# Patient Record
Sex: MALE | Race: WHITE | Employment: OTHER | URBAN - METROPOLITAN AREA
[De-identification: names, ages, dates, MRNs, and addresses within clinical notes are randomized per-mention and may not be internally consistent; named-entity substitution may affect disease eponyms.]

---

## 2017-01-12 ENCOUNTER — HOSPITAL ENCOUNTER (OUTPATIENT)
Dept: GENERAL RADIOLOGY | Age: 67
Discharge: HOME | End: 2017-01-12
Attending: FAMILY MEDICINE
Payer: COMMERCIAL

## 2017-01-12 DIAGNOSIS — M25.471 ANKLE SWELLING, RIGHT: ICD-10-CM
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
## 2017-01-12 PROCEDURE — 73610 X-RAY EXAM OF ANKLE: CPT

## 2017-03-10 ENCOUNTER — HOSPITAL ENCOUNTER (OUTPATIENT)
Dept: CT IMAGING | Age: 67
Discharge: HOME | End: 2017-03-10
Payer: COMMERCIAL

## 2017-03-10 DIAGNOSIS — N20.0 KIDNEY STONE: ICD-10-CM

## 2017-03-10 DIAGNOSIS — R31.9 HEMATURIA: ICD-10-CM

## 2017-03-10 PROCEDURE — 74176 CT ABD & PELVIS W/O CONTRAST: CPT

## 2017-07-13 ENCOUNTER — ANESTHESIA EVENT (OUTPATIENT)
Dept: ENDOSCOPY | Age: 67
End: 2017-07-13
Payer: COMMERCIAL

## 2017-07-13 RX ORDER — SODIUM CHLORIDE, SODIUM LACTATE, POTASSIUM CHLORIDE, CALCIUM CHLORIDE 600; 310; 30; 20 MG/100ML; MG/100ML; MG/100ML; MG/100ML
100 INJECTION, SOLUTION INTRAVENOUS CONTINUOUS
Status: CANCELLED | OUTPATIENT
Start: 2017-07-13

## 2017-07-13 RX ORDER — DIPHENHYDRAMINE HYDROCHLORIDE 50 MG/ML
12.5 INJECTION, SOLUTION INTRAMUSCULAR; INTRAVENOUS
Status: CANCELLED | OUTPATIENT
Start: 2017-07-13

## 2017-07-13 RX ORDER — ALBUTEROL SULFATE 0.83 MG/ML
2.5 SOLUTION RESPIRATORY (INHALATION) AS NEEDED
Status: CANCELLED | OUTPATIENT
Start: 2017-07-13

## 2017-07-13 RX ORDER — OXYCODONE HYDROCHLORIDE 5 MG/1
5 TABLET ORAL
Status: CANCELLED | OUTPATIENT
Start: 2017-07-13

## 2017-07-13 RX ORDER — HYDROMORPHONE HYDROCHLORIDE 2 MG/ML
0.5 INJECTION, SOLUTION INTRAMUSCULAR; INTRAVENOUS; SUBCUTANEOUS
Status: CANCELLED | OUTPATIENT
Start: 2017-07-13

## 2017-07-13 RX ORDER — OXYCODONE HYDROCHLORIDE 5 MG/1
10 TABLET ORAL
Status: CANCELLED | OUTPATIENT
Start: 2017-07-13

## 2017-07-13 RX ORDER — ONDANSETRON 2 MG/ML
4 INJECTION INTRAMUSCULAR; INTRAVENOUS ONCE
Status: CANCELLED | OUTPATIENT
Start: 2017-07-13 | End: 2017-07-13

## 2017-07-13 RX ORDER — NALOXONE HYDROCHLORIDE 0.4 MG/ML
0.1 INJECTION, SOLUTION INTRAMUSCULAR; INTRAVENOUS; SUBCUTANEOUS AS NEEDED
Status: CANCELLED | OUTPATIENT
Start: 2017-07-13

## 2017-07-13 NOTE — H&P
Patient:  Daria Kim  YOB: 1950   Date:                   7/14/17      This 77year old  patient was referred by Jailyn Ribeiro NP. Chief Complaint:  Patient seen at the request of Jailyn Ribeiro NP for evaluation of the following:  diarrhea    History of Present Illness:  1.  diarrhea   Mr. Sharda Laughlin is a 77year old male who presents for diarrhea. His last colonoscopy was done by Dr. Jose Luis Lyons on 12/21/10 which was normal. He states that his atrial fibrillation is currently well controlled on Tikosyn, and he is on Pradaxa. His mother had colon polyps but he denies family history of colon cancer. He reports that he has been experiencing diarrhea since February and also passed two kidney stones, one in February and one in March. He has diarrhea about 3-4 times a week and usually has only one episode daily. He will experience fecal urgency at times as well. He says that his mail order company has changed which caused some of the brands of his medications to change recently.         PAST MEDICAL/SURGICAL HISTORY  (Detailed)    Disease/disorder Onset Date Management Date Comments     Angioplasty 1995      CABG 1999    ADD       Coronary artery disease       Degenerative disc disease  celebrex     Diabetes type 2       Hyperlipidemia       Sleep apnea       Thyroid disease               PROVIDER INFORMATION AND PATIENT ADMISSION:    CURRENT MEDICATIONS  Medication Name Sig Desc   aspirin 81 mg tablet,delayed release take 1 tablet by oral route  every day   L-Lysine 500 mg tablet 1 qod   Tikosyn 500 mcg capsule take 1 capsule by oral route 2 times every day   Lotrel 5 mg-20 mg capsule take 1 capsule by oral route  every day   Lipitor 80 mg tablet take 0.5 tablet by oral route  every day   Feldene 20 mg capsule take 1 capsule by oral route  every day   Pradaxa 150 mg capsule take 1 capsule by oral route 2 times every day   Synthroid 88 mcg tablet take 1 tablet by oral route  every day   Vitamin B-12 1,000 mcg tablet Once daily   Fluticasone 50 mcg/Actuation Nasal Spray, Susp spray 1 spray by Intranasal route  every day in each nostril   Neurontin 300 mg Cap take 1 capsule (300MG)  by ORAL route 3 times every day   Omeprazole 20 mg Cap, Delayed Release take 1 capsule (20MG)  by ORAL route  every day before a meal   Tricor 145 mg Tab take 1 tablet (145MG)  by ORAL route  every day     Reviewed, no changes. Family History  (Detailed)  Relationship Family Member Name  Age at Death Condition Onset Age Cause of Death   Father  Y       Father  Y 72 Coronary artery disease  Y   Mother    Colon polyps  N     SOCIAL HISTORY  (Detailed)  Preferred language is [place]. EDUCATION/EMPLOYMENT/OCCUPATION  Employment History Status Retired Restrictions   [employer]         MARITAL STATUS/FAMILY/SOCIAL SUPPORT  Currently single. SMOKING STATUS  Use Status Type Smoking Status Usage Per Day Years Used Total Pack Years   no/never  Never smoker          REVIEW OF SYSTEMS  System Neg/Pos Details   ENMT Negative Hearing deficit. Eyes Negative Vision changes. Respiratory Negative Dyspnea. Cardio Negative Chest pain and irregular heartbeat/palpitations.  Negative Dysuria and urinary frequency. Endocrine Negative Cold intolerance and heat intolerance. Neuro Negative Confusion/disorientation, dizziness, headache and seizures. Hema/Lymph Negative Easy bleeding. VITAL SIGNS  BP mm/Hg Pulse/min Resp/min Temp F Height (Total in.) Weight (lbs.) Weight (oz.) BMI   162/84 64   69.00 173.00  25.55     PHYSICAL EXAM:  Weight has remained stable. Patient is healthy-appearing in no acute  distress. Further exam deferred at this time. Completed Orders (this encounter)  Order Details   Dietary management education, guidance, and counseling      Assessment/Plan  # Detail Type Description    1. Assessment Body mass index (BMI) 25.0-25.9, adult (C12.02).     Plan Orders Today's instructions / counseling include(s) Dietary management education, guidance, and counseling. 2. Assessment Functional diarrhea (K59.1). 3. Assessment Atrial fibrillation, unspecified type (I48.91). 4. Assessment Type 2 diabetes mellitus without complications (H13.8). 5. Assessment Coronary artery disease involving coronary bypass graft of native heart without angina pectoris (I25.810). 6. Assessment Colon cancer screening (Z12.11). Provider Plan Patient is complex from a  cardiac standpoint. He is on Tikosyn which will need to be continued. We can hold the Pradaxa. When he arrives for the procedure, we will check a QT interval on the rhythm strip. We will be prepared for [place] Point[place] in case an issue arises. We will obtain a stool for c. diff and giardia. We will hold anticoagulation for the procedure and biopsies will be taken. Further recommendations can be made following procedure. I suspect he has a post infectious diarrhea. We may use Enteragam in the future pending results of the procedure and the biopsies. Patient is schedule to see Dr. Toby Palm later this week so we will have a QT interval available, and he will be made aware of our plans. Plan Orders Further diagnostic evaluations ordered today include(s) Colonoscopy to be performed. Counseling / Educational Factors:  Items reviewed / discussed during today's visit: prior testing / procedures were reviewed; testing was discussed in detail; old records were reviewed; prescription medication usage was discussed; advised to follow up if symptoms worsen or do not completely resolve; symptomatic care was discussed; advised to continue current treatment. Patient expressed understanding of instructions. PT SEEN AND EXAMINED AND PLAN DISCUSSED AND IMPLEMENTED.   Dorota Ramirez MD

## 2017-07-14 ENCOUNTER — HOSPITAL ENCOUNTER (OUTPATIENT)
Age: 67
Setting detail: OUTPATIENT SURGERY
Discharge: HOME | End: 2017-07-14
Attending: INTERNAL MEDICINE | Admitting: INTERNAL MEDICINE
Payer: COMMERCIAL

## 2017-07-14 ENCOUNTER — ANESTHESIA (OUTPATIENT)
Dept: ENDOSCOPY | Age: 67
End: 2017-07-14
Payer: COMMERCIAL

## 2017-07-14 VITALS
TEMPERATURE: 98.6 F | HEIGHT: 69 IN | RESPIRATION RATE: 12 BRPM | WEIGHT: 170 LBS | DIASTOLIC BLOOD PRESSURE: 73 MMHG | HEART RATE: 62 BPM | BODY MASS INDEX: 25.18 KG/M2 | SYSTOLIC BLOOD PRESSURE: 152 MMHG | OXYGEN SATURATION: 99 %

## 2017-07-14 LAB
ATRIAL RATE: 300 BPM
CALCULATED R AXIS, ECG10: 50 DEGREES
CALCULATED T AXIS, ECG11: 85 DEGREES
DIAGNOSIS, 93000: NORMAL
GLUCOSE BLD STRIP.AUTO-MCNC: 91 MG/DL (ref 65–100)
MAGNESIUM SERPL-MCNC: 1.6 MG/DL (ref 1.8–2.4)
POTASSIUM SERPL-SCNC: 3.3 MMOL/L (ref 3.5–5.1)
Q-T INTERVAL, ECG07: 436 MS
QRS DURATION, ECG06: 92 MS
QTC CALCULATION (BEZET), ECG08: 467 MS
VENTRICULAR RATE, ECG03: 69 BPM

## 2017-07-14 PROCEDURE — 88305 TISSUE EXAM BY PATHOLOGIST: CPT | Performed by: INTERNAL MEDICINE

## 2017-07-14 PROCEDURE — 82962 GLUCOSE BLOOD TEST: CPT

## 2017-07-14 PROCEDURE — 77030009426 HC FCPS BIOP ENDOSC BSC -B: Performed by: INTERNAL MEDICINE

## 2017-07-14 PROCEDURE — 76040000025: Performed by: INTERNAL MEDICINE

## 2017-07-14 PROCEDURE — 84132 ASSAY OF SERUM POTASSIUM: CPT | Performed by: INTERNAL MEDICINE

## 2017-07-14 PROCEDURE — 74011250637 HC RX REV CODE- 250/637: Performed by: INTERNAL MEDICINE

## 2017-07-14 PROCEDURE — 93005 ELECTROCARDIOGRAM TRACING: CPT | Performed by: INTERNAL MEDICINE

## 2017-07-14 PROCEDURE — 74011000250 HC RX REV CODE- 250

## 2017-07-14 PROCEDURE — 83735 ASSAY OF MAGNESIUM: CPT | Performed by: INTERNAL MEDICINE

## 2017-07-14 PROCEDURE — 74011250636 HC RX REV CODE- 250/636: Performed by: INTERNAL MEDICINE

## 2017-07-14 PROCEDURE — 76060000031 HC ANESTHESIA FIRST 0.5 HR: Performed by: INTERNAL MEDICINE

## 2017-07-14 PROCEDURE — 74011250636 HC RX REV CODE- 250/636

## 2017-07-14 PROCEDURE — 74011250636 HC RX REV CODE- 250/636: Performed by: ANESTHESIOLOGY

## 2017-07-14 PROCEDURE — 36415 COLL VENOUS BLD VENIPUNCTURE: CPT | Performed by: INTERNAL MEDICINE

## 2017-07-14 RX ORDER — FENTANYL CITRATE 50 UG/ML
100 INJECTION, SOLUTION INTRAMUSCULAR; INTRAVENOUS ONCE
Status: DISCONTINUED | OUTPATIENT
Start: 2017-07-14 | End: 2017-07-14 | Stop reason: HOSPADM

## 2017-07-14 RX ORDER — MIDAZOLAM HYDROCHLORIDE 1 MG/ML
2 INJECTION, SOLUTION INTRAMUSCULAR; INTRAVENOUS
Status: DISCONTINUED | OUTPATIENT
Start: 2017-07-14 | End: 2017-07-14 | Stop reason: HOSPADM

## 2017-07-14 RX ORDER — PROPOFOL 10 MG/ML
INJECTION, EMULSION INTRAVENOUS AS NEEDED
Status: DISCONTINUED | OUTPATIENT
Start: 2017-07-14 | End: 2017-07-14 | Stop reason: HOSPADM

## 2017-07-14 RX ORDER — POTASSIUM CHLORIDE 14.9 MG/ML
20 INJECTION INTRAVENOUS
Status: DISCONTINUED | OUTPATIENT
Start: 2017-07-14 | End: 2017-07-14

## 2017-07-14 RX ORDER — MAGNESIUM SULFATE HEPTAHYDRATE 40 MG/ML
2 INJECTION, SOLUTION INTRAVENOUS ONCE
Status: COMPLETED | OUTPATIENT
Start: 2017-07-14 | End: 2017-07-14

## 2017-07-14 RX ORDER — MIDAZOLAM HYDROCHLORIDE 1 MG/ML
2 INJECTION, SOLUTION INTRAMUSCULAR; INTRAVENOUS ONCE
Status: DISCONTINUED | OUTPATIENT
Start: 2017-07-14 | End: 2017-07-14 | Stop reason: HOSPADM

## 2017-07-14 RX ORDER — LIDOCAINE HYDROCHLORIDE 10 MG/ML
0.1 INJECTION INFILTRATION; PERINEURAL AS NEEDED
Status: DISCONTINUED | OUTPATIENT
Start: 2017-07-14 | End: 2017-07-14 | Stop reason: HOSPADM

## 2017-07-14 RX ORDER — LIDOCAINE HYDROCHLORIDE 20 MG/ML
INJECTION, SOLUTION EPIDURAL; INFILTRATION; INTRACAUDAL; PERINEURAL AS NEEDED
Status: DISCONTINUED | OUTPATIENT
Start: 2017-07-14 | End: 2017-07-14 | Stop reason: HOSPADM

## 2017-07-14 RX ORDER — SODIUM CHLORIDE, SODIUM LACTATE, POTASSIUM CHLORIDE, CALCIUM CHLORIDE 600; 310; 30; 20 MG/100ML; MG/100ML; MG/100ML; MG/100ML
100 INJECTION, SOLUTION INTRAVENOUS CONTINUOUS
Status: DISCONTINUED | OUTPATIENT
Start: 2017-07-14 | End: 2017-07-14 | Stop reason: HOSPADM

## 2017-07-14 RX ORDER — PROPOFOL 10 MG/ML
INJECTION, EMULSION INTRAVENOUS
Status: DISCONTINUED | OUTPATIENT
Start: 2017-07-14 | End: 2017-07-14 | Stop reason: HOSPADM

## 2017-07-14 RX ORDER — POTASSIUM CHLORIDE 20 MEQ/1
40 TABLET, EXTENDED RELEASE ORAL
Status: COMPLETED | OUTPATIENT
Start: 2017-07-14 | End: 2017-07-14

## 2017-07-14 RX ADMIN — PROPOFOL 50 MG: 10 INJECTION, EMULSION INTRAVENOUS at 10:47

## 2017-07-14 RX ADMIN — POTASSIUM CHLORIDE 20 MEQ: 14.9 INJECTION, SOLUTION INTRAVENOUS at 10:03

## 2017-07-14 RX ADMIN — LIDOCAINE HYDROCHLORIDE 40 MG: 20 INJECTION, SOLUTION EPIDURAL; INFILTRATION; INTRACAUDAL; PERINEURAL at 10:47

## 2017-07-14 RX ADMIN — PROPOFOL 160 MCG/KG/MIN: 10 INJECTION, EMULSION INTRAVENOUS at 10:47

## 2017-07-14 RX ADMIN — SODIUM CHLORIDE, SODIUM LACTATE, POTASSIUM CHLORIDE, AND CALCIUM CHLORIDE 100 ML/HR: 600; 310; 30; 20 INJECTION, SOLUTION INTRAVENOUS at 09:14

## 2017-07-14 RX ADMIN — MAGNESIUM SULFATE IN WATER 2 G: 40 INJECTION, SOLUTION INTRAVENOUS at 11:17

## 2017-07-14 RX ADMIN — POTASSIUM CHLORIDE 40 MEQ: 20 TABLET, EXTENDED RELEASE ORAL at 11:18

## 2017-07-14 RX ADMIN — PROPOFOL 30 MG: 10 INJECTION, EMULSION INTRAVENOUS at 10:48

## 2017-07-14 NOTE — ROUTINE PROCESS
Discharge instructions given to son in law. IV magnesium given and pt tolerated well. PO Potassium was given and tolerated well. IV discontinued. Pt has passed flatus and tolerating liquids well. Pt ready for discharge.

## 2017-07-14 NOTE — ANESTHESIA POSTPROCEDURE EVALUATION
Post-Anesthesia Evaluation and Assessment    Patient: Jennifer Bernard MRN: [id number]  SSN: [id number]    YOB: 1950  Age: 77 y.o. Sex: male       Cardiovascular Function/Vital Signs  Visit Vitals    /82    Pulse 71    Temp 37 °C (98.6 °F)    Resp 14    Ht 5' 9\" (1.753 m)    Wt 77.1 kg (170 lb)    SpO2 99%    BMI 25.1 kg/m2       Patient is status post total IV anesthesia anesthesia for Procedure(s):  COLONOSCOPY  COLON BIOPSY. Nausea/Vomiting: None    Postoperative hydration reviewed and adequate. Pain:  Pain Scale 1: Numeric (0 - 10) (07/14/17 1156)  Pain Intensity 1: 0 (07/14/17 1156)   Managed    Neurological Status: At baseline    Mental Status and Level of Consciousness: Arousable    Pulmonary Status:   O2 Device: Room air (07/14/17 1156)   Adequate oxygenation and airway patent    Complications related to anesthesia: None    Post-anesthesia assessment completed.  No concerns    Signed By: Brian Garcia MD     July 14, 2017

## 2017-07-14 NOTE — DISCHARGE INSTRUCTIONS
Gastrointestinal Colonoscopy/Flexible Sigmoidoscopy - Lower Exam Discharge Instructions  1. Call Dr. Cheryl Pugh for any problems or questions. 2. Contact the doctors office for follow up appointment as directed  3. Medication may cause drowsiness for several hours, therefore, do not drive or operate machinery for remainder of the day. 4. No alcohol today. 5. Ordinarily, you may resume regular diet and activity after exam unless otherwise specified by your physician. 6. Because of air put into your colon during exam, you may experience some abdominal distension, relieved by the passage of gas, for several hours. 7. Contact your physician if you have any of the following:  a. Excessive amount of bleeding - large amount when having a bowel movement. Occasional specks of blood with bowel movement would not be unusual.  b. Severe abdominal pain  c. Fever or Chills  Any additional instructions:  Check pathology on biopsies. Next colonoscopy in 10 years. Follow up in one month. Instructions given to Eduar Palafox and other family members.   Instructions given by:  Dr. Cheryl Pugh

## 2017-07-14 NOTE — IP AVS SNAPSHOT
[address] [facility] 
[phone] Patient: Lucille Nair MRN: [id number] QFP:1/9/8003 You are allergic to the following Allergen Reactions Other Medication Anaphylaxis Combid Augmentin (Amoxicillin-Pot Clavulanate) Diarrhea Recent Documentation Height Weight BMI Smoking Status 1.753 m 77.1 kg 25.1 kg/m2 Never Smoker Emergency Contacts Name Discharge Info Relation Home Work Mobile Doug Pat  Other Relative [6] [phone] Pati Europe  Daughter [21] [phone] Evelia Adam  Daughter [21] [phone] About your hospitalization You were admitted on:  July 14, 2017 You last received care in the:  [facility] You were discharged on:  July 14, 2017 Unit phone number:  [phone] Why you were hospitalized Your primary diagnosis was:  Not on File Providers Seen During Your Hospitalizations Provider Role Specialty Primary office phone Christia Hodgkins, MD Attending Provider Gastroenterology [phone] Your Primary Care Physician (PCP) Primary Care Physician Office Phone Office [facility]ax [facility] [phone] Follow-up Information None Your Appointments Tuesday August 01, 2017 10:00 AM EDT Return appointment with [address] Reaves ([address] [facility] [address] [place] [address] [place] [id number] [phone] Current Discharge Medication List  
  
ASK your doctor about these medications Dose & Instructions Dispensing Information Comments Morning Noon Evening Bedtime  
 amLODIPine-benazepril 5-20 mg per capsule Commonly known as:  Jackquline Cox Your last dose was: Your next dose is: Take 1 capsule by mouth  daily Quantity:  90 Cap Refills:  1  
     
   
   
   
  
 aspirin delayed-release 81 mg tablet Your last dose was: Your next dose is: Take  by mouth every morning. Refills:  0  
     
   
   
   
  
 atorvastatin 80 mg tablet Commonly known as:  LIPITOR Your last dose was: Your next dose is: Take 1 tablet by mouth  daily Quantity:  90 Tab Refills:  1  
     
   
   
   
  
 cefdinir 300 mg capsule Commonly known as:  OMNICEF Your last dose was: Your next dose is:    
   
   
 Dose:  300 mg Take 1 Cap by mouth two (2) times a day. Quantity:  20 Cap Refills:  0  
     
   
   
   
  
 dabigatran etexilate 150 mg capsule Commonly known as:  PRADAXA Your last dose was: Your next dose is:    
   
   
 Dose:  150 mg Take 1 Cap by mouth every twelve (12) hours. Quantity:  180 Cap Refills:  3  
     
   
   
   
  
 dofetilide 500 mcg capsule Commonly known as:  Shardahlia Armando Your last dose was: Your next dose is: Take 1 capsule by mouth  twice daily Quantity:  180 Cap Refills:  3  
     
   
   
   
  
 fenofibrate 160 mg tablet Commonly known as:  LOFIBRA Your last dose was: Your next dose is:    
   
   
 Dose:  160 mg Take 1 Tab by mouth daily. Quantity:  180 Tab Refills:  1  
     
   
   
   
  
 fluticasone 50 mcg/actuation nasal spray Commonly known as:  Jorge Ortiz Your last dose was: Your next dose is:    
   
   
 Dose:  1 Spray 1 [place] by Both Nostrils route daily. Quantity:  3 Bottle Refills:  3  
     
   
   
   
  
 gabapentin 300 mg capsule Commonly known as:  NEURONTIN Your last dose was: Your next dose is: Take 1 capsule by mouth 3  times a day Quantity:  270 Cap Refills:  1  
     
   
   
   
  
 glucose blood VI test strips strip Commonly known as:  blood glucose test  
   
Your last dose was: Your next dose is: One Touch Verio IQ. Monitor BG once daily. Quantity:  100 Strip Refills:  3  
     
   
   
   
  
 lancets 30 gauge Misc Commonly known as:  Hurtis Catrina LANCETS Your last dose was: Your next dose is:    
   
   
 Test Blood Sugars once daily; 250.00 Quantity:  1 Package Refills:  3  
     
   
   
   
  
 levothyroxine 88 mcg tablet Commonly known as:  SYNTHROID Your last dose was: Your next dose is: Take 1 tablet by mouth  daily before breakfast  
 Quantity:  90 Tab Refills:  1  
     
   
   
   
  
 lysine 500 mg Tab tablet Commonly known as:  L-LYSINE Your last dose was: Your next dose is:    
   
   
 Dose:  250 mg Take 250 mg by mouth every other day. Refills:  0  
     
   
   
   
  
 omeprazole 20 mg capsule Commonly known as:  PRILOSEC Your last dose was: Your next dose is:    
   
   
 Dose:  20 mg Take 1 Cap by mouth daily. Quantity:  90 Cap Refills:  2  
     
   
   
   
  
 piroxicam 20 mg capsule Commonly known as:  Guzman Snide Your last dose was: Your next dose is: Take 1 capsule by mouth  daily Quantity:  90 Cap Refills:  1 VITAMIN B-12 1,000 mcg tablet Generic drug:  cyanocobalamin Your last dose was: Your next dose is:    
   
   
 Dose:  1000 mcg Take 1,000 mcg by mouth every morning. Refills:  0 Discharge Instructions Gastrointestinal Colonoscopy/Flexible Sigmoidoscopy - Lower Exam Discharge Instructions 1. Call Dr. Jesica Jimenez for any problems or questions. 2. Contact the doctors office for follow up appointment as directed 3. Medication may cause drowsiness for several hours, therefore, do not drive or operate machinery for remainder of the day. 4. No alcohol today. 5. Ordinarily, you may resume regular diet and activity after exam unless otherwise specified by your physician. 6. Because of air put into your colon during exam, you may experience some abdominal distension, relieved by the passage of gas, for several hours. 7. Contact your physician if you have any of the following: 
a. Excessive amount of bleeding  large amount when having a bowel movement. Occasional specks of blood with bowel movement would not be unusual. 
b. Severe abdominal pain 
c. Fever or Chills Any additional instructions:  Check pathology on biopsies. Next colonoscopy in 10 years. Follow up in one month. Instructions given to Isabela Bain and other family members. Instructions given by:  Dr. Raf Guillen Discharge Orders None Introducing [address]! Dear Bhakti Figueroa: Thank you for requesting a [employer] account. Our records indicate that you already have an active [employer] account. You can access your account anytime at [email or web address] Did you know that you can access your hospital and ER discharge instructions at any time in [employer]? You can also review all of your test results from your hospital stay or ER visit. Additional Information If you have questions, please visit the Frequently Asked Questions section of the [employer] website at [email or web address] Remember, [employer] is NOT to be used for urgent needs. For medical emergencies, dial 911. Now available from your iPhone and Android! General Information Please provide this summary of care documentation to your next provider. Patient Signature:  ____________________________________________________________ Date:  ____________________________________________________________  
  
Linda Soliz Provider Signature:  ____________________________________________________________ Date:  ____________________________________________________________

## 2017-07-14 NOTE — PROCEDURES
COLONOSCOPY    DATE of PROCEDURE: 7/14/2017    PT NAME: Wu Guallpa     [id number]    MEDICATION:   MAC    INSTRUMENT: DataRankHQ 190 L    SPECIAL PROCEDURE: bx  PREP: good  BLOOD LOSS- 0 or min. SPEC- yes  IMPLANTS- NONE  PROCEDURE: standard colonoscopy to terminal ileum w/ retroflex view of right colon    ASSESSMENT:  1. Normal - random bxs  2. Moderate Internal Hemorrhoids    PLAN:  1. F/U 1 month  2. Check path  3.  Surveillance 10 yrs    Hubert Baker MD

## 2017-07-14 NOTE — ANESTHESIA PREPROCEDURE EVALUATION
Anesthetic History               Review of Systems / Medical History  Patient summary reviewed, nursing notes reviewed and pertinent labs reviewed    Pulmonary        Sleep apnea: CPAP           Neuro/Psych              Cardiovascular    Hypertension: well controlled        Dysrhythmias : atrial fibrillation      Exercise tolerance: >4 METS     GI/Hepatic/Renal     GERD: well controlled           Endo/Other    Diabetes: well controlled, type 2  Hypothyroidism: well controlled       Other Findings              Physical Exam    Airway  Mallampati: III  TM Distance: 4 - 6 cm  Neck ROM: normal range of motion   Mouth opening: Diminished (comment)     Cardiovascular  Regular rate and rhythm,  S1 and S2 normal,  no murmur, click, rub, or gallop             Dental  No notable dental hx       Pulmonary  Breath sounds clear to auscultation               Abdominal         Other Findings            Anesthetic Plan    ASA: 3  Anesthesia type: total IV anesthesia          Induction: Intravenous  Anesthetic plan and risks discussed with: Patient

## 2017-10-02 ENCOUNTER — HOSPITAL ENCOUNTER (OUTPATIENT)
Dept: LAB | Age: 67
Discharge: HOME | End: 2017-10-02
Payer: COMMERCIAL

## 2017-10-02 DIAGNOSIS — E78.00 HYPERCHOLESTEROLEMIA: Chronic | ICD-10-CM

## 2017-10-02 LAB
CHOLEST SERPL-MCNC: 136 MG/DL
HDLC SERPL-MCNC: 55 MG/DL (ref 40–60)
HDLC SERPL: 2.5 {RATIO}
LDLC SERPL CALC-MCNC: 66.6 MG/DL
LIPID PROFILE,FLP: NORMAL
TRIGL SERPL-MCNC: 72 MG/DL (ref 35–150)
VLDLC SERPL CALC-MCNC: 14.4 MG/DL (ref 6–23)

## 2017-10-02 PROCEDURE — 80061 LIPID PANEL: CPT | Performed by: INTERNAL MEDICINE

## 2017-10-02 PROCEDURE — 36415 COLL VENOUS BLD VENIPUNCTURE: CPT | Performed by: INTERNAL MEDICINE

## 2018-01-19 PROBLEM — E11.21 TYPE 2 DIABETES MELLITUS WITH NEPHROPATHY (HCC): Status: ACTIVE | Noted: 2018-01-19

## 2019-06-21 RX ORDER — ATORVASTATIN CALCIUM 80 MG/1
80 TABLET, FILM COATED ORAL DAILY
COMMUNITY
End: 2020-01-20 | Stop reason: SDUPTHER

## 2019-06-21 NOTE — PROGRESS NOTES
Called to pre-assess for atrial fib ablation with Dr Karla Bustos , Scheduled 6/25/19. No answer & message left.

## 2019-06-22 NOTE — PROGRESS NOTES
Patient pre-assessment complete for Atrial fib ablation with Dr Darren Donovan scheduled for 19 at 7:30am, arrival time 6am. Patient verified using . Patient instructed to bring all home medications in labeled bottles on the day of procedure. NPO status reinforced. Patient instructed to HOLD pradaxa, glimperide & lotrel the am of procedure. Instructed they can take all other medications excluding vitamins & supplements. Patient verbalizes understanding of all instructions & denies any questions at this time.

## 2019-06-24 ENCOUNTER — ANESTHESIA EVENT (OUTPATIENT)
Dept: SURGERY | Age: 69
End: 2019-06-24
Payer: MEDICARE

## 2019-06-25 ENCOUNTER — APPOINTMENT (OUTPATIENT)
Dept: CARDIAC CATH/INVASIVE PROCEDURES | Age: 69
End: 2019-06-25
Payer: MEDICARE

## 2019-06-25 ENCOUNTER — HOSPITAL ENCOUNTER (OUTPATIENT)
Age: 69
Setting detail: OBSERVATION
Discharge: HOME | End: 2019-06-26
Attending: INTERNAL MEDICINE | Admitting: INTERNAL MEDICINE
Payer: MEDICARE

## 2019-06-25 ENCOUNTER — ANESTHESIA (OUTPATIENT)
Dept: SURGERY | Age: 69
End: 2019-06-25
Payer: MEDICARE

## 2019-06-25 ENCOUNTER — APPOINTMENT (OUTPATIENT)
Dept: GENERAL RADIOLOGY | Age: 69
End: 2019-06-25
Attending: INTERNAL MEDICINE
Payer: MEDICARE

## 2019-06-25 LAB
ACT BLD: 312 SECS (ref 70–128)
ACT BLD: 312 SECS (ref 70–128)
ACT BLD: 340 SECS (ref 70–128)
ANION GAP SERPL CALC-SCNC: 7 MMOL/L (ref 7–16)
ATRIAL RATE: 220 BPM
ATRIAL RATE: 64 BPM
BUN SERPL-MCNC: 20 MG/DL (ref 8–23)
CALCIUM SERPL-MCNC: 9.2 MG/DL (ref 8.3–10.4)
CALCULATED P AXIS, ECG09: 63 DEGREES
CALCULATED R AXIS, ECG10: 59 DEGREES
CALCULATED R AXIS, ECG10: 94 DEGREES
CALCULATED T AXIS, ECG11: 104 DEGREES
CALCULATED T AXIS, ECG11: 67 DEGREES
CHLORIDE SERPL-SCNC: 111 MMOL/L (ref 98–107)
CO2 SERPL-SCNC: 27 MMOL/L (ref 21–32)
CREAT SERPL-MCNC: 1.39 MG/DL (ref 0.8–1.5)
DIAGNOSIS, 93000: NORMAL
DIAGNOSIS, 93000: NORMAL
ERYTHROCYTE [DISTWIDTH] IN BLOOD BY AUTOMATED COUNT: 13.2 % (ref 11.9–14.6)
GLUCOSE SERPL-MCNC: 99 MG/DL (ref 65–100)
HCT VFR BLD AUTO: 43.4 % (ref 41.1–50.3)
HGB BLD-MCNC: 13.9 G/DL (ref 13.6–17.2)
INR PPP: 1.1
MAGNESIUM SERPL-MCNC: 1.8 MG/DL (ref 1.8–2.4)
MCH RBC QN AUTO: 29.3 PG (ref 26.1–32.9)
MCHC RBC AUTO-ENTMCNC: 32 G/DL (ref 31.4–35)
MCV RBC AUTO: 91.6 FL (ref 79.6–97.8)
NRBC # BLD: 0 K/UL (ref 0–0.2)
P-R INTERVAL, ECG05: 212 MS
PLATELET # BLD AUTO: 241 K/UL (ref 150–450)
PMV BLD AUTO: 10.9 FL (ref 9.4–12.3)
POTASSIUM SERPL-SCNC: 3.8 MMOL/L (ref 3.5–5.1)
PROTHROMBIN TIME: 14.5 SEC (ref 11.7–14.5)
Q-T INTERVAL, ECG07: 452 MS
Q-T INTERVAL, ECG07: 518 MS
QRS DURATION, ECG06: 88 MS
QRS DURATION, ECG06: 96 MS
QTC CALCULATION (BEZET), ECG08: 436 MS
QTC CALCULATION (BEZET), ECG08: 534 MS
RBC # BLD AUTO: 4.74 M/UL (ref 4.23–5.6)
SODIUM SERPL-SCNC: 145 MMOL/L (ref 136–145)
VENTRICULAR RATE, ECG03: 56 BPM
VENTRICULAR RATE, ECG03: 64 BPM
WBC # BLD AUTO: 4.6 K/UL (ref 4.3–11.1)

## 2019-06-25 PROCEDURE — 80048 BASIC METABOLIC PNL TOTAL CA: CPT

## 2019-06-25 PROCEDURE — 77030013292 HC BOWL MX PRSM J&J -A: Performed by: ANESTHESIOLOGY

## 2019-06-25 PROCEDURE — C1766 INTRO/SHEATH,STRBLE,NON-PEEL: HCPCS

## 2019-06-25 PROCEDURE — 93656 COMPRE EP EVAL ABLTJ ATR FIB: CPT

## 2019-06-25 PROCEDURE — 85027 COMPLETE CBC AUTOMATED: CPT

## 2019-06-25 PROCEDURE — C1732 CATH, EP, DIAG/ABL, 3D/VECT: HCPCS

## 2019-06-25 PROCEDURE — 99218 HC RM OBSERVATION: CPT

## 2019-06-25 PROCEDURE — 76210000016 HC OR PH I REC 1 TO 1.5 HR: Performed by: INTERNAL MEDICINE

## 2019-06-25 PROCEDURE — 77030019908 HC STETH ESOPH SIMS -A: Performed by: ANESTHESIOLOGY

## 2019-06-25 PROCEDURE — 93655 ICAR CATH ABLTJ DSCRT ARRHYT: CPT

## 2019-06-25 PROCEDURE — 77030027107 HC PTCH EXT REF CARTO3 J&J -F

## 2019-06-25 PROCEDURE — 77030013794 HC KT TRNSDUC BLD EDWD -B: Performed by: ANESTHESIOLOGY

## 2019-06-25 PROCEDURE — 85610 PROTHROMBIN TIME: CPT

## 2019-06-25 PROCEDURE — 85347 COAGULATION TIME ACTIVATED: CPT

## 2019-06-25 PROCEDURE — 74011000250 HC RX REV CODE- 250

## 2019-06-25 PROCEDURE — 74011250636 HC RX REV CODE- 250/636

## 2019-06-25 PROCEDURE — 77030020506 HC NDL TRNSPTL NRG BAYL -F

## 2019-06-25 PROCEDURE — 93623 PRGRMD STIMJ&PACG IV RX NFS: CPT

## 2019-06-25 PROCEDURE — 77030039425 HC BLD LARYNG TRULITE DISP TELE -A: Performed by: ANESTHESIOLOGY

## 2019-06-25 PROCEDURE — C1733 CATH, EP, OTHR THAN COOL-TIP: HCPCS

## 2019-06-25 PROCEDURE — 71045 X-RAY EXAM CHEST 1 VIEW: CPT

## 2019-06-25 PROCEDURE — 93312 ECHO TRANSESOPHAGEAL: CPT

## 2019-06-25 PROCEDURE — 94760 N-INVAS EAR/PLS OXIMETRY 1: CPT

## 2019-06-25 PROCEDURE — C1894 INTRO/SHEATH, NON-LASER: HCPCS

## 2019-06-25 PROCEDURE — 74011250636 HC RX REV CODE- 250/636: Performed by: INTERNAL MEDICINE

## 2019-06-25 PROCEDURE — C1759 CATH, INTRA ECHOCARDIOGRAPHY: HCPCS

## 2019-06-25 PROCEDURE — 92960 CARDIOVERSION ELECTRIC EXT: CPT

## 2019-06-25 PROCEDURE — 77030037088 HC TUBE ENDOTRACH ORAL NSL COVD-A: Performed by: ANESTHESIOLOGY

## 2019-06-25 PROCEDURE — C1893 INTRO/SHEATH, FIXED,NON-PEEL: HCPCS

## 2019-06-25 PROCEDURE — 93613 INTRACARDIAC EPHYS 3D MAPG: CPT

## 2019-06-25 PROCEDURE — 83735 ASSAY OF MAGNESIUM: CPT

## 2019-06-25 PROCEDURE — 74011250637 HC RX REV CODE- 250/637: Performed by: NURSE PRACTITIONER

## 2019-06-25 PROCEDURE — 93662 INTRACARDIAC ECG (ICE): CPT

## 2019-06-25 PROCEDURE — 77010033678 HC OXYGEN DAILY

## 2019-06-25 PROCEDURE — 93622 COMP EP EVAL L VENTR PAC&REC: CPT

## 2019-06-25 PROCEDURE — 74011250637 HC RX REV CODE- 250/637: Performed by: INTERNAL MEDICINE

## 2019-06-25 PROCEDURE — 93005 ELECTROCARDIOGRAM TRACING: CPT | Performed by: INTERNAL MEDICINE

## 2019-06-25 PROCEDURE — 76060000039 HC ANESTHESIA 4 TO 4.5 HR: Performed by: INTERNAL MEDICINE

## 2019-06-25 PROCEDURE — 77030005401 HC CATH RAD ARRO -A: Performed by: ANESTHESIOLOGY

## 2019-06-25 PROCEDURE — 77030035291 HC TBNG PMP SMARTABLATE J&J -B

## 2019-06-25 PROCEDURE — 74011250636 HC RX REV CODE- 250/636: Performed by: ANESTHESIOLOGY

## 2019-06-25 PROCEDURE — 77030013687 HC GD NDL BARD -B

## 2019-06-25 RX ORDER — HEPARIN SODIUM 1000 [USP'U]/ML
INJECTION, SOLUTION INTRAVENOUS; SUBCUTANEOUS AS NEEDED
Status: DISCONTINUED | OUTPATIENT
Start: 2019-06-25 | End: 2019-06-25 | Stop reason: HOSPADM

## 2019-06-25 RX ORDER — PANTOPRAZOLE SODIUM 40 MG/1
40 TABLET, DELAYED RELEASE ORAL
Status: DISCONTINUED | OUTPATIENT
Start: 2019-06-25 | End: 2019-06-26 | Stop reason: HOSPADM

## 2019-06-25 RX ORDER — NEOSTIGMINE METHYLSULFATE 1 MG/ML
INJECTION INTRAVENOUS AS NEEDED
Status: DISCONTINUED | OUTPATIENT
Start: 2019-06-25 | End: 2019-06-25 | Stop reason: HOSPADM

## 2019-06-25 RX ORDER — SODIUM CHLORIDE 0.9 % (FLUSH) 0.9 %
5-40 SYRINGE (ML) INJECTION AS NEEDED
Status: DISCONTINUED | OUTPATIENT
Start: 2019-06-25 | End: 2019-06-26 | Stop reason: HOSPADM

## 2019-06-25 RX ORDER — MIDAZOLAM HYDROCHLORIDE 1 MG/ML
2 INJECTION, SOLUTION INTRAMUSCULAR; INTRAVENOUS
Status: ACTIVE | OUTPATIENT
Start: 2019-06-25 | End: 2019-06-26

## 2019-06-25 RX ORDER — ATORVASTATIN CALCIUM 80 MG/1
80 TABLET, FILM COATED ORAL DAILY
Status: DISCONTINUED | OUTPATIENT
Start: 2019-06-26 | End: 2019-06-26 | Stop reason: HOSPADM

## 2019-06-25 RX ORDER — SODIUM CHLORIDE, SODIUM LACTATE, POTASSIUM CHLORIDE, CALCIUM CHLORIDE 600; 310; 30; 20 MG/100ML; MG/100ML; MG/100ML; MG/100ML
75 INJECTION, SOLUTION INTRAVENOUS CONTINUOUS
Status: DISPENSED | OUTPATIENT
Start: 2019-06-25 | End: 2019-06-26

## 2019-06-25 RX ORDER — SUCRALFATE 1 G/1
1 TABLET ORAL
Status: DISCONTINUED | OUTPATIENT
Start: 2019-06-25 | End: 2019-06-26 | Stop reason: HOSPADM

## 2019-06-25 RX ORDER — PANTOPRAZOLE SODIUM 40 MG/1
40 TABLET, DELAYED RELEASE ORAL
Qty: 60 TAB | Refills: 0 | Status: SHIPPED | OUTPATIENT
Start: 2019-06-26 | End: 2019-10-14

## 2019-06-25 RX ORDER — SODIUM CHLORIDE, SODIUM LACTATE, POTASSIUM CHLORIDE, CALCIUM CHLORIDE 600; 310; 30; 20 MG/100ML; MG/100ML; MG/100ML; MG/100ML
75 INJECTION, SOLUTION INTRAVENOUS CONTINUOUS
Status: DISCONTINUED | OUTPATIENT
Start: 2019-06-25 | End: 2019-06-25 | Stop reason: SDUPTHER

## 2019-06-25 RX ORDER — AMLODIPINE AND BENAZEPRIL HYDROCHLORIDE 5; 20 MG/1; MG/1
1 CAPSULE ORAL DAILY
Status: DISCONTINUED | OUTPATIENT
Start: 2019-06-26 | End: 2019-06-26 | Stop reason: HOSPADM

## 2019-06-25 RX ORDER — PROTAMINE SULFATE 10 MG/ML
INJECTION, SOLUTION INTRAVENOUS AS NEEDED
Status: DISCONTINUED | OUTPATIENT
Start: 2019-06-25 | End: 2019-06-25 | Stop reason: HOSPADM

## 2019-06-25 RX ORDER — LIDOCAINE HYDROCHLORIDE 10 MG/ML
0.3 INJECTION INFILTRATION; PERINEURAL ONCE
Status: DISPENSED | OUTPATIENT
Start: 2019-06-25 | End: 2019-06-25

## 2019-06-25 RX ORDER — FENTANYL CITRATE 50 UG/ML
INJECTION, SOLUTION INTRAMUSCULAR; INTRAVENOUS AS NEEDED
Status: DISCONTINUED | OUTPATIENT
Start: 2019-06-25 | End: 2019-06-25 | Stop reason: HOSPADM

## 2019-06-25 RX ORDER — LIDOCAINE HYDROCHLORIDE 10 MG/ML
0.1 INJECTION INFILTRATION; PERINEURAL AS NEEDED
Status: DISCONTINUED | OUTPATIENT
Start: 2019-06-25 | End: 2019-06-26 | Stop reason: HOSPADM

## 2019-06-25 RX ORDER — HYDROCODONE BITARTRATE AND ACETAMINOPHEN 5; 325 MG/1; MG/1
1 TABLET ORAL
Status: DISCONTINUED | OUTPATIENT
Start: 2019-06-25 | End: 2019-06-26 | Stop reason: HOSPADM

## 2019-06-25 RX ORDER — PIROXICAM 20 MG/1
20 CAPSULE ORAL DAILY
Status: DISCONTINUED | OUTPATIENT
Start: 2019-06-26 | End: 2019-06-26 | Stop reason: HOSPADM

## 2019-06-25 RX ORDER — ACETAMINOPHEN 325 MG/1
650 TABLET ORAL
Status: DISCONTINUED | OUTPATIENT
Start: 2019-06-25 | End: 2019-06-26 | Stop reason: HOSPADM

## 2019-06-25 RX ORDER — SODIUM CHLORIDE 0.9 % (FLUSH) 0.9 %
5-40 SYRINGE (ML) INJECTION EVERY 8 HOURS
Status: DISCONTINUED | OUTPATIENT
Start: 2019-06-25 | End: 2019-06-26 | Stop reason: HOSPADM

## 2019-06-25 RX ORDER — SODIUM CHLORIDE, SODIUM LACTATE, POTASSIUM CHLORIDE, CALCIUM CHLORIDE 600; 310; 30; 20 MG/100ML; MG/100ML; MG/100ML; MG/100ML
100 INJECTION, SOLUTION INTRAVENOUS CONTINUOUS
Status: DISCONTINUED | OUTPATIENT
Start: 2019-06-25 | End: 2019-06-25 | Stop reason: SDUPTHER

## 2019-06-25 RX ORDER — NALOXONE HYDROCHLORIDE 0.4 MG/ML
0.1 INJECTION, SOLUTION INTRAMUSCULAR; INTRAVENOUS; SUBCUTANEOUS
Status: DISCONTINUED | OUTPATIENT
Start: 2019-06-25 | End: 2019-06-25 | Stop reason: HOSPADM

## 2019-06-25 RX ORDER — OXYCODONE HYDROCHLORIDE 5 MG/1
5 TABLET ORAL
Status: DISCONTINUED | OUTPATIENT
Start: 2019-06-25 | End: 2019-06-25 | Stop reason: SDUPTHER

## 2019-06-25 RX ORDER — ATORVASTATIN CALCIUM 40 MG/1
80 TABLET, FILM COATED ORAL DAILY
Status: DISCONTINUED | OUTPATIENT
Start: 2019-06-26 | End: 2019-06-25

## 2019-06-25 RX ORDER — GLYCOPYRROLATE 0.2 MG/ML
INJECTION INTRAMUSCULAR; INTRAVENOUS AS NEEDED
Status: DISCONTINUED | OUTPATIENT
Start: 2019-06-25 | End: 2019-06-25 | Stop reason: HOSPADM

## 2019-06-25 RX ORDER — OXYCODONE HYDROCHLORIDE 5 MG/1
10 TABLET ORAL
Status: DISCONTINUED | OUTPATIENT
Start: 2019-06-25 | End: 2019-06-25 | Stop reason: SDUPTHER

## 2019-06-25 RX ORDER — FLUMAZENIL 0.1 MG/ML
0.2 INJECTION INTRAVENOUS AS NEEDED
Status: DISCONTINUED | OUTPATIENT
Start: 2019-06-25 | End: 2019-06-25 | Stop reason: HOSPADM

## 2019-06-25 RX ORDER — LIDOCAINE HYDROCHLORIDE 20 MG/ML
INJECTION, SOLUTION EPIDURAL; INFILTRATION; INTRACAUDAL; PERINEURAL AS NEEDED
Status: DISCONTINUED | OUTPATIENT
Start: 2019-06-25 | End: 2019-06-25 | Stop reason: HOSPADM

## 2019-06-25 RX ORDER — FENOFIBRATE 160 MG/1
160 TABLET ORAL DAILY
Status: DISCONTINUED | OUTPATIENT
Start: 2019-06-26 | End: 2019-06-26 | Stop reason: HOSPADM

## 2019-06-25 RX ORDER — DOFETILIDE 0.25 MG/1
250 CAPSULE ORAL EVERY 12 HOURS
Qty: 60 CAP | Refills: 6 | Status: SHIPPED | OUTPATIENT
Start: 2019-06-26 | End: 2019-07-11 | Stop reason: SDUPTHER

## 2019-06-25 RX ORDER — FLUTICASONE PROPIONATE 50 MCG
2 SPRAY, SUSPENSION (ML) NASAL DAILY
Status: DISCONTINUED | OUTPATIENT
Start: 2019-06-26 | End: 2019-06-26 | Stop reason: HOSPADM

## 2019-06-25 RX ORDER — DOFETILIDE 0.25 MG/1
250 CAPSULE ORAL EVERY 12 HOURS
Status: DISCONTINUED | OUTPATIENT
Start: 2019-06-25 | End: 2019-06-26 | Stop reason: HOSPADM

## 2019-06-25 RX ORDER — ROCURONIUM BROMIDE 10 MG/ML
INJECTION, SOLUTION INTRAVENOUS AS NEEDED
Status: DISCONTINUED | OUTPATIENT
Start: 2019-06-25 | End: 2019-06-25 | Stop reason: HOSPADM

## 2019-06-25 RX ORDER — HYDROMORPHONE HYDROCHLORIDE 2 MG/ML
0.5 INJECTION, SOLUTION INTRAMUSCULAR; INTRAVENOUS; SUBCUTANEOUS
Status: DISCONTINUED | OUTPATIENT
Start: 2019-06-25 | End: 2019-06-25 | Stop reason: SDUPTHER

## 2019-06-25 RX ORDER — GABAPENTIN 300 MG/1
300 CAPSULE ORAL
Status: DISCONTINUED | OUTPATIENT
Start: 2019-06-25 | End: 2019-06-26 | Stop reason: HOSPADM

## 2019-06-25 RX ORDER — NALOXONE HYDROCHLORIDE 0.4 MG/ML
0.1 INJECTION, SOLUTION INTRAMUSCULAR; INTRAVENOUS; SUBCUTANEOUS
Status: DISCONTINUED | OUTPATIENT
Start: 2019-06-25 | End: 2019-06-25 | Stop reason: SDUPTHER

## 2019-06-25 RX ORDER — PROPOFOL 10 MG/ML
INJECTION, EMULSION INTRAVENOUS AS NEEDED
Status: DISCONTINUED | OUTPATIENT
Start: 2019-06-25 | End: 2019-06-25 | Stop reason: HOSPADM

## 2019-06-25 RX ORDER — HEPARIN SODIUM 5000 [USP'U]/100ML
INJECTION, SOLUTION INTRAVENOUS
Status: DISCONTINUED | OUTPATIENT
Start: 2019-06-25 | End: 2019-06-25 | Stop reason: HOSPADM

## 2019-06-25 RX ORDER — HEPARIN SODIUM 200 [USP'U]/100ML
500 INJECTION, SOLUTION INTRAVENOUS AS NEEDED
Status: DISCONTINUED | OUTPATIENT
Start: 2019-06-25 | End: 2019-06-25 | Stop reason: HOSPADM

## 2019-06-25 RX ORDER — ASPIRIN 81 MG/1
81 TABLET ORAL DAILY
Status: DISCONTINUED | OUTPATIENT
Start: 2019-06-26 | End: 2019-06-26 | Stop reason: HOSPADM

## 2019-06-25 RX ORDER — FUROSEMIDE 10 MG/ML
INJECTION INTRAMUSCULAR; INTRAVENOUS AS NEEDED
Status: DISCONTINUED | OUTPATIENT
Start: 2019-06-25 | End: 2019-06-25 | Stop reason: HOSPADM

## 2019-06-25 RX ORDER — SUCRALFATE 1 G/1
1 TABLET ORAL
Qty: 120 TAB | Refills: 0 | Status: SHIPPED | OUTPATIENT
Start: 2019-06-25 | End: 2019-10-14

## 2019-06-25 RX ORDER — HYDROMORPHONE HYDROCHLORIDE 2 MG/ML
0.5 INJECTION, SOLUTION INTRAMUSCULAR; INTRAVENOUS; SUBCUTANEOUS
Status: DISCONTINUED | OUTPATIENT
Start: 2019-06-25 | End: 2019-06-25 | Stop reason: HOSPADM

## 2019-06-25 RX ORDER — SODIUM CHLORIDE, SODIUM LACTATE, POTASSIUM CHLORIDE, CALCIUM CHLORIDE 600; 310; 30; 20 MG/100ML; MG/100ML; MG/100ML; MG/100ML
100 INJECTION, SOLUTION INTRAVENOUS CONTINUOUS
Status: DISPENSED | OUTPATIENT
Start: 2019-06-25 | End: 2019-06-26

## 2019-06-25 RX ORDER — LEVOTHYROXINE SODIUM 88 UG/1
88 TABLET ORAL
Status: DISCONTINUED | OUTPATIENT
Start: 2019-06-26 | End: 2019-06-26 | Stop reason: HOSPADM

## 2019-06-25 RX ORDER — ADENOSINE 3 MG/ML
6 INJECTION, SOLUTION INTRAVENOUS
Status: DISCONTINUED | OUTPATIENT
Start: 2019-06-25 | End: 2019-06-25 | Stop reason: HOSPADM

## 2019-06-25 RX ORDER — OXYCODONE HYDROCHLORIDE 5 MG/1
5 TABLET ORAL
Status: DISCONTINUED | OUTPATIENT
Start: 2019-06-25 | End: 2019-06-25 | Stop reason: HOSPADM

## 2019-06-25 RX ORDER — GABAPENTIN 300 MG/1
300 CAPSULE ORAL 3 TIMES DAILY
Status: DISCONTINUED | OUTPATIENT
Start: 2019-06-25 | End: 2019-06-25

## 2019-06-25 RX ORDER — DABIGATRAN ETEXILATE 150 MG/1
150 CAPSULE ORAL EVERY 12 HOURS
Status: DISCONTINUED | OUTPATIENT
Start: 2019-06-25 | End: 2019-06-26 | Stop reason: HOSPADM

## 2019-06-25 RX ORDER — FLUMAZENIL 0.1 MG/ML
0.2 INJECTION INTRAVENOUS AS NEEDED
Status: DISCONTINUED | OUTPATIENT
Start: 2019-06-25 | End: 2019-06-25 | Stop reason: SDUPTHER

## 2019-06-25 RX ORDER — DIPHENHYDRAMINE HYDROCHLORIDE 50 MG/ML
12.5 INJECTION, SOLUTION INTRAMUSCULAR; INTRAVENOUS
Status: DISCONTINUED | OUTPATIENT
Start: 2019-06-25 | End: 2019-06-25 | Stop reason: HOSPADM

## 2019-06-25 RX ORDER — DOFETILIDE 0.25 MG/1
500 CAPSULE ORAL EVERY 12 HOURS
Status: DISCONTINUED | OUTPATIENT
Start: 2019-06-25 | End: 2019-06-25

## 2019-06-25 RX ORDER — OXYCODONE HYDROCHLORIDE 5 MG/1
10 TABLET ORAL
Status: DISCONTINUED | OUTPATIENT
Start: 2019-06-25 | End: 2019-06-25 | Stop reason: HOSPADM

## 2019-06-25 RX ORDER — SODIUM CHLORIDE, SODIUM LACTATE, POTASSIUM CHLORIDE, CALCIUM CHLORIDE 600; 310; 30; 20 MG/100ML; MG/100ML; MG/100ML; MG/100ML
75 INJECTION, SOLUTION INTRAVENOUS CONTINUOUS
Status: DISCONTINUED | OUTPATIENT
Start: 2019-06-25 | End: 2019-06-25 | Stop reason: HOSPADM

## 2019-06-25 RX ORDER — DIPHENHYDRAMINE HYDROCHLORIDE 50 MG/ML
12.5 INJECTION, SOLUTION INTRAMUSCULAR; INTRAVENOUS
Status: DISCONTINUED | OUTPATIENT
Start: 2019-06-25 | End: 2019-06-25 | Stop reason: SDUPTHER

## 2019-06-25 RX ORDER — EPHEDRINE SULFATE 50 MG/ML
INJECTION, SOLUTION INTRAVENOUS AS NEEDED
Status: DISCONTINUED | OUTPATIENT
Start: 2019-06-25 | End: 2019-06-25 | Stop reason: HOSPADM

## 2019-06-25 RX ORDER — DEXAMETHASONE SODIUM PHOSPHATE 4 MG/ML
INJECTION, SOLUTION INTRA-ARTICULAR; INTRALESIONAL; INTRAMUSCULAR; INTRAVENOUS; SOFT TISSUE AS NEEDED
Status: DISCONTINUED | OUTPATIENT
Start: 2019-06-25 | End: 2019-06-25 | Stop reason: HOSPADM

## 2019-06-25 RX ADMIN — DOFETILIDE 250 MCG: 0.25 CAPSULE ORAL at 21:00

## 2019-06-25 RX ADMIN — HEPARIN SODIUM 8700 UNITS: 1000 INJECTION, SOLUTION INTRAVENOUS; SUBCUTANEOUS at 09:17

## 2019-06-25 RX ADMIN — NEOSTIGMINE METHYLSULFATE 4 MG: 1 INJECTION INTRAVENOUS at 11:06

## 2019-06-25 RX ADMIN — Medication 10 ML: at 14:00

## 2019-06-25 RX ADMIN — DABIGATRAN ETEXILATE MESYLATE 150 MG: 150 CAPSULE ORAL at 20:56

## 2019-06-25 RX ADMIN — PROTAMINE SULFATE 10 MG: 10 INJECTION, SOLUTION INTRAVENOUS at 10:59

## 2019-06-25 RX ADMIN — FUROSEMIDE 40 MG: 10 INJECTION INTRAMUSCULAR; INTRAVENOUS at 11:03

## 2019-06-25 RX ADMIN — SUCRALFATE 1 G: 1 TABLET ORAL at 21:30

## 2019-06-25 RX ADMIN — Medication 10 ML: at 22:17

## 2019-06-25 RX ADMIN — HEPARIN SODIUM 1000 UNITS: 200 INJECTION, SOLUTION INTRAVENOUS at 09:43

## 2019-06-25 RX ADMIN — PANTOPRAZOLE SODIUM 40 MG: 40 TABLET, DELAYED RELEASE ORAL at 16:04

## 2019-06-25 RX ADMIN — EPHEDRINE SULFATE 10 MG: 50 INJECTION, SOLUTION INTRAVENOUS at 08:06

## 2019-06-25 RX ADMIN — PROTAMINE SULFATE 10 MG: 10 INJECTION, SOLUTION INTRAVENOUS at 11:00

## 2019-06-25 RX ADMIN — PROTAMINE SULFATE 20 MG: 10 INJECTION, SOLUTION INTRAVENOUS at 11:02

## 2019-06-25 RX ADMIN — LIDOCAINE HYDROCHLORIDE 60 MG: 20 INJECTION, SOLUTION EPIDURAL; INFILTRATION; INTRACAUDAL; PERINEURAL at 07:39

## 2019-06-25 RX ADMIN — ATORVASTATIN CALCIUM 80 MG: 80 TABLET, FILM COATED ORAL at 20:58

## 2019-06-25 RX ADMIN — PROPOFOL 200 MG: 10 INJECTION, EMULSION INTRAVENOUS at 07:39

## 2019-06-25 RX ADMIN — EPHEDRINE SULFATE 5 MG: 50 INJECTION, SOLUTION INTRAVENOUS at 09:59

## 2019-06-25 RX ADMIN — FENTANYL CITRATE 100 MCG: 50 INJECTION, SOLUTION INTRAMUSCULAR; INTRAVENOUS at 07:39

## 2019-06-25 RX ADMIN — HEPARIN SODIUM 1000 UNITS: 200 INJECTION, SOLUTION INTRAVENOUS at 09:42

## 2019-06-25 RX ADMIN — HEPARIN SODIUM 40 UNITS/KG/HR: 5000 INJECTION, SOLUTION INTRAVENOUS at 09:17

## 2019-06-25 RX ADMIN — ADENOSINE 6 MG: 3 INJECTION, SOLUTION INTRAVENOUS at 09:47

## 2019-06-25 RX ADMIN — SODIUM CHLORIDE, SODIUM LACTATE, POTASSIUM CHLORIDE, AND CALCIUM CHLORIDE: 600; 310; 30; 20 INJECTION, SOLUTION INTRAVENOUS at 10:44

## 2019-06-25 RX ADMIN — FENTANYL CITRATE 25 MCG: 50 INJECTION, SOLUTION INTRAMUSCULAR; INTRAVENOUS at 08:29

## 2019-06-25 RX ADMIN — HEPARIN SODIUM 3000 UNITS: 1000 INJECTION, SOLUTION INTRAVENOUS; SUBCUTANEOUS at 08:35

## 2019-06-25 RX ADMIN — HEPARIN SODIUM 1000 UNITS: 200 INJECTION, SOLUTION INTRAVENOUS at 09:46

## 2019-06-25 RX ADMIN — HEPARIN SODIUM 1000 UNITS: 200 INJECTION, SOLUTION INTRAVENOUS at 09:44

## 2019-06-25 RX ADMIN — ROCURONIUM BROMIDE 10 MG: 10 INJECTION, SOLUTION INTRAVENOUS at 08:36

## 2019-06-25 RX ADMIN — HEPARIN SODIUM 1000 UNITS: 200 INJECTION, SOLUTION INTRAVENOUS at 09:45

## 2019-06-25 RX ADMIN — SUCRALFATE 1 G: 1 TABLET ORAL at 16:04

## 2019-06-25 RX ADMIN — ROCURONIUM BROMIDE 10 MG: 10 INJECTION, SOLUTION INTRAVENOUS at 09:33

## 2019-06-25 RX ADMIN — PROTAMINE SULFATE 20 MG: 10 INJECTION, SOLUTION INTRAVENOUS at 11:01

## 2019-06-25 RX ADMIN — SODIUM CHLORIDE, SODIUM LACTATE, POTASSIUM CHLORIDE, AND CALCIUM CHLORIDE: 600; 310; 30; 20 INJECTION, SOLUTION INTRAVENOUS at 07:28

## 2019-06-25 RX ADMIN — HEPARIN SODIUM 1000 UNITS: 200 INJECTION, SOLUTION INTRAVENOUS at 09:40

## 2019-06-25 RX ADMIN — GABAPENTIN 300 MG: 300 CAPSULE ORAL at 22:58

## 2019-06-25 RX ADMIN — GLYCOPYRROLATE 0.6 MG: 0.2 INJECTION INTRAMUSCULAR; INTRAVENOUS at 11:06

## 2019-06-25 RX ADMIN — DEXAMETHASONE SODIUM PHOSPHATE 10 MG: 4 INJECTION, SOLUTION INTRA-ARTICULAR; INTRALESIONAL; INTRAMUSCULAR; INTRAVENOUS; SOFT TISSUE at 10:22

## 2019-06-25 RX ADMIN — ROCURONIUM BROMIDE 50 MG: 10 INJECTION, SOLUTION INTRAVENOUS at 07:39

## 2019-06-25 RX ADMIN — HEPARIN SODIUM 1000 UNITS: 200 INJECTION, SOLUTION INTRAVENOUS at 09:41

## 2019-06-25 NOTE — ANESTHESIA PREPROCEDURE EVALUATION
Relevant Problems   No relevant active problems       Anesthetic History   No history of anesthetic complications            Review of Systems / Medical History  Patient summary reviewed and pertinent labs reviewed    Pulmonary        Sleep apnea: CPAP           Neuro/Psych   Within defined limits           Cardiovascular    Hypertension        Dysrhythmias : atrial fibrillation  CAD, CABG (1999) and hyperlipidemia    Exercise tolerance: >4 METS  Comments: 2016 TTE - EF 55-60%   GI/Hepatic/Renal     GERD: well controlled           Endo/Other    Diabetes: well controlled, type 2  Hypothyroidism: well controlled  Arthritis     Other Findings              Physical Exam    Airway  Mallampati: II  TM Distance: 4 - 6 cm  Neck ROM: normal range of motion   Mouth opening: Normal     Cardiovascular    Rhythm: irregular  Rate: normal         Dental  No notable dental hx       Pulmonary  Breath sounds clear to auscultation               Abdominal         Other Findings            Anesthetic Plan    ASA: 3  Anesthesia type: general            Anesthetic plan and risks discussed with: Patient

## 2019-06-25 NOTE — PROGRESS NOTES
Initial visit to assess pt's spiritual needs. Feeling today? good    Receiving good care?  yes    Needs from Spiritual Care:  prayer    Ministry of presence & prayer to demonstrate caring & concern, convey emotional & spiritual support.     Chaplain Rolf Varela MDiv,ThM,PhD

## 2019-06-25 NOTE — PROGRESS NOTES
Pt admitted as Observation status. Pt instructed on home medication policy; pt voices understanding. Pt provided copies of the following: Admission pamphlet with Observation insert and Medicare FAQ's. Home meds ordered per MD, verified with [facility] and supplied by patient. Home medications include narcotics. Medications verified and labeled per pharmacy and placed in locked box in patient's room. Home narcotics counted and verified with patient, 2 RN's and pharmacy; home narcotics locked up at nursing station with Narcotic Inventory Record completed. The medications received from the patient include Gabapentin.

## 2019-06-25 NOTE — PROCEDURES
Attending: Carrie Armendariz. Jamila Escobar MD    Referring: Son Joshi MD      Pre-Electrophysiology Diagnosis  1. Atrial fibrillation, persistent  2. Atrial flutter        Procedure Performed  1. EPS afib ablation/pulmonary vein isolation  2. Left atrial pacing recording from the coronary sinus. 3. 3-D Electroanatomical mapping  4. Intracardiac echo  5. Ablation of second arrhythmia  6. LV pacing and recording  7. Transesophageal echo  8. Drug infusion  9. Cardioversion      Anesthesia: General     Estimated Blood Loss: Less than 50 cc     Specimens: * No specimens in log *    Antibiotics: None    Fluoroscopy Time: 6.3 TUQPNMD/78 mGy    Complications: None      Procedure in Detail:  The patient was brought to the electrophysiology lab in the fasting state. The patient was intubated by anesthesiology and an esophageal temperature probe inserted and advanced to a location directly posterior to the LA at the level of the pulmonary veins. The esophageal temperature probe was repositioned throughout the case to a location as close the the ablation catheter as possible. If the esophageal temperature increased 0.5 degrees Celsius ablation was stopped and high flush performed until the temperature returned to baseline. A tranesophageal echocardiogram was performed directly prior to the procedure and was negative for a VINCENT thrombus (see full report in chart). A Ref-Star CARTO patch was placed, the patient was then prepped and draped in sterile fashion. Venous access was obtained under ultrasound guidance x4 using modified Seldinger technique, with placement of three 8Fr short sidearm sheaths into the right femoral vein and placement of a 10Fr sheath into the left femoral vein. Two of the 8F sheaths were upsized to an 8.5Fr SL-1 and Agilis sheaths, respectively. The patient presented to the EP lab in atrial fibrillation.  An external 200 J shock was performed with pads across the anterior and posterior chest with an unsuccessful result as the patient remained atrial fibrillation. An intra-cardiac echo probe was prepped, and then inserted into an 10 Fr short sheath and used to localize the fossa ovalis and create LA and pulmonary vein anatomy utilizing [employer] [facility]. A Bios[employer] Ahll Pentaray catheter was then inserted into an 8.5 SL1 Fr sheath and used to create a 3D electroanatomical map of the right atrium, including attempts at His localization and the os of the CS. Then a Smart Touch porous irrigated BW 3.5 mm ablation catheter was used to map out the body of the CS. A decapolar Biosense Hall CS catheter was inserted via an 8Fr sheath and positioned in the mid coronary sinus. Next, a trans-septal needle was inserted into the SL1 and was used to perform a trans-septal puncture with assistance from ICE (intra-cardiac echo) as well as the [address] [place] [address],[address] map and the right atrial impedence map. The SL1 sheath was advanced into the LA. Total weight based heparin bolus was administered just after transeptal access, and systemic blood pressure monitored invasively. The patient was then placed on our heparin weight based nomogram for targeted ACT of 300-350 during the ablation procedure. A second trans-septal access was obtained with the ablation catheter using the \"junior-wire\" technique. The Pentaray catheter was then inserted into the LA via the SL-1 sheath and was used to map pulmonary vein potentials and target ablation. A complete 3D electroanatomic map of the left atrium was performed. PV potentials were seen in all 4 PVs. An 8 Fr, 3.5mm tip saline irrigated bidirectional D/F curve Thermo-cool SmartTouch catheter was used for RF ablation and ablation was performed at 40W unless ablation was in the proximity of the esophagus where ablation was performed at 30-35W. Antral pulmonary vein isolation was then performed for all 4 pulmonary veins using Surpoint technology to add in successful durable lesion formation. Entrance and exit block was demonstrated by left atrial pacing and within the pulmonary veins. Lack of any conducted atrial EGMs into the pulmonary veins was documented. Prior to ablation of the right antral pulmonary veins, the ablation catheter was used to pace at high output to try to localize the right phrenic nerve and map its location prior to ablation. Of note, the patient's arrhythmia terminated during ablation near the roof/superior region of the RUPV. The patient briefly went into a typical atrial flutter after this and remained in NSR for the remainder of the case. Adenosine was injected (12 mg) to demonstrate the lack of early reconnection with the Pentaray in the PVs. There was no early reconnection with adenosine. The ablation catheter was placed in the VINCENT and pacing maneuvers document lack of any signal in LUPV sleeve. The ablation catheter was inserted into the LV, documented LV electrograms and was used to pace the LV for the EP study and for rescue pacing during adenosine infusion. Cavotricuspid Isthmus ablation (right atrial flutter)  Linear ablation across the cavo-tricuspid isthmus was performed starting with 1:2 A:V EGMs along the isthmus at 6pm STEPH. The local electrogram activation sequence, differential pacing maneuvers and electrogram timing was used to demonstrate bidirectional block along the cavotricuspid isthmus. Post-Ablation trans-isthmus time: 192 msec  Local double potentials: 95 msec    Next, baseline recordings and a diagnostic EP study was performed. The coronary sinus multipolar catheter was used to pace the left atrium during the EP study. The LA CS electrograms were documented and interpreted during the procedure. A comprehensive EP study was performed with 1:1 AV decremental pacing, atrial extrastimuli and ventricular pacing to assess retrograde conduction. The patient did not have sustained slow pathway conduction or evidence of an accessory pathway. Ventricular pacing revealed retrograde AV conduction which was concentric and decremental.    At the completion of the ablation and EPS, all catheters were removed, 60-80 mg Protamine was administered and sheaths were pulled after placing a figure of 8 stitch. The patient tolerated the procedure well with no acute complications recognized. The patient left the EP lab in stable condition, in normal sinus rhythm. Just prior to pulling shealths, the ICE catheter was used to obtain ultrasound images and revealed no evidence of pericardial effusion. Baseline Intervals:  AH interval: 162 msec  HV interval: 46 msec  [place] interval: 232 msec  QRS duration: 91 msec  QT interval: 509 msec  RR interval: 1121 msec    EP Study  AV Wenchebach: 500 msec  AV taniya ERP: < or equal to 600/440 msec  VA [place]: 550 msec    Figure 1. 3D electroanatomic maps of the right and left atrium post ablation. Plan of care: The patient will be placed in observation on telemetry, 4 hour flat time, followed by ambulation as tolerated and will continue anticoagulation as prescribed pre-procedure. Impression:   1. Successful pulmonary vein isolation (PVAI) of the 4 pulmonary veins. 2.  Successful ablation of CTI-dependent atrial flutter. 3.  Comprehensive EP study with normal intra-atrial and infrahissian conduction times. Slightly diseased AV taniya timing. Plan:   1. Continue ([address] [place] [address]) indefinitely until EP follow up. 2.  Continue (AAD) for at least 4-6 weeks and stop if no AF. 3.  Family updated with plan. 4.  Routine cardiology follow up with Dr. Fabrice Phillips. 5.  Patient and family updated about small risk of atrioesophageal fistula and need for emergent ED evaluation if any concerning symptoms arise. Beab Joe.  Jose Guadalupe MO, MS  Clinical Cardiac Electrophysiology  6/25/2019  11:54 AM

## 2019-06-25 NOTE — ANESTHESIA POSTPROCEDURE EVALUATION
Procedure(s):  AFIB  ABLATION. general    Anesthesia Post Evaluation      Multimodal analgesia: multimodal analgesia used between 6 hours prior to anesthesia start to PACU discharge  Patient location during evaluation: PACU  Patient participation: complete - patient participated  Level of consciousness: awake  Pain management: adequate  Airway patency: patent  Anesthetic complications: no  Cardiovascular status: acceptable and hemodynamically stable  Respiratory status: acceptable  Hydration status: acceptable  Comments: Acceptable for discharge from PACU. Post anesthesia nausea and vomiting:  none      Vitals Value Taken Time   /73 6/25/2019 11:56 AM   Temp     Pulse 63 6/25/2019 12:04 PM   Resp 9 6/25/2019 12:04 PM   SpO2 100 % 6/25/2019 12:04 PM   Vitals shown include unvalidated device data.

## 2019-06-25 NOTE — PROGRESS NOTES
Bedside and verbal report given to [facility] by Praveen Corbin. Report included the following information SBAR, Kardex, Intake/Output and MAR. Oncoming RN assumed care of the patient. Bilateral groin sites visualized, no apparent bleeding or hematoma.

## 2019-06-25 NOTE — PERIOP NOTES
TRANSFER - OUT REPORT:    Verbal report given to Casey Martinez RN on Linda Wong  being transferred to [facility] for routine post - op       Report consisted of patients Situation, Background, Assessment and   Recommendations(SBAR). Information from the following report(s) Procedure Summary, Intake/Output, MAR, Recent Results and Cardiac Rhythm NSR was reviewed with the receiving nurse. Lines:   Peripheral IV 06/25/19 Right Antecubital (Active)   Site Assessment Clean, dry, & intact 6/25/2019 11:56 AM   Phlebitis Assessment 0 6/25/2019 11:56 AM   Infiltration Assessment 0 6/25/2019 11:56 AM   Dressing Status Clean, dry, & intact 6/25/2019 11:56 AM   Dressing Type Tape;Transparent 6/25/2019 11:56 AM   Hub Color/Line Status Pink;Capped 6/25/2019 11:56 AM       Peripheral IV 06/25/19 Left;Posterior Hand (Active)   Site Assessment Clean, dry, & intact 6/25/2019 11:56 AM   Phlebitis Assessment 0 6/25/2019 11:56 AM   Infiltration Assessment 0 6/25/2019 11:56 AM   Dressing Status Clean, dry, & intact 6/25/2019 11:56 AM   Dressing Type Tape;Transparent 6/25/2019 11:56 AM   Hub Color/Line Status Pink; Infusing 6/25/2019 11:56 AM        Opportunity for questions and clarification was provided. Patient transported with:   Monitor  O2 @ 2 liters  Registered Nurse    VTE prophylaxis orders have been written for Linda Wong. Patient and family given floor number and nurses name. Family updated re: pt status after security code verified.

## 2019-06-25 NOTE — PROGRESS NOTES
Patient received to [address] [place] [address] room # 9  Ambulatory from [facility]. Patient scheduled for Afib ABlation today with Dr Cynthia Dowd. Procedure reviewed & questions answered, voiced good understanding consent obtained & placed on chart. All medications and medical history reviewed. Will prep patient per orders. Patient & family updated on plan of care. The patient has a fraility score of 3-MANAGING WELL, based on patient A&Ox3, [facility]

## 2019-06-25 NOTE — H&P
The patient has been examined and the previous clinic note dated, 2019, has been reviewed and changes have been noted below. 76year old male with persistent AF who presents for AF ablation. He has undergone informed consent and will proceed with planned ablation procedure under GA. Will perform pre-procedural KWAME. Nikko Torrez. Sabine Noel MD, [address]  Clinical Cardiac Electrophysiology  [facility] Cardiology      NAME:  Emil Stratton  : 1950  MRN: [id number]      Referring Cardiologist: Rod Montoya MD     Reason for Consultation: Atrial fibrillation     ASSESSMENT and PLAN:  Diagnoses and all orders for this visit:        2. Persistent atrial fibrillation ([address] [id number] = 5, on Pradaxa, Tikosyn.      3. Essential hypertension     4. Coronary artery disease without angina pectoris, unspecified vessel or lesion type, unspecified whether native or transplanted heart     5. Uncontrolled type 2 diabetes mellitus with hyperglycemia (HCC)     6. S/P CABG/Aortocoronary bypass status     7. Anxiety     8. Pure hypercholesterolemia     69-year-old male with a history of persistent atrial fibrillation which is now drug refractory. He has symptomatic atrial fibrillation and remains in atrial fibrillation since about January of this year. We discussed the possible treatment options for his atrial fibrillation including doing nothing further versus consideration of a catheter ablation. He is interested in the catheter ablation at this point. We fully reviewed the procedure in detail describing the risks and the benefits, and he wishes to pursue this procedure at a time that is convenient in the near future.     -Plan for AF ablation.   -Continue current therapy.  -Routine cardiac care per Dr. Rod Montoya.      Procedure to be performed: AF ablation  Device/ablation Company: [employer]  Procedure Date: TBD  Medications to hold, days to hold [facility], AAD): Tikosyn, Pradaxa, day of procedure.   Anesthesia: GA   If ablation, device company to join (company)?: N  DEGAR required?: Y     AF ABLATION EDUCATION (done today):  I discussed with the patient the pathophysiology of atrial fibrillation, including details about potential triggers from the pulmonary veins (PVs), as well as non-PV sites. We also discussed that in certain patients (especially those with more persistent AF) there is often atrial substrate (i.e. fibrosis, dilatation, etc.) that promotes more sustained AF. We also discussed the therapeutic options for treatment of atrial fibrillation, including:  --Rate control   --Rhythm control with antiarrhythmic drugs (AAD) and supplemental rate control  --Catheter ablation, including pulmonary vein isolation (PVI), with or without additional substrate modification, in attempt to maintain sinus rhythm long-term  --AV taniya ablation with a permanent pacemaker (ablate & pace).     I emphasized to the patient that all of these options require stroke prophylaxis with oral anticoagulation therapy ([facility]) with  Coumadin or novel oral anticoagulant (NOACs), depending on risk factors. I explained that successful catheter ablation with sufficient long-term follow-up documenting a lack of recurrent AF may allow for discontinuation of anticoagulation in the future; however, this has not been proven safe in prospective clinical studies and is still considered experimental.  Data from retrospective trials, however, has suggested that stopping oral anticoagulation after successful ablation does not result in increased in stroke rates and appears to be safe.     The benefits and risks of each of these approaches were outlined in detail.   We discussed the variable success rates of AF ablation, which can range from 50-80+%, depending on multiple factors, including paroxysmal vs. persistent AF, duration of AF, AF burden, left atrial size and remodeling, concomitant valvular or other structural heart disease, non-PV triggers, prior ablation lesion sets, obstructive sleep apnea, and other potential confounding factors. I also explained that often (approximately 30-50% of the time) patients will require multiple procedures to achieve long-term AF suppression. Additionally, we discussed that ablation may decrease AF burden and/or symptoms, but additional therapeutic strategies (i.e. concomitant AAD therapy, rate controlling medications, oral anticoagulants, etc.) may be needed long term for AF management.     The catheter ablation procedure was described to the patient in detail, including the risks of recurrent AF/AT, need for repeat ablation, esophageal perforation/fistula, pulmonary vein stenosis,  bronchial injury/fistula, stroke, cardiac perforation with the need for catheter drainage or surgical repair, vascular damage, DVT/PE, bleeding, thermal skin burns, radiation skin injury, kidney failure, pneumo/hemothorax, need for permanent pacemaker, phrenic or vagus nerve damage resulting in diaphragmatic paralysis or gastroparesis, stiff left atrial syndrome, and even death.       We also discussed in detail today the options for anticoagulation for AF , including the periprocedure period. Published data (the RELY trial, NEJ 2009) suggests that dabigatran is superior to warfarin for stroke prevention in AF, and may also be safer. Also, using dabigatran will allow us to avoid routine lab monitoring as well as many drug-drug and food-drug interactions. Additionally, using dabigatran will allow us to avoid using LMWH periprocedurally, which may reduce bleeding and hematomas. The patient is aware of these risks/benefits and wishes to proceed with using dabigatran periprocedurally.     Patient has been instructed and agrees to call our office with any issues or other concerns related to their cardiac condition(s) and/or complaint(s). Thank you for allowing me to participate in the electrophysiologic care of Mr. Lisa Domingo.  Please contact me if any questions or concerns were to arise.     Aleja Angeles. Jose Guadalupe MO, MS  Clinical Cardiac Electrophysiology  [facility] Cardiology  04/25/19  1:31 PM     ===================================================================  Chief Complant:         Chief Complaint   Patient presents with    Irregular Heart Beat       Consult         Consultation is requested by Felix Rankin for evaluation of Irregular Heart Beat (Consult)        History:  Tod Berumen is a most pleasant 76 y.o. male with a past medical and cardiac history significant for CAD s/p CABG, HTN, and AF presenting for consultation regarding AF. He is a very active 78 yo who cycles avidly and reports he cycles about 100 miles per month. He presented to Dr. Leatha Siddiqui in 2016 and underwent a DCCV by Dr. Sita Muro a month later. He has not followed up with EP since that time. He was initiated on Tikosyn around that time and he had done well until he was found to be in AF in January of this time. He is followed by Dr. Portia Kirby and he has graciously referred him back for further EP mgmt of his persistent AF.      Currently, the patient remains in AF. He does feel more fatigued and has less energy, especially when attempting exercise. He remains on Pradaxa and Tikosyn. The patient otherwise denies chest pain, dyspnea, presyncope, syncope or lateralizing symptoms.     His daughter is expecting a baby girl in August, this will be his first grandchild.  He is a retired .      Cardiac PMH: (Old records have been reviewed and summarized below)  TTE (8/10/16): EF 50-55%, mod to severe LAE  MPI (9/9/16): normal perfusion     EKG:  (EKG has been independently visualized by me with interpretation below): Atrial fibrillation with controlled heart rate.      ECHO: 2016, EF 50-55%, moderate to severe LA, RVSP 30 mmHg      Previous Heart Catheterization: History of CABG \     Stress Test: 9/2016  Left Ventricular Size: normal.  Transient Ischemic Dilatation: not present. Gated cine images could not be performed due to atrial fibrillation. CONCLUSION:   1. Stress EKG: Normal.  2. SPECT Perfusion Imaging: Normal Perfusion. 3. LV Systolic Function is  not calculated due to inability to gate images   due to atrial fibrillation.      DEVICE INTERROGATION: n/a       Past Medical History, Past Surgical History, Family history, Social History, and Medications were all reviewed with the patient today and updated as necessary.             Current Outpatient Medications   Medication Sig Dispense Refill    amLODIPine-benazepril (LOTREL) 5-20 mg per capsule Take 1 capsule by mouth  daily 90 Cap 3    atorvastatin (LIPITOR) 80 mg tablet Take 1 tablet by mouth  daily 90 Tab 3    dabigatran etexilate (PRADAXA) 150 mg capsule Take 1 Cap by mouth every twelve (12) hours. 180 Cap 3    fenofibrate (LOFIBRA) 160 mg tablet Take 1 Tab by mouth daily. 180 Tab 3    dofetilide (TIKOSYN) 500 mcg capsule Take 1 capsule by mouth  twice daily 180 Cap 3    fluticasone propionate (FLONASE) 50 mcg/actuation nasal spray USE 2 SPRAYS IN EACH NOSTRIL EVERY DAY 48 g 1    Blood-Glucose Meter (ACCU-CHEK CANDICE PLUS METER) [facility] [place] blood sugar daily 1 Each 0    glucose blood VI test strips (ACCU-CHEK CANDICE PLUS TEST STRP) strip Check fasting blood sugar daily 90 Strip 3    piroxicam (FELDENE) 20 mg capsule Take 1 Cap by mouth daily. 90 Cap 4    levothyroxine (SYNTHROID) 88 mcg tablet Take 1 Tab by mouth Daily (before breakfast). 90 Tab 1    gabapentin (NEURONTIN) 300 mg capsule Take 1 Cap by mouth three (3) times daily. 270 Cap 1    omeprazole (PRILOSEC) 20 mg capsule Take 1 Cap by mouth daily.  90 Cap 1    lysine (L-LYSINE) 500 mg tab tablet Take 250 mg by mouth every other day.        cyanocobalamin (VITAMIN B-12) 1,000 mcg tablet Take 1,000 mcg by mouth every morning.        lancets (ONE TOUCH DELICA LANCETS) 30 gauge misc Test Blood Sugars once daily; 250.00 1 Package 3    aspirin delayed-release 81 mg tablet Take  by mouth every morning.                Allergies   Allergen Reactions    Other Medication Anaphylaxis       Combid    Augmentin [Amoxicillin-Pot Clavulanate] Diarrhea           Patient Active Problem List     Diagnosis    Type 2 diabetes mellitus with nephropathy (HCC)    Atrial fibrillation (HCC)    Hypersomnia    Persistent atrial fibrillation (HCC)    Lipid - Hyperlipidemia other unsp Dyslipidemia    S/P CABG/Aortocoronary bypass status    Mass of head, face, gum, chin, mouth, or nose       3/10/15 s/p excision right facial mass; Dr Abelina Severin: EPIDERMAL INCLUSION CYST. Electronically signed out on 3/11/2015 10:48 by ROSELYN Alfaro M.D. Microscopic examination reveals skin with dermal cyst lined by well differentiated squamous epithelium and  filled with keratin debris. Deeper cuts have similar findings. Dr. Frida pichardo.       CAD (coronary artery disease)       followed by Dr. Jaime Benoit Hypertension    Chronic pain       DDD       Type II diabetes mellitus adult onset, uncontrolled ([address]    Thyroid disease       Hypothyroid       Hypercholesterolemia    Arrhythmia       Atrial fib.       Calculus of kidney       right renal calculi       MARIO on CPAP        CPAP 10-20 cm       Diverticulitis    History of shingles    Anxiety              Past Medical History:   Diagnosis Date    Anxiety      Arrhythmia       Atrial fib.     CAD (coronary artery disease) 1995     followed by Dr. Tyler Dang Calculus of kidney       right renal calculi    Chronic pain       back     DDD (degenerative disc disease)      Diabetes ([address]       type 2; diet and exercise; a1c 6.8; does not chekc sugars daily    Diverticulitis 2013    Endocrine disease       thyroid    GERD (gastroesophageal reflux disease)      History of shingles 2013    Hypercholesterolemia      Hypertension      Lipid - Hyperlipidemia other unsp Dyslipidemia 7/5/2016    Other ill-defined conditions(799.89)       cholesterol    S/P CABG/Aortocoronary bypass status 7/5/2016    Sleep disorder 2007     apnea-uses CPAP    Thyroid disease       Hypothyroid    Type II diabetes mellitus adult onset, uncontrolled ([facility]              Past Surgical History:   Procedure Laterality Date    CARDIAC SURG PROCEDURE UNLIST   1999     CABG    COLONOSCOPY N/A 7/14/2017     COLONOSCOPY performed by Gustabo Kitchen MD at [place] [address] HX COLONOSCOPY   2010     Dr. Esperanza Dennis (5yrs)    HX COLONOSCOPY   07/14/2017    HX CORONARY ARTERY BYPASS GRAFT        HX HEENT         nasal surgery 2ndary to obstruction    HX HEENT   02/2015     cyst removal    HX OTHER SURGICAL         lasik eyes            Family History   Problem Relation Age of Onset    Heart Disease Mother      Heart Disease Father      Heart Disease Brother        Social History            Tobacco Use    Smoking status: Never Smoker    Smokeless tobacco: Never Used   Substance Use Topics    Alcohol use: Yes       Alcohol/week: 0.6 oz       Types: 1 Cans of beer per week       Comment: maybe 1-2 drinks per month         ROS:  A comprehensive review of systems was performed with the pertinent positives and negatives as noted in the HPI in addition to:     Review of Systems   Constitutional: Positive for malaise/fatigue. HENT: Negative. Eyes: Negative. Respiratory: Negative. Cardiovascular: Negative. Gastrointestinal: Negative. Genitourinary: Negative. Musculoskeletal: Negative. Skin: Negative. Neurological: Negative. Endo/Heme/Allergies: Negative.     Psychiatric/Behavioral: Negative.             PHYSICAL EXAM:     Visit Vitals  /74   Pulse 79   Ht 5' 9\" (1.753 m)   Wt 177 lb (80.3 kg)   BMI 26.14 kg/m²             Wt Readings from Last 3 Encounters:   04/25/19 177 lb (80.3 kg)   04/02/19 175 lb (79.4 kg) 03/28/19 174 lb 9.6 oz (79.2 kg)          BP Readings from Last 3 Encounters:   04/25/19 142/74   04/02/19 122/74   03/28/19 110/50         Gen: Well appearing, well developed, no acute distress  Eyes: Pupils equal, round.  Extraocular movements are intact  ENT: Oropharynx clear, no oral lesions, normal dentition  CV: S1S2, IRRR, no murmurs, rubs or gallops, normal JVD, no carotid bruits, normal distal pulses, no TYSHAWN  Pulm: Clear to auscultation bilaterally, no accessory muscle uses, no wheezes or rales  GI: Soft, NT, ND, +BS  Neuro: Alert and oriented, nonfocal  Psych: Appropriate affect  Skin: Normal color and skin turgor  MSK: Normal muscle bulk and tone     Medical problems and test results were reviewed with the patient today.      No results found for any visits on 04/25/19.           Lab Results   Component Value Date/Time     Potassium 5.4 (H) 01/25/2019 10:17 AM            Lab Results   Component Value Date/Time     Creatinine 1.31 (H) 02/28/2019 10:21 AM            Lab Results   Component Value Date/Time     HGB 14.6 01/25/2019 10:17 AM      No results found for: INR, PTMR, PTP, PT1, PT2

## 2019-06-25 NOTE — PROGRESS NOTES
TRANSFER - IN REPORT:    Verbal report received from Rafael Araujo RN on Marcia Medina being received from PACU for routine post - op      Report consisted of patients Situation, Background, Assessment and Recommendations(SBAR). Information from the following report(s) SBAR, Kardex, Intake/Output and MAR was reviewed with the receiving nurse. Opportunity for questions and clarification was provided. Assessment completed upon patients arrival to unit and care assumed. Telemetry monitor applied, bed low and locked, side rails x2. Pt oriented to room and instructed to call for assistance. Pt educated on bedrest until 1530 and to limit use of BLE. Dual skin assessment with secondary RN reveals sacrum and heels are intact. Bilateral groin sites present with dressings CDI, no apparent bleeding or hematoma noted. Mid sternal healed CABG scar and LLE scar from CABG present.

## 2019-06-26 VITALS
OXYGEN SATURATION: 92 % | RESPIRATION RATE: 18 BRPM | BODY MASS INDEX: 25.86 KG/M2 | TEMPERATURE: 98.6 F | HEART RATE: 73 BPM | DIASTOLIC BLOOD PRESSURE: 78 MMHG | HEIGHT: 69 IN | SYSTOLIC BLOOD PRESSURE: 165 MMHG | WEIGHT: 174.6 LBS

## 2019-06-26 LAB
ATRIAL RATE: 71 BPM
CALCULATED P AXIS, ECG09: 82 DEGREES
CALCULATED R AXIS, ECG10: 75 DEGREES
CALCULATED T AXIS, ECG11: 111 DEGREES
DIAGNOSIS, 93000: NORMAL
P-R INTERVAL, ECG05: 200 MS
Q-T INTERVAL, ECG07: 470 MS
QRS DURATION, ECG06: 94 MS
QTC CALCULATION (BEZET), ECG08: 510 MS
VENTRICULAR RATE, ECG03: 71 BPM

## 2019-06-26 PROCEDURE — 74011250637 HC RX REV CODE- 250/637: Performed by: INTERNAL MEDICINE

## 2019-06-26 PROCEDURE — 99218 HC RM OBSERVATION: CPT

## 2019-06-26 PROCEDURE — 93005 ELECTROCARDIOGRAM TRACING: CPT | Performed by: NURSE PRACTITIONER

## 2019-06-26 PROCEDURE — 74011250637 HC RX REV CODE- 250/637: Performed by: NURSE PRACTITIONER

## 2019-06-26 RX ADMIN — DABIGATRAN ETEXILATE MESYLATE 150 MG: 150 CAPSULE ORAL at 08:11

## 2019-06-26 RX ADMIN — PANTOPRAZOLE SODIUM 40 MG: 40 TABLET, DELAYED RELEASE ORAL at 07:14

## 2019-06-26 RX ADMIN — PIROXICAM 20 MG: 20 CAPSULE ORAL at 08:14

## 2019-06-26 RX ADMIN — Medication 10 ML: at 05:27

## 2019-06-26 RX ADMIN — FENOFIBRATE 160 MG: 160 TABLET ORAL at 08:15

## 2019-06-26 RX ADMIN — SUCRALFATE 1 G: 1 TABLET ORAL at 07:15

## 2019-06-26 RX ADMIN — ATORVASTATIN CALCIUM 80 MG: 80 TABLET, FILM COATED ORAL at 08:14

## 2019-06-26 RX ADMIN — LEVOTHYROXINE SODIUM 88 MCG: 88 TABLET ORAL at 05:41

## 2019-06-26 RX ADMIN — GABAPENTIN 300 MG: 300 CAPSULE ORAL at 07:15

## 2019-06-26 RX ADMIN — AMLODIPINE AND BENAZEPRIL HYDROCHLORIDE 1 CAPSULE: 5; 20 CAPSULE ORAL at 08:13

## 2019-06-26 RX ADMIN — ASPIRIN 81 MG: 81 TABLET, COATED ORAL at 08:11

## 2019-06-26 RX ADMIN — DOFETILIDE 250 MCG: 0.25 CAPSULE ORAL at 08:11

## 2019-06-26 NOTE — DISCHARGE INSTRUCTIONS
DISPOSITION: The patient is being discharged home in stable condition on a low saturated fat, low cholesterol and low salt diet. The patient is instructed to advance activities as tolerated to the limit of fatigue or shortness of breath. The patient is instructed to avoid all heavy lifting, straining, stooping or squatting for 3-5 days. The patient is instructed to watch the cath site for bleeding/oozing; if seen, the patient is instructed to apply firm pressure with a clean cloth and call [facility] at [phone]. The patient is instructed to watch for signs of infection which include: increasing area of redness, fever/hot to touch or purulent drainage at the catheterization site. The patient is instructed not to soak in a bathtub for 7-10 days, but is cleared to shower. Atrial Fibrillation: Care Instructions  Your Care Instructions    Atrial fibrillation is an irregular and often fast heartbeat. Treating this condition is important for several reasons. It can cause blood clots, which can travel from your heart to your brain and cause a stroke. If you have a fast heartbeat, you may feel lightheaded, dizzy, and weak. An irregular heartbeat can also increase your risk for heart failure. Atrial fibrillation is often the result of another heart condition, such as high blood pressure or coronary artery disease. Making changes to improve your heart condition will help you stay healthy and active. Follow-up care is a key part of your treatment and safety. Be sure to make and go to all appointments, and call your doctor if you are having problems. It's also a good idea to know your test results and keep a list of the medicines you take. How can you care for yourself at home? Medicines    · Take your medicines exactly as prescribed. Call your doctor if you think you are having a problem with your medicine.  You will get more details on the specific medicines your doctor prescribes.     · If your doctor has given you a blood thinner to prevent a stroke, be sure you get instructions about how to take your medicine safely. Blood thinners can cause serious bleeding problems.     · Do not take any vitamins, over-the-counter drugs, or herbal products without talking to your doctor first.    Lifestyle changes    · Do not smoke. Smoking can increase your chance of a stroke and heart attack. If you need help quitting, talk to your doctor about stop-smoking programs and medicines. These can increase your chances of quitting for good.     · Eat a heart-healthy diet.     · Stay at a healthy weight. Lose weight if you need to.     · Limit alcohol to 2 drinks a day for men and 1 drink a day for women. Too much alcohol can cause health problems.     · Avoid colds and flu. Get a pneumococcal vaccine shot. If you have had one before, ask your doctor whether you need another dose. Get a flu shot every year. If you must be around people with colds or flu, wash your hands often. Activity    · If your doctor recommends it, get more exercise. Walking is a good choice. Bit by bit, increase the amount you walk every day. Try for at least 30 minutes on most days of the week. You also may want to swim, bike, or do other activities. Your doctor may suggest that you join a cardiac rehabilitation program so that you can have help increasing your physical activity safely.     · Start light exercise if your doctor says it is okay. Even a small amount will help you get stronger, have more energy, and manage stress. Walking is an easy way to get exercise. Start out by walking a little more than you did in the hospital. Gradually increase the amount you walk.     · When you exercise, watch for signs that your heart is working too hard. You are pushing too hard if you cannot talk while you are exercising. If you become short of breath or dizzy or have chest pain, sit down and rest immediately.     · Check your pulse regularly.  Place two fingers on the artery at the palm side of your wrist, in line with your thumb. If your heartbeat seems uneven or fast, talk to your doctor. When should you call for help? Call 911 anytime you think you may need emergency care. For example, call if:    · You have symptoms of a heart attack. These may include:  ? Chest pain or pressure, or a strange feeling in the chest.  ? Sweating. ? Shortness of breath. ? Nausea or vomiting. ? Pain, pressure, or a strange feeling in the back, neck, jaw, or upper belly or in one or both shoulders or arms. ? Lightheadedness or sudden weakness. ? A fast or irregular heartbeat. After you call 911, the  may tell you to chew 1 adult-strength or 2 to 4 low-dose aspirin. Wait for an ambulance. Do not try to drive yourself.     · You have symptoms of a stroke. These may include:  ? Sudden numbness, tingling, weakness, or loss of movement in your face, arm, or leg, especially on only one side of your body. ? Sudden vision changes. ? Sudden trouble speaking. ? Sudden confusion or trouble understanding simple statements. ? Sudden problems with walking or balance. ? A sudden, severe headache that is different from past headaches.     · You passed out (lost consciousness).    Call your doctor now or seek immediate medical care if:    · You have new or increased shortness of breath.     · You feel dizzy or lightheaded, or you feel like you may faint.     · Your heart rate becomes irregular.     · You can feel your heart flutter in your chest or skip heartbeats. Tell your doctor if these symptoms are new or worse.    Watch closely for changes in your health, and be sure to contact your doctor if you have any problems. Where can you learn more? Go to http://lora-celia.info/. Enter U020 in the search box to learn more about \"Atrial Fibrillation: Care Instructions. \"  Current as of: July 22, 2018  Content Version: 11.9  © 3440-3521 [employer].  Care instructions adapted under license by [employer] (which disclaims liability or warranty for this information). If you have questions about a medical condition or this instruction, always ask your healthcare professional. [address] any warranty or liability for your use of this information. Electrophysiology Study and Catheter Ablation: What to Expect at [address] [place] [address] had an electrophysiology study for a problem with your heartbeat. You may also have had a catheter ablation to try to correct the problem. You may have swelling, bruising, or a small lump around the site where the catheters went into your body. These should go away in 3 to 4 weeks. Do not exercise hard or lift anything heavy for a week. You may be able to go back to work and to your normal routine in 1 or 2 days. This care sheet gives you a general idea about how long it will take for you to recover. But each person recovers at a different pace. Follow the steps below to get better as quickly as possible. How can you care for yourself at home? Activity    · For 1 week, do not lift anything that would make you strain. This may include heavy grocery bags and milk containers, a heavy briefcase or backpack, cat litter or dog food bags, a vacuum , or a child.     · For 1 week, do not exercise hard or do any activity that could strain your blood vessels or the site where the catheters went into your body.     · Ask your doctor when it is okay to have sex.     · You may shower 24 to 48 hours after the procedure, if your doctor okays it. Pat the incision dry. Do not take a bath for 1 week, or until your doctor tells you it is okay. Diet   · You can eat your normal diet. If your stomach is upset, try bland, low-fat foods like plain rice, broiled chicken, toast, and yogurt.     · Drink plenty of fluids (unless your doctor tells you not to).    Medicines    · Your doctor will tell you if and when you can restart your medicines. He or she will also give you instructions about taking any new medicines.     · If you take blood thinners, such as warfarin (Coumadin), clopidogrel (Plavix), or aspirin, be sure to talk to your doctor. He or she will tell you if and when to start taking those medicines again. Make sure that you understand exactly what your doctor wants you to do.     · Ask your doctor if you can take acetaminophen (Tylenol) for pain. Do not take aspirin for 3 days, unless your doctor says it is okay.     · Check with your doctor before you take aspirin or anti-inflammatory medicines to reduce pain and swelling. These include ibuprofen (Advil, Motrin) and naproxen (Aleve).   · Make sure you know which heart medicines to continue and which ones to stop. Ask your doctor if you are not sure.   Luigi Cavazos site care    · You can remove your bandages the day after the procedure.     · If you are bleeding, lie down and press on the area for 15 minutes to try to make it stop. If the bleeding does not stop, call your doctor or seek immediate medical care. Follow-up care is a key part of your treatment and safety. Be sure to make and go to all appointments, and call your doctor if you are having problems. It's also a good idea to know your test results and keep a list of the medicines you take. When should you call for help? Call 911 anytime you think you may need emergency care. For example, call if:    · You passed out (lost consciousness).     · You have symptoms of a heart attack. These may include:  ? Chest pain or pressure, or a strange feeling in the chest.  ? Sweating. ? Shortness of breath. ? Nausea or vomiting. ? Pain, pressure, or a strange feeling in the back, neck, jaw, or upper belly, or in one or both shoulders or arms. ? Lightheadedness or sudden weakness. ? A fast or irregular heartbeat. After you call 911, the  may tell you to chew 1 adult-strength or 2 to 4 low-dose aspirin. Wait for an ambulance. Do not try to drive yourself.     · You have symptoms of a stroke. These may include:  ? Sudden numbness, tingling, weakness, or loss of movement in your face, arm, or leg, especially on only one side of your body. ? Sudden vision changes. ? Sudden trouble speaking. ? Sudden confusion or trouble understanding simple statements. ? Sudden problems with walking or balance. ? A sudden, severe headache that is different from past headaches.    Call your doctor now or seek immediate medical care if:    · You are bleeding from the area where the catheter was put in your artery.     · You have a fast-growing, painful lump at the catheter site.     · You have signs of infection, such as:  ? Increased pain, swelling, warmth, or redness. ? Red streaks leading from the catheter site. ? Pus draining from the catheter site. ? A fever.     · Your leg or arm looks blue or feels cold, numb, or tingly.    Watch closely for any changes in your health, and be sure to contact your doctor if you have any problems. Where can you learn more? Go to http://lora-celia.info/. Enter [id number] in the search box to learn more about \"Electrophysiology Study and Catheter Ablation: What to Expect at Home. \"  Current as of: July 22, 2018  Content Version: 11.9  © 6100-7520 [employer]. Care instructions adapted under license by [employer] (which disclaims liability or warranty for this information). If you have questions about a medical condition or this instruction, always ask your healthcare professional. [place] [address] any warranty or liability for your use of this information. Ablation Discharge Instructions    *Check the puncture site frequently for swelling or bleeding. If you see any bleeding, lie down and apply pressure over the area with a clean town or washcloth.  Notify your doctor for any redness, swelling, drainage or oozing from the puncture site. Notify your doctor for any fever or chills. *If the leg with the puncture becomes cold, numb or painful, call [facility] at  [phone]. *Activity should be limited for the next 48 hours. Climb stairs as little as possible and avoid any stooping, bending or strenuous activity for 48 hours. No heavy lifting (anything over 10 pounds) for three days. *Do not drive for 48 hours. *You may resume your usual diet. Drink more fluids than usual.    *Have a responsible person drive you home and stay with you for at least 24 hours after your ablation. *You may remove the bandage from your Right, Left Groin in 24 hours. You may shower in 24 hours. No tub baths, hot tubs or swimming for one week. Do not place any lotions, creams, powders, ointments over the puncture site for one week. You may place a clean band-aid over the puncture site each day for 5 days. Change this daily. DISCHARGE SUMMARY from Nurse    PATIENT INSTRUCTIONS:    After general anesthesia or intravenous sedation, for 24 hours or while taking prescription Narcotics:  · Limit your activities  · Do not drive and operate hazardous machinery  · Do not make important personal or business decisions  · Do  not drink alcoholic beverages  · If you have not urinated within 8 hours after discharge, please contact your surgeon on call. Report the following to your surgeon:  · Excessive pain, swelling, redness or odor of or around the surgical area  · Temperature over 100.5  · Nausea and vomiting lasting longer than 4 hours or if unable to take medications  · Any signs of decreased circulation or nerve impairment to extremity: change in color, persistent  numbness, tingling, coldness or increase pain  · Any questions    What to do at Home:    *  Please give a list of your current medications to your Primary Care Provider.     *  Please update this list whenever your medications are discontinued, doses are      changed, or new medications (including over-the-counter products) are added. *  Please carry medication information at all times in case of emergency situations. These are general instructions for a healthy lifestyle:    No smoking/ No tobacco products/ Avoid exposure to second hand smoke  Surgeon General's Warning:  Quitting smoking now greatly reduces serious risk to your health. Obesity, smoking, and sedentary lifestyle greatly increases your risk for illness    A healthy diet, regular physical exercise & weight monitoring are important for maintaining a healthy lifestyle    You may be retaining fluid if you have a history of heart failure or if you experience any of the following symptoms:  Weight gain of 3 pounds or more overnight or 5 pounds in a week, increased swelling in our hands or feet or shortness of breath while lying flat in bed. Please call your doctor as soon as you notice any of these symptoms; do not wait until your next office visit. The discharge information has been reviewed with the patient. The patient verbalized understanding. Discharge medications reviewed with the patient and appropriate educational materials and side effects teaching were provided. ___________________________________________________________________________________________________________________________________    Pantoprazole (By mouth)   Pantoprazole (pan-TOE-pra-zole)  Treats gastroesophageal reflux disease (GERD), a damaged esophagus, and high levels of stomach acid. This medicine is a proton pump inhibitor (PPI). Brand Name(s): Protonix   There may be other brand names for this medicine. When This Medicine Should Not Be Used: This medicine is not right for everyone. Do not use it if you had an allergic reaction to pantoprazole or similar medicines.   How to Use This Medicine:   Packet, Tablet, Delayed Release Tablet, Long Acting Tablet  · Your doctor will tell you how much medicine to use. Do not use more than directed. Take the medicine at least 30 minutes before a meal.  · Delayed-release tablet: Swallow the tablet whole. Do not crush, break, or chew it. · Delayed-release packet:   ¨ To prepare with applesauce:   § Mix the packet contents with 1 teaspoon of applesauce. Do not mix with water, or other liquids or food. Do not divide the packet contents to make smaller doses. § Swallow the mixture within 10 minutes after you mix it. Do not chew or crush the granules. § Sip some water after you take the mixture to make sure you swallow all of the medicine. ¨ To prepare with apple juice:   § Mix the packet contents with 1 teaspoon of apple juice in a small cup. Do not divide the packet contents to make smaller doses. § Stir for 5 seconds and drink the mixture immediately. Do not chew or crush the granules. § To make sure you get all of the medicine, add more apple juice to the cup. Drink it immediately. ¨ To prepare for a feeding tube:   § Pour the packet contents in a 2-ounce (60 milliliter [mL]) catheter-tip syringe. § Add 10 mL of apple juice to the syringe. Add the mixture to the tube. Gently tap or shake the barrel of the syringe to help empty it. § Add 10 mL of apple juice to the syringe and put it in the tube. Do this at least 2 times. There should be no granules left in the syringe. · This medicine should come with a Medication Guide. Ask your pharmacist for a copy if you do not have one. · Missed dose: Take a dose as soon as you remember. If it is almost time for your next dose, wait until then and take a regular dose. Do not take extra medicine to make up for a missed dose. · Store the medicine in a closed container at room temperature, away from heat, moisture, and direct light. Drugs and Foods to Avoid:   Ask your doctor or pharmacist before using any other medicine, including over-the-counter medicines, vitamins, and herbal products.   · Some foods and medicines can affect how pantoprazole works. Tell your doctor if you are using any of the following:   ¨ Ampicillin, atazanavir, digoxin, erlotinib, ketoconazole, methotrexate, mycophenolate mofetil, nelfinavir  ¨ Blood thinner (including warfarin)  ¨ Diuretic (water pill)  ¨ Iron supplements  Warnings While Using This Medicine:   · Tell your doctor if you are pregnant or breastfeeding, or if you have liver disease, lupus, or osteoporosis. · This medicine may cause the following problems:  ¨ Kidney problems  ¨ Low vitamin B12 or magnesium levels  ¨ Increased risk of broken bones in the hip, wrist, or spine  · This medicine can cause diarrhea. Call your doctor if the diarrhea becomes severe, does not stop, or is bloody. Do not take any medicine to stop diarrhea until you have talked to your doctor. Diarrhea can occur 2 months or more after you stop taking this medicine. · Tell any doctor or dentist who treats you that you are using this medicine. This medicine may affect certain medical test results. · Your doctor will check your progress and the effects of this medicine at regular visits. Keep all appointments. · Keep all medicine out of the reach of children. Never share your medicine with anyone.   Possible Side Effects While Using This Medicine:   Call your doctor right away if you notice any of these side effects:  · Allergic reaction: Itching or hives, swelling in your face or hands, swelling or tingling in your mouth or throat, chest tightness, trouble breathing  · Blistering, peeling, red skin rash  · Fever, joint pain, swelling in your body, unusual weight gain, change in how much or how often you urinate  · Joint pain, rash on your cheeks or arms that gets worse in the sun  · Seizures, dizziness, uneven heartbeat, muscle cramps or twitching  · Severe diarrhea, stomach cramps, fever  · Stomach pain, nausea, vomiting, weight loss  If you notice other side effects that you think are caused by this medicine, tell your doctor. Call your doctor for medical advice about side effects. You may report side effects to FDA at 3-523-XWC-9488  © 2017 [facility] Information is for End User's use only and may not be sold, redistributed or otherwise used for commercial purposes. The above information is an  only. It is not intended as medical advice for individual conditions or treatments. Talk to your doctor, nurse or pharmacist before following any medical regimen to see if it is safe and effective for you. Sucralfate (By mouth)   Sucralfate ([id number]-MVUL-[id number])  Treats ulcers. Brand Name(s): Carafate   There may be other brand names for this medicine. When This Medicine Should Not Be Used: This medicine is not right for everyone. Do not use it if you had an allergic reaction to sucralfate. How to Use This Medicine:   Liquid, Tablet  · Your doctor will tell you how much medicine to use. Do not use more than directed. · It is best to take this medicine on an empty stomach. · Do not stop taking the medicine unless your doctor tells you to, even if you feel better. · Tablet: Swallow with a glass of water. Take it 1 hour before meals and at bedtime. · Oral liquid: Measure the oral liquid medicine with a marked measuring spoon, oral syringe, or medicine cup. Shake it well before each use. · Missed dose: Take a dose as soon as you remember. If it is almost time for your next dose, wait until then and take a regular dose. Do not take extra medicine to make up for a missed dose. · Store the medicine in a closed container at room temperature, away from heat, moisture, and direct light. Do not freeze the oral liquid. Drugs and Foods to Avoid:   Ask your doctor or pharmacist before using any other medicine, including over-the-counter medicines, vitamins, and herbal products. · Some medicines can affect how sucralfate works.  Tell your doctor if you are using any of the following:  ¨ Cimetidine, digoxin, levothyroxine, phenytoin, quinidine, ranitidine, theophylline  ¨ Medicine to treat infection (including fluoroquinolones, ketoconazole, tetracycline)  · Take antacids more than one-half hour before or after taking sucralfate oral liquid. · Take cimetidine, ciprofloxacin, digoxin, norfloxacin, ofloxacin, or ranitidine 2 hours before you take sucralfate oral liquid. Warnings While Using This Medicine:   · Tell your doctor if you are pregnant or breastfeeding, or if you have kidney disease or diabetes. · This medicine may cause the following problems:  ¨ Blood clot in the lungs or brain, which could be life-threatening  ¨ High blood sugar  · Your doctor will check your progress and the effects of this medicine at regular visits. Keep all appointments. · Keep all medicine out of the reach of children. Never share your medicine with anyone. Possible Side Effects While Using This Medicine:   Call your doctor right away if you notice any of these side effects:  · Allergic reaction: Itching or hives, swelling in your face or hands, swelling or tingling in your mouth or throat, chest tightness, trouble breathing  · Chest pain, trouble breathing, coughing up blood  · Increased hunger or thirst, change in how much or how often you urinate, unusual weight loss  · Numbness or weakness in your arm or leg, or on one side of your body  · Pain in your lower leg (calf)  · Sudden or severe headache, problems with vision, speech, or walking  If you notice these less serious side effects, talk with your doctor:   · Constipation  · Dizziness  · Dry mouth  · Mild stomach cramps, diarrhea  · Stomach upset, passing gas  If you notice other side effects that you think are caused by this medicine, tell your doctor. Call your doctor for medical advice about side effects.  You may report side effects to FDA at 3-512-FDA-1087  © 2017 [facility] Information is for End User's use only and may not be sold, redistributed or otherwise used for commercial purposes. The above information is an  only. It is not intended as medical advice for individual conditions or treatments. Talk to your doctor, nurse or pharmacist before following any medical regimen to see if it is safe and effective for you.

## 2019-06-26 NOTE — DISCHARGE SUMMARY
[facility] Cardiology Discharge Summary     Patient ID:  Rocio Milligan  [id number]  47 y.o.  1950    Admit date: 6/25/2019    Discharge date:  6/26/2019    Admitting Physician: Danny Pearl MD     Discharge Physician: Piedad Pavon NP/     Admission Diagnoses: Paroxysmal atrial fibrillation ([facility] [address] [I48.0]  Atrial fibrillation [facility]) [I48.91]    Discharge Diagnoses:    Diagnosis    Type 2 diabetes mellitus with nephropathy ([facility] [address]    Atrial fibrillation ([address]    Persistent atrial fibrillation ([facility] [address]    Lipid - Hyperlipidemia other unsp Dyslipidemia    S/P CABG/Aortocoronary bypass status    CAD (coronary artery disease)    Hypertension       Cardiology Procedures this admission:  AFIB ablation  Consults: None    Hospital Course: Patient was seen at the office of [facility] by Dr. Kassie Nicholas for management of AFIB and was subsequently scheduled for an AM Admission ablation at [facility] on 6/25/19. Patient tolerated the procedure well and was taken to telemetry for recovery. The morning of discharge, patient was up feeling well without any complaints of chest pain or shortness of breath. Patient's bilateral groin cath sites were clean, dry and intact without hematoma or bruit. Patient's labs were WNL. Patient was seen and examined by Dr. Kassie Nicholas and determined stable and ready for discharge. Patient was instructed on the importance of medication compliance including taking carafate, PPI and Pradaxa everyday without missing a dose. QTc on EKG post-procedure was noted to be long and home Tikosyn dose was dropped to 250mcg Q12. The patient will follow up with [facility] -- Dr. Kassie Nicholas in 2-4 weeks. Repeat ECG this am does note continued improvement of QTc--Pt seen by Dr. Kassie Nicholas and is acceptable for discharge.      DISPOSITION: The patient is being discharged home in stable condition on a low saturated fat, low cholesterol and low salt diet. The patient is instructed to advance activities as tolerated to the limit of fatigue or shortness of breath. The patient is instructed to avoid all heavy lifting, straining, stooping or squatting for 3-5 days. The patient is instructed to watch the cath site for bleeding/oozing; if seen, the patient is instructed to apply firm pressure with a clean cloth and call [facility] at [phone]. The patient is instructed to watch for signs of infection which include: increasing area of redness, fever/hot to touch or purulent drainage at the catheterization site. The patient is instructed not to soak in a bathtub for 7-10 days, but is cleared to shower. Discharge Exam:Patient has been seen by Dr. Kassie Nicholas: see his progress note for exam details.      Visit Vitals  /69   Pulse 75   Temp 97.1 °F (36.2 °C)   Resp 17   Ht 5' 9\" (1.753 m)   Wt 77.6 kg (171 lb)   SpO2 97%   BMI 25.25 kg/m²         Recent Results (from the past 24 hour(s))   CBC W/O DIFF    Collection Time: 06/25/19  6:37 AM   Result Value Ref Range    WBC 4.6 4.3 - 11.1 K/uL    RBC 4.74 4.23 - 5.6 M/uL    HGB 13.9 13.6 - 17.2 g/dL    HCT 43.4 41.1 - 50.3 %    MCV 91.6 79.6 - 97.8 FL    MCH 29.3 26.1 - 32.9 PG    MCHC 32.0 31.4 - 35.0 g/dL    RDW 13.2 11.9 - 14.6 %    PLATELET 359 702 - 962 K/uL    MPV 10.9 9.4 - 12.3 FL    ABSOLUTE NRBC 0.00 0.0 - 0.2 K/uL   MAGNESIUM    Collection Time: 06/25/19  6:37 AM   Result Value Ref Range    Magnesium 1.8 1.8 - 2.4 mg/dL   METABOLIC PANEL, BASIC    Collection Time: 06/25/19  6:37 AM   Result Value Ref Range    Sodium 145 136 - 145 mmol/L    Potassium 3.8 3.5 - 5.1 mmol/L    Chloride 111 (H) 98 - 107 mmol/L    CO2 27 21 - 32 mmol/L    Anion gap 7 7 - 16 mmol/L    Glucose 99 65 - 100 mg/dL    BUN 20 8 - 23 MG/DL    Creatinine 1.39 0.8 - 1.5 MG/DL    GFR est AA >60 >60 ml/min/1.73m2    GFR est non-AA 54 (L) >60 ml/min/1.73m2    Calcium 9.2 8.3 - 10.4 MG/DL   PROTHROMBIN TIME + INR    Collection Time: 06/25/19  6:37 AM   Result Value Ref Range    Prothrombin time 14.5 11.7 - 14.5 sec    INR 1.1     EKG, 12 LEAD, INITIAL    Collection Time: 06/25/19  6:57 AM   Result Value Ref Range    Ventricular Rate 56 BPM    Atrial Rate 220 BPM    QRS Duration 88 ms    Q-T Interval 452 ms    QTC Calculation (Bezet) 436 ms    Calculated R Axis 94 degrees    Calculated T Axis 67 degrees    Diagnosis       Atrial fibrillation with slow ventricular response  Rightward axis  Nonspecific ST and T wave abnormality  Abnormal ECG  When compared with ECG of 14-JUL-2017 08:53,  Borderline criteria for Anterior infarct are no longer Present  Confirmed by AYE MO (), Ruben Lovett ([id number]) on 6/25/2019 7:32:54 AM     POC ACTIVATED CLOTTING TIME    Collection Time: 06/25/19  9:32 AM   Result Value Ref Range    Activated Clotting Time (POC) 340 (H) 70 - 128 SECS   POC ACTIVATED CLOTTING TIME    Collection Time: 06/25/19 10:08 AM   Result Value Ref Range    Activated Clotting Time (POC) 312 (H) 70 - 128 SECS   POC ACTIVATED CLOTTING TIME    Collection Time: 06/25/19 10:46 AM   Result Value Ref Range    Activated Clotting Time (POC) 312 (H) 70 - 128 SECS   EKG, 12 LEAD, INITIAL    Collection Time: 06/25/19 11:55 AM   Result Value Ref Range    Ventricular Rate 64 BPM    Atrial Rate 64 BPM    P-R Interval 212 ms    QRS Duration 96 ms    Q-T Interval 518 ms    QTC Calculation (Bezet) 534 ms    Calculated P Axis 63 degrees    Calculated R Axis 59 degrees    Calculated T Axis 104 degrees    Diagnosis       Sinus rhythm with 1st degree A-V block with Premature atrial complexes  Incomplete right bundle branch block  Possible Anterior infarct , age undetermined  Prolonged QT  Abnormal ECG  When compared with ECG of 25-JUN-2019 06:57,  Sinus rhythm has replaced Atrial fibrillation  QT has lengthened  Confirmed by ST VINNY BAUMAN MD (), JOLYNN PATEL ([id number]) on 6/25/2019 12:18:33 PM           Patient Instructions:   Current Discharge Medication List      START taking these medications    Details   pantoprazole (PROTONIX) 40 mg tablet Take 1 Tab by mouth Before breakfast and dinner. Qty: 60 Tab, Refills: 0      sucralfate (CARAFATE) 1 gram tablet Take 1 Tab by mouth Before breakfast, lunch, dinner and at bedtime. Qty: 120 Tab, Refills: 0         CONTINUE these medications which have CHANGED    Details   dofetilide (TIKOSYN) 250 mcg capsule Take 1 Cap by mouth every twelve (12) hours every twelve (12) hours. Qty: 60 Cap, Refills: 6         CONTINUE these medications which have NOT CHANGED    Details   atorvastatin (LIPITOR) 80 mg tablet Take 80 mg by mouth daily. glimepiride (AMARYL) 2 mg tablet Take 1 Tab by mouth every morning. Qty: 90 Tab, Refills: 1      amLODIPine-benazepril (LOTREL) 5-20 mg per capsule Take 1 capsule by mouth  daily  Qty: 90 Cap, Refills: 3      dabigatran etexilate (PRADAXA) 150 mg capsule Take 1 Cap by mouth every twelve (12) hours. Qty: 180 Cap, Refills: 3      fenofibrate (LOFIBRA) 160 mg tablet Take 1 Tab by mouth daily. Qty: 180 Tab, Refills: 3      fluticasone propionate (FLONASE) 50 mcg/actuation nasal spray USE 2 SPRAYS IN EACH NOSTRIL EVERY DAY  Qty: 48 g, Refills: 1      piroxicam (FELDENE) 20 mg capsule Take 1 Cap by mouth daily. Qty: 90 Cap, Refills: 4      levothyroxine (SYNTHROID) 88 mcg tablet Take 1 Tab by mouth Daily (before breakfast). Qty: 90 Tab, Refills: 1      gabapentin (NEURONTIN) 300 mg capsule Take 1 Cap by mouth three (3) times daily. Qty: 270 Cap, Refills: 1      omeprazole (PRILOSEC) 20 mg capsule Take 1 Cap by mouth daily. Qty: 90 Cap, Refills: 1      lysine (L-LYSINE) 500 mg tab tablet Take 250 mg by mouth every other day. cyanocobalamin (VITAMIN B-12) 1,000 mcg tablet Take 1,000 mcg by mouth every morning. aspirin delayed-release 81 mg tablet Take  by mouth every morning.                Rupinder Felix NP

## 2019-06-26 NOTE — PROGRESS NOTES
Bedside and Verbal shift change report given to self (oncoming nurse) by Jose Ramon Mishra (offgoing nurse). Report included the following information SBAR, Kardex, Intake/Output and MAR. Bilateral groin sites visualized, no apparent bleeding or hematoma.

## 2019-06-26 NOTE — PROGRESS NOTES
Bilateral groin suture (s) were removed by  Nurse with difficulty. Wound edges were not well approximated. There was not evidence of infection. Patient did tolerate well.

## 2019-06-26 NOTE — PROGRESS NOTES
Discharge instructions were reviewed with patient. An opportunity was given for questions. All medications were reviewed, and information was given on the new medications - tikosyn, protonix, carafate. Patient verbalized understanding, and has no questions at this time. IV and telemetry monitor removed by primary RN.

## 2019-06-26 NOTE — PROGRESS NOTES
Care Management Interventions  PCP Verified by CM: Yes  Last Visit to PCP: 01/25/19  Mode of Transport at Discharge: Other (see comment)(Almita Pat Daughter [phone] )  Transition of Care Consult (CM Consult): Discharge Planning  Discharge Durable Medical Equipment: No  Physical Therapy Consult: No  Occupational Therapy Consult: No  Speech Therapy Consult: No  Current Support Network: Own Home, Lives Alone  Confirm Follow Up Transport: Self  Plan discussed with Pt/Family/Caregiver: Yes  Freedom of Choice Offered: Yes  Discharge Location  Discharge Placement: Home      Pt admitted to 3rd floor [facility] for Afib. CM met with pt to discuss CM needs & DCP. Pt is A&Ox4. Pt is indep at home with all ADLS. Pt lives alone. Pt has no DME needs. Pt has no difficulty with obtaining medications or transport. CM spoke with [facility] on [address], confirmed Roxanne Means is available. DCP home. No further needs noted. CM to continue to monitor.

## 2019-07-02 ENCOUNTER — HOSPITAL ENCOUNTER (OUTPATIENT)
Dept: ULTRASOUND IMAGING | Age: 69
Discharge: HOME | End: 2019-07-02
Attending: INTERNAL MEDICINE
Payer: MEDICARE

## 2019-07-02 DIAGNOSIS — I72.9 PSEUDOANEURYSM (HCC): ICD-10-CM

## 2019-07-02 PROCEDURE — 93922 UPR/L XTREMITY ART 2 LEVELS: CPT

## 2019-10-14 PROBLEM — Z86.79 S/P ABLATION OF ATRIAL FIBRILLATION: Status: ACTIVE | Noted: 2019-10-14

## 2019-10-14 PROBLEM — S54.12XA NEUROPRAXIA OF LEFT MEDIAN NERVE: Status: ACTIVE | Noted: 2019-10-14

## 2019-10-14 PROBLEM — Z98.890 S/P ABLATION OF ATRIAL FIBRILLATION: Status: ACTIVE | Noted: 2019-10-14

## 2021-01-19 ENCOUNTER — HOSPITAL ENCOUNTER (OUTPATIENT)
Dept: CT IMAGING | Age: 71
Discharge: HOME | End: 2021-01-19
Attending: FAMILY MEDICINE

## 2021-01-19 DIAGNOSIS — R10.9 FLANK PAIN: ICD-10-CM

## 2021-01-19 DIAGNOSIS — R10.31 CONTINUOUS RLQ ABDOMINAL PAIN: ICD-10-CM

## 2021-01-19 RX ORDER — SODIUM CHLORIDE 0.9 % (FLUSH) 0.9 %
10 SYRINGE (ML) INJECTION
Status: COMPLETED | OUTPATIENT
Start: 2021-01-19 | End: 2021-01-19

## 2021-01-19 RX ADMIN — Medication 10 ML: at 11:27

## 2021-05-24 PROBLEM — E11.65 UNCONTROLLED TYPE 2 DIABETES MELLITUS WITH HYPERGLYCEMIA (HCC): Status: ACTIVE | Noted: 2021-05-24

## 2021-05-24 PROBLEM — R22.1 NECK MASS: Status: ACTIVE | Noted: 2021-05-24

## 2021-07-01 PROBLEM — R13.19 OTHER DYSPHAGIA: Status: ACTIVE | Noted: 2021-07-01

## 2021-07-05 ENCOUNTER — ANESTHESIA EVENT (OUTPATIENT)
Dept: SURGERY | Age: 71
End: 2021-07-05
Payer: MEDICARE

## 2021-07-06 ENCOUNTER — HOSPITAL ENCOUNTER (OUTPATIENT)
Age: 71
Setting detail: OUTPATIENT SURGERY
Discharge: HOME | End: 2021-07-06
Attending: SURGERY | Admitting: SURGERY
Payer: MEDICARE

## 2021-07-06 ENCOUNTER — ANESTHESIA (OUTPATIENT)
Dept: SURGERY | Age: 71
End: 2021-07-06
Payer: MEDICARE

## 2021-07-06 VITALS
SYSTOLIC BLOOD PRESSURE: 157 MMHG | HEART RATE: 79 BPM | HEIGHT: 69 IN | TEMPERATURE: 98.7 F | OXYGEN SATURATION: 98 % | RESPIRATION RATE: 24 BRPM | DIASTOLIC BLOOD PRESSURE: 78 MMHG | BODY MASS INDEX: 27.61 KG/M2 | WEIGHT: 186.4 LBS

## 2021-07-06 DIAGNOSIS — R22.1 NECK MASS: Primary | ICD-10-CM

## 2021-07-06 LAB
ANION GAP SERPL CALC-SCNC: 6 MMOL/L (ref 7–16)
ATRIAL RATE: 227 BPM
BUN SERPL-MCNC: 24 MG/DL (ref 8–23)
CALCIUM SERPL-MCNC: 8.2 MG/DL (ref 8.3–10.4)
CALCULATED R AXIS, ECG10: 21 DEGREES
CALCULATED T AXIS, ECG11: 69 DEGREES
CHLORIDE SERPL-SCNC: 114 MMOL/L (ref 98–107)
CO2 SERPL-SCNC: 25 MMOL/L (ref 21–32)
CREAT SERPL-MCNC: 1.34 MG/DL (ref 0.8–1.5)
DIAGNOSIS, 93000: NORMAL
GLUCOSE BLD STRIP.AUTO-MCNC: 114 MG/DL (ref 65–100)
GLUCOSE SERPL-MCNC: 118 MG/DL (ref 65–100)
MAGNESIUM SERPL-MCNC: 1.9 MG/DL (ref 1.8–2.4)
POTASSIUM SERPL-SCNC: 4 MMOL/L (ref 3.5–5.1)
Q-T INTERVAL, ECG07: 438 MS
QRS DURATION, ECG06: 88 MS
QTC CALCULATION (BEZET), ECG08: 459 MS
SERVICE CMNT-IMP: ABNORMAL
SODIUM SERPL-SCNC: 145 MMOL/L (ref 138–145)
VENTRICULAR RATE, ECG03: 66 BPM

## 2021-07-06 PROCEDURE — 83735 ASSAY OF MAGNESIUM: CPT

## 2021-07-06 PROCEDURE — 2709999900 HC NON-CHARGEABLE SUPPLY: Performed by: SURGERY

## 2021-07-06 PROCEDURE — 74011250636 HC RX REV CODE- 250/636: Performed by: ANESTHESIOLOGY

## 2021-07-06 PROCEDURE — 82962 GLUCOSE BLOOD TEST: CPT

## 2021-07-06 PROCEDURE — 77030033269 HC SLV COMPR SCD KNE2 CARD -B: Performed by: SURGERY

## 2021-07-06 PROCEDURE — 77030002916 HC SUT ETHLN J&J -A: Performed by: SURGERY

## 2021-07-06 PROCEDURE — 77030019908 HC STETH ESOPH SIMS -A: Performed by: ANESTHESIOLOGY

## 2021-07-06 PROCEDURE — 77030008462 HC STPLR SKN PROX J&J -A: Performed by: SURGERY

## 2021-07-06 PROCEDURE — 77030040922 HC BLNKT HYPOTHRM STRY -A: Performed by: ANESTHESIOLOGY

## 2021-07-06 PROCEDURE — 77030040361 HC SLV COMPR DVT MDII -B: Performed by: SURGERY

## 2021-07-06 PROCEDURE — 74011250637 HC RX REV CODE- 250/637: Performed by: ANESTHESIOLOGY

## 2021-07-06 PROCEDURE — 76210000006 HC OR PH I REC 0.5 TO 1 HR: Performed by: SURGERY

## 2021-07-06 PROCEDURE — 76060000032 HC ANESTHESIA 0.5 TO 1 HR: Performed by: SURGERY

## 2021-07-06 PROCEDURE — 74011250636 HC RX REV CODE- 250/636: Performed by: SURGERY

## 2021-07-06 PROCEDURE — 76010000160 HC OR TIME 0.5 TO 1 HR INTENSV-TIER 1: Performed by: SURGERY

## 2021-07-06 PROCEDURE — 77030010509 HC AIRWY LMA MSK TELE -A: Performed by: ANESTHESIOLOGY

## 2021-07-06 PROCEDURE — 80048 BASIC METABOLIC PNL TOTAL CA: CPT

## 2021-07-06 PROCEDURE — 76210000020 HC REC RM PH II FIRST 0.5 HR: Performed by: SURGERY

## 2021-07-06 PROCEDURE — 21555 EXC NECK LES SC < 3 CM: CPT | Performed by: SURGERY

## 2021-07-06 PROCEDURE — 93005 ELECTROCARDIOGRAM TRACING: CPT

## 2021-07-06 PROCEDURE — 88305 TISSUE EXAM BY PATHOLOGIST: CPT

## 2021-07-06 PROCEDURE — 74011000250 HC RX REV CODE- 250: Performed by: SURGERY

## 2021-07-06 PROCEDURE — 74011000250 HC RX REV CODE- 250: Performed by: ANESTHESIOLOGY

## 2021-07-06 RX ORDER — ONDANSETRON 2 MG/ML
4 INJECTION INTRAMUSCULAR; INTRAVENOUS
Status: DISCONTINUED | OUTPATIENT
Start: 2021-07-06 | End: 2021-07-06 | Stop reason: HOSPADM

## 2021-07-06 RX ORDER — LIDOCAINE HYDROCHLORIDE 10 MG/ML
0.1 INJECTION INFILTRATION; PERINEURAL AS NEEDED
Status: DISCONTINUED | OUTPATIENT
Start: 2021-07-06 | End: 2021-07-06 | Stop reason: HOSPADM

## 2021-07-06 RX ORDER — FENTANYL CITRATE 50 UG/ML
INJECTION, SOLUTION INTRAMUSCULAR; INTRAVENOUS AS NEEDED
Status: DISCONTINUED | OUTPATIENT
Start: 2021-07-06 | End: 2021-07-06 | Stop reason: HOSPADM

## 2021-07-06 RX ORDER — HYDROCODONE BITARTRATE AND ACETAMINOPHEN 5; 325 MG/1; MG/1
1-2 TABLET ORAL
Qty: 28 TABLET | Refills: 0 | Status: SHIPPED | OUTPATIENT
Start: 2021-07-06 | End: 2021-07-13

## 2021-07-06 RX ORDER — BUPIVACAINE HYDROCHLORIDE 2.5 MG/ML
INJECTION, SOLUTION EPIDURAL; INFILTRATION; INTRACAUDAL AS NEEDED
Status: DISCONTINUED | OUTPATIENT
Start: 2021-07-06 | End: 2021-07-06 | Stop reason: HOSPADM

## 2021-07-06 RX ORDER — ALBUTEROL SULFATE 0.83 MG/ML
2.5 SOLUTION RESPIRATORY (INHALATION) AS NEEDED
Status: DISCONTINUED | OUTPATIENT
Start: 2021-07-06 | End: 2021-07-06 | Stop reason: HOSPADM

## 2021-07-06 RX ORDER — MIDAZOLAM HYDROCHLORIDE 1 MG/ML
2 INJECTION, SOLUTION INTRAMUSCULAR; INTRAVENOUS
Status: DISCONTINUED | OUTPATIENT
Start: 2021-07-06 | End: 2021-07-06 | Stop reason: HOSPADM

## 2021-07-06 RX ORDER — OXYCODONE HYDROCHLORIDE 5 MG/1
5 TABLET ORAL
Status: DISCONTINUED | OUTPATIENT
Start: 2021-07-06 | End: 2021-07-06 | Stop reason: HOSPADM

## 2021-07-06 RX ORDER — LIDOCAINE HYDROCHLORIDE 20 MG/ML
INJECTION, SOLUTION EPIDURAL; INFILTRATION; INTRACAUDAL; PERINEURAL AS NEEDED
Status: DISCONTINUED | OUTPATIENT
Start: 2021-07-06 | End: 2021-07-06 | Stop reason: HOSPADM

## 2021-07-06 RX ORDER — CEFAZOLIN SODIUM/WATER 2 G/20 ML
2 SYRINGE (ML) INTRAVENOUS ONCE
Status: COMPLETED | OUTPATIENT
Start: 2021-07-06 | End: 2021-07-06

## 2021-07-06 RX ORDER — SODIUM CHLORIDE, SODIUM LACTATE, POTASSIUM CHLORIDE, CALCIUM CHLORIDE 600; 310; 30; 20 MG/100ML; MG/100ML; MG/100ML; MG/100ML
1000 INJECTION, SOLUTION INTRAVENOUS CONTINUOUS
Status: DISCONTINUED | OUTPATIENT
Start: 2021-07-06 | End: 2021-07-06 | Stop reason: HOSPADM

## 2021-07-06 RX ORDER — ACETAMINOPHEN 500 MG
1000 TABLET ORAL ONCE
Status: COMPLETED | OUTPATIENT
Start: 2021-07-06 | End: 2021-07-06

## 2021-07-06 RX ORDER — SODIUM CHLORIDE 0.9 % (FLUSH) 0.9 %
5-40 SYRINGE (ML) INJECTION EVERY 8 HOURS
Status: DISCONTINUED | OUTPATIENT
Start: 2021-07-06 | End: 2021-07-06 | Stop reason: HOSPADM

## 2021-07-06 RX ORDER — PROPOFOL 10 MG/ML
INJECTION, EMULSION INTRAVENOUS AS NEEDED
Status: DISCONTINUED | OUTPATIENT
Start: 2021-07-06 | End: 2021-07-06 | Stop reason: HOSPADM

## 2021-07-06 RX ORDER — EPHEDRINE SULFATE/0.9% NACL/PF 50 MG/5 ML
SYRINGE (ML) INTRAVENOUS AS NEEDED
Status: DISCONTINUED | OUTPATIENT
Start: 2021-07-06 | End: 2021-07-06 | Stop reason: HOSPADM

## 2021-07-06 RX ORDER — SODIUM CHLORIDE 0.9 % (FLUSH) 0.9 %
5-40 SYRINGE (ML) INJECTION AS NEEDED
Status: DISCONTINUED | OUTPATIENT
Start: 2021-07-06 | End: 2021-07-06 | Stop reason: HOSPADM

## 2021-07-06 RX ORDER — HYDROMORPHONE HYDROCHLORIDE 2 MG/ML
0.5 INJECTION, SOLUTION INTRAMUSCULAR; INTRAVENOUS; SUBCUTANEOUS
Status: DISCONTINUED | OUTPATIENT
Start: 2021-07-06 | End: 2021-07-06 | Stop reason: HOSPADM

## 2021-07-06 RX ADMIN — ACETAMINOPHEN 1000 MG: 500 TABLET ORAL at 09:08

## 2021-07-06 RX ADMIN — PHENYLEPHRINE HYDROCHLORIDE 180 MCG: 10 INJECTION INTRAVENOUS at 11:10

## 2021-07-06 RX ADMIN — FENTANYL CITRATE 50 MCG: 50 INJECTION INTRAMUSCULAR; INTRAVENOUS at 11:25

## 2021-07-06 RX ADMIN — SODIUM CHLORIDE, SODIUM LACTATE, POTASSIUM CHLORIDE, AND CALCIUM CHLORIDE 1000 ML: 600; 310; 30; 20 INJECTION, SOLUTION INTRAVENOUS at 09:15

## 2021-07-06 RX ADMIN — CEFAZOLIN 2 G: 1 INJECTION, POWDER, FOR SOLUTION INTRAVENOUS at 11:23

## 2021-07-06 RX ADMIN — Medication 10 MG: at 11:30

## 2021-07-06 RX ADMIN — PROPOFOL 180 MG: 10 INJECTION, EMULSION INTRAVENOUS at 11:10

## 2021-07-06 RX ADMIN — LIDOCAINE HYDROCHLORIDE 100 MG: 20 INJECTION, SOLUTION EPIDURAL; INFILTRATION; INTRACAUDAL; PERINEURAL at 11:10

## 2021-07-06 NOTE — OP NOTES
[facility]  OPERATIVE REPORT    Name:  Winnie Cheung  MR#:  [id number]  :  1950  ACCOUNT #:  [de-identified]  DATE OF SERVICE:  2021    PREOPERATIVE DIAGNOSIS:  Right posterior neck soft tissue mass. POSTOPERATIVE DIAGNOSIS:  Right posterior neck soft tissue mass. PROCEDURE PERFORMED:  Excision of right posterior neck subcutaneous 2-cm soft tissue mass (CPT 86277). SURGEON:  Sunny Hernandez MD    ASSISTANT:  None    ANESTHESIA:  General.    COMPLICATIONS:  None. SPECIMENS REMOVED:  Sent to Pathology for review. IMPLANTS:  None. ESTIMATED BLOOD LOSS:  Less than 10 mL. PROCEDURE:  The patient was taken to the operating room and placed in the supine position. After adequate anesthesia was given, he was repositioned in the decubitus position on a bean bag with his right side up. His right posterior neck was prepped and draped in a sterile fashion with multiple layers of Betadine scrub and Betadine solution. Time-out protocol was followed. He has given prophylactic antibiotics. An oblique incision was made over a soft tissue mass in his right posterior neck. We dissected into the subcutaneous tissues and excised the 2-cm soft tissue mass clinically consistent with lipoma. It was completely removed. It was sent to Pathology for review. Irrigation was used. Hemostasis was confirmed. There was no evidence of gross or palpable mass remaining following the excision. The fibers of the trapezius were exposed during the dissection and could clearly be seen at the base of the wound. We could palpate no residual masses deep to the trapezius. Irrigation was used. Hemostasis was confirmed. There was no evidence of gross or palpable disease remaining following the excision. Interrupted deep dermal 3-0 Vicryl sutures were placed. The skin was closed with clips. A sterile dressing was placed. The patient was taken to Recovery in good condition.   He tolerated the procedure well. There were no immediate complications.       Opal Sheikh MD [id number]  D:  07/06/2021 12:32  T:  07/06/2021 14:37  JOB #:  [id number]

## 2021-07-06 NOTE — H&P
H&P/Consult Note/Progress Note/Office Note:   Kishor Tracey  MRN: [id number]  DSK:7[birth year]  Age:70 y.o.    HPI: Kishor Tracey is a 79 y.o. male who is here for right posterior neck mass excision on 7/6/21. He was originally referred by Dr Annette [place] for evaluation of a right posterior neck mass since approx Jan, 2021  No recent infections. No h/o malignancy  Non-smoker    Nothing in particular made the mass better or worse. A trial of doxycycline did not help  No h/o ear infections or ear symptoms  He was bothered by the mass and felstthat it kepts him awake at night. He is s/p excision of a benign epidermal inclusion cyst from the right jaw in 2015 which has not recurred (see path below). No N/V, F/C, wt loss, rashes, or itching. No history of lymphoma. He mentione some incidental difficulty with dysphagia recently    He is diabetic with Hgb A1c 8.0 on 5/5/21        3/10/15 s/p excision of a large >2cm sebaceous cyst right jaw; Dr Stoddard Pulling: EPIDERMAL INCLUSION CYST. Electronically signed out on 3/11/2015 10:48 by ROSELYN Garcia M.D. Microscopic examination reveals skin with dermal cyst lined by well differentiated squamous epithelium and  filled with keratin debris. Deeper cuts have similar findings. Dr. Christina Caraballo concurs          Past Medical History:   Diagnosis Date    Anxiety     Arrhythmia     Atrial fib.     CAD (coronary artery disease) 1995    followed by Dr. Kamar Gaston Calculus of kidney     right renal calculi    Chronic pain     back     DDD (degenerative disc disease)     Diabetes ([address]     type 2; glimeperide as needed per patient ; a1c = 8.0 as of 5/5/2021; check sugars daily avg is 80 per patient; hypoglycemic at 61     Diverticulitis 2013    Endocrine disease     thyroid    GERD (gastroesophageal reflux disease)     controlled with medication     History of EKG 02/15/2021    NSR with incomp RBBB    History of shingles 2013    Hx of echocardiogram 07/01/2019    LVEF >55%    Hypercholesteremia     medication     Hypertension     controlled with medication  per patient     Lipid - Hyperlipidemia other unsp Dyslipidemia 7/5/2016    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 Karen Thomas (5yrs)    HX COLONOSCOPY  07/14/2017    HX CORONARY ARTERY BYPASS GRAFT      HX HEART CATHETERIZATION  06/25/2019    HX HEENT      nasal surgery 2ndary to obstruction    HX HEENT  02/2015    cyst removal    HX OTHER SURGICAL      lasik eyes    [place] CARDIAC SURG PROCEDURE UNLIST  1999    CABG    [place] CARDIAC SURG PROCEDURE UNLIST  6/225/19    ablation     No current facility-administered medications for this encounter. Current Outpatient Medications   Medication Sig    tamsulosin (FLOMAX) 0.4 mg capsule Take 0.4 mg by mouth every evening.  doxycycline (ADOXA) 100 mg tablet Take 1 Tablet by mouth every twelve (12) hours.  fluticasone propionate (FLONASE) 50 mcg/actuation nasal spray 2 Sprays by Both Nostrils route daily.  gabapentin (NEURONTIN) 300 mg capsule Take 1 Cap by mouth three (3) times daily.  glimepiride (AMARYL) 1 mg tablet Take 1 Tab by mouth Daily (before breakfast). (Patient taking differently: Take 1 mg by mouth as needed. Patient takes 'as needed' so his blood sugar does not drop too low)    levothyroxine (SYNTHROID) 88 mcg tablet TAKE 1 TABLET BY MOUTH EVERY DAY BEFORE BREAKFAST    omeprazole (PRILOSEC) 20 mg capsule Take 1 Cap by mouth daily.     piroxicam (FELDENE) 20 mg capsule Take 1 Cap by mouth daily.  fenofibrate (LOFIBRA) 160 mg tablet Take 1 Tab by mouth daily.  dofetilide (TIKOSYN) 250 mcg capsule Take 1 Cap by mouth every twelve (12) hours every twelve (12) hours.  dabigatran etexilate (Pradaxa) 150 mg capsule Take 1 Cap by mouth every twelve (12) hours.  atorvastatin (Lipitor) 80 mg tablet Take 1 Tab by mouth daily. (Patient taking differently: Take 80 mg by mouth every evening.)    amLODIPine-benazepril (LOTREL) 5-20 mg per capsule Take 1 capsule by mouth  daily    glucose blood VI test strips (OneTouch Verio test strips) strip UAD to test BG daily as directed.  lysine (L-LYSINE) 500 mg tab tablet Take 250 mg by mouth every other day.  cyanocobalamin (VITAMIN B-12) 1,000 mcg tablet Take 1,000 mcg by mouth every morning.  aspirin delayed-release 81 mg tablet Take  by mouth every morning. Other medication and Augmentin [amoxicillin-pot clavulanate]  Social History     Socioeconomic History    Marital status:      Spouse name: Not on file    Number of children: Not on file    Years of education: Not on file    Highest education level: Not on file   Tobacco Use    Smoking status: Never Smoker    Smokeless tobacco: Never Used   Vaping Use    Vaping Use: Never used   Substance and Sexual Activity    Alcohol use: Yes     Alcohol/week: 1.0 standard drinks     Types: 1 Cans of beer per week     Comment: maybe 1-2 drinks per month    Drug use: No     Social Determinants of Health     Financial Resource Strain:     Difficulty of Paying Living Expenses:    Food Insecurity:     Worried About Running Out of Food in the Last Year:     [address] in the Last Year:    Transportation Needs:     Lack of Transportation (Medical):      Lack of Transportation (Non-Medical):    Physical Activity:     Days of Exercise per Week:     Minutes of Exercise per Session:    Stress:     Feeling of Stress :    Social Connections:     Frequency of Communication with Friends and Family:     Frequency of Social Gatherings with Friends and Family:     Attends Buddhist Services:     Active Member of Clubs or Organizations:     Attends Club or Organization Meetings:     Marital Status:      Social History     Tobacco Use   Smoking Status Never Smoker   Smokeless Tobacco Never Used     Family History   Problem Relation Age of Onset    Heart Disease Mother     Heart Disease Father     Heart Disease Brother      ROS: The patient has no difficulty with chest pain or shortness of breath. No fever or chills. Comprehensive review of systems was otherwise unremarkable except as noted above. Physical Exam:   There were no vitals taken for this visit. There were no vitals filed for this visit. [unfilled]  [unfilled]    Constitutional: Alert, oriented, cooperative patient in no acute distress; appears stated age    Eyes:Sclera are clear. EOMs intact  ENMT: no external lesions gross hearing normal; no obvious neck masses, no ear or lip lesions, nares normal    There is a soft tissue mass involving the right posterior mid neck just lateral to the border of the trapezius. It is isolated. It is fairly deep. It is approximately 1.3 cm --->now approx 2cm in size      CV: RRR. Normal perfusion  Resp: No JVD. Breathing is  non-labored; no audible wheezing. GI: soft and non-distended     Musculoskeletal: unremarkable with normal function. No embolic signs or cyanosis. Neuro:  Oriented; moves all 4; no focal deficits  Psychiatric: normal affect and mood, no memory impairment    Recent vitals (if inpt):  @IPVITALS(24:)@    Amount and/or Complexity of Data Reviewed and Analyzed:  I reviewed and analyzed all of the unique labs and radiologic studies that are shown below as well as any that are in the HPI, and any that are in the expanded problem list below  *Each unique test, order, or document contributes to the combination of 2 or combination of 3 in Category 1 below.   For this visit I also reviewed old records and prior notes. No results for input(s): WBC, HGB, PLT, NA, K, CL, CO2, BUN, CREA, GLU, PTP, INR, APTT, TBIL, TBILI, CBIL, ALT, AP, AML, AML, LPSE, LCAD, NH4, TROPT, TROIQ, PCO2, PO2, HCO3, HGBEXT, PLTEXT, INREXT, HGBEXT, PLTEXT, INREXT in the last 72 hours. No lab exists for component: SGOT, GPT,  PH  Review of most recent CBC  Lab Results   Component Value Date/Time    WBC 3.5 04/23/2021 07:50 AM    HGB 13.2 04/23/2021 07:50 AM    HCT 40.1 04/23/2021 07:50 AM    PLATELET 668 41/98/2499 07:50 AM    MCV 90 04/23/2021 07:50 AM       Review of most recent BMP  Lab Results   Component Value Date/Time    Sodium 144 04/23/2021 07:50 AM    Potassium 4.2 04/23/2021 07:50 AM    Chloride 106 04/23/2021 07:50 AM    CO2 24 04/23/2021 07:50 AM    Anion gap 7 06/25/2019 06:37 AM    Glucose 116 (H) 04/23/2021 07:50 AM    BUN 19 05/05/2021 08:36 AM    Creatinine 1.47 (H) 05/05/2021 08:36 AM    BUN/Creatinine ratio 13 05/05/2021 08:36 AM    GFR est AA 55 (L) 05/05/2021 08:36 AM    GFR est non-AA 48 (L) 05/05/2021 08:36 AM    Calcium 9.3 04/23/2021 07:50 AM       Review of most recent LFTs (and lipase if done)  Lab Results   Component Value Date/Time    ALT (SGPT) 17 04/23/2021 07:50 AM    AST (SGOT) 22 04/23/2021 07:50 AM    Alk.  phosphatase 87 04/23/2021 07:50 AM    Bilirubin, direct 0.13 11/11/2019 09:44 AM    Bilirubin, total 0.4 04/23/2021 07:50 AM     No results found for: LPSE    Lab Results   Component Value Date/Time    INR 1.1 06/25/2019 06:37 AM    Bilirubin, direct 0.13 11/11/2019 09:44 AM       Review of most recent HgbA1c  Lab Results   Component Value Date/Time    Hemoglobin A1c 8.0 (H) 05/05/2021 08:36 AM    Hemoglobin A1c (POC) 6.6 08/10/2020 03:00 PM       Nutritional assessment screen for wound healing issues:  Lab Results   Component Value Date/Time    Protein, total 6.6 04/23/2021 07:50 AM    Albumin 4.4 04/23/2021 07:50 AM       @lastcovr@  XR Results (most recent):  Results from [place] encounter on 06/25/19    XR CHEST PORT    Narrative  CHEST X-RAY, one view. HISTORY:  Status post atrial fibrillation ablation. TECHNIQUE:  AP supine portable view. COMPARISON: None. FINDINGS:   Lungs are clear. Heart and pulmonary vasculature is unremarkable. No  pneumothorax. Surgical changes along the left heart border. Impression  IMPRESSION:  Negative for acute change. CT Results (most recent):  Results from Hospital Encounter encounter on 01/19/21    CT ABD PELV W CONT    Narrative  EXAMINATION: CT  ABDOMEN / PELVIS   1/19/2021 11:28 AM    ACCESSION NUMBER:  [id number]    INDICATION:  RLQ pain    COMPARISON: CT of the abdomen and pelvis, 3/10/2017. CT of the abdomen and  pelvis, 4/25/2013    TECHNIQUE: Contiguous axial computed tomographic images were obtained from the  domes of the diaphragm to the symphysis pubis after the intravenous  administration of 100 mL of Isovue 370. Coronal reconstructions were also  performed. Radiation dose reduction techniques were used for this study:  Our CT scanners  use one or all of the following: Automated exposure control, adjustment of the  mA and/or kVp according to patient's size, iterative reconstruction. FINDINGS:    LOWER THORAX: The visualized lung bases are clear. The visualized portions of  the heart are unremarkable. LIVER: Normal    BILIARY: Gallbladder is unremarkable. No intrahepatic or extrahepatic biliary  ductal dilation. SPLEEN: No splenomegaly or concerning lesion. PANCREAS: No pancreatic duct dilation or mass. ADRENALS: No adrenal nodules or hypertrophy. URINARY: Right renal cyst measuring 2.6 cm. A few subcentimeter hypodensities  are present on the left kidney, too small to definitively characterize but  likely representing simple cysts. No renal or ureteral calculi. No  hydronephrosis. There is mild circumferential wall thickening of the urinary  bladder.     BOWEL: The stomach, small bowel, and large bowel are normal in caliber. No  abnormal wall thickening. No pneumoperitoneum. The appendix is normal.    VESSELS: Abdominal aorta and iliac arterial system are nonaneurysmal with  moderate atherosclerotic calcifications. NODES: Redemonstration of lobulated hypodense fluid density cluster of masses in  the right lower quadrant mesentery associated with a few punctate  calcifications, the largest of which measures 4.5 x 2.0 x 1.2 cm and remains  unchanged from 2017, mildly decreased in size from 2013. FLUID: No free intraperitoneal fluid. REPRODUCTIVE: There is hypertrophy of the central gland of the prostate with  indentation of the bladder base. BONES/SOFT TISSUES: No acute or aggressive osseous abnormality. Regional soft  tissues are unremarkable. Impression  1. Mild circumferential thickening of the urinary bladder is nonspecific. Differential considerations include underdistention, acute cystitis, and chronic  bladder outlet obstruction given prostatomegaly. 2. A cluster of low-density nodules in the right lower quadrant mesentery are  unchanged from 2017 but decreased in size from 2013, consistent with a benign  process. US Results (most recent):  Results from [place] encounter on 09    US RT [facility]    ULTRASOUND    NAME: Sada Ybarra  PT : 1950               SEX: M         MR#: [id number]  LOCATION/NS: ER-                 AGE: 63Y      ACCT: [de-identified]  ORDERING: Tala Pedersen                  PT TYPE: E  RADIOLOGIST: Kaleb Cano ([id number])  Final Report      ICD Codes / Adm. Diagnosis:                  /  Examination:  US VENOUS DUPLEX RIGHT LE  - [id number] - May  5 2009  3:24PM          Reason:   Unilateral leg swelling. REPORT:  Sonographic evaluation of the right lower extremity venous vascular  was performed.     The right common femoral vein, profunda, greater saphenous vein, superficial  femoral vein, popliteal vein, posterior tibial vein and peroneal vein  demonstrate no evidence of deep venous thrombosis. The veins demonstrate  normal compressibility and augmentation. The right iliac vein is within  normal limits. The left common femoral vein demonstrates no evidence of deep venous  thrombosis. IMPRESSION:    NO EVIDENCE OF DEEP VENOUS THROMBOSIS WITHIN THE RIGHT LOWER  EXTREMITY VENOUS VASCULATURE. WITHIN THE RIGHT GROIN ARE LYMPH NODES, THE  LARGEST OF WHICH MEASURES 2.2 CM. CLINICAL CORRELATION ADVISED. Interpreting/Reading Doctor: Mirta Cage ([id number])  Transcribed: [address] Dario[address]jali Levi on 05/05/2009  Approved: Mirta Cage ([id number])  05/13/2009        Distribution:  Attending Doctor: Beatris Holley        Admission date (for inpatients): (Not on file)   * No surgery found *  Procedure(s):  MASS EXCISION RIGHT POSTERIOR NECK/ RIGHT SIDE UP DECUBITUS BEAN BAG        ASSESSMENT/PLAN:  Problem List  Date Reviewed: 7/1/2021        Codes Class Noted    Other dysphagia ICD-10-CM: R13.19  ICD-9-CM: 787.29  7/1/2021        Neck mass ICD-10-CM: R22.1  ICD-9-CM: 784.2  5/24/2021        Neuropraxia of left median nerve ICD-10-CM: S54. 12XA  ICD-9-CM: 955.1  10/14/2019        S/P ablation of atrial fibrillation ICD-10-CM: Z98.890, Z86.79  ICD-9-CM: V45.89  10/14/2019        Type 2 diabetes mellitus with nephropathy ([facility] ICD-10-CM: E11.21  ICD-9-CM: 250.40, 583.81  1/19/2018        Atrial fibrillation ([facility] ICD-10-CM: I48.91  ICD-9-CM: 427.31  11/3/2016        Hypersomnia ICD-10-CM: G47.10  ICD-9-CM: 780.54  8/16/2016        Persistent atrial fibrillation (HCC) ICD-10-CM: I48.19  ICD-9-CM: 427.31  7/15/2016        Lipid - Hyperlipidemia other unsp Dyslipidemia ICD-10-CM: E78.5  ICD-9-CM: 272.4  7/5/2016        S/P CABG/Aortocoronary bypass status ICD-10-CM: Z95.1  ICD-9-CM: V45.81  7/5/2016        Mass of soft tissue of face ICD-10-CM: M79.89  ICD-9-CM: 729.99  3/10/2015    Overview Signed 3/13/2015  1:52 PM by Alex Bishop MD     3/10/15 s/p excision right facial mass; Dr David Mayorga: EPIDERMAL INCLUSION CYST. Electronically signed out on 3/11/2015 10:48 by ROSELYN Guerra M.D. Microscopic examination reveals skin with dermal cyst lined by well differentiated squamous epithelium and  filled with keratin debris. Deeper cuts have similar findings. Dr. Zeny King concurs. CAD (coronary artery disease) (Chronic) ICD-10-CM: I25.10  ICD-9-CM: 414.00  Unknown    Overview Signed 10/22/2014  8:05 AM by Annabelle Gupta RN     followed by Dr. Lissy Garcia             Hypertension (Chronic) ICD-10-CM: I10  ICD-9-CM: 401.9  Unknown        Chronic pain (Chronic) ICD-10-CM: J60.49  ICD-9-CM: 338.29  Unknown    Overview Signed 10/22/2014  8:05 AM by Annabelle Gupta RN     DDD             Thyroid disease (Chronic) ICD-10-CM: E07.9  ICD-9-CM: 246. 9  Unknown    Overview Signed 10/22/2014  8:07 AM by Annabelle Gupta RN     Hypothyroid             Hypercholesterolemia (Chronic) ICD-10-CM: E78.00  ICD-9-CM: 272.0  Unknown        Arrhythmia (Chronic) ICD-10-CM: I49.9  ICD-9-CM: 427.9  Unknown    Overview Signed 10/22/2014  8:08 AM by Annabelle Gupta RN     Atrial fib. Calculus of kidney ICD-10-CM: N20.0  ICD-9-CM: 592.0  Unknown    Overview Signed 10/22/2014  8:08 AM by Annabelle Gupta RN     right renal calculi             MARIO on CPAP ICD-10-CM: G47.33, Z99.89  ICD-9-CM: 327.23, V46.8  Unknown    Overview Addendum 1/19/2017  4:05 PM by Guilherme Mitchell MD      CPAP 10-20 cm             Diverticulitis ICD-10-CM: U15.43  ICD-9-CM: 562.11  Unknown        History of shingles ICD-10-CM: Z86.19  ICD-9-CM: V12.09  Unknown        Anxiety ICD-10-CM: F41.9  ICD-9-CM: 300.00  Unknown            Active Problems:    * No active hospital problems.  *         Number and Complexity of Problems addressed and   Risks of complications and/or morbidity of management            Isolated right posterior neck soft tissue mass suspicious for isolated node    He is here for right posterior neck mass excision on 7/6/21. We will send this to pathology for review. No recent known infections  No history of malignancy. Prior to resection we prescribed doxycycline 100mg q12 hours x10 days which did not help. Informed consent was obtained for right posterior neck mass excision. Procedural risks discussed including bleeding, infection, risk of anesthesia, etc.          Recurrent mass right face/jaw   This was a benign sebaceous cyst and has not recurred since its excision in 2015. DM2  Hgb A1c 8.0 on 5/5/2021  Encourage strict adherence to diabetic medical regimen, diabetic diet, and close follow-up with primary care physician to lower risk of infection and help with wound healing      Dysphagia  I encouraged him to follow-up with his gastroenterologist at [address] [place] [address]. He had a prior colonoscopy with them in the past                I have personally performed a face-to-face diagnostic evaluation and management  service on this patient. I have independently seen the patient. I have independently obtained the above history from the patient/family. I have independently examined the patient with above findings. I have independently reviewed data/labs for this patient and developed the above plan of care (MDM). Signed: Dali Barrera MD, FACS

## 2021-07-06 NOTE — ANESTHESIA POSTPROCEDURE EVALUATION
Procedure(s):  MASS EXCISION RIGHT POSTERIOR NECK/ RIGHT SIDE UP DECUBITUS BEAN BAG. general    Anesthesia Post Evaluation      Multimodal analgesia: multimodal analgesia used between 6 hours prior to anesthesia start to PACU discharge  Patient location during evaluation: bedside  Patient participation: complete - patient participated  Level of consciousness: awake and responsive to light touch  Pain management: adequate  Airway patency: patent  Anesthetic complications: no  Cardiovascular status: acceptable, hemodynamically stable, blood pressure returned to baseline and stable  Respiratory status: acceptable, unassisted, spontaneous ventilation and nonlabored ventilation  Hydration status: acceptable        INITIAL Post-op Vital signs:   Vitals Value Taken Time   /78 07/06/21 1247   Temp 36.5 °C (97.7 °F) 07/06/21 1203   Pulse 59 07/06/21 1248   Resp 12 07/06/21 1248   SpO2 98 % 07/06/21 1248   Vitals shown include unvalidated device data.

## 2021-07-06 NOTE — ANESTHESIA PREPROCEDURE EVALUATION
Relevant Problems   No relevant active problems       Anesthetic History   No history of anesthetic complications            Review of Systems / Medical History  Patient summary reviewed and pertinent labs reviewed    Pulmonary        Sleep apnea: CPAP           Neuro/Psych   Within defined limits           Cardiovascular    Hypertension        Dysrhythmias : atrial fibrillation  CAD, CABG (1999) and hyperlipidemia    Exercise tolerance: >4 METS  Comments: 2016 TTE - EF 55-60%   GI/Hepatic/Renal     GERD: well controlled           Endo/Other    Diabetes: well controlled, type 2  Hypothyroidism: well controlled  Arthritis     Other Findings              Physical Exam    Airway  Mallampati: II  TM Distance: 4 - 6 cm  Neck ROM: normal range of motion   Mouth opening: Normal     Cardiovascular    Rhythm: irregular  Rate: normal      Pertinent negatives: No peripheral edema   Dental  No notable dental hx       Pulmonary  Breath sounds clear to auscultation               Abdominal         Other Findings            Anesthetic Plan    ASA: 3  Anesthesia type: general            Anesthetic plan and risks discussed with: Patient      LMA

## 2021-07-15 PROBLEM — Z98.890 S/P EXCISION OF LIPOMA: Status: ACTIVE | Noted: 2021-07-15

## 2021-07-15 PROBLEM — Z86.018 S/P EXCISION OF LIPOMA: Status: ACTIVE | Noted: 2021-07-15

## 2021-08-30 ENCOUNTER — HOSPITAL ENCOUNTER (OUTPATIENT)
Dept: LAB | Age: 71
Discharge: HOME | End: 2021-08-30
Payer: MEDICARE

## 2021-08-30 DIAGNOSIS — I48.19 PERSISTENT ATRIAL FIBRILLATION (HCC): ICD-10-CM

## 2021-08-30 DIAGNOSIS — I25.119 ATHEROSCLEROSIS OF NATIVE CORONARY ARTERY OF NATIVE HEART WITH ANGINA PECTORIS (HCC): ICD-10-CM

## 2021-08-30 DIAGNOSIS — I25.10 CORONARY ARTERY DISEASE WITHOUT ANGINA PECTORIS, UNSPECIFIED VESSEL OR LESION TYPE, UNSPECIFIED WHETHER NATIVE OR TRANSPLANTED HEART: Chronic | ICD-10-CM

## 2021-08-30 DIAGNOSIS — E11.21 TYPE 2 DIABETES MELLITUS WITH NEPHROPATHY (HCC): ICD-10-CM

## 2021-08-30 DIAGNOSIS — G47.33 OSA ON CPAP: ICD-10-CM

## 2021-08-30 DIAGNOSIS — Z99.89 OSA ON CPAP: ICD-10-CM

## 2021-08-30 DIAGNOSIS — I10 ESSENTIAL HYPERTENSION: Chronic | ICD-10-CM

## 2021-08-30 DIAGNOSIS — I20.9 ANGINA PECTORIS, UNSPECIFIED (HCC): ICD-10-CM

## 2021-08-30 PROBLEM — I20.0 UNSTABLE ANGINA PECTORIS (HCC): Status: ACTIVE | Noted: 2021-08-30

## 2021-08-30 PROBLEM — R07.9 CHEST PAIN: Status: ACTIVE | Noted: 2021-08-30

## 2021-08-30 LAB
ANION GAP SERPL CALC-SCNC: 4 MMOL/L (ref 7–16)
BASOPHILS # BLD: 0.1 K/UL (ref 0–0.2)
BASOPHILS NFR BLD: 2 % (ref 0–2)
BUN SERPL-MCNC: 25 MG/DL (ref 8–23)
CALCIUM SERPL-MCNC: 9.1 MG/DL (ref 8.3–10.4)
CHLORIDE SERPL-SCNC: 112 MMOL/L (ref 98–107)
CO2 SERPL-SCNC: 27 MMOL/L (ref 21–32)
CREAT SERPL-MCNC: 1.79 MG/DL (ref 0.8–1.5)
DIFFERENTIAL METHOD BLD: ABNORMAL
EOSINOPHIL # BLD: 0.1 K/UL (ref 0–0.8)
EOSINOPHIL NFR BLD: 5 % (ref 0.5–7.8)
ERYTHROCYTE [DISTWIDTH] IN BLOOD BY AUTOMATED COUNT: 14.9 % (ref 11.9–14.6)
GLUCOSE SERPL-MCNC: 148 MG/DL (ref 65–100)
HCT VFR BLD AUTO: 39.8 % (ref 41.1–50.3)
HGB BLD-MCNC: 12.2 G/DL (ref 13.6–17.2)
IMM GRANULOCYTES # BLD AUTO: 0 K/UL (ref 0–0.5)
IMM GRANULOCYTES NFR BLD AUTO: 0 % (ref 0–5)
INR PPP: 1.5
LYMPHOCYTES # BLD: 0.4 K/UL (ref 0.5–4.6)
LYMPHOCYTES NFR BLD: 14 % (ref 13–44)
MCH RBC QN AUTO: 28.3 PG (ref 26.1–32.9)
MCHC RBC AUTO-ENTMCNC: 30.7 G/DL (ref 31.4–35)
MCV RBC AUTO: 92.3 FL (ref 79.6–97.8)
MONOCYTES # BLD: 0.4 K/UL (ref 0.1–1.3)
MONOCYTES NFR BLD: 13 % (ref 4–12)
NEUTS SEG # BLD: 1.9 K/UL (ref 1.7–8.2)
NEUTS SEG NFR BLD: 67 % (ref 43–78)
NRBC # BLD: 0 K/UL (ref 0–0.2)
PLATELET # BLD AUTO: 184 K/UL (ref 150–450)
PMV BLD AUTO: 13 FL (ref 9.4–12.3)
POTASSIUM SERPL-SCNC: 4.8 MMOL/L (ref 3.5–5.1)
PROTHROMBIN TIME: 18 SEC (ref 12.6–14.5)
RBC # BLD AUTO: 4.31 M/UL (ref 4.23–5.6)
SODIUM SERPL-SCNC: 143 MMOL/L (ref 138–145)
WBC # BLD AUTO: 2.9 K/UL (ref 4.3–11.1)

## 2021-08-30 PROCEDURE — 85610 PROTHROMBIN TIME: CPT

## 2021-08-30 PROCEDURE — 80048 BASIC METABOLIC PNL TOTAL CA: CPT

## 2021-08-30 PROCEDURE — 36415 COLL VENOUS BLD VENIPUNCTURE: CPT

## 2021-08-30 PROCEDURE — 85025 COMPLETE CBC W/AUTO DIFF WBC: CPT

## 2021-09-09 ENCOUNTER — HOSPITAL ENCOUNTER (OUTPATIENT)
Age: 71
Setting detail: OUTPATIENT SURGERY
Discharge: HOME | End: 2021-09-09
Attending: INTERNAL MEDICINE | Admitting: INTERNAL MEDICINE
Payer: MEDICARE

## 2021-09-09 VITALS
DIASTOLIC BLOOD PRESSURE: 70 MMHG | SYSTOLIC BLOOD PRESSURE: 137 MMHG | HEART RATE: 57 BPM | WEIGHT: 183 LBS | RESPIRATION RATE: 16 BRPM | OXYGEN SATURATION: 96 % | BODY MASS INDEX: 27.11 KG/M2 | HEIGHT: 69 IN

## 2021-09-09 DIAGNOSIS — R07.2 PRECORDIAL PAIN: ICD-10-CM

## 2021-09-09 DIAGNOSIS — R07.9 CHEST PAIN: ICD-10-CM

## 2021-09-09 LAB
ANION GAP SERPL CALC-SCNC: 7 MMOL/L (ref 7–16)
ATRIAL RATE: 70 BPM
BUN SERPL-MCNC: 30 MG/DL (ref 8–23)
CALCIUM SERPL-MCNC: 8.9 MG/DL (ref 8.3–10.4)
CALCULATED P AXIS, ECG09: -27 DEGREES
CALCULATED R AXIS, ECG10: 40 DEGREES
CALCULATED T AXIS, ECG11: 74 DEGREES
CHLORIDE SERPL-SCNC: 113 MMOL/L (ref 98–107)
CO2 SERPL-SCNC: 25 MMOL/L (ref 21–32)
CREAT SERPL-MCNC: 1.62 MG/DL (ref 0.8–1.5)
DIAGNOSIS, 93000: NORMAL
GLUCOSE BLD STRIP.AUTO-MCNC: 79 MG/DL (ref 65–100)
GLUCOSE SERPL-MCNC: 87 MG/DL (ref 65–100)
INR PPP: 1.2
MAGNESIUM SERPL-MCNC: 2.1 MG/DL (ref 1.8–2.4)
P-R INTERVAL, ECG05: 176 MS
POTASSIUM SERPL-SCNC: 4 MMOL/L (ref 3.5–5.1)
PROTHROMBIN TIME: 15 SEC (ref 12.6–14.5)
Q-T INTERVAL, ECG07: 424 MS
QRS DURATION, ECG06: 92 MS
QTC CALCULATION (BEZET), ECG08: 457 MS
SERVICE CMNT-IMP: NORMAL
SODIUM SERPL-SCNC: 145 MMOL/L (ref 138–145)
VENTRICULAR RATE, ECG03: 70 BPM

## 2021-09-09 PROCEDURE — C1894 INTRO/SHEATH, NON-LASER: HCPCS | Performed by: INTERNAL MEDICINE

## 2021-09-09 PROCEDURE — 74011250636 HC RX REV CODE- 250/636: Performed by: INTERNAL MEDICINE

## 2021-09-09 PROCEDURE — 74011250637 HC RX REV CODE- 250/637: Performed by: INTERNAL MEDICINE

## 2021-09-09 PROCEDURE — 85610 PROTHROMBIN TIME: CPT

## 2021-09-09 PROCEDURE — 74011000636 HC RX REV CODE- 636: Performed by: INTERNAL MEDICINE

## 2021-09-09 PROCEDURE — 99153 MOD SED SAME PHYS/QHP EA: CPT | Performed by: INTERNAL MEDICINE

## 2021-09-09 PROCEDURE — 80048 BASIC METABOLIC PNL TOTAL CA: CPT

## 2021-09-09 PROCEDURE — 77030004558 HC CATH ANGI DX SUPR TORQ CARD -A: Performed by: INTERNAL MEDICINE

## 2021-09-09 PROCEDURE — 93005 ELECTROCARDIOGRAM TRACING: CPT | Performed by: INTERNAL MEDICINE

## 2021-09-09 PROCEDURE — 93458 L HRT ARTERY/VENTRICLE ANGIO: CPT | Performed by: INTERNAL MEDICINE

## 2021-09-09 PROCEDURE — 99152 MOD SED SAME PHYS/QHP 5/>YRS: CPT | Performed by: INTERNAL MEDICINE

## 2021-09-09 PROCEDURE — 93459 L HRT ART/GRFT ANGIO: CPT | Performed by: INTERNAL MEDICINE

## 2021-09-09 PROCEDURE — 74011000250 HC RX REV CODE- 250: Performed by: INTERNAL MEDICINE

## 2021-09-09 PROCEDURE — 82962 GLUCOSE BLOOD TEST: CPT

## 2021-09-09 PROCEDURE — 83735 ASSAY OF MAGNESIUM: CPT

## 2021-09-09 RX ORDER — NALOXONE HYDROCHLORIDE 0.4 MG/ML
0.4 INJECTION, SOLUTION INTRAMUSCULAR; INTRAVENOUS; SUBCUTANEOUS AS NEEDED
Status: DISCONTINUED | OUTPATIENT
Start: 2021-09-09 | End: 2021-09-09 | Stop reason: HOSPADM

## 2021-09-09 RX ORDER — SODIUM CHLORIDE 9 MG/ML
1 INJECTION, SOLUTION INTRAVENOUS AS NEEDED
Status: DISCONTINUED | OUTPATIENT
Start: 2021-09-09 | End: 2021-09-09 | Stop reason: HOSPADM

## 2021-09-09 RX ORDER — GUAIFENESIN 100 MG/5ML
324 LIQUID (ML) ORAL ONCE
Status: COMPLETED | OUTPATIENT
Start: 2021-09-09 | End: 2021-09-09

## 2021-09-09 RX ORDER — MIDAZOLAM HYDROCHLORIDE 1 MG/ML
INJECTION, SOLUTION INTRAMUSCULAR; INTRAVENOUS AS NEEDED
Status: DISCONTINUED | OUTPATIENT
Start: 2021-09-09 | End: 2021-09-09 | Stop reason: HOSPADM

## 2021-09-09 RX ORDER — LIDOCAINE HYDROCHLORIDE 10 MG/ML
INJECTION INFILTRATION; PERINEURAL AS NEEDED
Status: DISCONTINUED | OUTPATIENT
Start: 2021-09-09 | End: 2021-09-09 | Stop reason: HOSPADM

## 2021-09-09 RX ORDER — ACETAMINOPHEN 325 MG/1
650 TABLET ORAL
Status: DISCONTINUED | OUTPATIENT
Start: 2021-09-09 | End: 2021-09-09 | Stop reason: HOSPADM

## 2021-09-09 RX ORDER — SODIUM CHLORIDE 0.9 % (FLUSH) 0.9 %
5-40 SYRINGE (ML) INJECTION EVERY 8 HOURS
Status: DISCONTINUED | OUTPATIENT
Start: 2021-09-09 | End: 2021-09-09 | Stop reason: HOSPADM

## 2021-09-09 RX ORDER — SODIUM CHLORIDE 9 MG/ML
75 INJECTION, SOLUTION INTRAVENOUS CONTINUOUS
Status: DISCONTINUED | OUTPATIENT
Start: 2021-09-09 | End: 2021-09-09

## 2021-09-09 RX ORDER — SODIUM CHLORIDE 0.9 % (FLUSH) 0.9 %
5-40 SYRINGE (ML) INJECTION AS NEEDED
Status: DISCONTINUED | OUTPATIENT
Start: 2021-09-09 | End: 2021-09-09 | Stop reason: HOSPADM

## 2021-09-09 RX ORDER — ONDANSETRON 2 MG/ML
4 INJECTION INTRAMUSCULAR; INTRAVENOUS
Status: DISCONTINUED | OUTPATIENT
Start: 2021-09-09 | End: 2021-09-09 | Stop reason: HOSPADM

## 2021-09-09 RX ORDER — HEPARIN SODIUM 200 [USP'U]/100ML
INJECTION, SOLUTION INTRAVENOUS
Status: COMPLETED | OUTPATIENT
Start: 2021-09-09 | End: 2021-09-09

## 2021-09-09 RX ORDER — FENTANYL CITRATE 50 UG/ML
INJECTION, SOLUTION INTRAMUSCULAR; INTRAVENOUS AS NEEDED
Status: DISCONTINUED | OUTPATIENT
Start: 2021-09-09 | End: 2021-09-09 | Stop reason: HOSPADM

## 2021-09-09 RX ORDER — SODIUM CHLORIDE 9 MG/ML
75 INJECTION, SOLUTION INTRAVENOUS CONTINUOUS
Status: DISCONTINUED | OUTPATIENT
Start: 2021-09-09 | End: 2021-09-09 | Stop reason: HOSPADM

## 2021-09-09 RX ORDER — SODIUM CHLORIDE 9 MG/ML
3 INJECTION, SOLUTION INTRAVENOUS ONCE
Status: COMPLETED | OUTPATIENT
Start: 2021-09-09 | End: 2021-09-09

## 2021-09-09 RX ORDER — HYDROCODONE BITARTRATE AND ACETAMINOPHEN 5; 325 MG/1; MG/1
1 TABLET ORAL
Status: DISCONTINUED | OUTPATIENT
Start: 2021-09-09 | End: 2021-09-09 | Stop reason: HOSPADM

## 2021-09-09 RX ORDER — MORPHINE SULFATE 2 MG/ML
1 INJECTION, SOLUTION INTRAMUSCULAR; INTRAVENOUS
Status: DISCONTINUED | OUTPATIENT
Start: 2021-09-09 | End: 2021-09-09 | Stop reason: HOSPADM

## 2021-09-09 RX ADMIN — ASPIRIN 81 MG 324 MG: 81 TABLET ORAL at 06:00

## 2021-09-09 RX ADMIN — SODIUM CHLORIDE 3 ML/KG/HR: 900 INJECTION, SOLUTION INTRAVENOUS at 07:50

## 2021-09-09 RX ADMIN — SODIUM CHLORIDE 1 ML/KG/HR: 900 INJECTION, SOLUTION INTRAVENOUS at 08:06

## 2021-09-09 NOTE — ROUTINE PROCESS
Patient received to [address] [place] [address] room # 15  Ambulatory from [facility]. Patient scheduled for C today with Dr Hannah Jane. Procedure reviewed & questions answered, voiced good understanding consent obtained & placed on chart. All medications and medical history reviewed. Will prep patient per orders. Patient & family updated on plan of care.       The patient has a fraility score of 3-MANAGING WELL, based on independent of adl's        Patient took Aspirin 324 mg today at 0600 prior to arrival.

## 2021-09-09 NOTE — PROGRESS NOTES
TRANSFER - IN REPORT:    Verbal report received from [address] [place] [address] at 8403 on [facility] being received from [facility] for routine post - op      Report consisted of patients Situation, Background, Assessment and Recommendations(SBAR). Information from the following report(s) Procedure Summary and MAR was reviewed with the receiving nurse. Opportunity for questions and clarification was provided. Assessment completed upon patients arrival to unit and care assumed. 6FR arterial sheath removed from right groin using sterile technique. Manual pressure held over site for 20 minutes. Hemostasis achieved. Groin site without bleeding and without hematoma. Sterile gauze placed over site and secured with tegaderm. 5lb sandbag placed over site. Patient instructed to keep leg straight and still and head on pillow.  Patient verbalizes understanding

## 2021-09-09 NOTE — DISCHARGE INSTRUCTIONS
HEART CATHETERIZATION/ANGIOGRAPHY DISCHARGE INSTRUCTIONS    1. Check puncture site frequently for swelling or bleeding. If there is any bleeding, apply pressure over the area with a clean towel or washcloth and call 911. Notify your doctor for any redness, swelling, drainage, or oozing from the puncture site. Notify your doctor for any fever or chills. 2. If the extremity becomes cold, numb, or painful call Dr. Jett Quiros at [phone].  3. Activity should be limited for the next 48 hours. No heavy lifting, pushing, pulling  or strenuous activity for 48 hours. No heavy lifting (anything over 10 pounds) for 3 days. 4. You may resume your usual diet. Drink more fluids than usual.  5. Have a responsible person drive you home and stay with you for at least 24 hours after your heart catheterization/angiography. 6. You may remove bandage from your Right and Groin in 24 hours. You may shower in 24 hours. No tub baths, hot tubs, or swimming for 1 week. Do not place any lotions, creams, powders, or ointments over puncture site for 1 week. You may place a clean band-aid over the puncture site each day for 5 days. Change daily. Sedation for a Medical Procedure: Care Instructions     You were given a sedative medication during your visit. While many of the effects will have worn   off before you leave; you may continue to feel some effects for several hours. Common side effects from sedation include:  · Feeling sleepy. (Your doctors and nurses will make sure you are not too sleepy to go home.)  · Nausea and vomiting. This usually does not last long. · Feeling tired. How can you care for yourself at home? Activity    · Don't do anything for 24 hours that requires attention to detail. It takes time for the medicine effects to completely wear off. · Do not make important legal decisions for 24 hours. · Do not sign any legal documents for 24 hours.      · Do not drink alcohol today     · For your safety, you should not drive or operate heavy machinery for the remainder of the day     · Rest when you feel tired. Getting enough sleep will help you recover. Diet    · You can eat your normal diet, unless your doctor gives you other instructions. If your stomach is upset, try clear liquids and bland, low-fat foods like plain toast or rice. · Drink plenty of fluids (unless your doctor tells you not to). · Don't drink alcohol for 24 hours. Medicines    · Be safe with medicines. Read and follow all instructions on the label. · If the doctor gave you a prescription medicine for pain, take it as prescribed. · If you are not taking a prescription pain medicine, ask your doctor if you can take an over-the-counter medicine. · If you think your pain medicine is making you sick to your stomach:  · Take your medicine after meals (unless your doctor has told you not to). · Ask your doctor for a different pain medicine. I have read the above instructions and have had the opportunity to ask questions.       Patient: ________________________   Date: _____________    Witness: _______________________   Date: _____________

## 2021-09-09 NOTE — Clinical Note
TRANSFER - IN REPORT:     Verbal report received from: CPRU RN. Report consisted of patient's Situation, Background, Assessment and   Recommendations(SBAR). Opportunity for questions and clarification was provided. Assessment completed upon patient's arrival to unit and care assumed.

## 2021-09-09 NOTE — Clinical Note
Contrast Dose Calculator:   Patient's age: 70.   Patient's sex: Male. Patient weight (kg) = 83. Creatinine level (mg/dL) = 1.62. Creatinine clearance (mL/min): 49.1. Contrast concentration (mg/mL) = 370. MACD = 207. 71 mL. Max Contrast dose per Creatinine Cl calculator = 110.47 mL.

## 2021-09-09 NOTE — ROUTINE PROCESS
TRANSFER - OUT REPORT:    LHC/grafts  Dr. Jaki Manzano  RFA access    Versed 3mg, fentanyl 75mcg  Diagnostic cath, 3 of 4 grafts patent, medical management  6FR sheath RFA to be pulled in cpru    Verbal report given to Evens BOURGEOIS(name) on Nilda Quinones  being transferred to cpru(unit) for routine progression of care       Report consisted of patients Situation, Background, Assessment and   Recommendations(SBAR). Information from the following report(s) Procedure Summary was reviewed with the receiving nurse. Lines:   Peripheral IV 09/09/21 Right Antecubital (Active)       Peripheral IV 09/09/21 Left;Posterior Hand (Active)        Opportunity for questions and clarification was provided.       Patient transported with:   Registered Nurse

## 2021-09-09 NOTE — PROGRESS NOTES
Spiritual Care visit. Initial Visit, Pre Surgery Consult. Visit and prayer before patient goes to surgery.     Visit by Ashleigh Hendricks M.Ed., Th.B. ,Staff

## 2021-09-09 NOTE — Clinical Note
TRANSFER - OUT REPORT:     Verbal report given to: CPRU RN. Report consisted of patient's Situation, Background, Assessment and   Recommendations(SBAR). Opportunity for questions and clarification was provided. Patient transported with a Cardiac Cath Tech / Patient Care Tech. Patient transported to: [address] [place] [address].

## 2022-03-18 PROBLEM — E11.21 TYPE 2 DIABETES MELLITUS WITH NEPHROPATHY (HCC): Status: ACTIVE | Noted: 2018-01-19

## 2022-03-19 PROBLEM — R07.9 CHEST PAIN: Status: ACTIVE | Noted: 2021-08-30

## 2022-03-19 PROBLEM — Z98.890 S/P ABLATION OF ATRIAL FIBRILLATION: Status: ACTIVE | Noted: 2019-10-14

## 2022-03-19 PROBLEM — R22.1 NECK MASS: Status: ACTIVE | Noted: 2021-05-24

## 2022-03-19 PROBLEM — R13.19 OTHER DYSPHAGIA: Status: ACTIVE | Noted: 2021-07-01

## 2022-03-19 PROBLEM — Z86.79 S/P ABLATION OF ATRIAL FIBRILLATION: Status: ACTIVE | Noted: 2019-10-14

## 2022-03-19 PROBLEM — S54.12XA NEUROPRAXIA OF LEFT MEDIAN NERVE: Status: ACTIVE | Noted: 2019-10-14

## 2022-03-19 PROBLEM — I20.0 UNSTABLE ANGINA PECTORIS (HCC): Status: ACTIVE | Noted: 2021-08-30

## 2022-03-19 PROBLEM — Z86.018 S/P EXCISION OF LIPOMA: Status: ACTIVE | Noted: 2021-07-15

## 2022-03-19 PROBLEM — Z98.890 S/P EXCISION OF LIPOMA: Status: ACTIVE | Noted: 2021-07-15

## 2022-05-23 ENCOUNTER — TELEPHONE (OUTPATIENT)
Dept: FAMILY MEDICINE CLINIC | Facility: CLINIC | Age: 72
End: 2022-05-23

## 2022-05-23 DIAGNOSIS — S54.12XD NEURAPRAXIA OF LEFT MEDIAN NERVE, SUBSEQUENT ENCOUNTER: Primary | ICD-10-CM

## 2022-05-23 RX ORDER — GABAPENTIN 300 MG/1
CAPSULE ORAL
Qty: 90 CAPSULE | Refills: 5 | Status: SHIPPED | OUTPATIENT
Start: 2022-05-23 | End: 2022-08-10 | Stop reason: SDUPTHER

## 2022-05-23 NOTE — TELEPHONE ENCOUNTER
Patient called for a refill of Gabapentin 300 sent  To [facility] on [address]     He stated that he is completely out.

## 2022-06-06 ENCOUNTER — PATIENT MESSAGE (OUTPATIENT)
Dept: CARDIOLOGY CLINIC | Age: 72
End: 2022-06-06

## 2022-06-06 RX ORDER — NITROGLYCERIN 0.4 MG/1
0.4 TABLET SUBLINGUAL EVERY 5 MIN PRN
Qty: 25 TABLET | Refills: 5 | Status: SHIPPED | OUTPATIENT
Start: 2022-06-06

## 2022-06-06 NOTE — TELEPHONE ENCOUNTER
From: Nash Severs  To: Dr. Gina Schofield  Sent: 6/6/2022 1:51 PM EDT  Subject: I lost all feeling in my left hand    Sunday evening, I lost all feeling in my left hand, the numbing traveled all most to my shoulder. I took a nitroglycerin tablet and about 15 min I regained use of my hand. Please refill my: nitroGLYCERIN 0.4 MG SL tablet. at [address] near [facility].    Thanks, Nory Garcia

## 2022-07-14 RX ORDER — LEVOTHYROXINE SODIUM 88 UG/1
TABLET ORAL
Qty: 90 TABLET | OUTPATIENT
Start: 2022-07-14

## 2022-08-10 ENCOUNTER — OFFICE VISIT (OUTPATIENT)
Dept: FAMILY MEDICINE CLINIC | Facility: CLINIC | Age: 72
End: 2022-08-10
Payer: MEDICARE

## 2022-08-10 VITALS
WEIGHT: 181 LBS | HEART RATE: 52 BPM | BODY MASS INDEX: 26.73 KG/M2 | SYSTOLIC BLOOD PRESSURE: 118 MMHG | DIASTOLIC BLOOD PRESSURE: 62 MMHG | OXYGEN SATURATION: 100 %

## 2022-08-10 DIAGNOSIS — I25.119 CORONARY ARTERY DISEASE WITH ANGINA PECTORIS, UNSPECIFIED VESSEL OR LESION TYPE, UNSPECIFIED WHETHER NATIVE OR TRANSPLANTED HEART (HCC): ICD-10-CM

## 2022-08-10 DIAGNOSIS — M79.671 RIGHT FOOT PAIN: ICD-10-CM

## 2022-08-10 DIAGNOSIS — S54.12XD NEURAPRAXIA OF LEFT MEDIAN NERVE, SUBSEQUENT ENCOUNTER: ICD-10-CM

## 2022-08-10 DIAGNOSIS — E11.21 TYPE 2 DIABETES MELLITUS WITH NEPHROPATHY (HCC): Primary | ICD-10-CM

## 2022-08-10 DIAGNOSIS — M25.532 LEFT WRIST PAIN: ICD-10-CM

## 2022-08-10 DIAGNOSIS — E07.9 THYROID DISEASE: ICD-10-CM

## 2022-08-10 DIAGNOSIS — J32.0 LEFT MAXILLARY SINUSITIS: ICD-10-CM

## 2022-08-10 PROCEDURE — 3017F COLORECTAL CA SCREEN DOC REV: CPT | Performed by: FAMILY MEDICINE

## 2022-08-10 PROCEDURE — G8428 CUR MEDS NOT DOCUMENT: HCPCS | Performed by: FAMILY MEDICINE

## 2022-08-10 PROCEDURE — 1036F TOBACCO NON-USER: CPT | Performed by: FAMILY MEDICINE

## 2022-08-10 PROCEDURE — 1123F ACP DISCUSS/DSCN MKR DOCD: CPT | Performed by: FAMILY MEDICINE

## 2022-08-10 PROCEDURE — 3051F HG A1C>EQUAL 7.0%<8.0%: CPT | Performed by: FAMILY MEDICINE

## 2022-08-10 PROCEDURE — G8417 CALC BMI ABV UP PARAM F/U: HCPCS | Performed by: FAMILY MEDICINE

## 2022-08-10 PROCEDURE — 2022F DILAT RTA XM EVC RTNOPTHY: CPT | Performed by: FAMILY MEDICINE

## 2022-08-10 PROCEDURE — 99214 OFFICE O/P EST MOD 30 MIN: CPT | Performed by: FAMILY MEDICINE

## 2022-08-10 RX ORDER — GLIMEPIRIDE 1 MG/1
1 TABLET ORAL
Qty: 30 TABLET | Refills: 5 | Status: SHIPPED | OUTPATIENT
Start: 2022-08-10

## 2022-08-10 RX ORDER — LEVOTHYROXINE SODIUM 88 UG/1
88 TABLET ORAL DAILY
Qty: 30 TABLET | Refills: 5 | Status: SHIPPED | OUTPATIENT
Start: 2022-08-10

## 2022-08-10 RX ORDER — GABAPENTIN 300 MG/1
CAPSULE ORAL
Qty: 90 CAPSULE | Refills: 5 | Status: SHIPPED | OUTPATIENT
Start: 2022-08-10 | End: 2023-08-10

## 2022-08-10 RX ORDER — FLUTICASONE PROPIONATE 50 MCG
2 SPRAY, SUSPENSION (ML) NASAL DAILY
Qty: 16 G | Refills: 5 | Status: SHIPPED | OUTPATIENT
Start: 2022-08-10 | End: 2022-08-26 | Stop reason: CLARIF

## 2022-08-10 RX ORDER — OMEPRAZOLE 20 MG/1
20 CAPSULE, DELAYED RELEASE ORAL DAILY
Qty: 30 CAPSULE | Refills: 5 | Status: SHIPPED | OUTPATIENT
Start: 2022-08-10

## 2022-08-11 LAB
ALBUMIN SERPL-MCNC: 4 G/DL (ref 3.2–4.6)
ALBUMIN/GLOB SERPL: 1.5 {RATIO} (ref 1.2–3.5)
ALP SERPL-CCNC: 91 U/L (ref 50–136)
ALT SERPL-CCNC: 32 U/L (ref 12–65)
ANION GAP SERPL CALC-SCNC: 3 MMOL/L (ref 7–16)
AST SERPL-CCNC: 27 U/L (ref 15–37)
BILIRUB SERPL-MCNC: 0.6 MG/DL (ref 0.2–1.1)
BUN SERPL-MCNC: 26 MG/DL (ref 8–23)
CALCIUM SERPL-MCNC: 9.5 MG/DL (ref 8.3–10.4)
CHLORIDE SERPL-SCNC: 113 MMOL/L (ref 98–107)
CO2 SERPL-SCNC: 27 MMOL/L (ref 21–32)
CREAT SERPL-MCNC: 1.6 MG/DL (ref 0.8–1.5)
EST. AVERAGE GLUCOSE BLD GHB EST-MCNC: 163 MG/DL
GLOBULIN SER CALC-MCNC: 2.6 G/DL (ref 2.3–3.5)
GLUCOSE SERPL-MCNC: 129 MG/DL (ref 65–100)
HBA1C MFR BLD: 7.3 % (ref 4.8–5.6)
POTASSIUM SERPL-SCNC: 4.7 MMOL/L (ref 3.5–5.1)
PROT SERPL-MCNC: 6.6 G/DL (ref 6.3–8.2)
SODIUM SERPL-SCNC: 143 MMOL/L (ref 136–145)
TSH, 3RD GENERATION: 0.4 UIU/ML (ref 0.36–3.74)

## 2022-08-12 ENCOUNTER — OFFICE VISIT (OUTPATIENT)
Dept: ORTHOPEDIC SURGERY | Age: 72
End: 2022-08-12
Payer: MEDICARE

## 2022-08-12 DIAGNOSIS — M79.672 FOOT PAIN, LEFT: Primary | ICD-10-CM

## 2022-08-12 PROCEDURE — G8417 CALC BMI ABV UP PARAM F/U: HCPCS | Performed by: ORTHOPAEDIC SURGERY

## 2022-08-12 PROCEDURE — 99204 OFFICE O/P NEW MOD 45 MIN: CPT | Performed by: ORTHOPAEDIC SURGERY

## 2022-08-12 PROCEDURE — G8428 CUR MEDS NOT DOCUMENT: HCPCS | Performed by: ORTHOPAEDIC SURGERY

## 2022-08-12 PROCEDURE — 4004F PT TOBACCO SCREEN RCVD TLK: CPT | Performed by: ORTHOPAEDIC SURGERY

## 2022-08-12 PROCEDURE — 3017F COLORECTAL CA SCREEN DOC REV: CPT | Performed by: ORTHOPAEDIC SURGERY

## 2022-08-12 PROCEDURE — L4396 STATIC OR DYNAMI AFO PRE CST: HCPCS | Performed by: ORTHOPAEDIC SURGERY

## 2022-08-12 PROCEDURE — 1123F ACP DISCUSS/DSCN MKR DOCD: CPT | Performed by: ORTHOPAEDIC SURGERY

## 2022-08-12 RX ORDER — METHYLPREDNISOLONE 4 MG/1
TABLET ORAL
Qty: 1 KIT | Refills: 0 | Status: SHIPPED | OUTPATIENT
Start: 2022-08-12 | End: 2022-08-18

## 2022-08-12 NOTE — PROGRESS NOTES
Name: Sujit Caldwell  YOB: 1950  Gender: male  MRN: [id number]    Summary: Left insertion achilles tendonitis with retrocalcaneal bursitis. Medrol dose pack night splint and physical therapy started. At next visit discussed retrocalcaneal bursa injection, return to activities, versus potential secondary Achilles reconstruction           CC: left Heel pain     HPI: Sujit Caldwell is a 67 y.o. male who presents with increasing posterior ankle pain located at the Achilles tendon region. They do not remember any acute event that started. It has been going on for several months and has gotten worse. The pain as a burning tearing pain, tender to palpation and pressure with shoe wear, and it limits daily activities. I also described some swelling in this region also. He has been treated from a podiatrist with multiple treatments to include home stretching, boot, inserts, heel lifts, oral nonsteroidal medications. His pain began approximately January 2022. It is getting worse and he can no longer cycle he wants to do. History was obtained by patient    ROS/Meds/PSH/PMH/FH/SH: I personally reviewed the patients standard intake form. Below are the pertinents    Tobacco:  reports that he has never smoked. He has never used smokeless tobacco.  Diabetes: None  Other: none    Physical Examination:  left Lower Extremity: FROM actively of toes, foot, ankle, knee and hip. No instability of foot or ankle with drawer and stress. 5/5 strength to TA/EHL/GSC/Peroneals/PTib. No skin lesions, Non TTP throughout. 2+ dp pulse w/ cap refill of 5 toes <2s. Dorsiflexion of the ankle produces pain at the Achilles insertion. Hind foot alignment neutral. There is some edema, mild erythema, and TTP at the Achilles at the same point of tenderness. + silverskoid. Achilles has no defects but it is painful.        Imaging:   I independently interpreted XR taken today and   3 views (AP/Lat/Oblique) of the on left ankle for achilles pain/ankle pain   Findings: no signs of acute fractures or dislocations. No signs of neoplastic process. There is some age related degenerative changes noted in the ankle but these are minimal.  The achilles tendon at the insertion shows some calcifications and there is an enlarged calcaneal process indicative of a Tommie deformity. Impression:  Insertional calcific achilles tendonitis. Ana Wong MD       And apparently interpreted MRI obtained by another doctor at an outside facility. MRI of the left ankle without contrast on March 24, 2022 does reveal significant retrocalcaneal bursitis with a large Achilles enthesophyte and calcifications noted at the insertion of the Achilles tendon. There is degenerative changes consistent with interstitial tearing of the Achilles tendon near its insertion. No Tommie's deformity is noted. Assessment:   insertion Achilles Tendonitis    Plan:     4 This is a chronic illness/condition with exacerbation and progression  Treatment at this time: Physical Therapy and Bracing: Placed in: night splint  Studies ordered: NO XR needed @ Next Visit  Prescription drug management for Medrol Dosepak was also performed  Weight-bearing status: WBAT        Return to work/work restrictions: none  Medrol Dose pack (6 day oral taper): I discussed medrol being a steroid and how it can elevate blood sugars. I recommended to monitor sugars closely and to beware of symptoms such as lethargy, excessive thirst, polyuria, and GI distress. We also discussed the dose pack taper format and how long term steroid use can alter adrenal function. I also discussed steroids effect on depression and mood. How in some people it can exacerbate underlying depression while in others create a more manic response.      Oksana Fontaine MD    no surgery today - proceed with non op

## 2022-08-12 NOTE — LETTER
DME Patient Authorization Form    Name: Michael Figueroa  : 1950  MRN: [id number]   Age: 67 y.o. Gender: male  Delivery Address: [facility]     Diagnosis:     ICD-10-CM    1. Foot pain, left  M79.672 XR FOOT LEFT (MIN 3 VIEWS)     Night Splint Dorsal ()     [facility], [facility]           Requested DME:  Night Splint Dorsal -  ($197.00) X 1 - left        Clinical Notes:     **Indicates non-covered items by insurance. Payment expected on date of service. Electronically signed by  Provider: _______________________________ Date: 2022                             [facility]/[address] Tax ID # [id number]        Durable Medical Equipment and/or Orthotics Patient Consent     I understand that my physician has prescribed this medical supply as part of my treatment plan as a matter of Medical Necessity.  I understand that I have a choice in where I receive my prescribed orthopedic supplies and/or services.  I authorize [facility] to furnish this service/product and to provide my insurance carrier with any information requested in order to process for payment.  I instruct my insurance carrier to pay [facility] directly for these services/products.  I understand that my insurance carrier may deny payment for this supply because it is a non-covered item, deemed not medically necessary or considered experimental.   I understand that any cost not covered by my insurance carrier will be solely my financial responsibility.  I have received the Supplier Standards and have reviewed them.  I have received the prescribed item and have been fully instructed on the proper use of the above services/products.    ______ (Patient Initials) I understand that all DME items are non-returnable after being dispensed.  Items still in sealed packaging may be returned up to 14 days after purchasing. [address] will replace items that are defective.    ______ (Patient Initials) I understand that [facility] will not file a claim with my insurance carrier for this service/product and I am waiving my right to file a claim on my own for this service/product with my insurance company as this item is NON-COVERED (Denoted by the **) by my Insurance company/policy. ______ (Patient Initials) I understand that I am responsible to bring my equipment to the hospital for any surgery. ______________________________________________  ________________________  Patient / Yovani Carter            Thank you for considering [address] Your physician has prescribed specific medical equipment or devices for your home use. The following describes your rights and responsibilities as our customer. Right to Choose Providers: You have a choice regarding which company supplies your home medical equipment and devices, and to consult your physician in this decision. You may choose a medical supply store, a home medical equipment provider, or a specialist such as [facility]. [facility] will coordinate with your physician to provide the medical equipment or devices prescribed for your home use. Right to Service:  You have the right to considerate, respectful and nondiscriminatory care. You have the right to receive accurate and easily understood information about your health care. If you speak a foreign language, or don't understand the discussions, assistance will be provided to allow you to make informed health care decisions. You have the right to know your treatment options and to participate in decisions about your care, including the right to accept or refuse treatment.   You have the right to expect a reasonable response to your requests for treatment or service. You have the right to talk in confidence with health care providers and to have your health care information protected. You have the right to receive an explanation of your bill. You have the right to complain about the service or product you receive. Patient Responsibilities:  Please provide complete and accurate information about your health insurance benefits and make arrangements for the timely payment of your bill. [facility] will, if possible, assume responsibility for billing your insurance (Medicare, Medicaid and commercial) for the prescribed equipment or devices. If your policy does not cover the prescribed product, or only covers a portion of the bill, you are responsible for any remaining balance. Return and Exchange Policy:  [facility] will honor published  Warranties for products. [facility] will accept returns or exchanges within 14 days from the date of receipt, providin) the product must be in new condition; 2) receipt as required; and 3) used disposable and hygiene products may only be returned due to a defective product. Note: Refunds will be issued in a timely manner, please allow 4-6 weeks for processing. Complaint Procedures and DME Consumer Protection Resources:  [facility] values you as a customer, and is committed to resolving patient concerns. This commitment includes understanding and documenting your concerns, conducting a review of internal procedures, and providing you with an explanation and resolution to your concerns. Should you have any questions about our services or billing process, please contact our office at (practice phone number). If we are unable to resolve the concern, you have the right to direct comments to the office of Consumer Protection, in the [address] or the [facility] office, without fear of repercussion.     [address] [facility]    A supplier must be in compliance with all applicable Federal and State licensure and regulatory requirements. A supplier must provide complete and accurate information on the DMEPOS supplier application. Any changes to this information must be reported to the [facility] within 30 days. An authorized individual (one whose signature is binding) must sign the application for billing privileges. A supplier must fill orders from its own inventory, or must contract with other companies for the purchase of items necessary to fill the order. A supplier may not contract with any entity that is currently excluded from the Medicare program, any [facility] program, or from any other Federal procurement or Nonprocurement programs. A supplier must advise beneficiaries that they may rent or purchase inexpensive or routinely purchased durable medical equipment, and of the purchase option for capped rental equipment. A supplier must notify beneficiaries of warranty coverage and honor all warranties under applicable State Law, and repair or replace free of charge Medicare covered items that are under warranty. A supplier must maintain a physical facility on an appropriate site. A supplier must permit CMS, or its agents to conduct on-site inspections to ascertain the supplier's compliance with these standards. The supplier location must be accessible to beneficiaries during reasonable business hours, and must maintain a visible sign and posted hours of operation. A supplier must maintain a primary business telephone listed under the name of the business in a [employer] or a toll free number available through directory assistance. The exclusive use of a beeper, answering machine or cell phone is prohibited. A supplier must have comprehensive liability insurance in the amount of at least $300,000 that covers both the supplier's place of business and all customers and employees of the supplier.   If the supplier manufactures its own items, this insurance must also cover product liability and completed operations. A supplier must agree not to initiate telephone contact with beneficiaries, with a few exceptions allowed. This standard prohibits suppliers from calling beneficiaries in order to solicit new business. A supplier is responsible for delivery and must instruct beneficiaries on use of Medicare covered items, and maintain proof of delivery. A supplier must answer questions, and respond to complaints of the beneficiaries, and maintain documentation of such contacts. A supplier must maintain and replace at no charge or repair directly, or through a service contract with another company, Medicare covered items it has rented to beneficiaries. A supplier must accept returns of substandard (less than full quality for the particular item) or unsuitable items (inappropriate for the beneficiary at the time it was fitted and rented or sold) from beneficiaries. A supplier must disclose these supplier standards to each beneficiary to whom it supplies a Medicare-covered item. A supplier must disclose to the government any person having ownership, financial, or control interest in the supplier. A supplier must not convey or reassign a supplier number; i.e., the supplier may not sell or allow another entity to use its Medicare billing number. A supplier must have a complaint resolution protocol established to address beneficiary complaints that relate to these standards. A record of these complaints must be maintained at the physical facility. Complaint records must include: the name, address, telephone number and health insurance claim number of the beneficiary, a summary of the complaint, and any action taken to resolve it. A supplier must agree to furnish CMS any information required by the Medicare statute and implementing regulations.   A supplier of DMEPOS and other items and services must be accredited by a CMS-approved accreditation organization in order to receive and retain a supplier billing number. The accreditation must indicate the specific products and services, for which the supplier is accredited in order for the supplier to receive payment for those specific products and services. A DMEPOS supplier must notify their accreditation organization when a new DMEPOS location is opened. The accreditation organization may accredit the new supplier location for three months after it is operational without requiring a new site visit. All DMEPOS supplier locations, whether owned or subcontracted, must meet the [employer] and be separately accredited in order to bill Medicare. An accredited supplier may be denied enrollment or their enrollment may be revoked, if CMS determines that they are not in compliance with the DMEPOS quality standards. A DMEPOS supplier must disclose upon enrollment all products and services, including the addition of new product lines for which they are seeking accreditation. If a new product line is added after enrollment, the DMEPOS supplier will be responsible for notifying the accrediting body of the new product so that the DMEPOS supplier can be re-surveyed and accredited for these new products. Must meet the surety bond requirements specified in 42 C. F.R. 424.57(c). Implementation date- May 4, 2009. A supplier must obtain oxygen from a state-licensed oxygen supplier. A supplier must maintain ordering and referring documentation consistent with provisions found in 42 C. F.R. 424.516(f). DMEPOS suppliers are prohibited from sharing a practice location with certain other Medicare providers and suppliers. DMEPOS suppliers must remain open to the public for a minimum of 30 hours per week with certain exceptions.

## 2022-08-12 NOTE — PROGRESS NOTES
Patient was prescribed a Night splint for the patient's left foot. The patient wears a size 10.5 shoe and I fitted the patient with a M size night splint. Additionally, I instructed the patient on proper use and care of device as well as ensuring patient could safely get device on and off. I explained to the patient that wearing the splint, the foot is held in a certain position, called dorsiflexion. This means that the fascia is stretched, not allowing it to contract and become tighter overnight. The great thing about a night splint is that the remedy is quite gentle and the fascia will be returned to its proper length over a period of time. Once stretched out, the plantar fascia will become less tense and therefore will cause less pain. Patient read and signed documenting they understand and agree to [facility]'s current DME return policy.

## 2022-08-15 ENCOUNTER — HOSPITAL ENCOUNTER (OUTPATIENT)
Dept: CT IMAGING | Age: 72
Discharge: HOME | End: 2022-08-18
Payer: MEDICARE

## 2022-08-15 DIAGNOSIS — J32.0 LEFT MAXILLARY SINUSITIS: ICD-10-CM

## 2022-08-15 DIAGNOSIS — J34.9 SINUS DISORDER: Primary | ICD-10-CM

## 2022-08-15 PROCEDURE — 70486 CT MAXILLOFACIAL W/O DYE: CPT

## 2022-08-15 NOTE — RESULT ENCOUNTER NOTE
Notify that we will refer to ENT because there is complete blockage of the right max sinus but I think he said the left was the one that was bothering him .

## 2022-08-22 ENCOUNTER — OFFICE VISIT (OUTPATIENT)
Dept: ORTHOPEDIC SURGERY | Age: 72
End: 2022-08-22
Payer: MEDICARE

## 2022-08-22 VITALS — HEIGHT: 69 IN | BODY MASS INDEX: 25.62 KG/M2 | WEIGHT: 173 LBS

## 2022-08-22 DIAGNOSIS — G56.02 LEFT CARPAL TUNNEL SYNDROME: Primary | ICD-10-CM

## 2022-08-22 NOTE — PROGRESS NOTES
Patient was fitted and instructed on an  Wrist Splint for patients left wrist. Patient is aware the hand slides in the brace with the thumb placed threw the thumb hole. I demonstrated the correct way to tighten the brace straps to allow for a comfortable fit. Patient understood the correct way to wear the brace. Patient read and signed documenting they understand and agree to [facility]'s current DME return policy.

## 2022-08-22 NOTE — LETTER
DME Patient Authorization Form    Name: Hollis Gottlieb  : 1950  MRN: [id number]   Age: 67 y.o. Gender: male  Delivery Address: [facility]     Diagnosis:     ICD-10-CM    1. Left carpal tunnel syndrome  G56.02 XR WRIST LEFT (MIN 3 VIEWS)     Wrist Lacer ()           Requested DME:  Wrist Lacer- ($65.00) X 1 - left        Clinical Notes:     **Indicates non-covered items by insurance. Payment expected on date of service. Electronically signed by  Provider: ___Ivonne Julian MD_______________________ Date: 2022                             [facility] Tax ID # [id number]        Durable Medical Equipment and/or Orthotics Patient Consent     I understand that my physician has prescribed this medical supply as part of my treatment plan as a matter of Medical Necessity.  I understand that I have a choice in where I receive my prescribed orthopedic supplies and/or services.  I authorize [facility] to furnish this service/product and to provide my insurance carrier with any information requested in order to process for payment.  I instruct my insurance carrier to pay [facility] directly for these services/products.  I understand that my insurance carrier may deny payment for this supply because it is a non-covered item, deemed not medically necessary or considered experimental.   I understand that any cost not covered by my insurance carrier will be solely my financial responsibility.  I have received the Supplier Standards and have reviewed them.  I have received the prescribed item and have been fully instructed on the proper use of the above services/products.    ______ (Patient Initials) I understand that all DME items are non-returnable after being dispensed. Items still in sealed packaging may be returned up to 14 days after purchasing.  [facility] [address] will replace items that are defective.    ______ (Patient Initials) I understand that [facility] will not file a claim with my insurance carrier for this service/product and I am waiving my right to file a claim on my own for this service/product with my insurance company as this item is NON-COVERED (Denoted by the **) by my Insurance company/policy. ______ (Patient Initials) I understand that I am responsible to bring my equipment to the hospital for any surgery. ______________________________________________  ________________________  Patient / Artist Ahr            Thank you for considering [address] Your physician has prescribed specific medical equipment or devices for your home use. The following describes your rights and responsibilities as our customer. Right to Choose Providers: You have a choice regarding which company supplies your home medical equipment and devices, and to consult your physician in this decision. You may choose a medical supply store, a home medical equipment provider, or a specialist such as [facility]. [facility] will coordinate with your physician to provide the medical equipment or devices prescribed for your home use. Right to Service:  You have the right to considerate, respectful and nondiscriminatory care. You have the right to receive accurate and easily understood information about your health care. If you speak a foreign language, or don't understand the discussions, assistance will be provided to allow you to make informed health care decisions. You have the right to know your treatment options and to participate in decisions about your care, including the right to accept or refuse treatment. You have the right to expect a reasonable response to your requests for treatment or service.   You have the right to talk in confidence with health care providers and to have your health care information protected. You have the right to receive an explanation of your bill. You have the right to complain about the service or product you receive. Patient Responsibilities:  Please provide complete and accurate information about your health insurance benefits and make arrangements for the timely payment of your bill. [facility] will, if possible, assume responsibility for billing your insurance (Medicare, Medicaid and commercial) for the prescribed equipment or devices. If your policy does not cover the prescribed product, or only covers a portion of the bill, you are responsible for any remaining balance. Return and Exchange Policy:  [facility] will honor published  Warranties for products. [facility] will accept returns or exchanges within 14 days from the date of receipt, providin) the product must be in new condition; 2) receipt as required; and 3) used disposable and hygiene products may only be returned due to a defective product. Note: Refunds will be issued in a timely manner, please allow 4-6 weeks for processing. Complaint Procedures and DME Consumer Protection Resources:  [facility] values you as a customer, and is committed to resolving patient concerns. This commitment includes understanding and documenting your concerns, conducting a review of internal procedures, and providing you with an explanation and resolution to your concerns. Should you have any questions about our services or billing process, please contact our office at (practice phone number). If we are unable to resolve the concern, you have the right to direct comments to the office of Consumer Protection, in the [address] or the [place] office, without fear of repercussion.     DMEPOS SUPPLIER STANDARDS    A supplier must be in compliance with all applicable Federal and [employer] and regulatory requirements. A supplier must provide complete and accurate information on the DMEPOS supplier application. Any changes to this information must be reported to the [facility] within 30 days. An authorized individual (one whose signature is binding) must sign the application for billing privileges. A supplier must fill orders from its own inventory, or must contract with other companies for the purchase of items necessary to fill the order. A supplier may not contract with any entity that is currently excluded from the Medicare program, any [facility] program, or from any other Federal procurement or Nonprocurement programs. A supplier must advise beneficiaries that they may rent or purchase inexpensive or routinely purchased durable medical equipment, and of the purchase option for capped rental equipment. A supplier must notify beneficiaries of warranty coverage and honor all warranties under applicable State Law, and repair or replace free of charge Medicare covered items that are under warranty. A supplier must maintain a physical facility on an appropriate site. A supplier must permit CMS, or its agents to conduct on-site inspections to ascertain the supplier's compliance with these standards. The supplier location must be accessible to beneficiaries during reasonable business hours, and must maintain a visible sign and posted hours of operation. A supplier must maintain a primary business telephone listed under the name of the business in a [employer] or a toll free number available through directory assistance. The exclusive use of a beeper, answering machine or cell phone is prohibited. A supplier must have comprehensive liability insurance in the amount of at least $300,000 that covers both the supplier's place of business and all customers and employees of the supplier.   If the supplier manufactures its own items, this insurance must also cover product liability and completed operations. A supplier must agree not to initiate telephone contact with beneficiaries, with a few exceptions allowed. This standard prohibits suppliers from calling beneficiaries in order to solicit new business. A supplier is responsible for delivery and must instruct beneficiaries on use of Medicare covered items, and maintain proof of delivery. A supplier must answer questions, and respond to complaints of the beneficiaries, and maintain documentation of such contacts. A supplier must maintain and replace at no charge or repair directly, or through a service contract with another company, Medicare covered items it has rented to beneficiaries. A supplier must accept returns of substandard (less than full quality for the particular item) or unsuitable items (inappropriate for the beneficiary at the time it was fitted and rented or sold) from beneficiaries. A supplier must disclose these supplier standards to each beneficiary to whom it supplies a Medicare-covered item. A supplier must disclose to the government any person having ownership, financial, or control interest in the supplier. A supplier must not convey or reassign a supplier number; i.e., the supplier may not sell or allow another entity to use its Medicare billing number. A supplier must have a complaint resolution protocol established to address beneficiary complaints that relate to these standards. A record of these complaints must be maintained at the physical facility. Complaint records must include: the name, address, telephone number and health insurance claim number of the beneficiary, a summary of the complaint, and any action taken to resolve it. A supplier must agree to furnish CMS any information required by the Medicare statute and implementing regulations.   A supplier of DMEPOS and other items and services must be accredited by a CMS-approved accreditation organization in order to receive and retain a supplier billing number. The accreditation must indicate the specific products and services, for which the supplier is accredited in order for the supplier to receive payment for those specific products and services. A DMEPOS supplier must notify their accreditation organization when a new DMEPOS location is opened. The accreditation organization may accredit the new supplier location for three months after it is operational without requiring a new site visit. All DMEPOS supplier locations, whether owned or subcontracted, must meet the [employer] and be separately accredited in order to bill Medicare. An accredited supplier may be denied enrollment or their enrollment may be revoked, if CMS determines that they are not in compliance with the DMEPOS quality standards. A DMEPOS supplier must disclose upon enrollment all products and services, including the addition of new product lines for which they are seeking accreditation. If a new product line is added after enrollment, the DMEPOS supplier will be responsible for notifying the accrediting body of the new product so that the DMEPOS supplier can be re-surveyed and accredited for these new products. Must meet the surety bond requirements specified in 42 C. F.R. 424.57(c). Implementation date- May 4, 2009. A supplier must obtain oxygen from a state-licensed oxygen supplier. A supplier must maintain ordering and referring documentation consistent with provisions found in 42 C. F.R. 424.516(f). DMEPOS suppliers are prohibited from sharing a practice location with certain other Medicare providers and suppliers. DMEPOS suppliers must remain open to the public for a minimum of 30 hours per week with certain exceptions.

## 2022-08-22 NOTE — PROGRESS NOTES
Orthopaedic Hand Surgery Note    Name: Kalpesh Ordonez  YOB: 1950  Gender: male  MRN: [id number]    CC: New patient referred for hand numbness      HPI: Patient is a 67 y.o. male with a chief complaint of left hand numbness and tingling all fingers, and a sensation that his hand becomes \"locked\". He has difficulty describing the locking, and is not sure if this is numbness, or inability to move the hand. This occurs when he is working with the hand and bends the wrist backwards. He has mild pain in the wrist, which he describes as a squeezing feeling. The symptoms have been going on for at least 3 years, and started after a cardiac procedure. . The patient does not complain of night wakening and increased symptoms with driving. Evaluation to date has included nerve conduction study 3 years ago. ROS/Meds/PSH/PMH/FH/SH: I personally reviewed the patients standard intake form. Pertinents are discussed In the HPI    Physical Examination:    Musculoskeletal:     Examination of the left upper extremity demonstrates Normal sensation to light touch in the median distribution, normal sensation in ulnar and radial distribution, Positive carpal tunnel compression testing and Phalen testing, cap refill < 5 seconds in all fingers. Inspection reveals no thenar atrophy. Positive Tinel and elbow flexion compression test of the cubital tunnel, negative Tinel over Guyon's canal. Sensation to light touch in the ulnar 2 digits is normal with no intrinsic atrophy/weakness. No tenderness to palpation or masses noted in the forearm. Imaging / Electrodiagnostic Tests: Independently reviewed and interpreted patient's nerve conduction study from 2019. He has findings consistent with mild left carpal and moderate cubital tunnel syndrome. Left median sensory latency is mildly prolonged at 3.7 ms.   Left median motor conduction has mildly diminished conduction velocity at 48 m/s, with normal latency and amplitude. There is a conduction velocity drop of the left ulnar motor conduction from 54 to 42 m/s. Needle EMG demonstrates reduced recruitment at the first dorsal interosseous and abductor digit he minimi. Assessment:     ICD-10-CM    1. Left carpal tunnel syndrome  G56.02 XR WRIST LEFT (MIN 3 VIEWS)     Wrist Lacer ()     Ambulatory referral to Neurology     Wrist Adeel  ()          Plan:  We discussed the diagnosis and different treatment options. We discussed observation, EMG/NCV, night splinting, cortisone injections and surgical decompression and the risks and benefits of all were clearly outlined. After discussing in detail the patient elects to proceed with use of a  wrist brace at night, and will refer him for another nerve conduction study. Once this is complete he will return and we will discuss the results and further treatment options. Patient voiced accordance and understanding of the information provided and the formulated plan. All questions were answered to the patient's satisfaction during the encounter.         Anne Overton MD  Orthopaedic Surgery  08/22/22  4:37 PM

## 2022-08-24 NOTE — PROGRESS NOTES
[address] [facility] [address]. [address] [facility], [address] [facility]  [phone]    Patient Name:  Lori Valles  YOB: 1950      Office Visit 8/26/2022    CHIEF COMPLAINT:    Chief Complaint   Patient presents with    Sleep Apnea         HISTORY OF PRESENT ILLNESS:      The patient presents today in follow-up of obstructive sleep apnea. He has a history of severe obstructive sleep apnea with an RDI of 69.8 and desaturations as low as 75% on a PSG in 2007. He is currently being treated with APAP at 10-20 cmH2O with an EPR of 3. His download today indicates 100% compliance over the past 90 days. Average usage is running about 7.5 hours per night and his AHI is excellent at 1.2. The patient does report fairly good sleep quality. His [place] score today is mildly elevated at 11/24 consistent with mild hypersomnia. The etiology for his hypersomnia is a bit unclear. He did have a recent normal TSH suggesting that his thyroid dysfunction is not contributing to his sleep apnea or hypersomnia. The patient does have underlying coronary artery disease and atrial fibrillation. He is on Tikosyn which may be associated with some malaise. The atrial fibrillation may also contribute to fatigue. At his last visit with Cole Lema, it was recommended melatonin to help improve sleep earlier in the night. The patient does report that he is only able to tolerate a Mirage Activa LT nasal mask. It has become increasingly difficult for him to get this mask. We will switch his DME company since he has had issues with his current company.        Sleep Medicine 8/26/2022   Sitting and reading 0   Watching TV 3   Sitting, inactive in a public place (e.g. a theatre or a meeting) 2   As a passenger in a car for an hour without a break 3   Lying down to rest in the afternoon when circumstances permit 3   Sitting and talking to someone 0   Sitting quietly after a lunch without alcohol 0   In a car, while stopped for a few minutes in traffic 0   Total score 11     Component Ref Range & Units 8/10/22 1424    TSH, 3RD GENERATION 0.358 - 3.740 uIU/mL 0.398    Resulting Agency  [facility]       Past Medical History:   Diagnosis Date    Anxiety     Arrhythmia     Atrial fib.     CAD (coronary artery disease) 1995    followed by Dr. Prasad Baez    Calculus of kidney     right renal calculi    Chronic pain     back     DDD (degenerative disc disease)     Diabetes ([address]     type 2; glimeperide as needed per patient ; a1c = 8.0 as of 5/5/2021; check sugars daily avg is 80 per patient; hypoglycemic at 60     Diverticulitis 2013    Endocrine disease     thyroid    GERD (gastroesophageal reflux disease)     controlled with medication     History of EKG 02/15/2021    NSR with incomp RBBB    History of shingles 2013    HLD (hyperlipidemia) 7/5/2016    Hx of echocardiogram 07/01/2019    LVEF >55%    Hypercholesteremia     medication     Hypertension     controlled with medication  per patient     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     anxiety - controlled     S/P CABG (coronary artery bypass graft) 07/05/2016    4 vessel bypass    Sleep apnea     cpap    Sleep disorder 2007    apnea-uses CPAP    Thyroid disease     Hypothyroid         Patient Active Problem List   Diagnosis    Persistent atrial fibrillation ([address]    Type 2 diabetes mellitus with nephropathy ([address]    Anxiety    Arrhythmia    Diverticulitis    Mass of soft tissue of face    History of shingles    Other dysphagia    Chest pain    Atrial fibrillation (HCC)    Chronic pain    BULMARO on CPAP    Hypersomnia    Unstable angina pectoris (HCC)    Neck mass    S/P excision of lipoma    Neuropraxia of left median nerve    S/P ablation of atrial fibrillation    Hypercholesterolemia    S/P CABG (coronary artery bypass graft)    Calculus of kidney    CAD (coronary artery disease)    Hypertension    HLD (hyperlipidemia)    Thyroid disease          Past Surgical History:   Procedure Laterality Date    ANGIOPLASTY  1995    CARDIAC CATHETERIZATION  06/25/2019    COLONOSCOPY N/A 7/14/2017    COLONOSCOPY performed by Yamilet Stone MD at [place] [address]    Dr. Jaime Erickson (5yrs)    COLONOSCOPY  07/14/2017    CORONARY ARTERY BYPASS GRAFT      HEENT      nasal surgery 2ndary to obstruction    HEENT  02/2015    cyst removal    OTHER SURGICAL HISTORY      lasik eyes    [place] CARDI[place] SUR[place] PROCEDURE UNLIST  6/225/19    ablation    [place] CARDI[place] SUR[facility] PROCEDURE UNLIST  1999    CABG               Social History     Socioeconomic History    Marital status:      Spouse name: Not on file    Number of children: Not on file    Years of education: Not on file    Highest education level: Not on file   Occupational History    Not on file   Tobacco Use    Smoking status: Never    Smokeless tobacco: Never   Vaping Use    Vaping Use: Never used   Substance and Sexual Activity    Alcohol use: Not Currently    Drug use: No    Sexual activity: Not on file   Other Topics Concern    Not on file   Social History Narrative    Not on file     Social Determinants of Health     Financial Resource Strain: Not on file   Food Insecurity: Not on file   Transportation Needs: Not on file   Physical Activity: Not on file   Stress: Not on file   Social Connections: Not on file   Intimate Partner Violence: Not on file   Housing Stability: Not on file         Family History   Problem Relation Age of Onset    Heart Disease Mother     Heart Disease Father     Heart Disease Brother          Allergies   Allergen Reactions    Amoxicillin-Pot Clavulanate Diarrhea         Current Outpatient Medications   Medication Sig    glimepiride (AMARYL) 1 MG tablet Take 1 tablet by mouth every morning (before breakfast) TAKE 1 TABLET EVERY DAY BEFORE BREAKFAST    levothyroxine (SYNTHROID) 88 MCG tablet Take 1 tablet by mouth in the morning. TAKE 1 TABLET BY MOUTH EVERY DAY BEFORE BREAKFAST. omeprazole (PRILOSEC) 20 MG delayed release capsule Take 1 capsule by mouth in the morning. TAKE 1 CAPSULE EVERY DAY. gabapentin (NEURONTIN) 300 MG capsule Take 300 mg by mouth 3 times daily. nitroGLYCERIN (NITROSTAT) 0.4 MG SL tablet Place 1 tablet under the tongue every 5 minutes as needed for Chest pain Up to 3 tabs in 24 hrs. amLODIPine-benazepril (LOTREL) 5-20 MG per capsule TAKE 1 CAPSULE EVERY DAY    aspirin 81 MG EC tablet Take by mouth    atorvastatin (LIPITOR) 80 MG tablet TAKE 1 TABLET EVERY DAY    cyanocobalamin 1000 MCG tablet Take 1,000 mcg by mouth    dabigatran (PRADAXA) 150 MG capsule TAKE 1 CAPSULE EVERY 12 HOURS    dofetilide (TIKOSYN) 250 MCG capsule Take 250 mcg by mouth every 12 hours    fenofibrate (TRIGLIDE) 160 MG tablet TAKE 1 TABLET EVERY DAY    tamsulosin (FLOMAX) 0.4 MG capsule Take 0.4 mg by mouth every evening     No current facility-administered medications for this visit. REVIEW OF SYSTEMS:   CONSTITUTIONAL:   There is no history of fever, chills, night sweats, weight loss, weight gain; he has had some persistent fatigue, and lethargy/hypersomnolence. CARDIAC:   No chest pain, pressure, discomfort, palpitations, orthopnea, murmurs, or edema. GI:   No dysphagia, heartburn reflux, nausea/vomiting, diarrhea, abdominal pain, or bleeding. NEURO:   There is no history of AMS, persistent headache, decreased level of consciousness, seizures, or motor or sensory deficits. PHYSICAL EXAM:    Vitals:    08/26/22 1055   BP: 104/60   Pulse: 82   Resp: 14   Temp: 96.8 °F (36 °C)   SpO2: 97%        GENERAL APPEARANCE:   The patient is normal weight and in no respiratory distress. HEENT:   PERRL. Conjunctivae unremarkable. Nasal mucosa is without epistaxis, exudate, or polyps. Gums and dentition are unremarkable. There is mild to moderate oropharyngeal narrowing. NECK/LYMPHATIC:   Symmetrical with no elevation of jugular venous pulsation. Trachea midline.  No thyroid enlargement. No cervical adenopathy. LUNGS:   Normal respiratory effort with symmetrical lung expansion. Breath sounds are clear bilaterally. Reinaldo Chime HEART:   There is an irregularly irregular rate and rhythm. No murmur, rub, or gallop. There is no edema in the lower extremities. ABDOMEN:   Soft and non-tender. Bowel sounds are normal.     NEURO:   The patient is alert and oriented to person, place, and time. Memory appears intact and mood is normal.  No gross sensorimotor deficits are present. ASSESSMENT:  (Medical Decision Making)        ICD-10-CM    1. BULMARO on CPAP  G47.33 The patient is doing quite well on his current APAP therapy. No adjustment is needed in the pressure settings. I will renew his supplies and set him up with [facility].    Z99.89       2. Hypersomnia  G47.10 This is very mild with an [place] score of 11/24. I suspect this is multifactorial from his other medical issues aside from the sleep apnea. 3. Persistent atrial fibrillation (HCC)  I48.19 The patient's ventricular response appears to be well controlled. PLAN:    Continue APAP as currently prescribed. New supplies are ordered today and he will be set up with [facility].    Continue current medications as prescribed. Follow-up will be in 6 months.       Orders Placed This Encounter   Procedures    DME - DURABLE MEDICAL EQUIPMENT     [facility]  Phone: [address] [address]  Dept: [phone]      Patient Name: Kimberley Prasad  : 1950  Gender: male  Address: [address] [place] [address]   Patient phone: [phone] (home)       Primary Insurance: Payor: Pratik Arzate / Plan: MEDICARE PART A AND B / Product Type: *No Product type* /   Subscriber ID: [id number] - (Medicare)      AMB Supply Order  Order Details     DME Location: [facility]   Order Date: 2022   The primary encounter diagnosis was BULMARO on CPAP. Diagnoses of Hypersomnia and Persistent atrial fibrillation (HCC) were also pertinent to this visit. (  X   )Supplies Needed        Machine   (     ) CPAP Unit  (     ) Auto CPAP Unit  (     ) BiLevel Unit  (     ) Auto BiLevel Unit  (     ) ASV        (     ) Bilevel ST      Length of need: 12 months    Pressure: 10-20 cmH20  EPR: 3    Starting Ramp Pressure:  10 cm H20  Ramp Time: min N/A      Patient had a diagnostic Apnea Hypopnea Index (AHI) of : 69.8  *SUPPLIES* Replace all as needed, or per coverage guidelines     Masks Type:  (    ) -Full Face Mask (1 per 3 mon)  (    ) -Full Mask (1 per month) Interface/Cushion      ( x ) -Nasal Mask (1 per 3 mon) <<< Mirage Activa LT >>>   ( x  ) - Nasal Mask (1 per month) Interface/Cushion  (  x   ) -Pillow (2 per mon)  (     ) -Ezdwmyfmw (1 per 6 mon)            Other Supplies:    (   X  )-Txkpvude (1 per 6 mon)  (   X  )-Wjglvl Tubing (1 per 3 mon)  (   X  )- Disposable Filter (2 per mon)  (   x  )-Aodkhn Humidifier (1 per year)     (  x   )-Ymhkucylx (sometimes used with Full Face Mask) (1 per 6 mos)  (    )-Tubing-without heat (1 per 3 mos)  (     )-Non-Disposable Filter (1 per 6 mos)  (  x   )-Water Chamber (1 per 6 mos)  (     )-Humidifier non-heated (1 per 5 yrs)      Signed Date: 8/26/2022  Electronically Signed By: Shukri Merino MD  Electronically Dated:  8/26/2022          Shukri Merino MD  Electronically signed    Over 50% of today's office visit was spent in face to face time reviewing test results, prognosis, importance of compliance, education about disease process, benefits of medications, instructions for management of acute flare-ups, and follow up plans. Total face to face time spent with the patient and charting was 18 minutes. Dictated using voice recognition software.   Proof read but unrecognized errors may exist.

## 2022-08-26 ENCOUNTER — OFFICE VISIT (OUTPATIENT)
Dept: SLEEP MEDICINE | Age: 72
End: 2022-08-26
Payer: MEDICARE

## 2022-08-26 VITALS
DIASTOLIC BLOOD PRESSURE: 60 MMHG | BODY MASS INDEX: 25.77 KG/M2 | SYSTOLIC BLOOD PRESSURE: 104 MMHG | OXYGEN SATURATION: 97 % | WEIGHT: 174 LBS | HEART RATE: 82 BPM | TEMPERATURE: 96.8 F | RESPIRATION RATE: 14 BRPM | HEIGHT: 69 IN

## 2022-08-26 DIAGNOSIS — G47.33 OSA ON CPAP: Primary | ICD-10-CM

## 2022-08-26 DIAGNOSIS — G47.10 HYPERSOMNIA: ICD-10-CM

## 2022-08-26 DIAGNOSIS — I48.19 PERSISTENT ATRIAL FIBRILLATION (HCC): ICD-10-CM

## 2022-08-26 DIAGNOSIS — Z99.89 OSA ON CPAP: Primary | ICD-10-CM

## 2022-08-26 PROCEDURE — 99214 OFFICE O/P EST MOD 30 MIN: CPT | Performed by: INTERNAL MEDICINE

## 2022-08-26 PROCEDURE — G8417 CALC BMI ABV UP PARAM F/U: HCPCS | Performed by: INTERNAL MEDICINE

## 2022-08-26 PROCEDURE — 1123F ACP DISCUSS/DSCN MKR DOCD: CPT | Performed by: INTERNAL MEDICINE

## 2022-08-26 PROCEDURE — 1036F TOBACCO NON-USER: CPT | Performed by: INTERNAL MEDICINE

## 2022-08-26 PROCEDURE — 3017F COLORECTAL CA SCREEN DOC REV: CPT | Performed by: INTERNAL MEDICINE

## 2022-08-26 PROCEDURE — G8427 DOCREV CUR MEDS BY ELIG CLIN: HCPCS | Performed by: INTERNAL MEDICINE

## 2022-08-26 ASSESSMENT — SLEEP AND FATIGUE QUESTIONNAIRES
HOW LIKELY ARE YOU TO NOD OFF OR FALL ASLEEP WHILE LYING DOWN TO REST IN THE AFTERNOON WHEN CIRCUMSTANCES PERMIT: 3
HOW LIKELY ARE YOU TO NOD OFF OR FALL ASLEEP WHEN YOU ARE A PASSENGER IN A CAR FOR AN HOUR WITHOUT A BREAK: 3
ESS TOTAL SCORE: 11
HOW LIKELY ARE YOU TO NOD OFF OR FALL ASLEEP WHILE SITTING INACTIVE IN A PUBLIC PLACE: 2
HOW LIKELY ARE YOU TO NOD OFF OR FALL ASLEEP WHILE WATCHING TV: 3
HOW LIKELY ARE YOU TO NOD OFF OR FALL ASLEEP WHILE SITTING AND READING: 0
HOW LIKELY ARE YOU TO NOD OFF OR FALL ASLEEP WHILE SITTING AND TALKING TO SOMEONE: 0
HOW LIKELY ARE YOU TO NOD OFF OR FALL ASLEEP WHILE SITTING QUIETLY AFTER LUNCH WITHOUT ALCOHOL: 0
HOW LIKELY ARE YOU TO NOD OFF OR FALL ASLEEP IN A CAR, WHILE STOPPED FOR A FEW MINUTES IN TRAFFIC: 0

## 2022-08-27 ASSESSMENT — ENCOUNTER SYMPTOMS
RESPIRATORY NEGATIVE: 1
GASTROINTESTINAL NEGATIVE: 1
CHEST TIGHTNESS: 0
SHORTNESS OF BREATH: 0
EYES NEGATIVE: 1

## 2022-08-27 NOTE — PROGRESS NOTES
HISTORY OF PRESENT ILLNESS  Mal Sinclair is a 67 y.o. y.o. male    Diabetes  He presents for his follow-up diabetic visit. He has type 1 diabetes mellitus. His disease course has been improving. Pertinent negatives for diabetes include no chest pain. Coronary Artery Disease  Presents for follow-up visit. Symptoms include palpitations. Pertinent negatives include no chest pain, chest pressure, chest tightness or shortness of breath. The symptoms have been stable. Compliance with diet is good. Compliance with exercise is good. Compliance with medications is good. Foot Pain   The pain is present in the left foot. This is a recurrent problem. The problem has been unchanged. Other  This is a recurrent problem. The problem has been unchanged. Pertinent negatives include no chest pain. Palpitations   This is a recurrent (atrial fib) problem. The problem has been unchanged. Pertinent negatives include no chest fullness, chest pain or shortness of breath. Shoulder Pain   This is a recurrent problem. The problem has been unchanged. The pain is at a severity of 4/10. The pain is moderate. Sinusitis  This is a recurrent problem. The problem is unchanged. The pain is mild. Pertinent negatives include no shortness of breath. Allergies   Allergen Reactions    Amoxicillin-Pot Clavulanate Diarrhea        Current Outpatient Medications   Medication Sig    glimepiride (AMARYL) 1 MG tablet Take 1 tablet by mouth every morning (before breakfast) TAKE 1 TABLET EVERY DAY BEFORE BREAKFAST    levothyroxine (SYNTHROID) 88 MCG tablet Take 1 tablet by mouth in the morning. TAKE 1 TABLET BY MOUTH EVERY DAY BEFORE BREAKFAST.    omeprazole (PRILOSEC) 20 MG delayed release capsule Take 1 capsule by mouth in the morning. TAKE 1 CAPSULE EVERY DAY. gabapentin (NEURONTIN) 300 MG capsule Take 300 mg by mouth 3 times daily.     nitroGLYCERIN (NITROSTAT) 0.4 MG SL tablet Place 1 tablet under the tongue every 5 minutes as needed for Chest pain Up to 3 tabs in 24 hrs. amLODIPine-benazepril (LOTREL) 5-20 MG per capsule TAKE 1 CAPSULE EVERY DAY    aspirin 81 MG EC tablet Take by mouth    atorvastatin (LIPITOR) 80 MG tablet TAKE 1 TABLET EVERY DAY    cyanocobalamin 1000 MCG tablet Take 1,000 mcg by mouth    dabigatran (PRADAXA) 150 MG capsule TAKE 1 CAPSULE EVERY 12 HOURS    dofetilide (TIKOSYN) 250 MCG capsule Take 250 mcg by mouth every 12 hours    fenofibrate (TRIGLIDE) 160 MG tablet TAKE 1 TABLET EVERY DAY    tamsulosin (FLOMAX) 0.4 MG capsule Take 0.4 mg by mouth every evening     No current facility-administered medications for this visit. Past Medical History:   Diagnosis Date    Anxiety     Arrhythmia     Atrial fib.     CAD (coronary artery disease) 1995    followed by Dr. Brent Callahan    Calculus of kidney     right renal calculi    Chronic pain     back     DDD (degenerative disc disease)     Diabetes ([facility]     type 2; glimeperide as needed per patient ; a1c = 8.0 as of 5/5/2021; check sugars daily avg is 80 per patient; hypoglycemic at 60     Diverticulitis 2013    Endocrine disease     thyroid    GERD (gastroesophageal reflux disease)     controlled with medication     History of EKG 02/15/2021    NSR with incomp RBBB    History of shingles 2013    HLD (hyperlipidemia) 7/5/2016    Hx of echocardiogram 07/01/2019    LVEF >55%    Hypercholesteremia     medication     Hypertension     controlled with medication  per patient     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     anxiety - controlled     S/P CABG (coronary artery bypass graft) 07/05/2016    4 vessel bypass    Sleep apnea     cpap    Sleep disorder 2007    apnea-uses CPAP    Thyroid disease     Hypothyroid        Past Surgical History:   Procedure Laterality Date    [address]  06/25/2019    COLONOSCOPY N/A 7/14/2017    COLONOSCOPY performed by Dea Ordonez MD at [facility]  2010     [place] (5yrs)    COLONOSCOPY  07/14/2017    CORONARY ARTERY BYPASS GRAFT      HEENT      nasal surgery 2ndary to obstruction    HEENT  02/2015    cyst removal    OTHER SURGICAL HISTORY      lasik eyes    [place] CARDIAC SUR[place] PROCEDURE UNLIST  6/225/19    ablation    [place] CARDIAC SUR[place] PROCEDURE UNLIST  1999    CABG        Social History     Socioeconomic History    Marital status:      Spouse name: Not on file    Number of children: Not on file    Years of education: Not on file    Highest education level: Not on file   Occupational History    Not on file   Tobacco Use    Smoking status: Never    Smokeless tobacco: Never   Vaping Use    Vaping Use: Never used   Substance and Sexual Activity    Alcohol use: Not Currently    Drug use: No    Sexual activity: Not on file   Other Topics Concern    Not on file   Social History Narrative    Not on file     Social Determinants of Health     Financial Resource Strain: Not on file   Food Insecurity: Not on file   Transportation Needs: Not on file   Physical Activity: Not on file   Stress: Not on file   Social Connections: Not on file   Intimate Partner Violence: Not on file   Housing Stability: Not on file        Review of Systems   Constitutional: Negative. HENT: Negative. Eyes: Negative. Respiratory: Negative. Negative for chest tightness and shortness of breath. Cardiovascular:  Positive for palpitations. Negative for chest pain. Gastrointestinal: Negative. Endocrine: Negative. Genitourinary: Negative. Skin: Negative. Neurological: Negative. Psychiatric/Behavioral: Negative. /62   Pulse 52   Wt 181 lb (82.1 kg)   SpO2 100%   BMI 26.73 kg/m²      Physical Exam  Constitutional:       General: He is not in acute distress. Appearance: Normal appearance. HENT:      Head: Normocephalic. Nose: Nose normal.   Eyes:      Conjunctiva/sclera: Conjunctivae normal.   Cardiovascular:      Rate and Rhythm: Normal rate. and schedule for future lab studies reviewed with patient  reviewed diet, exercise and weight control     Return in about 3 months (around 11/10/2022).      Martin Painter MD

## 2022-08-31 ENCOUNTER — HOSPITAL ENCOUNTER (OUTPATIENT)
Dept: PHYSICAL THERAPY | Age: 72
Setting detail: RECURRING SERIES
Discharge: HOME | End: 2022-09-03
Payer: MEDICARE

## 2022-08-31 PROCEDURE — 97161 PT EVAL LOW COMPLEX 20 MIN: CPT

## 2022-08-31 PROCEDURE — 97110 THERAPEUTIC EXERCISES: CPT

## 2022-08-31 ASSESSMENT — PAIN SCALES - GENERAL: PAINLEVEL_OUTOF10: 5

## 2022-08-31 NOTE — PLAN OF CARE
Shabnam Lines  : 1950  Primary: Medicare Part A And B  Secondary: [facility]  [address] [address]  Phone: [phone]  Fax: [phone] Plan Frequency: 4-7M/HL  Plan of Care/Certification Expiration Date: 22    PT Visit Info:    No data recorded    Visit Count:  1    OUTPATIENT PHYSICAL THERAPY:OP NOTE TYPE: Initial Assessment 2022               Episode  Appt Desk         Treatment Diagnosis:  pain in left foot (X61.362) ACHILLES TENDONITIS M76.62  Medical/Referring Diagnosis:  Pain in left foot [M79.672]  Referring Physician:  Jorge Luis Maldonado MD MD Orders:  PT Eval and Treat   Return MD Appt:    Date of Onset:  No data recorded   Allergies: Other and Amoxicillin-pot clavulanate  Restrictions/Precautions:    No data recordedNo data recorded   Medications Last Reviewed:  2022     SUBJECTIVE   History of Injury/Illness (Reason for Referral):  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 She saw a podiatrist who ordered an MRI and gave him a boot for 8 wks. He felt some better but the pain was still there and gradually worsened. The MRI revealed partial achilles tear. Dr. Kj Rodriguez recommended a night splint which has helped. Pain starts after about 1/2 mile. Patient Stated Goal(s):   \"To return to full function include cycling without pain\"  Initial:      /10 Post Session:      /10  Past Medical History/Comorbidities:   Mr. Fer Barker  has a past medical history of Anxiety, Arrhythmia, CAD (coronary artery disease), Calculus of kidney, Chronic pain, DDD (degenerative disc disease), Diabetes ([facility] [address] Diverticulitis, Endocrine disease, GERD (gastroesophageal reflux disease), History of EKG, History of shingles, HLD (hyperlipidemia), Hx of echocardiogram, Hypercholesteremia, Hypertension, Neuropraxia of left median nerve, Psychiatric disorder, S/P CABG (coronary artery bypass graft), Sleep apnea, Sleep disorder, and Thyroid disease. Mr. Nav Viera  has a past surgical history that includes Colonoscopy (N/A, 7/14/2017); heent; pr cardiac surg procedure unlist (6/225/19); Cardiac catheterization (06/25/2019); pr cardiac surg procedure unlist (1999); other surgical history; Colonoscopy (2010); Colonoscopy (07/14/2017); heent (02/2015); Coronary artery bypass graft; and angioplasty (1995). Quadruple bypass 22 yrs ago, afibb   Social History/Living Environment:   Lives With: Spouse  Type of Home: House   Prior Level of Function/Work/Activity:   Prior level of function: Independent  Occupation: [employer] recorded   Learning:   No data recorded   Fall Risk Scale: Total Score: 0  Porter Fall Risk: Low (0-24)         OBJECTIVE   Observation/Orthostatic Postural Assessment:    Relaxed standing posture:  [place] deformity noted on L side, relatively neutral rearfoot    Palpation/tone/tissue texture:    ASIS: symmetrical   PSIS: symmetrical   Leg Length: supine:symmetrical         Long Sitting:      Soft tissue:  Gastroc/soleus/posterior tibialis: mild tightness  Achilles: very tender near insertion on L side  Planter fascia:  n/a      Foot Exam Date:  8/31/2022       Left Right   Talocrural Joint: Mild tightness Mild tightness   Rear foot: (neutral to relaxed: 4-6                      deg-normal) Mild tightness Mild tightness   Mid-foot (navicular drop, <7-normal) wnl wnl   Forefoot: (position, mobility) Varus,mobile Varus,mobile   First ray position/hallux limitus test: - -   Windlass test: - -   Foot intrinsic strength:  - -       ROM:      Ankle ROM Date:  9/7/2022       Right Left   DF 5 5   PF wnl wnl   Inversion wnl wnl   Eversion wnl wnl           Strength: Foot and ankle Strength Date:  9/7/2022       Right Left   DF 5 5   PF 4 3   Inversion 5 5   Eversion 5 5                Special Tests:    Ankle stability:  Anterior Drawer:stable  Talar tilt: stable  Kleiger:stable    Neurological Screen:   Myotomes: intact in bilateral LE's     Dermatomes: intact in bilateral LE's         Reflexes: n/a         Neural Tension Tests: n/a  Functional Mobility:    Double leg squat: able to do a functional squat  Gait Pattern: ambulates with decreased stance time  Balance:  Single leg   L: 3 sec, moderate sway  R:3 sec  ASSESSMENT   Initial Assessment:  Patient presents to physical therapy with symptoms of posterior calcaneal pain consistent with insertional tendinopthy. Skilled physical therapy is needed to progress his plan of care in order to return him to full function with minimal symptoms. Problem List: (Impacting functional limitations): Body Structures, Functions, Activity Limitations Requiring Skilled Therapeutic Intervention: Decreased functional mobility ; Decreased ROM; Decreased strength; Increased pain   Therapy Prognosis:   Therapy Prognosis: Good   Assessment Complexity:   Low Complexity  PLAN   Effective Dates: 8/31/2022 TO Plan of Care/Certification Expiration Date: 12/06/22   Frequency/Duration: Plan Frequency: 1-2x/wk   Interventions Planned (Treatment may consist of any combination of the following):    Current Treatment Recommendations: Strengthening; ROM;  Integrated dry needling; Manual Therapy - Joint Manipulation; Manual Therapy - Soft Tissue Mobilization   Goals: (Goals have been discussed and agreed upon with patient.)  Short Term Goals: (2-4 wks)  Patient will report 25-50% overall improvement  Patient will be compliant with HEP and plan of care  Patient will be able to do 10 single leg heel raises without increased pain  Patient will improve on the FAAM questionnaire by 4-5 points    Long Term Goals: (6-8 wks)  Patient will report % overall improvement in order to demonstrate improved function with less pain  Patient will report feeling comfortable progressing their plan of care from this point forward  Patient will be able to ride his bike for 30-60 min with minimal symptoms (0-2/10)  Patient will improve on the FAAM questionnaire by 8-10 points in order to demonstrate improved function with less pain              Outcome Measure: Tool Used: FOOT AND ANKLE ABILITY MEASURE  Score:  Initial: 52 Most Recent: X (Date: -- )   Interpretation of Score: For the \"Activities of Daily Living\", there are 21 questions each scored on a 5 point scale with 0 representing \"Unable to do\" and 4 representing \"No difficulty\". The lower the score, the greater the functional disability. 84/84 represents no disability. Minimal detectable change is 5.7 points. With the addition of the 8 questions in the \"Sports Subscale,\" there are 29 questions, each scored on a 5 point scale with 0 representing \"Unable to do\" and 4 representing \"No difficulty\". The lower the score, the greater the functional disability. 116/116 represents no disability. Minimal detectable change is 12.3 points. Medical Necessity:   > Skilled intervention continues to be required due to ongoing symptoms. Reason For Services/Other Comments:  > Patient continues to require skilled intervention due to ongoing symptoms. Total Duration:  Time In: 1520  Time Out: 5698    Regarding Lion Merchant's therapy, I certify that the treatment plan above will be carried out by a therapist or under their direction.   Thank you for this referral,  Michelle Galicia, PT     Referring Physician Signature: Jordan Christie MD _______________________________ Date _____________        Post Session Pain  Charge Capture  PT Visit Info MD Maggy Boothe

## 2022-08-31 NOTE — PROGRESS NOTES
Mohit Lockhart  : 1950  Primary: Medicare Part A And B  Secondary: [facility]  [address] [facility] [address]  Phone: [phone]  Fax: [phone] Plan Frequency: 5-9B/GD  Plan of Care/Certification Expiration Date: 22    PT Visit Info:  No data recorded   Visit Count:  1   OUTPATIENT PHYSICAL THERAPY:OP NOTE TYPE: Treatment Note 2022       Episode  }Appt Desk             Treatment Diagnosis:  pain in left foot (M79.672) ACHILLES TENDONITIS M76.62  Medical/Referring Diagnosis:  Pain in left foot [M79.672]  Referring Physician:  Yazmin Martínez MD MD Orders:  PT Eval and Treat   Date of Onset:  No data recorded   Allergies: Other and Amoxicillin-pot clavulanate  Restrictions/Precautions:  No data recordedNo data recorded   Interventions Planned (Treatment may consist of any combination of the following):    Current Treatment Recommendations: Strengthening; ROM; Integrated dry needling; Manual Therapy - Joint Manipulation; Manual Therapy - Soft Tissue Mobilization   Subjective Comments:   Reports moderate pain in his left posterior heel. Initial:}    5/10Post Session:       5/10  Medications Last Reviewed:  2022  Updated Objective Findings:  See evaluation note from today  Treatment   THERAPEUTIC ACTIVITY: ( 10 minutes): Therapeutic activities per grid below to improve mobility and strength. Date:  22 Date:     Activity/Exercise Parameters Parameters   Education Discussed his plan of care and HEP in detail    Taping Taped foot to limit closed chain DF, used kinesio tape    DF stretch Knee straight, using a strap    Achilles strengthening  Isometric, 5 sec, 10x                  Treatment/Session Summary:    Treatment Assessment:   Patient tolerated the session well. Reviewed home program and plan of care.   Communication/Consultation:  None today  Equipment provided today:  None  Recommendations/Intent for next treatment session: Next visit will focus on progressing his plan of care.     Total Treatment Billable Duration:  15 minutes, evaluation 30 min  Time In: 1520  Time Out: [address], PT       Charge Capture  }Post Session Pain  PT Visit [address] Portal  MD Guidelines  Scanned Media  Benefits  MyChart    Future Appointments   Date Time Provider Savanna Kanchan   9/7/2022 10:45 AM Tad Domingo, PT [facility]   9/14/2022  1:45 PM Tad Domingo, PT [facility]   9/26/2022  2:15 PM Bri Saxena MD BSNI [facility]   9/27/2022 10:40 AM Ralph Kerr MD POAI [facility]   10/3/2022 12:45 PM Glenys Cartwright MD THA [facility]   10/17/2022 10:00 AM DO LEIGHA Steve [facility]   11/9/2022 10:30 AM Zakia Jean MD HTF [facility]   11/21/2022  2:45 PM Zaina Whitehead MD UCDG [facility]   2/23/2023 11:20 AM Keyla Ellis APRN - CNP PSCD [facility]   4/28/2023  1:45 PM Zakia Jean MD HTF [facility]   5/3/2023  9:30 AM PGU GYPSY BLOOD DRAWS PGUMAU [facility]   5/9/2023 11:00 AM Fish Porter MD PGUMAU [facility]

## 2022-09-06 ENCOUNTER — PATIENT MESSAGE (OUTPATIENT)
Dept: CARDIOLOGY CLINIC | Age: 72
End: 2022-09-06

## 2022-09-06 ENCOUNTER — OFFICE VISIT (OUTPATIENT)
Dept: ENT CLINIC | Age: 72
End: 2022-09-06
Payer: MEDICARE

## 2022-09-06 VITALS
BODY MASS INDEX: 25.62 KG/M2 | HEIGHT: 69 IN | WEIGHT: 173 LBS | SYSTOLIC BLOOD PRESSURE: 110 MMHG | DIASTOLIC BLOOD PRESSURE: 66 MMHG

## 2022-09-06 DIAGNOSIS — J34.2 DEVIATED NASAL SEPTUM: ICD-10-CM

## 2022-09-06 DIAGNOSIS — J34.3 NASAL TURBINATE HYPERTROPHY: ICD-10-CM

## 2022-09-06 DIAGNOSIS — J31.0 CHRONIC RHINOSINUSITIS: Primary | ICD-10-CM

## 2022-09-06 DIAGNOSIS — J32.9 CHRONIC RHINOSINUSITIS: Primary | ICD-10-CM

## 2022-09-06 PROCEDURE — 1036F TOBACCO NON-USER: CPT | Performed by: STUDENT IN AN ORGANIZED HEALTH CARE EDUCATION/TRAINING PROGRAM

## 2022-09-06 PROCEDURE — G8427 DOCREV CUR MEDS BY ELIG CLIN: HCPCS | Performed by: STUDENT IN AN ORGANIZED HEALTH CARE EDUCATION/TRAINING PROGRAM

## 2022-09-06 PROCEDURE — G8417 CALC BMI ABV UP PARAM F/U: HCPCS | Performed by: STUDENT IN AN ORGANIZED HEALTH CARE EDUCATION/TRAINING PROGRAM

## 2022-09-06 PROCEDURE — 99204 OFFICE O/P NEW MOD 45 MIN: CPT | Performed by: STUDENT IN AN ORGANIZED HEALTH CARE EDUCATION/TRAINING PROGRAM

## 2022-09-06 PROCEDURE — 31231 NASAL ENDOSCOPY DX: CPT | Performed by: STUDENT IN AN ORGANIZED HEALTH CARE EDUCATION/TRAINING PROGRAM

## 2022-09-06 PROCEDURE — 1123F ACP DISCUSS/DSCN MKR DOCD: CPT | Performed by: STUDENT IN AN ORGANIZED HEALTH CARE EDUCATION/TRAINING PROGRAM

## 2022-09-06 PROCEDURE — 3017F COLORECTAL CA SCREEN DOC REV: CPT | Performed by: STUDENT IN AN ORGANIZED HEALTH CARE EDUCATION/TRAINING PROGRAM

## 2022-09-06 RX ORDER — AMOXICILLIN AND CLAVULANATE POTASSIUM 875; 125 MG/1; MG/1
1 TABLET, FILM COATED ORAL 2 TIMES DAILY
Qty: 20 TABLET | Refills: 0 | Status: SHIPPED | OUTPATIENT
Start: 2022-09-06 | End: 2022-09-15 | Stop reason: SDUPTHER

## 2022-09-06 ASSESSMENT — ENCOUNTER SYMPTOMS
ABDOMINAL PAIN: 0
EYE DISCHARGE: 0
COUGH: 0

## 2022-09-06 NOTE — PROGRESS NOTES
fibrillation    Hypercholesterolemia    S/P CABG (coronary artery bypass graft)    Calculus of kidney    CAD (coronary artery disease)    Hypertension    HLD (hyperlipidemia)    Thyroid disease       Current Outpatient Medications   Medication Sig    amoxicillin-clavulanate (AUGMENTIN) 875-125 MG per tablet Take 1 tablet by mouth 2 times daily for 10 days    glimepiride (AMARYL) 1 MG tablet Take 1 tablet by mouth every morning (before breakfast) TAKE 1 TABLET EVERY DAY BEFORE BREAKFAST    levothyroxine (SYNTHROID) 88 MCG tablet Take 1 tablet by mouth in the morning. TAKE 1 TABLET BY MOUTH EVERY DAY BEFORE BREAKFAST.    omeprazole (PRILOSEC) 20 MG delayed release capsule Take 1 capsule by mouth in the morning. TAKE 1 CAPSULE EVERY DAY. gabapentin (NEURONTIN) 300 MG capsule Take 300 mg by mouth 3 times daily. nitroGLYCERIN (NITROSTAT) 0.4 MG SL tablet Place 1 tablet under the tongue every 5 minutes as needed for Chest pain Up to 3 tabs in 24 hrs. amLODIPine-benazepril (LOTREL) 5-20 MG per capsule TAKE 1 CAPSULE EVERY DAY    aspirin 81 MG EC tablet Take by mouth    atorvastatin (LIPITOR) 80 MG tablet TAKE 1 TABLET EVERY DAY    cyanocobalamin 1000 MCG tablet Take 1,000 mcg by mouth    dabigatran (PRADAXA) 150 MG capsule TAKE 1 CAPSULE EVERY 12 HOURS    dofetilide (TIKOSYN) 250 MCG capsule Take 250 mcg by mouth every 12 hours    fenofibrate (TRIGLIDE) 160 MG tablet TAKE 1 TABLET EVERY DAY    tamsulosin (FLOMAX) 0.4 MG capsule Take 0.4 mg by mouth every evening     No current facility-administered medications for this visit. Past Medical History:   Diagnosis Date    Anxiety     Arrhythmia     Atrial fib.     CAD (coronary artery disease) 1995    followed by Dr. José Miguel Vernon    Calculus of kidney     right renal calculi    Chronic pain     back     DDD (degenerative disc disease)     Diabetes ([facility] [address]     type 2; glimeperide as needed per patient ; a1c = 8.0 as of 5/5/2021; check sugars daily avg is 80 per patient; hypoglycemic at 60     Diverticulitis 2013    Endocrine disease     thyroid    GERD (gastroesophageal reflux disease)     controlled with medication     History of EKG 02/15/2021    NSR with incomp RBBB    History of shingles 2013    HLD (hyperlipidemia) 7/5/2016    Hx of echocardiogram 07/01/2019    LVEF >55%    Hypercholesteremia     medication     Hypertension     controlled with medication  per patient     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     anxiety - controlled     S/P CABG (coronary artery bypass graft) 07/05/2016    4 vessel bypass    Sleep apnea     cpap    Sleep disorder 2007    apnea-uses CPAP    Thyroid disease     Hypothyroid       Past Surgical History:   Procedure Laterality Date    [address]  06/25/2019    COLONOSCOPY N/A 7/14/2017    COLONOSCOPY performed by Terrie Abbott MD at [facility] ENDOSCOPY    COLONOSCOPY  2010    Dr. Zahira Tabor (5yrs)    COLONOSCOPY  07/14/2017    CORONARY ARTERY BYPASS GRAFT      HEENT      nasal surgery 2ndary to obstruction    HEENT  02/2015    cyst removal    OTHER SURGICAL HISTORY      lasik eyes    [place] CARDIAC SURG PROCEDURE UNLIST  6/225/19    ablation    [place] CARDIAC SURG PROCEDURE UNLIST  1999    CABG       Social History     Tobacco Use    Smoking status: Never    Smokeless tobacco: Never   Substance Use Topics    Alcohol use: Not Currently       Family History   Problem Relation Age of Onset    Heart Disease Mother     Heart Disease Father     Heart Disease Brother         ROS:    Review of Systems   Constitutional:  Negative for fever. HENT:  Positive for congestion and postnasal drip. Negative for ear discharge and ear pain. Eyes:  Negative for discharge. Respiratory:  Negative for cough. Gastrointestinal:  Negative for abdominal pain. Endocrine: Negative for cold intolerance. Genitourinary:  Negative for difficulty urinating.    Musculoskeletal:  Negative for arthralgias and neck pain.   Skin:  Negative for rash. Allergic/Immunologic: Negative for environmental allergies. Neurological:  Negative for dizziness. Hematological:  Negative for adenopathy. Psychiatric/Behavioral:  Negative for agitation. PHYSICAL EXAM:    /66   Ht 5' 9\" (1.753 m)   Wt 173 lb (78.5 kg)   BMI 25.55 kg/m²     Physical Exam  Vitals and nursing note reviewed. Constitutional:       Appearance: Normal appearance. HENT:      Head: Normocephalic and atraumatic. Right Ear: Tympanic membrane, ear canal and external ear normal. There is no impacted cerumen. Left Ear: Tympanic membrane, ear canal and external ear normal. There is no impacted cerumen. Nose: Septal deviation present. No congestion or rhinorrhea. Right Turbinates: Enlarged. Left Turbinates: Enlarged. Comments: DNS to the right posteriorly with narrowing of the right middle meatus     Mouth/Throat:      Mouth: Mucous membranes are moist.      Pharynx: Oropharynx is clear. No oropharyngeal exudate or posterior oropharyngeal erythema. Eyes:      General: No scleral icterus. Pulmonary:      Effort: Pulmonary effort is normal.   Musculoskeletal:      Cervical back: Normal range of motion and neck supple. No rigidity. Lymphadenopathy:      Cervical: No cervical adenopathy. Skin:     General: Skin is warm and dry. Neurological:      Mental Status: He is alert and oriented to person, place, and time. Psychiatric:         Mood and Affect: Mood normal.         Behavior: Behavior normal.      CT Result (most recent):  CT SINUS WO CONTRAST 08/15/2022    Narrative  CT OF THE SINUSES WITHOUT CONTRAST. CLINICAL INDICATION: Maxillary sinusitis    PROCEDURE: Serial thin section axial images obtained through the sinuses without  the administration of intravenous contrast.  Radiation dose reduction techniques  were used for this study.  Our CT scanners use one or all of the following:  Automated exposure control, adjusted of the mA and/or kV according to patient  size, iterative reconstruction    COMPARISON: No prior similar study available for direct comparison. FINDINGS: There is complete opacification of the right maxillary sinus with  hyperostosis of the sinus walls and linear hyperdensity within the sinus  consolidation. The left maxillary sinus is clear. The sphenoid sinus chambers  and ethmoid air cells are clear. The frontal sinuses are clear. The retroantral  fat along the posterior wall of the right maxillary sinus is clean. The  pterygoid palatine fossa is clean. There is no bone destruction present or  intraorbital extension of the infection two suspect aggressive sinusitis. The  middle ear cavities and mastoid air cells are clear. Coronal reformatted images show complete opacification of the right ostiomeatal  unit with minimal extension into the right nasal cavity. The left ostiomeatal  unit is patent. Impression  Chronic right maxillary sinusitis without aggressive features. PROCEDURE: Nasal endoscopy  INDICATIONS: Chronic sinusitis  DESCRIPTION: After verbal consent was obtained and a timeout was performed, the flexible fiberoptic nasal endoscope was advanced into both nares. The septum was deviated to the right w/ no perforations. There were no masses seen along the nasal floor or within the nasopharynx. On the R side, the mucosa was healthy and the turbinates were intact. The right-sided middle meatus had presence of mucopurulence and mild edema/erythema. No obvious polyps or lesions extruding from the maxillary antrum. And the sphenoethmoid recess was was clear. On the L side, the mucosa was healthy and the turbinates were intact. The middle meatus was clear w/ no edema, polyps or mucopurulence and the sphenoethmoid recess was was clear. The scope was then removed and the patient tolerated the procedure well w/ no complications. ASSESSMENT and PLAN        ICD-10-CM    1. Chronic rhinosinusitis  J31.0 NASAL ENDOSCOPY,DX    J32.9       2. Deviated nasal septum  J34.2       3. Nasal turbinate hypertrophy  J34.3           Patient with chronic nasal congestion/obstruction and significant postnasal drip from the sinuses for the last couple years with recent CT imaging of the sinuses showing right-sided maxillary sinus opacification with indications of chronic sinusitis. This is in line with today's nasal endoscopy showing mucopurulence and edematous inflammatory changes to the right middle meatus. There is been no recent optimize medical therapy for which I recommended 3 weeks Augmentin twice daily, Flonase daily, nasal saline irrigations daily. No systemic steroids at this time secondary to diabetes mellitus type 2. Follow-up in 3 to 4 weeks for reevaluation and repeat nasal endoscopy and if symptoms and/or examination findings still present we may consider surgical interventions as indicated.     Tano Carter DO  9/6/2022

## 2022-09-07 ENCOUNTER — HOSPITAL ENCOUNTER (OUTPATIENT)
Dept: PHYSICAL THERAPY | Age: 72
Setting detail: RECURRING SERIES
Discharge: HOME | End: 2022-09-10
Payer: MEDICARE

## 2022-09-07 PROCEDURE — 97140 MANUAL THERAPY 1/> REGIONS: CPT

## 2022-09-07 PROCEDURE — 97110 THERAPEUTIC EXERCISES: CPT

## 2022-09-07 NOTE — PROGRESS NOTES
Karina Sykes  : 1950  Primary: Medicare Part A And B  Secondary: [facility]  [address] [facility] [address]  Phone: [phone]  Fax: [phone] Plan Frequency: 7-0B/WO  Plan of Care/Certification Expiration Date: 22      PT Visit Info:  No data recorded   Visit Count:  2   OUTPATIENT PHYSICAL THERAPY:OP NOTE TYPE: Treatment Note 2022       Episode  }Appt Desk             Treatment Diagnosis:  pain in left foot (M79.672) ACHILLES TENDONITIS M76.62  Medical/Referring Diagnosis:  Pain in left foot [M79.672]  Referring Physician:  Franck Hearn MD MD Orders:  PT Eval and Treat   Date of Onset:  No data recorded   Allergies: Other and Amoxicillin-pot clavulanate  Restrictions/Precautions:  No data recordedNo data recorded   Interventions Planned (Treatment may consist of any combination of the following):    Current Treatment Recommendations: Strengthening; ROM; Integrated dry needling; Manual Therapy - Joint Manipulation; Manual Therapy - Soft Tissue Mobilization     Subjective Comments:   Reports moderate pain in his left posterior heel. Initial:}     /10Post Session:        /10  Medications Last Reviewed:  2022  Updated Objective Findings:  See evaluation note from today  Treatment   THERAPEUTIC ACTIVITY: ( 15 minutes): Therapeutic activities per grid below to improve mobility and strength.      Date:  22 Date:  22   Activity/Exercise Parameters Parameters   Education Discussed his plan of care and HEP in detail Discussed HEP   Taping Taped foot to limit closed chain DF, used kinesio tape Taped foot to limit closed chain DF, used kinesio tape   DF stretch Knee straight, using a strap HEP   Achilles strengthening  Isometric, 5 sec, 10x Thick green band, partial motion, knee straight, 30x, 2 sets          Manual Therapy (25 min): done to improve soft tissue and joint mobility in order to improve ROM, muscle tone, and decrease pain  Achilles distraction, done w/ gentle DF    Dry Needling (5 min), done to desensitize the tissue  To distal achilles    Treatment/Session Summary:    Treatment Assessment:   Patient tolerated the session well. He had less sharp pain w/ palpation at the base of the achilles. Reviewed his home program.  Communication/Consultation:  None today  Equipment provided today:  None  Recommendations/Intent for next treatment session: Next visit will focus on progressing his plan of care.     Total Treatment Billable Duration:  manual 25 min, TE-15 min  Time In: 1055  Time Out: Ajit Hancock PT       Charge Capture  }Post Session Pain  PT Visit Info  [facility] Portal  MD Guidelines  Scanned Media  Benefits  MyChart    Future Appointments   Date Time Provider Savanna Hemphill   9/14/2022  1:45 PM Skyler Domingo, PT [facility]   9/26/2022  2:15 PM Olamide Polanco MD BSNI [facility]   9/27/2022 10:40 AM Goldie Gonzáles MD POAI [facility]   10/3/2022 12:45 PM Jenaro Prabhakar MD THA [facility]   10/17/2022 10:00 AM Brady Anderson DO ENTG [facility]   11/9/2022 10:30 AM Sherry Coughlin MD HTF [facility]   11/21/2022  2:45 PM Natasha Gill MD UCDG [facility]   2/23/2023 11:20 AM Yamilka Bunn, APRN - CNP PSCD [facility]   4/28/2023  1:45 PM Sherry Coughlin MD HTF [facility]   5/3/2023  9:30 AM ISAEL BETANCUR BLOOD DRAWS PGUMAU [facility]   5/9/2023 11:00 AM French Wong MD PGUMAU [facility]

## 2022-09-08 RX ORDER — BENAZEPRIL HYDROCHLORIDE 20 MG/1
20 TABLET ORAL DAILY
Qty: 30 TABLET | Refills: 0 | Status: SHIPPED | OUTPATIENT
Start: 2022-09-08 | End: 2022-09-12 | Stop reason: SDUPTHER

## 2022-09-08 NOTE — TELEPHONE ENCOUNTER
Spoke to pt he verbalized understanding and that he received Dr Roseann Villarreal message. He requested a 30 day supply be sent to the local pharmacy and a 90 day supply be sent to his mail order.      Requested Prescriptions     Pending Prescriptions Disp Refills    benazepril (LOTENSIN) 20 MG tablet 30 tablet 0     Sig: Take 1 tablet by mouth daily

## 2022-09-12 RX ORDER — BENAZEPRIL HYDROCHLORIDE 20 MG/1
20 TABLET ORAL DAILY
Qty: 90 TABLET | Refills: 3 | Status: SHIPPED | OUTPATIENT
Start: 2022-09-12

## 2022-09-13 RX ORDER — AMOXICILLIN AND CLAVULANATE POTASSIUM 875; 125 MG/1; MG/1
1 TABLET, FILM COATED ORAL 2 TIMES DAILY
Qty: 11 TABLET | Refills: 0 | Status: SHIPPED | OUTPATIENT
Start: 2022-09-13 | End: 2022-09-15

## 2022-09-13 NOTE — TELEPHONE ENCOUNTER
Patient sent a [employer] message about not getting the full three weeks of Augmentin. I spoke with Prudence Benites due to Authur Aase being out of office and Dr. Nathalie Aponte agreed to send the rest of the medication in to the pharmacy.

## 2022-09-14 ENCOUNTER — HOSPITAL ENCOUNTER (OUTPATIENT)
Dept: PHYSICAL THERAPY | Age: 72
Setting detail: RECURRING SERIES
Discharge: HOME | End: 2022-09-17
Payer: MEDICARE

## 2022-09-14 PROCEDURE — 97140 MANUAL THERAPY 1/> REGIONS: CPT

## 2022-09-14 PROCEDURE — 97110 THERAPEUTIC EXERCISES: CPT

## 2022-09-14 ASSESSMENT — PAIN SCALES - GENERAL: PAINLEVEL_OUTOF10: 1

## 2022-09-14 NOTE — PROGRESS NOTES
bike   Upright, 10 min (after session)   DF stretch Knee straight, using a strap HEP In supine and manually done   Achilles strengthening  Isometric, 5 sec, 10x Thick green band, partial motion, knee straight, 30x, 2 sets Seated, partial range-lift R LE up and lower with left  Double leg heel raise, towel under heel, 10x, 3 sets           Manual Therapy (15 min): done to improve soft tissue and joint mobility in order to improve ROM, muscle tone, and decrease pain  Achilles distraction, done w/ gentle DF  Soleus soft tissue mobilization, done w/ passive DF    Dry Needling (- min), done to desensitize the tissue (NOT DONE TODAY)  To distal achilles    Treatment/Session Summary:    Treatment Assessment:  Patient tolerated the session well. Discussed starting weight bearing strengtheing and using tape to avoid compression force. Educated to start riding his bike up to about 2 miles as long as pain free. Communication/Consultation:  None today  Equipment provided today:  None  Recommendations/Intent for next treatment session: Next visit will focus on progressing his plan of care.     Total Treatment Billable Duration:  manual 15 min, TE-25 min  Time In: 1345  Time Out: 1435    Adenike Borrego, PT       Charge Capture  }Post Session Pain  PT Visit Info  [employer] Portal  MD Guidelines  Scanned Media  Benefits  MyChart    Future Appointments   Date Time Provider [place]   9/26/2022  2:15 PM Kacey Smith MD BSNI [facility] AMB   9/27/2022 10:40 AM Sharry Sicard, MD POAI [facility] AMB   10/3/2022 12:45 PM Sarah Mohamud MD THA [facility] AMB   10/17/2022 10:00 AM DO LEIGHA Brooks [facility] AM[facility]   11/9/2022 10:30 AM MD SALAS Dasilva [facility] AM[facility]   11/21/2022  2:45 PM Benigno Navarro MD UCDG [facility] AMB   2/23/2023 11:20 AM DIANNE Lopez - CNP PSCD [facility] AMB   4/28/2023  1:45 PM Debo Schuler MD ROXI [facility] AMB   5/3/2023  9:30 AM PGU GYPSY BLOOD DRAWS PGUMAU [facility] AMB   5/9/2023 11:00 AM MD Sue Lombardo [facility] AMB

## 2022-09-15 DIAGNOSIS — J34.3 NASAL TURBINATE HYPERTROPHY: ICD-10-CM

## 2022-09-15 DIAGNOSIS — J32.9 CHRONIC RHINOSINUSITIS: Primary | ICD-10-CM

## 2022-09-15 DIAGNOSIS — J31.0 CHRONIC RHINOSINUSITIS: Primary | ICD-10-CM

## 2022-09-15 RX ORDER — AMOXICILLIN AND CLAVULANATE POTASSIUM 875; 125 MG/1; MG/1
1 TABLET, FILM COATED ORAL 2 TIMES DAILY
Qty: 22 TABLET | Refills: 0 | Status: SHIPPED | OUTPATIENT
Start: 2022-09-15 | End: 2022-09-26

## 2022-09-20 ENCOUNTER — HOSPITAL ENCOUNTER (OUTPATIENT)
Dept: PHYSICAL THERAPY | Age: 72
Setting detail: RECURRING SERIES
Discharge: HOME | End: 2022-09-23
Payer: MEDICARE

## 2022-09-20 PROCEDURE — 97140 MANUAL THERAPY 1/> REGIONS: CPT

## 2022-09-20 PROCEDURE — 97110 THERAPEUTIC EXERCISES: CPT

## 2022-09-20 NOTE — PROGRESS NOTES
Quentin Mclean  : 1950  Primary: Medicare Part A And B  Secondary: [facility]  [address] [facility] [address] [facility] [address]  Phone: [phone]  Fax: [phone] Plan Frequency: 9-8D/TU  Plan of Care/Certification Expiration Date: 22      PT Visit Info:  No data recorded   Visit Count:  4   OUTPATIENT PHYSICAL THERAPY:OP NOTE TYPE: Treatment Note 2022       Episode  }Appt Desk             Treatment Diagnosis:  pain in left foot (M79.672) ACHILLES TENDONITIS M76.62  Medical/Referring Diagnosis:  Pain in left foot [M79.672]  Referring Physician:  Angel Gamboa MD MD Orders:  PT Eval and Treat   Date of Onset:  No data recorded   Allergies: Other and Amoxicillin-pot clavulanate  Restrictions/Precautions:  No data recordedNo data recorded   Interventions Planned (Treatment may consist of any combination of the following):    Current Treatment Recommendations: Strengthening; ROM; Integrated dry needling; Manual Therapy - Joint Manipulation; Manual Therapy - Soft Tissue Mobilization     Subjective Comments:  Reports that he is doing pretty good. states he rode several times and started to feel some pain after the 2nd or 3rd ride. Reports moderate pain in his left posterior heel. Initial:}    1/10Post Session:       1/10  Medications Last Reviewed:  2022  Updated Objective Findings:   less tenderness noted in achilles insertion today  Treatment   THERAPEUTIC ACTIVITY: ( 25 minutes): Therapeutic activities per grid below to improve mobility and strength.      Date:  22 Date  22 Date  22   Activity/Exercise Parameters     Education Discussed HEP Discussed HEP Discussed HEP   Taping Taped foot to limit closed chain DF, used kinesio tape Taped foot to limit closed chain DF, used kinesio tape -   bike  Upright, 10 min (after session) -   DF stretch HEP In supine and manually done In supine   Hamstring stretch   In supine, with strap, 60 sec   Achilles strengthening  Thick green band, partial motion, knee straight, 30x, 2 sets Seated, partial range-lift R LE up and lower with left  Double leg heel raise, towel under heel, 10x, 3 sets Up on 2, weight shift L and lower, 10x, 3 sets   Prone hip exten   10x, 2 sets, pillow under belly     Manual Therapy (15 min): done to improve soft tissue and joint mobility in order to improve ROM, muscle tone, and decrease pain  Achilles distraction, done w/ gentle DF  Soleus soft tissue mobilization, done w/ passive DF    Dry Needling (- min), done to desensitize the tissue (NOT DONE TODAY)  To distal achilles    Treatment/Session Summary:    Treatment Assessment:  Patient tolerated the session well. Reviewed his home program. educated to ride his bike with an open heel shoe to limit friction/compression on the achilles tendon. Continues to progress well. Communication/Consultation:  None today  Equipment provided today:  None  Recommendations/Intent for next treatment session: Next visit will focus on progressing his plan of care.     Total Treatment Billable Duration:  manual 15 min, TE-25 min  Time In: 6765  Time Out: 1044 Tirso Becerril, PT       Charge Capture  }Post Session Pain  PT Visit Info  [employer] Portal  MD Guidelines  Scanned Media  Benefits  MyChart    Future Appointments   Date Time Provider [place]   9/26/2022  2:15 PM Prasad Cotton MD BSNI [facility] AMB   9/27/2022 10:40 AM Amanda Sherman MD AI GV AMB   9/28/2022  3:15 PM Thom Sabillon, PT SFOORPT SFO   10/3/2022 12:45 PM Ernie Warren MD THA [facility] AMB   10/17/2022 10:00 AM Kelle Ferrer DO ENTG [facility] AMB   11/9/2022 10:30 AM Kirsten Hernandez MD [facility] AMB   11/21/2022  2:45 PM Ford Danielson MD UCDG [facility] AMB   2/23/2023 11:20 AM DIANNE Forman - CNP PSCD [facility] AMB   4/28/2023  1:45 PM Kirsten Hernandez MD [facility] AMB   5/3/2023  9:30 AM PGU GYPSY BLOOD DRAWS PGUMAU [facility] AMB   5/9/2023 11:00 AM Valeri Curiel MD PADDY COPEL AMB

## 2022-09-21 ASSESSMENT — PAIN SCALES - GENERAL: PAINLEVEL_OUTOF10: 1

## 2022-09-26 ENCOUNTER — SCHEDULED TELEPHONE ENCOUNTER (OUTPATIENT)
Dept: NEUROLOGY | Age: 72
End: 2022-09-26
Payer: MEDICARE

## 2022-09-26 DIAGNOSIS — G25.0 ESSENTIAL TREMOR: Primary | ICD-10-CM

## 2022-09-26 PROCEDURE — 99443 PR PHYS/QHP TELEPHONE EVALUATION 21-30 MIN: CPT | Performed by: PSYCHIATRY & NEUROLOGY

## 2022-09-26 NOTE — PROGRESS NOTES
[facility] Neurology Telephone Encounter  [address], [address] [facility], [address] [place] [address]  Phone: [phone] Fax [phone]  Provider: Taylor Chino MD    Patient: Quentin Mclean  Date: 9/28/2022    Telephone Encounter     Quentin Mclean is a 67 y.o. male who was evaluated by telephone. Patient was offered an encounter using audio-video technology either via [employer] or 1-800-DOCTORS. me but declined. Consent:  He and/or his healthcare decision maker is aware that this patient-initiated Telehealth encounter is a billable service, with coverage as determined by his insurance carrier. He is aware that he may receive a bill and has provided verbal consent to proceed: YES    I was at home while conducting this encounter. Patient Location: Patient Home    Subjective     Chief Complaint   Patient presents with    Follow-up    Tremors       Quentin Mclean is a 67 y.o. male who presents for follow up of essential tremor. He is unaccompanied for today's visit. Patient presents for follow-up and continued management of a tremor of the hands which clinically has been most consistent with benign essential tremor. Tremor has been more noticeable in the nondominant left hand. It does not appear to be causing any significant day-to-day limitations. He continues to take gabapentin for neuropathic pain but has deferred any other medication trials. There is no family history of tremor. His past medical history includes coronary artery disease s/p CABG and he is on anticoagulation for atrial fibrillation. He has type 2 diabetes reportedly complicated by neuropathy. He denies any history of spine surgery. No history of stroke or TIA. Current medications include:  Gabapentin 300 mg 3 times a day for neuropathy pain    Previous medication trials include: N/A      Patient presents today for follow-up. Overall things continue to be very stable.   Tremor is manageable and he remains uninterested in any medications. Past Medical History:     Past medical history, surgical history, social history, family history, medications, and allergies were reviewed and updated as appropriate. PAST MEDICAL HISTORY:  Past Medical History:   Diagnosis Date    Anxiety     Arrhythmia     Atrial fib.     CAD (coronary artery disease) 1995    followed by Dr. Rene Ponce    Calculus of kidney     right renal calculi    Chronic pain     back     DDD (degenerative disc disease)     Diabetes ([address]     type 2; glimeperide as needed per patient ; a1c = 8.0 as of 5/5/2021; check sugars daily avg is 80 per patient; hypoglycemic at 60     Diverticulitis 2013    Endocrine disease     thyroid    GERD (gastroesophageal reflux disease)     controlled with medication     History of EKG 02/15/2021    NSR with incomp RBBB    History of shingles 2013    HLD (hyperlipidemia) 7/5/2016    Hx of echocardiogram 07/01/2019    LVEF >55%    Hypercholesteremia     medication     Hypertension     controlled with medication  per patient     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     anxiety - controlled     S/P CABG (coronary artery bypass graft) 07/05/2016    4 vessel bypass    Sleep apnea     cpap    Sleep disorder 2007    apnea-uses CPAP    Thyroid disease     Hypothyroid     PAST SURGICAL HISTORY:   Past Surgical History:   Procedure Laterality Date    [address]  06/25/2019    COLONOSCOPY N/A 7/14/2017    COLONOSCOPY performed by Tosin Fatima MD at [facility] ENDOSCOPY    COLONOSCOPY  2010    Dr. Polo Hidalgo (5yrs)    COLONOSCOPY  07/14/2017    CORONARY ARTERY BYPASS GRAFT      HEENT      nasal surgery 2ndary to obstruction    HEENT  02/2015    cyst removal    OTHER SURGICAL HISTORY      lasik eyes    [place] CARDIAC SURG PROCEDURE UNLIST  6/225/19    ablation    [place] CARDIAC SURG PROCEDURE UNLIST  1999    CABG     FAMILY HISTORY:  Family History   Problem Relation Age of Onset    Heart Disease Mother     Heart Disease Father     Heart Disease Brother      SOCIAL HISTORY:  Social History     Socioeconomic History    Marital status:    Tobacco Use    Smoking status: Never    Smokeless tobacco: Never   Vaping Use    Vaping Use: Never used   Substance and Sexual Activity    Alcohol use: Not Currently    Drug use: No       Medications/Allergies:     MEDICATIONS:   Outpatient Encounter Medications as of 2022   Medication Sig Dispense Refill    [] amoxicillin-clavulanate (AUGMENTIN) 875-125 MG per tablet Take 1 tablet by mouth 2 times daily for 11 days 22 tablet 0    benazepril (LOTENSIN) 20 MG tablet Take 1 tablet by mouth daily 90 tablet 3    glimepiride (AMARYL) 1 MG tablet Take 1 tablet by mouth every morning (before breakfast) TAKE 1 TABLET EVERY DAY BEFORE BREAKFAST 30 tablet 5    levothyroxine (SYNTHROID) 88 MCG tablet Take 1 tablet by mouth in the morning. TAKE 1 TABLET BY MOUTH EVERY DAY BEFORE BREAKFAST. 30 tablet 5    omeprazole (PRILOSEC) 20 MG delayed release capsule Take 1 capsule by mouth in the morning. TAKE 1 CAPSULE EVERY DAY. 30 capsule 5    gabapentin (NEURONTIN) 300 MG capsule Take 300 mg by mouth 3 times daily. 90 capsule 5    nitroGLYCERIN (NITROSTAT) 0.4 MG SL tablet Place 1 tablet under the tongue every 5 minutes as needed for Chest pain Up to 3 tabs in 24 hrs.  25 tablet 5    amLODIPine-benazepril (LOTREL) 5-20 MG per capsule TAKE 1 CAPSULE EVERY DAY      aspirin 81 MG EC tablet Take by mouth      atorvastatin (LIPITOR) 80 MG tablet TAKE 1 TABLET EVERY DAY      cyanocobalamin 1000 MCG tablet Take 1,000 mcg by mouth      dabigatran (PRADAXA) 150 MG capsule TAKE 1 CAPSULE EVERY 12 HOURS      dofetilide (TIKOSYN) 250 MCG capsule Take 250 mcg by mouth every 12 hours      fenofibrate (TRIGLIDE) 160 MG tablet TAKE 1 TABLET EVERY DAY      tamsulosin (FLOMAX) 0.4 MG capsule Take 0.4 mg by mouth every evening       No facility-administered encounter medications on file as of 9/26/2022. ALLERGIES:  Allergies   Allergen Reactions    Other Anaphylaxis     Combid - no longer sold in [place] per patient     Amoxicillin-Pot Clavulanate Diarrhea       Lab/Imaging Review:   I REVIEWED PERTINENT LABS, IMAGES, AND REPORTS WITH THE PATIENT PERSONALLY, DIRECTLY AND FULLY. THE MOST PERTINENT FINDINGS ARE NOTED BELOW:    N/A    Assessment and Plan:     Libby Soria is a 67 y.o. male who presents with the following issues:     Renato Mar was seen today for follow-up and tremors. Diagnoses and all orders for this visit:    Essential tremor    Patient presents for follow-up and continued management of a mild postural and action tremor of the hands which is most consistent with essential tremor, more noticeable on the nondominant left hand. There has been no concern for parkinsonism. We again discussed options for symptomatic management but again he has declined at this time on account that his current symptoms are not causing any significant day-to-day functional limitations. In the future trials of propranolol could be considered though caution should be given given his known history of coronary artery disease. Primidone would be another option. Advised him to contact us should symptoms worsen. He will follow-up with us as needed. I have personally interviewed Mr. Felipe De León and I have personally reviewed all relevant records including labs and imaging as noted above. I have written all aspects of this note. More than 50% of this time was used for counseling regarding my diagnosis, prognosis, and plans for management. Total visit time: 25 minutes.       Signature: Tnoey Nichole MD      Date:  9/28/2022    [facility] Neurology   [address] , [address]  Ph: [phone]  Fax: [phone]

## 2022-09-27 ENCOUNTER — OFFICE VISIT (OUTPATIENT)
Dept: ORTHOPEDIC SURGERY | Age: 72
End: 2022-09-27
Payer: MEDICARE

## 2022-09-27 DIAGNOSIS — N18.31 STAGE 3A CHRONIC KIDNEY DISEASE (HCC): ICD-10-CM

## 2022-09-27 DIAGNOSIS — M79.672 FOOT PAIN, LEFT: Primary | ICD-10-CM

## 2022-09-27 PROBLEM — N18.30 CHRONIC RENAL DISEASE, STAGE III (HCC): Status: ACTIVE | Noted: 2022-09-27

## 2022-09-27 PROCEDURE — 1036F TOBACCO NON-USER: CPT | Performed by: ORTHOPAEDIC SURGERY

## 2022-09-27 PROCEDURE — G8428 CUR MEDS NOT DOCUMENT: HCPCS | Performed by: ORTHOPAEDIC SURGERY

## 2022-09-27 PROCEDURE — 3017F COLORECTAL CA SCREEN DOC REV: CPT | Performed by: ORTHOPAEDIC SURGERY

## 2022-09-27 PROCEDURE — G8417 CALC BMI ABV UP PARAM F/U: HCPCS | Performed by: ORTHOPAEDIC SURGERY

## 2022-09-27 PROCEDURE — 1123F ACP DISCUSS/DSCN MKR DOCD: CPT | Performed by: ORTHOPAEDIC SURGERY

## 2022-09-27 PROCEDURE — 99213 OFFICE O/P EST LOW 20 MIN: CPT | Performed by: ORTHOPAEDIC SURGERY

## 2022-09-27 NOTE — PROGRESS NOTES
Name: Mal Sinclair  YOB: 1950  Gender: male  MRN: [id number]    Summary: Left insertion achilles tendonitis with retrocalcaneal bursitis. Medrol dose pack night splint and physical therapy -helped a lot. Follow-up as needed. Neck step would be immobilization and recurrent physical therapy  . CC: left Heel pain     HPI: Mal Sinclair is a 67 y.o. male who presents with increasing posterior ankle pain located at the Achilles tendon region. They do not remember any acute event that started. It has been going on for several months and has gotten worse. The pain as a burning tearing pain, tender to palpation and pressure with shoe wear, and it limits daily activities. I also described some swelling in this region also. He has been treated from a podiatrist with multiple treatments to include home stretching, boot, inserts, heel lifts, oral nonsteroidal medications. His pain began approximately January 2022. It is getting worse and he can no longer cycle he wants to do.    9/27/2022: This is my second visit with the patient. He has been in physical therapy. He is doing very well in physical therapy. He has gotten back to cycling and is very pleased. His pain is a 2 out of 10 of them the day. He takes over-the-counter nonsteroidals for his pain. He is very pleased. He continues to use a night splint. ROS/Meds/PSH/PMH/FH/SH: I personally reviewed the patients standard intake form. Below are the pertinents    Tobacco:  reports that he has never smoked. He has never used smokeless tobacco.  Diabetes: None  Other: none    Physical Examination:  left Lower Extremity: FROM actively of toes, foot, ankle, knee and hip. No instability of foot or ankle with drawer and stress. 5/5 strength to TA/EHL/GSC/Peroneals/PTib. No skin lesions, Non TTP throughout. 2+ dp pulse w/ cap refill of 5 toes <2s. Dorsiflexion of the ankle produces pain at the Achilles insertion.   Hind foot alignment neutral.  No longer tender over the Achilles. No sensitivity in the retrocalcaneal bursa. No pain with dorsiflexion. Neutral hindfoot alignment. No cavovarus nor planovalgus foot deformity  Talar tilt exam : normal  Anterior drawer exam w/ ankle plantarflexed at 20 deg: normal    Neuro:  normal SILT to s/s/sp/dp/t. Reflexes normal: 1+ patella reflex bilaterally, 1+ achilles reflex bilaterally, negative babinski bilaterally. no signs of hyper reflexia or absent reflex    Vascular: radial=4/4, femoral=4/4, popliteal=4/4, dorsalis pedis=4/4,         Imaging:   Review of prior x-rays  3 views (AP/Lat/Oblique) of the on left ankle for achilles pain/ankle pain   Findings: no signs of acute fractures or dislocations. No signs of neoplastic process. There is some age related degenerative changes noted in the ankle but these are minimal.  The achilles tendon at the insertion shows some calcifications and there is an enlarged calcaneal process indicative of a Tommie deformity. Impression:  Insertional calcific achilles tendonitis. Ute Greenfield MD       And apparently interpreted MRI obtained by another doctor at an outside facility. MRI of the left ankle without contrast on March 24, 2022 does reveal significant retrocalcaneal bursitis with a large Achilles enthesophyte and calcifications noted at the insertion of the Achilles tendon. There is degenerative changes consistent with interstitial tearing of the Achilles tendon near its insertion. No Tommei's deformity is noted. Assessment:   insertion Achilles Tendonitis    Plan:     4 This is a chronic illness/condition with exacerbation and progression  Treatment at this time: OTC NSAIDS and and completing physical therapy.   Studies ordered: NO XR needed @ Next Visit  Prescription drug management for Medrol Dosepak was also performed  Weight-bearing status: WBAT        Return to work/work restrictions: none  OTC NSAIDs - We discussed using OTC NSAID's or tylenol for inflammation and pain. I discussed reading the bottle and following the instructions. I also briefly discussed how NSAIDs can cause GI irritation and Kidney damage. I also discussed Tylenols effect on the Liver.      Nicole Aquino MD    no surgery today - proceed with non op

## 2022-09-28 ENCOUNTER — APPOINTMENT (OUTPATIENT)
Dept: PHYSICAL THERAPY | Age: 72
End: 2022-09-28
Payer: MEDICARE

## 2022-10-03 ENCOUNTER — OFFICE VISIT (OUTPATIENT)
Dept: PHYSICAL MEDICINE AND REHAB | Age: 72
End: 2022-10-03
Payer: MEDICARE

## 2022-10-03 VITALS
HEIGHT: 69 IN | WEIGHT: 173 LBS | SYSTOLIC BLOOD PRESSURE: 126 MMHG | DIASTOLIC BLOOD PRESSURE: 74 MMHG | HEART RATE: 95 BPM | BODY MASS INDEX: 25.62 KG/M2

## 2022-10-03 DIAGNOSIS — M54.12 CERVICAL RADICULOPATHY: Primary | ICD-10-CM

## 2022-10-03 PROCEDURE — 95909 NRV CNDJ TST 5-6 STUDIES: CPT | Performed by: PHYSICAL MEDICINE & REHABILITATION

## 2022-10-03 PROCEDURE — 95886 MUSC TEST DONE W/N TEST COMP: CPT | Performed by: PHYSICAL MEDICINE & REHABILITATION

## 2022-10-05 ENCOUNTER — HOSPITAL ENCOUNTER (OUTPATIENT)
Dept: PHYSICAL THERAPY | Age: 72
Setting detail: RECURRING SERIES
Discharge: HOME | End: 2022-10-08
Payer: MEDICARE

## 2022-10-05 PROCEDURE — 97110 THERAPEUTIC EXERCISES: CPT

## 2022-10-05 PROCEDURE — 97140 MANUAL THERAPY 1/> REGIONS: CPT

## 2022-10-05 NOTE — PLAN OF CARE
Gigi Nab  : 1950  Primary: Medicare Part A And B  Secondary: [facility]  [address] [address]  Phone: [phone]  Fax: [phone] Plan Frequency: 2-5J/ZV  Plan of Care/Certification Expiration Date: 22      PT Visit Info:    No data recorded    Visit Count:  5    OUTPATIENT PHYSICAL THERAPY:OP NOTE TYPE: Discharge Summary 10/5/2022               Episode  Appt Desk         Treatment Diagnosis:  pain in left foot (C28.396) ACHILLES TENDONITIS M76.62  Medical/Referring Diagnosis:  Pain in left foot [M79.672]  Referring Physician:  Kashif Raya MD MD Orders:  PT Eval and Treat   Return MD Appt:    Date of Onset:  No data recorded   Allergies: Other and Amoxicillin-pot clavulanate  Restrictions/Precautions:    No data recordedNo data recorded   Medications Last Reviewed:  10/5/2022     SUBJECTIVE   Reports feeling better. States he has been able to ride his bike now without pain.             OBJECTIVE   Observation/Orthostatic Postural Assessment:    Relaxed standing posture:  Haglunds deformity noted on L side, relatively neutral rearfoot    Palpation/tone/tissue texture:    ASIS: symmetrical   PSIS: symmetrical   Leg Length: supine:symmetrical         Long Sitting:      Soft tissue:  Gastroc/soleus/posterior tibialis: mild tightness  Achilles: very tender near insertion on L side (much improved but still has slightly tender)  Planter fascia:  n/a      Foot Exam Date:  10/5/2022       Left Right   Talocrural Joint: Mild tightness Mild tightness   Rear foot: (neutral to relaxed: 4-6                      deg-normal) Mild tightness Mild tightness   Mid-foot (navicular drop, <7-normal) wnl wnl   Forefoot: (position, mobility) Varus,mobile Varus,mobile   First ray position/hallux limitus test: - -   Windlass test: - -   Foot intrinsic strength:  - -       ROM:      Ankle ROM Date:  10/5/2022       Right Left   DF 5 5   PF wnl wnl   Inversion wnl wnl   Eversion wnl wnl           Strength: Foot and ankle Strength Date:  10/5/2022       Right Left   DF 5 5   PF 4 4   Inversion 5 5   Eversion 5 5                Special Tests: Ankle stability:  Anterior Drawer:stable  Talar tilt: stable  Kleiger:stable    Neurological Screen:   Myotomes: intact in bilateral LE's     Dermatomes: intact in bilateral LE's         Reflexes: n/a         Neural Tension Tests: n/a  Functional Mobility:    Double leg squat: able to do a functional squat  Gait Pattern: ambulates with decreased stance time  Balance:  Single leg   L: 3 sec, moderate sway  R:3 sec  ASSESSMENT   Assessment:  Mr. Manjit Charles has come for a total of 5 visits since starting physical therapy on 8-31-22. During that time he has progressed well and is now being able to ride his bike without increased pain which was his main goal. At this time he feels comfortable progressing his plan of care and since he met his main goal, we discussed discharging his plan of care until further notice. Goals: (Goals have been discussed and agreed upon with patient.)  Short Term Goals: (2-4 wks)  Patient will report 25-50% overall improvement (MET)  Patient will be compliant with HEP and plan of care (MET)  Patient will be able to do 10 single leg heel raises without increased pain (MET)  Patient will improve on the FAAM questionnaire by 4-5 points (MET)    Long Term Goals: (6-8 wks)  Patient will report % overall improvement in order to demonstrate improved function with less pain (MET)  Patient will report feeling comfortable progressing their plan of care from this point forward (MET)  Patient will be able to ride his bike for 30-60 min with minimal symptoms (0-2/10) (MET)  Patient will improve on the FAAM questionnaire by 8-10 points in order to demonstrate improved function with less pain (MET)              Outcome Measure:    Tool Used: FOOT AND ANKLE ABILITY MEASURE  Score:  Initial: 52 Most Recent: 61 (Date: 10/5/2022)   Interpretation of Score: For the \"Activities of Daily Living\", there are 21 questions each scored on a 5 point scale with 0 representing \"Unable to do\" and 4 representing \"No difficulty\". The lower the score, the greater the functional disability. 84/84 represents no disability. Minimal detectable change is 5.7 points. With the addition of the 8 questions in the \"Sports Subscale,\" there are 29 questions, each scored on a 5 point scale with 0 representing \"Unable to do\" and 4 representing \"No difficulty\". The lower the score, the greater the functional disability. 116/116 represents no disability. Minimal detectable change is 12.3 points. Total Duration:  Time In: 1345  Time Out: 1435    Regarding Lashaun Merchant's therapy, I certify that the treatment plan above will be carried out by a therapist or under their direction. Thank you for this referral,  Randy Bryant PT     Referring Physician Signature: Rachelle Taylor MD No Signature is Required for this note.         Post Session Pain  Charge Capture  PT Visit Info MD Guidelines  Shyann

## 2022-10-05 NOTE — PROGRESS NOTES
Abilio Fall  : 1950  Primary: Medicare Part A And B  Secondary: [facility]  [address] [facility] [address]  Phone: [phone]  Fax: [phone] Plan Frequency: 8-1W/DM  Plan of Care/Certification Expiration Date: 22      PT Visit Info:  No data recorded   Visit Count:  5   OUTPATIENT PHYSICAL THERAPY:OP NOTE TYPE: Treatment Note 10/5/2022       Episode  }Appt Desk             Treatment Diagnosis:  pain in left foot (M79.672) ACHILLES TENDONITIS M76.62  Medical/Referring Diagnosis:  Pain in left foot [M79.672]  Referring Physician:  Thalia Lopez MD MD Orders:  PT Eval and Treat   Date of Onset:  No data recorded   Allergies: Other and Amoxicillin-pot clavulanate  Restrictions/Precautions:  No data recordedNo data recorded   Interventions Planned (Treatment may consist of any combination of the following):    Current Treatment Recommendations: Strengthening; ROM; Integrated dry needling; Manual Therapy - Joint Manipulation; Manual Therapy - Soft Tissue Mobilization     Subjective Comments:  Reports that he is feeling better in his achilles. States he has been riding his bike without issues. Reports moderate pain in his left posterior heel. Initial:}    0/10Post Session:       0/10  Medications Last Reviewed:  10/5/2022  Updated Objective Findings:   less tenderness noted in achilles insertion today  Treatment   THERAPEUTIC ACTIVITY: ( 25 minutes): Therapeutic activities per grid below to improve mobility and strength.      Date  22 Date  22 Date  10-5-22   Activity/Exercise      Education Discussed HEP Discussed HEP Discussed HEP including progressions   Taping Taped foot to limit closed chain DF, used kinesio tape -    bike Upright, 10 min (after session) - Upright, 5 min   DF stretch In supine and manually done In supine In supine, 30 sec   Hamstring stretch  In supine, with strap, 60 sec 60 sec   Achilles strengthening Seated, partial range-lift R LE up and lower with left  Double leg heel raise, towel under heel, 10x, 3 sets Up on 2, weight shift L and lower, 10x, 3 sets SL, 10x, 3 sets   Prone hip exten  10x, 2 sets, pillow under belly      Manual Therapy (15 min): done to improve soft tissue and joint mobility in order to improve ROM, muscle tone, and decrease pain  Achilles distraction, done w/ gentle DF  Soleus soft tissue mobilization, done w/ passive DF    Dry Needling (- min), done to desensitize the tissue (NOT DONE TODAY)  To distal achilles    Treatment/Session Summary:    Treatment Assessment:  Maira Kang has done very well with physical therapy. He is able to ride now without pain in his achilles. At this point he feels comfortable progressing his plan of care and will be discharged until further notice.       Total Treatment Billable Duration:  manual 15 min, TE-25 min, re-eval 5 min  Time In: 1345  Time Out: [address] [facility],  [address], [place]       Charge Capture  }Post Session Pain  PT Visit Info  MedBridge Portal  MD Guidelines  Scanned Media  Benefits  MyChart    Future Appointments   Date Time Provider [facility]   10/17/2022 10:00 AM DO NIRU HongG [facility] AMB   10/18/2022  1:50 PM Braulio Rodgers MD PO [facility] AMB   11/9/2022 10:30 AM Triston Cervantes MD [facility] AMB   11/21/2022  2:45 PM Angelica Solomon MD [facility] AMB   2/23/2023 11:20 AM DIANNE Fair - CNP PS[facility] AMB   4/28/2023  1:45 PM Triston Cervantes MD F [facility] AMB   5/3/2023  9:30 AM PGU GYPSY BLOOD DRAWS [facility] AMB   5/9/2023 11:00 AM Mina Mejia MD [facility] AM[facility]

## 2022-10-12 ASSESSMENT — PAIN SCALES - GENERAL: PAINLEVEL_OUTOF10: 0

## 2022-10-17 ENCOUNTER — PREP FOR PROCEDURE (OUTPATIENT)
Dept: ENT CLINIC | Age: 72
End: 2022-10-17

## 2022-10-17 ENCOUNTER — OFFICE VISIT (OUTPATIENT)
Dept: ENT CLINIC | Age: 72
End: 2022-10-17
Payer: MEDICARE

## 2022-10-17 VITALS — BODY MASS INDEX: 26.22 KG/M2 | WEIGHT: 177 LBS | RESPIRATION RATE: 18 BRPM | HEIGHT: 69 IN

## 2022-10-17 DIAGNOSIS — J32.9 CHRONIC RHINOSINUSITIS: Primary | ICD-10-CM

## 2022-10-17 DIAGNOSIS — J34.2 DEVIATED NASAL SEPTUM: ICD-10-CM

## 2022-10-17 DIAGNOSIS — J31.0 CHRONIC RHINOSINUSITIS: Primary | ICD-10-CM

## 2022-10-17 DIAGNOSIS — J34.3 NASAL TURBINATE HYPERTROPHY: ICD-10-CM

## 2022-10-17 PROBLEM — J34.89 NASAL OBSTRUCTION: Status: ACTIVE | Noted: 2022-10-17

## 2022-10-17 PROCEDURE — 99214 OFFICE O/P EST MOD 30 MIN: CPT | Performed by: STUDENT IN AN ORGANIZED HEALTH CARE EDUCATION/TRAINING PROGRAM

## 2022-10-17 PROCEDURE — 31231 NASAL ENDOSCOPY DX: CPT | Performed by: STUDENT IN AN ORGANIZED HEALTH CARE EDUCATION/TRAINING PROGRAM

## 2022-10-17 PROCEDURE — G8484 FLU IMMUNIZE NO ADMIN: HCPCS | Performed by: STUDENT IN AN ORGANIZED HEALTH CARE EDUCATION/TRAINING PROGRAM

## 2022-10-17 PROCEDURE — 3017F COLORECTAL CA SCREEN DOC REV: CPT | Performed by: STUDENT IN AN ORGANIZED HEALTH CARE EDUCATION/TRAINING PROGRAM

## 2022-10-17 PROCEDURE — 1036F TOBACCO NON-USER: CPT | Performed by: STUDENT IN AN ORGANIZED HEALTH CARE EDUCATION/TRAINING PROGRAM

## 2022-10-17 PROCEDURE — G8417 CALC BMI ABV UP PARAM F/U: HCPCS | Performed by: STUDENT IN AN ORGANIZED HEALTH CARE EDUCATION/TRAINING PROGRAM

## 2022-10-17 PROCEDURE — 1123F ACP DISCUSS/DSCN MKR DOCD: CPT | Performed by: STUDENT IN AN ORGANIZED HEALTH CARE EDUCATION/TRAINING PROGRAM

## 2022-10-17 PROCEDURE — G8427 DOCREV CUR MEDS BY ELIG CLIN: HCPCS | Performed by: STUDENT IN AN ORGANIZED HEALTH CARE EDUCATION/TRAINING PROGRAM

## 2022-10-17 ASSESSMENT — ENCOUNTER SYMPTOMS
COUGH: 0
EYE DISCHARGE: 0
SORE THROAT: 1
ABDOMINAL PAIN: 0

## 2022-10-17 NOTE — PROGRESS NOTES
HPI:  Bebe Aguayo is a 67 y.o. male seen Established   Chief Complaint   Patient presents with    Follow-up     Patient presents today for 4 week recheck . Patient states that he has not noticed much difference or improvement . He has some throat discomfort . Patient states that he has some PND due to nasal sprays. 77-year-old male presents for follow-up evaluation now approximately 3 weeks status post last evaluation with the finding of chronic right-sided rhinosinusitis. He has just recently finished 3 total weeks of Augmentin therapy for which he was also using Flonase and saline irrigations. He has times where he cannot get the saline irrigations to even flow from the right side of his nose. Overall there has been limited if any benefit to his symptoms of sinonasal pain and pressure congestion and drainage. As review he does have diabetes mellitus and we have been withholding oral systemic steroids. He had a CT scan done 2 months prior to our original evaluation which showed complete opacification of the right maxillary sinus with internal calcifications and some mucus thickening of the mucosal linings of the right ethmoid sinuses. He has struggled with the symptoms for the last few years. Past Medical History, Past Surgical History, Family history, Social History, and Medications were all reviewed with the patient today and updated as necessary.      Allergies   Allergen Reactions    Other Anaphylaxis     Combid - no longer sold in [place] per patient     Amoxicillin-Pot Clavulanate Diarrhea       Patient Active Problem List   Diagnosis    Persistent atrial fibrillation (HCC)    Type 2 diabetes mellitus with nephropathy (HCC)    Anxiety    Arrhythmia    Diverticulitis    Mass of soft tissue of face    History of shingles    Other dysphagia    Chest pain    Atrial fibrillation (HCC)    Chronic pain    BULMARO on CPAP    Hypersomnia    Unstable angina pectoris (HCC)    Neck mass    S/P excision of lipoma    Neuropraxia of left median nerve    S/P ablation of atrial fibrillation    Hypercholesterolemia    S/P CABG (coronary artery bypass graft)    Calculus of kidney    CAD (coronary artery disease)    Hypertension    HLD (hyperlipidemia)    Thyroid disease    Chronic renal disease, stage III (HCC) [064297]    Chronic rhinitis    Nasal obstruction    Deviated nasal septum    Hypertrophy of both inferior nasal turbinates       Current Outpatient Medications   Medication Sig    benazepril (LOTENSIN) 20 MG tablet Take 1 tablet by mouth daily    glimepiride (AMARYL) 1 MG tablet Take 1 tablet by mouth every morning (before breakfast) TAKE 1 TABLET EVERY DAY BEFORE BREAKFAST    levothyroxine (SYNTHROID) 88 MCG tablet Take 1 tablet by mouth in the morning. TAKE 1 TABLET BY MOUTH EVERY DAY BEFORE BREAKFAST.    omeprazole (PRILOSEC) 20 MG delayed release capsule Take 1 capsule by mouth in the morning. TAKE 1 CAPSULE EVERY DAY. gabapentin (NEURONTIN) 300 MG capsule Take 300 mg by mouth 3 times daily. nitroGLYCERIN (NITROSTAT) 0.4 MG SL tablet Place 1 tablet under the tongue every 5 minutes as needed for Chest pain Up to 3 tabs in 24 hrs. aspirin 81 MG EC tablet Take by mouth    atorvastatin (LIPITOR) 80 MG tablet TAKE 1 TABLET EVERY DAY    cyanocobalamin 1000 MCG tablet Take 1,000 mcg by mouth    dabigatran (PRADAXA) 150 MG capsule TAKE 1 CAPSULE EVERY 12 HOURS    dofetilide (TIKOSYN) 250 MCG capsule Take 250 mcg by mouth every 12 hours    fenofibrate (TRIGLIDE) 160 MG tablet TAKE 1 TABLET EVERY DAY    tamsulosin (FLOMAX) 0.4 MG capsule Take 0.4 mg by mouth every evening     No current facility-administered medications for this visit. Past Medical History:   Diagnosis Date    Anxiety     Arrhythmia     Atrial fib.     CAD (coronary artery disease) 1995    followed by Dr. Kody Leon    Calculus of kidney     right renal calculi    Chronic pain     back     DDD (degenerative disc disease)     Diabetes ([facility] [address] type 2; glimeperide as needed per patient ; a1c = 8.0 as of 5/5/2021; check sugars daily avg is 80 per patient; hypoglycemic at 60     Diverticulitis 2013    Endocrine disease     thyroid    GERD (gastroesophageal reflux disease)     controlled with medication     History of EKG 02/15/2021    NSR with incomp RBBB    History of shingles 2013    HLD (hyperlipidemia) 7/5/2016    Hx of echocardiogram 07/01/2019    LVEF >55%    Hypercholesteremia     medication     Hypertension     controlled with medication  per patient     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     anxiety - controlled     S/P CABG (coronary artery bypass graft) 07/05/2016    4 vessel bypass    Sleep apnea     cpap    Sleep disorder 2007    apnea-uses CPAP    Thyroid disease     Hypothyroid       Past Surgical History:   Procedure Laterality Date    [address]  06/25/2019    COLONOSCOPY N/A 7/14/2017    COLONOSCOPY performed by Espinoza Luther MD at [facility] ENDOSCOPY    COLONOSCOPY  2010    Dr. Jayme Ramirez (5yrs)    COLONOSCOPY  07/14/2017    CORONARY ARTERY BYPASS GRAFT      HEENT      nasal surgery 2ndary to obstruction    HEENT  02/2015    cyst removal    OTHER SURGICAL HISTORY      lasik eyes    [place] CARDIAC SURG PROCEDURE UNLIST  6/225/19    ablation    [place] CARDIAC SURG PROCEDURE UNLIST  1999    CABG       Social History     Tobacco Use    Smoking status: Never    Smokeless tobacco: Never   Substance Use Topics    Alcohol use: Not Currently       Family History   Problem Relation Age of Onset    Heart Disease Mother     Heart Disease Father     Heart Disease Brother         ROS:    Review of Systems   Constitutional:  Negative for fever. HENT:  Positive for postnasal drip and sore throat. Negative for ear discharge and ear pain. Eyes:  Negative for discharge. Respiratory:  Negative for cough. Cardiovascular:  Negative for chest pain.    Gastrointestinal:  Negative for abdominal pain.   Endocrine: Negative for cold intolerance. Genitourinary:  Negative for difficulty urinating. Musculoskeletal:  Negative for neck pain. Skin:  Negative for rash. Allergic/Immunologic: Negative for environmental allergies. Neurological:  Negative for dizziness. Hematological:  Negative for adenopathy. Psychiatric/Behavioral:  Negative for agitation. PHYSICAL EXAM:    Resp 18   Ht 5' 9\" (1.753 m)   Wt 177 lb (80.3 kg)   BMI 26.14 kg/m²     Physical Exam       PROCEDURE: Nasal endoscopy  INDICATIONS: Chronic rhinosinusitis  DESCRIPTION: After verbal consent was obtained and a timeout was performed, the 0 degree rigid nasal endoscope was advanced into both nares. The septum was deviated to the right posteriorly with some narrowing of the right middle meatus w/ no perforations. There were no masses seen along the nasal floor or within the nasopharynx. Right nasal endoscopy showed narrowing of the right middle meatus secondary to DNS and significant mucopurulence extruding from the right infundibulum of the maxillary sinus. And the sphenoethmoid recess was was clear. On the L side, the mucosa was healthy and the turbinates were intact. The middle meatus was clear w/ no edema, polyps or mucopurulence and the sphenoethmoid recess was was clear. The scope was then removed and the patient tolerated the procedure well w/ no complications. ASSESSMENT and PLAN        ICD-10-CM    1. Chronic rhinosinusitis  J31.0 CT MAXILLOFACIAL WO CONTRAST    J32.9 NASAL ENDOSCOPY,DX      2. Deviated nasal septum  J34.2       3. Nasal turbinate hypertrophy  J34.3           Patient with little or no benefit with 3 weeks of oral Augmentin therapy and Flonase/saline daily. Repeat nasal endoscopy today shows continuation of mucopurulence from the right middle meatus and a right-sided DNS.   We once again reviewed over his recent CT scan from 2 months ago which does show complete right-sided opacification and internal calcifications which could also indicate chronic fungal sinusitis. Today's nasal endoscopy also showed continuation of right middle meatus mucopurulence. As he has failed optimal maximal medical therapy we discussed treatment options to include recommending endoscopic sinus surgery at this time. He would also require septoplasty for access and to relieve narrowing of the right middle meatus. I recommend proceeding w/ FESS with right sided maxillary antrostomies, total ethmoidectomies. For preparation of image guided navigation endoscopic sinus surgery I have recommended a repeat CT of the paranasal sinuses with the image navigation protocol. I discussed all the risks of surgery including bleeding, infection, continued sinonasal symptoms, CSF leak, damage to orbit w/ vision changes, and need for further procedures and they would like to proceed. I discussed all the risks of septoplasty and/or SMR of turbinates surgery including bleeding, septal hematoma, septal perforation, continued nasal obstruction, change in sense of smell, CSF leak, and numbness/tingling of upper teeth/lips, and they would like to proceed. In the interim time we will have him initiate twice daily medicated sinonasal rinses with Levaquin and budesonide to be used for the next couple weeks and is well for the first 2 weeks after surgery. Still withholding oral systemic steroids secondary to diabetes mellitus.       Graciela Cavazos,   10/17/2022

## 2022-10-18 ENCOUNTER — OFFICE VISIT (OUTPATIENT)
Dept: ORTHOPEDIC SURGERY | Age: 72
End: 2022-10-18
Payer: MEDICARE

## 2022-10-18 VITALS — WEIGHT: 173 LBS | BODY MASS INDEX: 25.62 KG/M2 | HEIGHT: 69 IN

## 2022-10-18 DIAGNOSIS — G56.02 LEFT CARPAL TUNNEL SYNDROME: Primary | ICD-10-CM

## 2022-10-18 DIAGNOSIS — M54.12 CERVICAL RADICULOPATHY: ICD-10-CM

## 2022-10-18 PROCEDURE — 99214 OFFICE O/P EST MOD 30 MIN: CPT | Performed by: ORTHOPAEDIC SURGERY

## 2022-10-18 PROCEDURE — 3017F COLORECTAL CA SCREEN DOC REV: CPT | Performed by: ORTHOPAEDIC SURGERY

## 2022-10-18 PROCEDURE — G8484 FLU IMMUNIZE NO ADMIN: HCPCS | Performed by: ORTHOPAEDIC SURGERY

## 2022-10-18 PROCEDURE — G8417 CALC BMI ABV UP PARAM F/U: HCPCS | Performed by: ORTHOPAEDIC SURGERY

## 2022-10-18 PROCEDURE — 1123F ACP DISCUSS/DSCN MKR DOCD: CPT | Performed by: ORTHOPAEDIC SURGERY

## 2022-10-18 PROCEDURE — 1036F TOBACCO NON-USER: CPT | Performed by: ORTHOPAEDIC SURGERY

## 2022-10-18 PROCEDURE — G8428 CUR MEDS NOT DOCUMENT: HCPCS | Performed by: ORTHOPAEDIC SURGERY

## 2022-10-18 PROCEDURE — L3908 WHO COCK-UP NONMOLDE PRE OTS: HCPCS | Performed by: ORTHOPAEDIC SURGERY

## 2022-10-18 NOTE — PROGRESS NOTES
Patient was fitted and instructed on an  Wrist Splint for patients left wrist. Patient is aware the hand slides in the brace with the thumb placed threw the thumb hole. I demonstrated the correct way to tighten the brace straps to allow for a comfortable fit. Patient understood the correct way to wear the brace. Patient read and signed documenting they understand and agree to [facility]'s current DME return policy.

## 2022-10-18 NOTE — LETTER
DME Patient Authorization Form    Name: Shayla Diana  : 1950  MRN: [id number]   Age: 67 y.o. Gender: male  Delivery Address: [address] [place] [address] Orthopaedics     Diagnosis:     ICD-10-CM    1. Left carpal tunnel syndrome  G56.02 [place] WHO COCK-UP NONMOLDE PRE OTS           Requested DME:  Wrist Lacer- ($65.00) X 1 - left        Clinical Notes:     **Indicates non-covered items by insurance. Payment expected on date of service. Electronically signed by  Provider: Jaycob East MD__Date: 10/18/2022                            [facility]/[address] Tax ID # [id number]        Durable Medical Equipment and/or Orthotics Patient Consent     I understand that my physician has prescribed this medical supply as part of my treatment plan as a matter of Medical Necessity.  I understand that I have a choice in where I receive my prescribed orthopedic supplies and/or services.  I authorize [facility] to furnish this service/product and to provide my insurance carrier with any information requested in order to process for payment.  I instruct my insurance carrier to pay [facility] directly for these services/products.  I understand that my insurance carrier may deny payment for this supply because it is a non-covered item, deemed not medically necessary or considered experimental.   I understand that any cost not covered by my insurance carrier will be solely my financial responsibility.  I have received the Supplier Standards and have reviewed them.  I have received the prescribed item and have been fully instructed on the proper use of the above services/products.    ______ (Patient Initials) I understand that all DME items are non-returnable after being dispensed. Items still in sealed packaging may be returned up to 14 days after purchasing.  [address] will replace items that are defective.    ______ (Patient Initials) I understand that [facility] will not file a claim with my insurance carrier for this service/product and I am waiving my right to file a claim on my own for this service/product with my insurance company as this item is NON-COVERED (Denoted by the **) by my Insurance company/policy. ______ (Patient Initials) I understand that I am responsible to bring my equipment to the hospital for any surgery. ______________________________________________  ________________________  Patient / Kaycee [place]            Thank you for considering [address] Your physician has prescribed specific medical equipment or devices for your home use. The following describes your rights and responsibilities as our customer. Right to Choose Providers: You have a choice regarding which company supplies your home medical equipment and devices, and to consult your physician in this decision. You may choose a medical supply store, a home medical equipment provider, or a specialist such as [facility]. [facility] will coordinate with your physician to provide the medical equipment or devices prescribed for your home use. Right to Service:  You have the right to considerate, respectful and nondiscriminatory care. You have the right to receive accurate and easily understood information about your health care. If you speak a foreign language, or don't understand the discussions, assistance will be provided to allow you to make informed health care decisions. You have the right to know your treatment options and to participate in decisions about your care, including the right to accept or refuse treatment. You have the right to expect a reasonable response to your requests for treatment or service.   You have the right to talk in confidence with health care providers and to have your health care information protected. You have the right to receive an explanation of your bill. You have the right to complain about the service or product you receive. Patient Responsibilities:  Please provide complete and accurate information about your health insurance benefits and make arrangements for the timely payment of your bill. [facility] will, if possible, assume responsibility for billing your insurance (Medicare, Medicaid and commercial) for the prescribed equipment or devices. If your policy does not cover the prescribed product, or only covers a portion of the bill, you are responsible for any remaining balance. Return and Exchange Policy:  [facility] will honor published  Warranties for products. [facility] will accept returns or exchanges within 14 days from the date of receipt, providin) the product must be in new condition; 2) receipt as required; and 3) used disposable and hygiene products may only be returned due to a defective product. Note: Refunds will be issued in a timely manner, please allow 4-6 weeks for processing. Complaint Procedures and DME Consumer Protection Resources:  [facility] values you as a customer, and is committed to resolving patient concerns. This commitment includes understanding and documenting your concerns, conducting a review of internal procedures, and providing you with an explanation and resolution to your concerns. Should you have any questions about our services or billing process, please contact our office at (practice phone number). If we are unable to resolve the concern, you have the right to direct comments to the office of Consumer Protection, in the [address] or the [facility] office, without fear of repercussion. DMEPOS SUPPLIER STANDARDS    A supplier must be in compliance with all applicable Federal and [employer] and regulatory requirements.   A supplier must provide complete and accurate information on the DMEPOS supplier application. Any changes to this information must be reported to the [facility] within 30 days. An authorized individual (one whose signature is binding) must sign the application for billing privileges. A supplier must fill orders from its own inventory, or must contract with other companies for the purchase of items necessary to fill the order. A supplier may not contract with any entity that is currently excluded from the Medicare program, any [facility] program, or from any other Federal procurement or Nonprocurement programs. A supplier must advise beneficiaries that they may rent or purchase inexpensive or routinely purchased durable medical equipment, and of the purchase option for capped rental equipment. A supplier must notify beneficiaries of warranty coverage and honor all warranties under applicable State Law, and repair or replace free of charge Medicare covered items that are under warranty. A supplier must maintain a physical facility on an appropriate site. A supplier must permit CMS, or its agents to conduct on-site inspections to ascertain the supplier's compliance with these standards. The supplier location must be accessible to beneficiaries during reasonable business hours, and must maintain a visible sign and posted hours of operation. A supplier must maintain a primary business telephone listed under the name of the business in a [employer] or a toll free number available through directory assistance. The exclusive use of a beeper, answering machine or cell phone is prohibited. A supplier must have comprehensive liability insurance in the amount of at least $300,000 that covers both the supplier's place of business and all customers and employees of the supplier. If the supplier manufactures its own items, this insurance must also cover product liability and completed operations.   A supplier must agree not to initiate telephone contact with beneficiaries, with a few exceptions allowed. This standard prohibits suppliers from calling beneficiaries in order to solicit new business. A supplier is responsible for delivery and must instruct beneficiaries on use of Medicare covered items, and maintain proof of delivery. A supplier must answer questions, and respond to complaints of the beneficiaries, and maintain documentation of such contacts. A supplier must maintain and replace at no charge or repair directly, or through a service contract with another company, Medicare covered items it has rented to beneficiaries. A supplier must accept returns of substandard (less than full quality for the particular item) or unsuitable items (inappropriate for the beneficiary at the time it was fitted and rented or sold) from beneficiaries. A supplier must disclose these supplier standards to each beneficiary to whom it supplies a Medicare-covered item. A supplier must disclose to the government any person having ownership, financial, or control interest in the supplier. A supplier must not convey or reassign a supplier number; i.e., the supplier may not sell or allow another entity to use its Medicare billing number. A supplier must have a complaint resolution protocol established to address beneficiary complaints that relate to these standards. A record of these complaints must be maintained at the physical facility. Complaint records must include: the name, address, telephone number and health insurance claim number of the beneficiary, a summary of the complaint, and any action taken to resolve it. A supplier must agree to furnish CMS any information required by the Medicare statute and implementing regulations. A supplier of DMEPOS and other items and services must be accredited by a CMS-approved accreditation organization in order to receive and retain a supplier billing number.  The accreditation must indicate the specific products and services, for which the supplier is accredited in order for the supplier to receive payment for those specific products and services. A DMEPOS supplier must notify their accreditation organization when a new DMEPOS location is opened. The accreditation organization may accredit the new supplier location for three months after it is operational without requiring a new site visit. All DMEPOS supplier locations, whether owned or subcontracted, must meet the [employer] and be separately accredited in order to bill Medicare. An accredited supplier may be denied enrollment or their enrollment may be revoked, if CMS determines that they are not in compliance with the DMEPOS quality standards. A DMEPOS supplier must disclose upon enrollment all products and services, including the addition of new product lines for which they are seeking accreditation. If a new product line is added after enrollment, the DMEPOS supplier will be responsible for notifying the accrediting body of the new product so that the DMEPOS supplier can be re-surveyed and accredited for these new products. Must meet the surety bond requirements specified in 42 C. F.R. 424.57(c). Implementation date- May 4, 2009. A supplier must obtain oxygen from a state-licensed oxygen supplier. A supplier must maintain ordering and referring documentation consistent with provisions found in 42 C. F.R. 424.516(f). DMEPOS suppliers are prohibited from sharing a practice location with certain other Medicare providers and suppliers. DMEPOS suppliers must remain open to the public for a minimum of 30 hours per week with certain exceptions.

## 2022-10-18 NOTE — PROGRESS NOTES
Orthopaedic Hand Surgery Note    Name: Shayla Diana  YOB: 1950  Gender: male  MRN: [id number]    CC: Follow up of hand numbness    HPI: Patient is a 67 y.o. male who is here regarding follow up for  hand numbness and tingling. He states that the brace is very helpful. He is here to review his nerve conduction study. ROS/Meds/PSH/PMH/FH/SH: I personally reviewed the patients standard intake form. Pertinents are discussed in the HPI    Physical Examination:  Musculoskeletal:       Examination of the left upper extremity demonstrates Normal sensation to light touch in the median distribution, normal sensation in ulnar and radial distribution, equivocal carpal tunnel compression testing and Phalen testing, cap refill < 5 seconds in all fingers. Inspection reveals no thenar atrophy. Negative Tinel and elbow flexion compression test of the cubital tunnel, negative Tinel over Guyon's canal. Sensation to light touch in the ulnar 2 digits is normal with no intrinsic atrophy/weakness. No tenderness to palpation or masses noted in the forearm. Imaging / Electrodiagnostic Tests:     I independently reviewed and interpreted the patient's nerve conduction study. There is no evidence of carpal or cubital tunnel syndrome on the left, left median ulnar and radial sensory conductions are normal, and left median and ulnar motor conductions are normal.  Needle EMG demonstrates increased insertional activity at the left biceps, triceps, FDS, FCU suggestive of C6-7 radiculopathy. Cervical Spine XR: AP, Lateral views     Clinical Indication  1. Left carpal tunnel syndrome    2. Cervical radiculopathy           Report: AP, lateral x-ray of the cervical spine demonstrates multilevel degenerative changes with disc space narrowing and osteophyte formation particularly at C5-6 and C6-7    Impression: as above     Jaycob East MD       Assessment:     ICD-10-CM    1.  Left carpal tunnel syndrome  G56.02 [place] WHO COCK-UP NONMOLDE PRE OTS     [place] WHO COCK-UP NONMOLDE PRE OTS      2. Cervical radiculopathy  M54.12 XR CERVICAL SPINE (2-3 VIEWS)     MRI CERVICAL SPINE WO CONTRAST          Plan:  We discussed the diagnosis and different treatment options. We discussed the results of his nerve conduction study, which are more consistent with cervical radiculopathy rather than carpal or cubital tunnel syndrome. After discussing in detail the patient elects to proceed with obtaining an MRI of the cervical spine, and follow up with one of our spine specialists. He can continue the use of his wrist brace at night. I will see him back as needed . Patient voiced accordance and understanding of the information provided and the formulated plan. All questions were answered to the patient's satisfaction during the encounter.       Laura Rai MD  Orthopaedic Surgery  10/18/22  4:16 PM

## 2022-10-19 ENCOUNTER — HOSPITAL ENCOUNTER (OUTPATIENT)
Dept: CT IMAGING | Age: 72
Discharge: HOME | End: 2022-10-22
Payer: MEDICARE

## 2022-10-19 ENCOUNTER — CLINICAL DOCUMENTATION (OUTPATIENT)
Dept: ORTHOPEDIC SURGERY | Age: 72
End: 2022-10-19

## 2022-10-19 DIAGNOSIS — J31.0 CHRONIC RHINOSINUSITIS: ICD-10-CM

## 2022-10-19 DIAGNOSIS — J32.9 CHRONIC RHINOSINUSITIS: ICD-10-CM

## 2022-10-19 PROCEDURE — 70486 CT MAXILLOFACIAL W/O DYE: CPT

## 2022-10-31 DIAGNOSIS — E11.65 TYPE 2 DIABETES MELLITUS WITH HYPERGLYCEMIA (HCC): ICD-10-CM

## 2022-10-31 RX ORDER — BLOOD SUGAR DIAGNOSTIC
STRIP MISCELLANEOUS
Qty: 100 STRIP | Refills: 4 | Status: SHIPPED | OUTPATIENT
Start: 2022-10-31

## 2022-11-02 DIAGNOSIS — M54.12 CERVICAL RADICULOPATHY: ICD-10-CM

## 2022-11-04 ENCOUNTER — PREP FOR PROCEDURE (OUTPATIENT)
Dept: ENT CLINIC | Age: 72
End: 2022-11-04

## 2022-11-04 DIAGNOSIS — J34.2 DEVIATED NASAL SEPTUM: ICD-10-CM

## 2022-11-04 DIAGNOSIS — J31.0 CHRONIC RHINOSINUSITIS: Primary | ICD-10-CM

## 2022-11-04 DIAGNOSIS — J32.9 CHRONIC RHINOSINUSITIS: Primary | ICD-10-CM

## 2022-11-04 DIAGNOSIS — J34.3 NASAL TURBINATE HYPERTROPHY: ICD-10-CM

## 2022-11-04 NOTE — PERIOP NOTE
Patient verified name and . Order for consent IS NOT found in EHR; patient verifies procedure. Type 1b surgery, phone assessment complete. Orders not received. Labs per surgeon: none  Labs per anesthesia protocol: none    Patient is taking Tikosyn-will need EKG, Mag, and BMP DOS. Patient answered medical/surgical history questions at their best of ability. All prior to admission medications documented in [facility]. Patient instructed to take the following medications the day of surgery according to anesthesia guidelines with a small sip of water: Levothyroxine, Omeprazole   On the day before surgery please take Acetaminophen 1000mg in the morning and then again before bed. You may substitute for Tylenol 650 mg. Hold all vitamins 7 days prior to surgery and NSAIDS 5 days prior to surgery. Prescription meds to hold: Follow Dr. Oral Martinez instructions for holding Pradaxa and Aspirin. Patient instructed on the following:    > Arrive at [facility], time of arrival to be called the day before by 1700  > NPO after midnight, unless otherwise indicated, including gum, mints, and ice chips  > Responsible adult must drive patient to the hospital, stay during surgery, and patient will need supervision 24 hours after anesthesia  > Use antibacterial soap in shower the night before surgery and on the morning of surgery  > All piercings must be removed prior to arrival.    > Leave all valuables (money and jewelry) at home but bring insurance card and ID on DOS.   > You may be required to pay a deductible or co-pay on the day of your procedure. You can pre-pay by calling [phone] if your surgery is at the [facility] or [phone] if your surgery is at the [facility]. > Do not wear make-up, nail polish, lotions, cologne, perfumes, powders, or oil on skin. Artificial nails are not permitted.

## 2022-11-07 ENCOUNTER — TELEPHONE (OUTPATIENT)
Dept: CARDIOLOGY CLINIC | Age: 72
End: 2022-11-07

## 2022-11-07 NOTE — TELEPHONE ENCOUNTER
Patient having CT Image Quided Functional Endoscopic Sinus Surgery , Septoplasty, Turbinate Resection with Dr. Derrek Washington under [facility] for 2 days.  Fax: [phone]

## 2022-11-08 NOTE — TELEPHONE ENCOUNTER
The patient a low CV risk for a moderate risk surgery. Hold the blood thinner for 2-3 days and restart when able.      PERRY

## 2022-11-09 ENCOUNTER — TELEPHONE (OUTPATIENT)
Dept: ENT CLINIC | Age: 72
End: 2022-11-09

## 2022-11-10 DIAGNOSIS — G89.18 POST-OP PAIN: Primary | ICD-10-CM

## 2022-11-10 RX ORDER — ONDANSETRON 8 MG/1
8 TABLET, ORALLY DISINTEGRATING ORAL EVERY 8 HOURS PRN
Qty: 15 TABLET | Refills: 0 | Status: SHIPPED | OUTPATIENT
Start: 2022-11-10 | End: 2022-11-15

## 2022-11-10 RX ORDER — PREDNISOLONE SODIUM PHOSPHATE 15 MG/5ML
15 SOLUTION ORAL DAILY
Qty: 25 ML | Refills: 0 | Status: SHIPPED | OUTPATIENT
Start: 2022-11-10 | End: 2022-11-15

## 2022-11-10 RX ORDER — SULFAMETHOXAZOLE AND TRIMETHOPRIM 400; 80 MG/1; MG/1
1 TABLET ORAL DAILY
Qty: 10 TABLET | Refills: 0 | Status: SHIPPED | OUTPATIENT
Start: 2022-11-10 | End: 2022-11-20

## 2022-11-10 RX ORDER — CLINDAMYCIN HYDROCHLORIDE 300 MG/1
300 CAPSULE ORAL 3 TIMES DAILY
Qty: 21 CAPSULE | Refills: 0 | Status: CANCELLED | OUTPATIENT
Start: 2022-11-10 | End: 2022-11-17

## 2022-11-28 ENCOUNTER — OFFICE VISIT (OUTPATIENT)
Dept: ORTHOPEDIC SURGERY | Age: 72
End: 2022-11-28
Payer: MEDICARE

## 2022-11-28 VITALS — WEIGHT: 171 LBS | BODY MASS INDEX: 24.48 KG/M2 | HEIGHT: 70 IN

## 2022-11-28 DIAGNOSIS — M54.12 CERVICAL RADICULOPATHY: Primary | ICD-10-CM

## 2022-11-28 DIAGNOSIS — M79.2 NEUROPATHIC PAIN: ICD-10-CM

## 2022-11-28 PROCEDURE — 1036F TOBACCO NON-USER: CPT | Performed by: ORTHOPAEDIC SURGERY

## 2022-11-28 PROCEDURE — G8427 DOCREV CUR MEDS BY ELIG CLIN: HCPCS | Performed by: ORTHOPAEDIC SURGERY

## 2022-11-28 PROCEDURE — G8484 FLU IMMUNIZE NO ADMIN: HCPCS | Performed by: ORTHOPAEDIC SURGERY

## 2022-11-28 PROCEDURE — 99214 OFFICE O/P EST MOD 30 MIN: CPT | Performed by: ORTHOPAEDIC SURGERY

## 2022-11-28 PROCEDURE — G8420 CALC BMI NORM PARAMETERS: HCPCS | Performed by: ORTHOPAEDIC SURGERY

## 2022-11-28 PROCEDURE — 1123F ACP DISCUSS/DSCN MKR DOCD: CPT | Performed by: ORTHOPAEDIC SURGERY

## 2022-11-28 PROCEDURE — 3017F COLORECTAL CA SCREEN DOC REV: CPT | Performed by: ORTHOPAEDIC SURGERY

## 2022-11-28 NOTE — PROGRESS NOTES
1000 MCG tablet Take 1,000 mcg by mouth    dabigatran (PRADAXA) 150 MG capsule TAKE 1 CAPSULE EVERY 12 HOURS    dofetilide (TIKOSYN) 250 MCG capsule Take 250 mcg by mouth every 12 hours    fenofibrate (TRIGLIDE) 160 MG tablet TAKE 1 TABLET EVERY DAY    tamsulosin (FLOMAX) 0.4 MG capsule Take 0.4 mg by mouth every evening     No current facility-administered medications for this visit. Allergies: Allergies   Allergen Reactions    Other Anaphylaxis     Combid - no longer sold in [place] per patient     Amoxicillin-Pot Clavulanate Diarrhea     No longer allergic        Physical Exam:     This is a well developed well nourished adult male in no acute distress. Oriented to person, place and time. Mood and affect are appropriate. He is noted to have a subtle head and vocal tremor. He also has a more prominent tremor in the left upper extremity. Respirations are unlabored and there is no evidence of cyanosis. Examination of the cervical spine reveals no evidence of sagittal or coronal plane deformity. Neck ROM is somewhat limited in all planes due to exacerbation of pain. Spurling's sign is negative for reproduction of radicular symptoms. Patient ambulates with a normal tandem gait. Sensory testing reveals intact sensation to light touch and in the distribution of the C5-T1 dermatomes bilaterally with the exception of paresthesias in the radial forearm and around the wrist circumferentially and in the left axilla. Jestine Courts Eaton's is negative    Ankle jerk is  negative      Tinel's and Demetrius testing over the cubital and carpal tunnels do not reproduce the symptoms. Shoulder examination is suggestive of adhesive capsulitis or shoulder arthritis. The patient does have difficulty with coordinated or rapid alternating hand movements.     Strength testing in the upper extremity reveals the following based on the 5 point grading scale:       Delt(C5) Bicep(C6) WE(C6) Tricep (C7) WF(C7) (C8) Int (T1)   Right 5 5 5 5 5 5 5   Left 5 5 5 4 4 3 3     Radiographic Studies:    MRI Cervical spine, report and images reviewed and interpreted and reveals multilevel disc degeneration and spondylotic changes with no area of significant central stenosis. He does have some foraminal stenosis on the left side including at C5-C6 and to a much lesser degree at C6-C7    Course and size of the vertebral arteries: Normal    Diagnosis:      ICD-10-CM    1. Cervical radiculopathy  M54.12       2. Neuropathic pain  M79.2           Assessment/Plan: This patient's symptoms are atypical for single or double root cervical radiculopathy and are more likely of an extraspinal neuropathic origin. Again he has weakness in hand intrinsics, wrist flexors, , and triceps along with the tremor in the left upper extremity which could include components of C6 or C7 radiculopathy but several components would not be explained by those levels of involvement. .  In this scenario, I would not recommend surgical intervention. Rather, I would recommend pain management evaluation or perhaps neurology. The patient is going to discuss over with Dr. Ronda Ross at his next PCP visit and decide what path to pursue.            Electronically Signed By Rebecca Deras MD     2:34 PM

## 2022-12-02 NOTE — TELEPHONE ENCOUNTER
Requested Prescriptions     Pending Prescriptions Disp Refills    dabigatran (PRADAXA) 150 MG capsule [Pharmacy Med Name: DABIGATRAN ETEXILATE 150 MG Capsule] 180 capsule 3     Sig: TAKE 1 CAPSULE EVERY 12 HOURS    dofetilide (TIKOSYN) 250 MCG capsule [Pharmacy Med Name: DOFETILIDE 250 MCG Capsule] 180 capsule 3     Sig: TAKE 1 CAPSULE EVERY 12 HOURS

## 2022-12-06 RX ORDER — DOFETILIDE 0.25 MG/1
CAPSULE ORAL
Qty: 180 CAPSULE | Refills: 3 | Status: SHIPPED | OUTPATIENT
Start: 2022-12-06

## 2022-12-06 RX ORDER — DABIGATRAN ETEXILATE 150 MG/1
CAPSULE ORAL
Qty: 180 CAPSULE | Refills: 3 | Status: SHIPPED | OUTPATIENT
Start: 2022-12-06

## 2022-12-16 ENCOUNTER — PREP FOR PROCEDURE (OUTPATIENT)
Dept: ENT CLINIC | Age: 72
End: 2022-12-16

## 2022-12-19 DIAGNOSIS — G89.18 POST-OP PAIN: Primary | ICD-10-CM

## 2022-12-20 RX ORDER — CLINDAMYCIN HYDROCHLORIDE 300 MG/1
300 CAPSULE ORAL 3 TIMES DAILY
Qty: 21 CAPSULE | Refills: 0 | Status: SHIPPED | OUTPATIENT
Start: 2022-12-20 | End: 2022-12-27

## 2022-12-20 RX ORDER — ONDANSETRON 4 MG/1
4 TABLET, FILM COATED ORAL 3 TIMES DAILY PRN
Qty: 15 TABLET | Refills: 0 | Status: SHIPPED | OUTPATIENT
Start: 2022-12-20 | End: 2022-12-25

## 2022-12-20 RX ORDER — HYDROCODONE BITARTRATE AND ACETAMINOPHEN 5; 325 MG/1; MG/1
1 TABLET ORAL EVERY 4 HOURS PRN
Qty: 30 TABLET | Refills: 0 | Status: SHIPPED | OUTPATIENT
Start: 2022-12-20 | End: 2022-12-25

## 2022-12-27 ENCOUNTER — OFFICE VISIT (OUTPATIENT)
Dept: CARDIOLOGY CLINIC | Age: 72
End: 2022-12-27
Payer: MEDICARE

## 2022-12-27 VITALS
HEART RATE: 75 BPM | HEIGHT: 70 IN | DIASTOLIC BLOOD PRESSURE: 108 MMHG | SYSTOLIC BLOOD PRESSURE: 158 MMHG | BODY MASS INDEX: 24.62 KG/M2 | WEIGHT: 172 LBS

## 2022-12-27 DIAGNOSIS — I48.19 PERSISTENT ATRIAL FIBRILLATION (HCC): Primary | ICD-10-CM

## 2022-12-27 PROCEDURE — 1036F TOBACCO NON-USER: CPT | Performed by: INTERNAL MEDICINE

## 2022-12-27 PROCEDURE — 93000 ELECTROCARDIOGRAM COMPLETE: CPT | Performed by: INTERNAL MEDICINE

## 2022-12-27 PROCEDURE — G8420 CALC BMI NORM PARAMETERS: HCPCS | Performed by: INTERNAL MEDICINE

## 2022-12-27 PROCEDURE — G8484 FLU IMMUNIZE NO ADMIN: HCPCS | Performed by: INTERNAL MEDICINE

## 2022-12-27 PROCEDURE — G8427 DOCREV CUR MEDS BY ELIG CLIN: HCPCS | Performed by: INTERNAL MEDICINE

## 2022-12-27 PROCEDURE — 3078F DIAST BP <80 MM HG: CPT | Performed by: INTERNAL MEDICINE

## 2022-12-27 PROCEDURE — 3074F SYST BP LT 130 MM HG: CPT | Performed by: INTERNAL MEDICINE

## 2022-12-27 PROCEDURE — 99214 OFFICE O/P EST MOD 30 MIN: CPT | Performed by: INTERNAL MEDICINE

## 2022-12-27 PROCEDURE — 3017F COLORECTAL CA SCREEN DOC REV: CPT | Performed by: INTERNAL MEDICINE

## 2022-12-27 PROCEDURE — 1123F ACP DISCUSS/DSCN MKR DOCD: CPT | Performed by: INTERNAL MEDICINE

## 2022-12-27 RX ORDER — BENAZEPRIL HYDROCHLORIDE 20 MG/1
20 TABLET ORAL DAILY
Qty: 90 TABLET | Refills: 3 | Status: SHIPPED | OUTPATIENT
Start: 2022-12-27

## 2022-12-27 RX ORDER — DOFETILIDE 0.25 MG/1
CAPSULE ORAL
Qty: 180 CAPSULE | Refills: 3 | Status: SHIPPED | OUTPATIENT
Start: 2022-12-27

## 2022-12-27 RX ORDER — DABIGATRAN ETEXILATE 150 MG/1
CAPSULE ORAL
Qty: 180 CAPSULE | Refills: 3 | Status: SHIPPED | OUTPATIENT
Start: 2022-12-27

## 2022-12-27 RX ORDER — ATORVASTATIN CALCIUM 80 MG/1
80 TABLET, FILM COATED ORAL DAILY
Qty: 90 TABLET | Refills: 3 | Status: SHIPPED | OUTPATIENT
Start: 2022-12-27

## 2022-12-27 ASSESSMENT — ENCOUNTER SYMPTOMS
ALLERGIC/IMMUNOLOGIC NEGATIVE: 1
RESPIRATORY NEGATIVE: 1
GASTROINTESTINAL NEGATIVE: 1
EYES NEGATIVE: 1

## 2022-12-27 NOTE — PROGRESS NOTES
[facility] CARDIOLOGY  [address] [place] [address], [address] [employer] [facility], [address] [place] [address]  PHONE: [phone]        22      NAME:  Rafael Shea  : 1950  MRN: [id number]     Referring Cardiologist: Barry Meyer MD     Reason for Consultation: Atrial fibrillation     ASSESSMENT and PLAN:  Diagnoses and all orders for this visit:      1. Persistent atrial fibrillation ([facility] [id number] = 5, on Pradaxa, Tikosyn. S/p AF/AFL ablation 2019.       2. S/P CABG/Aortocoronary bypass status, performed in . 3. Essential hypertension      4. Coronary artery disease without angina pectoris, unspecified vessel or lesion type, unspecified whether native or transplanted heart      5. Uncontrolled type 2 diabetes mellitus with hyperglycemia ([facility]      6. Anxiety      7. Pure hypercholesterolemia      8. Neuropraxia of median nerve. 9. COVID infection 2022     66-year-old male with a history of persistent atrial fibrillation which is now drug refractory. He is status post atrial fibrillation ablation and maintaining rhythm. He remains on Tikosyn and Pradaxa. -AF - Continue Tikosyn and Pradaxa indefinitely. Now back in AF. Consider cardioversion in  if remains OOR.   -Maxillary sinus issues - planning for sinus surgery.    -Achilles tendon injury - continue conservative mgmt. -EP Follow up in 6 months or PRN. Patient has been instructed and agrees to call our office with any issues or other concerns related to their cardiac condition(s) and/or complaint(s). No follow-up provider specified. Thank you for allowing me to participate in the electrophysiologic care of Mr. Rafael Shea. Please contact me if any questions or concerns were to arise. Jose Delacruz MD, MS  Clinical Cardiac Electrophysiology  [facility] Cardiology  22  1:14 PM    ===================================================================  Chief Complant:    Chief Complaint   Patient presents with Irregular Heart Beat    Follow-up      Consultation is requested by No ref. provider found for evaluation of Irregular Heart Beat and Follow-up    History:  Claire Ramirez is a most pleasant 67 y.o. male with a past medical and cardiac history significant for CAD s/p CABG, HTN, and AF presenting for consultation regarding AF. He is a very  active 78 yo who cycles avidly and reports he cycles about 100 miles per month. He presented to Dr. Shandra Perera in 2016 and underwent a DCCV by Dr. Celeste Garza a month later. He has not followed up with EP since that time. He was initiated on Tikosyn around  that time and he had done well until he was found to be in AF in January of this time. He is followed by Dr. Alison Pedersen and he has graciously referred him back for further EP mgmt of his persistent AF. He comes in for follow up. He had an AF ablation in June 2019 c/b left arm neuropraxia from issues with his A line. He did suffer an Achilles tendon injury/partial tear which was diagnosed late in 2021. He does have a recent dx of COVID. He needs sinus surgery (right sided). And he's now back in AF today. The patient otherwise denies dyspnea, presyncope, syncope or lateralizing symptoms. His daughter had a baby in August 2019, this will be his first grandchild. He is a retired . Cardiac PMH: (Old records have been reviewed and summarized below)   TTE (8/10/16): EF 50-55%, mod to severe LAE   MPI (9/9/16): normal perfusion      EKG:  (EKG has been independently visualized by me with interpretation below): NSR, PACs, normal axis,  no ischemia. QTc 454 msec. ECHO: 2016, EF 50-55%, moderate to severe LA, RVSP 30 mmHg       Previous Heart Catheterization: History of CABG       Stress Test: 9/2016   Left Ventricular Size: normal.  Transient Ischemic Dilatation: not present. Gated cine images could not be performed due to atrial fibrillation. CONCLUSION:   1.  Stress EKG: Normal.  2. SPECT Perfusion Imaging: Normal Perfusion. 3. LV Systolic Function is  not calculated due to inability to gate images   due to atrial fibrillation. Past Medical History, Past Surgical History, Family history, Social History, and Medications were all reviewed with the patient today and updated as necessary. Current Outpatient Medications   Medication Sig Dispense Refill    dabigatran (PRADAXA) 150 MG capsule TAKE 1 CAPSULE EVERY 12 HOURS 180 capsule 3    benazepril (LOTENSIN) 20 MG tablet Take 1 tablet by mouth daily 90 tablet 3    atorvastatin (LIPITOR) 80 MG tablet Take 1 tablet by mouth daily TAKE 1 TABLET EVERY DAY 90 tablet 3    dofetilide (TIKOSYN) 250 MCG capsule TAKE 1 CAPSULE EVERY 12 HOURS 180 capsule 3    NONFORMULARY Take by mouth daily Levofloxacin 50 mg/Budesonide 0.75mg      ONETOUCH VERIO strip CHECK BLOOD SUGAR EVERY DAY E11.65 100 strip 4    glimepiride (AMARYL) 1 MG tablet Take 1 tablet by mouth every morning (before breakfast) TAKE 1 TABLET EVERY DAY BEFORE BREAKFAST (Patient taking differently: Take 0.5 mg by mouth every morning (before breakfast) TAKE 1 TABLET EVERY DAY BEFORE BREAKFAST) 30 tablet 5    levothyroxine (SYNTHROID) 88 MCG tablet Take 1 tablet by mouth in the morning. TAKE 1 TABLET BY MOUTH EVERY DAY BEFORE BREAKFAST. 30 tablet 5    omeprazole (PRILOSEC) 20 MG delayed release capsule Take 1 capsule by mouth in the morning. TAKE 1 CAPSULE EVERY DAY. 30 capsule 5    gabapentin (NEURONTIN) 300 MG capsule Take 300 mg by mouth 3 times daily. 90 capsule 5    nitroGLYCERIN (NITROSTAT) 0.4 MG SL tablet Place 1 tablet under the tongue every 5 minutes as needed for Chest pain Up to 3 tabs in 24 hrs.  25 tablet 5    aspirin 81 MG EC tablet Take by mouth      cyanocobalamin 1000 MCG tablet Take 1,000 mcg by mouth      fenofibrate (TRIGLIDE) 160 MG tablet TAKE 1 TABLET EVERY DAY      tamsulosin (FLOMAX) 0.4 MG capsule Take 0.4 mg by mouth every evening      clindamycin (CLEOCIN) 300 MG capsule Take 1 capsule by mouth 3 times daily for 7 days (Patient not taking: Reported on 12/27/2022) 21 capsule 0     No current facility-administered medications for this visit. Allergies   Allergen Reactions    Other Anaphylaxis     Combid - no longer sold in [place] per patient        Past Medical History:   Diagnosis Date    Anxiety     Arrhythmia     Atrial fib.     CAD (coronary artery disease) 1995    followed by Dr. Elvi Quintero    Calculus of kidney     right renal calculi    Chronic pain     back     DDD (degenerative disc disease)     Diabetes ([address]     type 2; glimeperide as needed per patient ; a1c = 8.0 as of 5/5/2021; check sugars daily avg is 80 per patient; hypoglycemic at 60     Diverticulitis 2013    Endocrine disease     thyroid    GERD (gastroesophageal reflux disease)     controlled with medication     History of EKG 02/15/2021    NSR with incomp RBBB    History of shingles 2013    HLD (hyperlipidemia) 7/5/2016    Hx of echocardiogram 07/01/2019    LVEF >55%    Hypercholesteremia     medication     Hypertension     controlled with medication  per patient     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     anxiety - controlled     S/P CABG (coronary artery bypass graft) 07/05/2016    4 vessel bypass    Sleep apnea     cpap    Sleep disorder 2007    apnea-uses CPAP    Thyroid disease     Hypothyroid     Past Surgical History:   Procedure Laterality Date    [address]  06/25/2019    COLONOSCOPY N/A 7/14/2017    COLONOSCOPY performed by Luli Fontana MD at [facility] ENDOSCOPY    COLONOSCOPY  2010    Dr. Damaris Esparza (5yrs)    COLONOSCOPY  07/14/2017    CORONARY ARTERY BYPASS GRAFT      quadruple bypass    HEENT      nasal surgery 2ndary to obstruction    HEENT  02/2015    cyst removal    OTHER SURGICAL HISTORY      lasik eyes    DE CARDIAC SURG PROCEDURE UNLIST  6/225/19    ablation    DE CARDIAC SURG PROCEDURE UNLIST  1999    CABG     Family History   Problem Relation Age of Onset    Heart Disease Mother     Heart Disease Father     Heart Disease Brother      Social History     Tobacco Use    Smoking status: Never    Smokeless tobacco: Never   Substance Use Topics    Alcohol use: Not Currently     ROS:  A comprehensive review of systems was performed with the pertinent positives and negatives as noted in the HPI in addition to:  Review of Systems   Constitutional: Negative. HENT: Negative. Eyes: Negative. Respiratory: Negative. Cardiovascular: Negative. Gastrointestinal: Negative. Endocrine: Negative. Genitourinary: Negative. Musculoskeletal: Negative. Skin: Negative. Allergic/Immunologic: Negative. Neurological: Negative. Hematological: Negative. Psychiatric/Behavioral: Negative. All other systems reviewed and are negative. PHYSICAL EXAM:   BP (!) 158/108 Comment: right arm  Pulse 75   Ht 5' 10\" (1.778 m)   Wt 172 lb (78 kg)   BMI 24.68 kg/m²      Wt Readings from Last 3 Encounters:   12/27/22 172 lb (78 kg)   11/28/22 171 lb (77.6 kg)   10/18/22 173 lb (78.5 kg)     BP Readings from Last 3 Encounters:   12/27/22 (!) 158/108   10/03/22 126/74   09/06/22 110/66     Gen: Well appearing, well developed, no acute distress  Eyes: Pupils equal, round. Extraocular movements are intact  ENT: Oropharynx clear, no oral lesions, normal dentition  CV: S1S2, regular rate and rhythm, no murmurs, rubs or gallops, normal JVD, no carotid bruits, normal distal pulses, no RADHA  Pulm: Clear to auscultation bilaterally, no accessory muscle uses, no wheezes or rales  GI: Soft, NT, ND, +BS  Neuro: Alert and oriented, nonfocal  Psych: Appropriate affect  Skin: Normal color and skin turgor  MSK: Normal muscle bulk and tone    Medical problems and test results were reviewed with the patient today. No results found for any visits on 12/27/22.

## 2022-12-29 ENCOUNTER — ANESTHESIA EVENT (OUTPATIENT)
Dept: SURGERY | Age: 72
End: 2022-12-29
Payer: MEDICARE

## 2022-12-29 NOTE — PERIOP NOTE
Pt was scheduled for surgery Nov 11, 2022 but was cancelled due to Covid. Talked with pt and states no medical changes except Covid since prior assessment  11/04/2022. Pt is followed by Dr. Kaila Funez at [facility] Card[facility] with hx of persistent atrial fibrillation. Latest cardiac office note 12/27/2022, latest EKG 12/27/2022, latest Cardiac cath 09/09/2021, latest ECHO ([facility]) with LVEF >55% 06/25/2019 and all found in Chart Review. Pt is currently taking Tikosyn and was instructed to bring Tikosyn in original bottle, verbalized understanding. Instructed to take Omeprazole and gabapentin but pt states takes them at 11am every day and states will take when he gets home from surgery. EKG, BMP and Mag ordered for DOS. Instructed on NPO after midnight and to come in entrance A . Verbalized understanding.

## 2022-12-30 ENCOUNTER — ANESTHESIA (OUTPATIENT)
Dept: SURGERY | Age: 72
End: 2022-12-30
Payer: MEDICARE

## 2022-12-30 ENCOUNTER — HOSPITAL ENCOUNTER (OUTPATIENT)
Age: 72
Setting detail: OUTPATIENT SURGERY
Discharge: HOME | End: 2022-12-30
Attending: STUDENT IN AN ORGANIZED HEALTH CARE EDUCATION/TRAINING PROGRAM | Admitting: STUDENT IN AN ORGANIZED HEALTH CARE EDUCATION/TRAINING PROGRAM
Payer: MEDICARE

## 2022-12-30 VITALS
HEIGHT: 70 IN | OXYGEN SATURATION: 97 % | HEART RATE: 66 BPM | TEMPERATURE: 97.2 F | BODY MASS INDEX: 24.9 KG/M2 | WEIGHT: 173.9 LBS | RESPIRATION RATE: 18 BRPM | DIASTOLIC BLOOD PRESSURE: 70 MMHG | SYSTOLIC BLOOD PRESSURE: 142 MMHG

## 2022-12-30 DIAGNOSIS — J34.2 DEVIATED NASAL SEPTUM: ICD-10-CM

## 2022-12-30 DIAGNOSIS — J32.9 CHRONIC RHINOSINUSITIS: ICD-10-CM

## 2022-12-30 DIAGNOSIS — J34.3 HYPERTROPHY OF BOTH INFERIOR NASAL TURBINATES: ICD-10-CM

## 2022-12-30 DIAGNOSIS — J31.0 CHRONIC RHINOSINUSITIS: ICD-10-CM

## 2022-12-30 DIAGNOSIS — J31.0 CHRONIC RHINITIS: ICD-10-CM

## 2022-12-30 DIAGNOSIS — J34.3 NASAL TURBINATE HYPERTROPHY: ICD-10-CM

## 2022-12-30 DIAGNOSIS — J34.89 NASAL OBSTRUCTION: ICD-10-CM

## 2022-12-30 LAB
ANION GAP SERPL CALC-SCNC: 5 MMOL/L (ref 2–11)
BUN SERPL-MCNC: 23 MG/DL (ref 8–23)
CALCIUM SERPL-MCNC: 9.5 MG/DL (ref 8.3–10.4)
CHLORIDE SERPL-SCNC: 108 MMOL/L (ref 101–110)
CO2 SERPL-SCNC: 28 MMOL/L (ref 21–32)
CREAT SERPL-MCNC: 1.43 MG/DL (ref 0.8–1.5)
EKG ATRIAL RATE: 65 BPM
EKG DIAGNOSIS: NORMAL
EKG P AXIS: 77 DEGREES
EKG P-R INTERVAL: 182 MS
EKG Q-T INTERVAL: 444 MS
EKG QRS DURATION: 82 MS
EKG QTC CALCULATION (BAZETT): 461 MS
EKG R AXIS: 264 DEGREES
EKG T AXIS: -67 DEGREES
EKG VENTRICULAR RATE: 65 BPM
GLUCOSE BLD STRIP.AUTO-MCNC: 126 MG/DL (ref 65–100)
GLUCOSE SERPL-MCNC: 145 MG/DL (ref 65–100)
MAGNESIUM SERPL-MCNC: 2 MG/DL (ref 1.8–2.4)
POTASSIUM SERPL-SCNC: 4.5 MMOL/L (ref 3.5–5.1)
SERVICE CMNT-IMP: ABNORMAL
SODIUM SERPL-SCNC: 141 MMOL/L (ref 133–143)

## 2022-12-30 PROCEDURE — 6370000000 HC RX 637 (ALT 250 FOR IP): Performed by: STUDENT IN AN ORGANIZED HEALTH CARE EDUCATION/TRAINING PROGRAM

## 2022-12-30 PROCEDURE — 6360000002 HC RX W HCPCS: Performed by: REGISTERED NURSE

## 2022-12-30 PROCEDURE — 87205 SMEAR GRAM STAIN: CPT

## 2022-12-30 PROCEDURE — 3700000000 HC ANESTHESIA ATTENDED CARE: Performed by: STUDENT IN AN ORGANIZED HEALTH CARE EDUCATION/TRAINING PROGRAM

## 2022-12-30 PROCEDURE — 87186 SC STD MICRODIL/AGAR DIL: CPT

## 2022-12-30 PROCEDURE — 87206 SMEAR FLUORESCENT/ACID STAI: CPT

## 2022-12-30 PROCEDURE — 7100000010 HC PHASE II RECOVERY - FIRST 15 MIN: Performed by: STUDENT IN AN ORGANIZED HEALTH CARE EDUCATION/TRAINING PROGRAM

## 2022-12-30 PROCEDURE — 7100000000 HC PACU RECOVERY - FIRST 15 MIN: Performed by: STUDENT IN AN ORGANIZED HEALTH CARE EDUCATION/TRAINING PROGRAM

## 2022-12-30 PROCEDURE — 3600000004 HC SURGERY LEVEL 4 BASE: Performed by: STUDENT IN AN ORGANIZED HEALTH CARE EDUCATION/TRAINING PROGRAM

## 2022-12-30 PROCEDURE — 82962 GLUCOSE BLOOD TEST: CPT

## 2022-12-30 PROCEDURE — 87077 CULTURE AEROBIC IDENTIFY: CPT

## 2022-12-30 PROCEDURE — 2709999900 HC NON-CHARGEABLE SUPPLY: Performed by: STUDENT IN AN ORGANIZED HEALTH CARE EDUCATION/TRAINING PROGRAM

## 2022-12-30 PROCEDURE — 2580000003 HC RX 258: Performed by: REGISTERED NURSE

## 2022-12-30 PROCEDURE — 87075 CULTR BACTERIA EXCEPT BLOOD: CPT

## 2022-12-30 PROCEDURE — 2500000003 HC RX 250 WO HCPCS: Performed by: REGISTERED NURSE

## 2022-12-30 PROCEDURE — 3700000001 HC ADD 15 MINUTES (ANESTHESIA): Performed by: STUDENT IN AN ORGANIZED HEALTH CARE EDUCATION/TRAINING PROGRAM

## 2022-12-30 PROCEDURE — 80048 BASIC METABOLIC PNL TOTAL CA: CPT

## 2022-12-30 PROCEDURE — 2500000003 HC RX 250 WO HCPCS: Performed by: STUDENT IN AN ORGANIZED HEALTH CARE EDUCATION/TRAINING PROGRAM

## 2022-12-30 PROCEDURE — 2580000003 HC RX 258: Performed by: ANESTHESIOLOGY

## 2022-12-30 PROCEDURE — 6360000002 HC RX W HCPCS: Performed by: ANESTHESIOLOGY

## 2022-12-30 PROCEDURE — 7100000001 HC PACU RECOVERY - ADDTL 15 MIN: Performed by: STUDENT IN AN ORGANIZED HEALTH CARE EDUCATION/TRAINING PROGRAM

## 2022-12-30 PROCEDURE — 6370000000 HC RX 637 (ALT 250 FOR IP): Performed by: ANESTHESIOLOGY

## 2022-12-30 PROCEDURE — 83735 ASSAY OF MAGNESIUM: CPT

## 2022-12-30 PROCEDURE — 87102 FUNGUS ISOLATION CULTURE: CPT

## 2022-12-30 PROCEDURE — C2625 STENT, NON-COR, TEM W/DEL SY: HCPCS | Performed by: STUDENT IN AN ORGANIZED HEALTH CARE EDUCATION/TRAINING PROGRAM

## 2022-12-30 PROCEDURE — 2720000010 HC SURG SUPPLY STERILE: Performed by: STUDENT IN AN ORGANIZED HEALTH CARE EDUCATION/TRAINING PROGRAM

## 2022-12-30 PROCEDURE — 3600000014 HC SURGERY LEVEL 4 ADDTL 15MIN: Performed by: STUDENT IN AN ORGANIZED HEALTH CARE EDUCATION/TRAINING PROGRAM

## 2022-12-30 DEVICE — PROPEL CONTOUR SINUS IMPLANT
Type: IMPLANTABLE DEVICE | Site: NOSE | Status: FUNCTIONAL
Brand: PROPEL CONTOUR

## 2022-12-30 DEVICE — PROPEL SINUS IMPLANT
Type: IMPLANTABLE DEVICE | Site: NOSE | Status: FUNCTIONAL
Brand: PROPEL

## 2022-12-30 RX ORDER — ONDANSETRON 2 MG/ML
4 INJECTION INTRAMUSCULAR; INTRAVENOUS
Status: DISCONTINUED | OUTPATIENT
Start: 2022-12-30 | End: 2022-12-30 | Stop reason: HOSPADM

## 2022-12-30 RX ORDER — GINSENG 100 MG
CAPSULE ORAL PRN
Status: DISCONTINUED | OUTPATIENT
Start: 2022-12-30 | End: 2022-12-30 | Stop reason: HOSPADM

## 2022-12-30 RX ORDER — MIDAZOLAM HYDROCHLORIDE 2 MG/2ML
2 INJECTION, SOLUTION INTRAMUSCULAR; INTRAVENOUS
Status: COMPLETED | OUTPATIENT
Start: 2022-12-30 | End: 2022-12-30

## 2022-12-30 RX ORDER — SODIUM CHLORIDE 0.9 % (FLUSH) 0.9 %
5-40 SYRINGE (ML) INJECTION EVERY 12 HOURS SCHEDULED
Status: DISCONTINUED | OUTPATIENT
Start: 2022-12-30 | End: 2022-12-30 | Stop reason: HOSPADM

## 2022-12-30 RX ORDER — LIDOCAINE HYDROCHLORIDE AND EPINEPHRINE 10; 10 MG/ML; UG/ML
INJECTION, SOLUTION INFILTRATION; PERINEURAL PRN
Status: DISCONTINUED | OUTPATIENT
Start: 2022-12-30 | End: 2022-12-30 | Stop reason: HOSPADM

## 2022-12-30 RX ORDER — LIDOCAINE HYDROCHLORIDE 10 MG/ML
1 INJECTION, SOLUTION INFILTRATION; PERINEURAL
Status: DISCONTINUED | OUTPATIENT
Start: 2022-12-30 | End: 2022-12-30 | Stop reason: HOSPADM

## 2022-12-30 RX ORDER — PROCHLORPERAZINE EDISYLATE 5 MG/ML
5 INJECTION INTRAMUSCULAR; INTRAVENOUS
Status: DISCONTINUED | OUTPATIENT
Start: 2022-12-30 | End: 2022-12-30 | Stop reason: HOSPADM

## 2022-12-30 RX ORDER — ONDANSETRON 2 MG/ML
INJECTION INTRAMUSCULAR; INTRAVENOUS PRN
Status: DISCONTINUED | OUTPATIENT
Start: 2022-12-30 | End: 2022-12-30 | Stop reason: SDUPTHER

## 2022-12-30 RX ORDER — ACETAMINOPHEN 500 MG
1000 TABLET ORAL ONCE
Status: COMPLETED | OUTPATIENT
Start: 2022-12-30 | End: 2022-12-30

## 2022-12-30 RX ORDER — HYDROMORPHONE HYDROCHLORIDE 1 MG/ML
0.5 INJECTION, SOLUTION INTRAMUSCULAR; INTRAVENOUS; SUBCUTANEOUS EVERY 5 MIN PRN
Status: DISCONTINUED | OUTPATIENT
Start: 2022-12-30 | End: 2022-12-30 | Stop reason: HOSPADM

## 2022-12-30 RX ORDER — SODIUM CHLORIDE 9 MG/ML
INJECTION, SOLUTION INTRAVENOUS PRN
Status: DISCONTINUED | OUTPATIENT
Start: 2022-12-30 | End: 2022-12-30 | Stop reason: HOSPADM

## 2022-12-30 RX ORDER — LIDOCAINE HYDROCHLORIDE 20 MG/ML
INJECTION, SOLUTION EPIDURAL; INFILTRATION; INTRACAUDAL; PERINEURAL PRN
Status: DISCONTINUED | OUTPATIENT
Start: 2022-12-30 | End: 2022-12-30 | Stop reason: SDUPTHER

## 2022-12-30 RX ORDER — FENTANYL CITRATE 50 UG/ML
INJECTION, SOLUTION INTRAMUSCULAR; INTRAVENOUS PRN
Status: DISCONTINUED | OUTPATIENT
Start: 2022-12-30 | End: 2022-12-30 | Stop reason: SDUPTHER

## 2022-12-30 RX ORDER — GLYCOPYRROLATE 0.2 MG/ML
INJECTION INTRAMUSCULAR; INTRAVENOUS PRN
Status: DISCONTINUED | OUTPATIENT
Start: 2022-12-30 | End: 2022-12-30 | Stop reason: SDUPTHER

## 2022-12-30 RX ORDER — EPHEDRINE SULFATE/0.9% NACL/PF 50 MG/5 ML
SYRINGE (ML) INTRAVENOUS PRN
Status: DISCONTINUED | OUTPATIENT
Start: 2022-12-30 | End: 2022-12-30 | Stop reason: SDUPTHER

## 2022-12-30 RX ORDER — OXYMETAZOLINE HYDROCHLORIDE 0.05 G/100ML
SPRAY NASAL PRN
Status: DISCONTINUED | OUTPATIENT
Start: 2022-12-30 | End: 2022-12-30 | Stop reason: HOSPADM

## 2022-12-30 RX ORDER — DEXAMETHASONE SODIUM PHOSPHATE 10 MG/ML
INJECTION INTRAMUSCULAR; INTRAVENOUS PRN
Status: DISCONTINUED | OUTPATIENT
Start: 2022-12-30 | End: 2022-12-30 | Stop reason: SDUPTHER

## 2022-12-30 RX ORDER — OXYCODONE HYDROCHLORIDE 5 MG/1
5 TABLET ORAL PRN
Status: COMPLETED | OUTPATIENT
Start: 2022-12-30 | End: 2022-12-30

## 2022-12-30 RX ORDER — PROPOFOL 10 MG/ML
INJECTION, EMULSION INTRAVENOUS PRN
Status: DISCONTINUED | OUTPATIENT
Start: 2022-12-30 | End: 2022-12-30 | Stop reason: SDUPTHER

## 2022-12-30 RX ORDER — NEOSTIGMINE METHYLSULFATE 1 MG/ML
INJECTION, SOLUTION INTRAVENOUS PRN
Status: DISCONTINUED | OUTPATIENT
Start: 2022-12-30 | End: 2022-12-30 | Stop reason: SDUPTHER

## 2022-12-30 RX ORDER — ROCURONIUM BROMIDE 10 MG/ML
INJECTION, SOLUTION INTRAVENOUS PRN
Status: DISCONTINUED | OUTPATIENT
Start: 2022-12-30 | End: 2022-12-30 | Stop reason: SDUPTHER

## 2022-12-30 RX ORDER — SODIUM CHLORIDE 0.9 % (FLUSH) 0.9 %
5-40 SYRINGE (ML) INJECTION PRN
Status: DISCONTINUED | OUTPATIENT
Start: 2022-12-30 | End: 2022-12-30 | Stop reason: HOSPADM

## 2022-12-30 RX ORDER — OXYCODONE HYDROCHLORIDE 5 MG/1
10 TABLET ORAL PRN
Status: COMPLETED | OUTPATIENT
Start: 2022-12-30 | End: 2022-12-30

## 2022-12-30 RX ORDER — HYDROMORPHONE HYDROCHLORIDE 1 MG/ML
0.25 INJECTION, SOLUTION INTRAMUSCULAR; INTRAVENOUS; SUBCUTANEOUS EVERY 5 MIN PRN
Status: DISCONTINUED | OUTPATIENT
Start: 2022-12-30 | End: 2022-12-30 | Stop reason: HOSPADM

## 2022-12-30 RX ORDER — SODIUM CHLORIDE, SODIUM LACTATE, POTASSIUM CHLORIDE, CALCIUM CHLORIDE 600; 310; 30; 20 MG/100ML; MG/100ML; MG/100ML; MG/100ML
INJECTION, SOLUTION INTRAVENOUS CONTINUOUS
Status: DISCONTINUED | OUTPATIENT
Start: 2022-12-30 | End: 2022-12-30 | Stop reason: HOSPADM

## 2022-12-30 RX ADMIN — SODIUM CHLORIDE, SODIUM LACTATE, POTASSIUM CHLORIDE, AND CALCIUM CHLORIDE: 600; 310; 30; 20 INJECTION, SOLUTION INTRAVENOUS at 06:13

## 2022-12-30 RX ADMIN — SODIUM CHLORIDE, SODIUM LACTATE, POTASSIUM CHLORIDE, AND CALCIUM CHLORIDE: 600; 310; 30; 20 INJECTION, SOLUTION INTRAVENOUS at 08:25

## 2022-12-30 RX ADMIN — PHENYLEPHRINE HYDROCHLORIDE 100 MCG: 0.1 INJECTION, SOLUTION INTRAVENOUS at 08:16

## 2022-12-30 RX ADMIN — LIDOCAINE HYDROCHLORIDE 60 MG: 20 INJECTION, SOLUTION EPIDURAL; INFILTRATION; INTRACAUDAL; PERINEURAL at 07:42

## 2022-12-30 RX ADMIN — PHENYLEPHRINE HYDROCHLORIDE 30 MCG/MIN: 10 INJECTION INTRAVENOUS at 08:24

## 2022-12-30 RX ADMIN — GLYCOPYRROLATE 0.2 MG: 0.2 INJECTION, SOLUTION INTRAMUSCULAR; INTRAVENOUS at 09:23

## 2022-12-30 RX ADMIN — ROCURONIUM BROMIDE 50 MG: 50 INJECTION, SOLUTION INTRAVENOUS at 07:42

## 2022-12-30 RX ADMIN — PHENYLEPHRINE HYDROCHLORIDE 50 MCG: 0.1 INJECTION, SOLUTION INTRAVENOUS at 08:07

## 2022-12-30 RX ADMIN — Medication 10 MG: at 08:21

## 2022-12-30 RX ADMIN — ACETAMINOPHEN 1000 MG: 500 TABLET, FILM COATED ORAL at 06:13

## 2022-12-30 RX ADMIN — Medication 2 MG: at 09:23

## 2022-12-30 RX ADMIN — Medication 3 MG: at 09:13

## 2022-12-30 RX ADMIN — PHENYLEPHRINE HYDROCHLORIDE 100 MCG: 0.1 INJECTION, SOLUTION INTRAVENOUS at 08:10

## 2022-12-30 RX ADMIN — PHENYLEPHRINE HYDROCHLORIDE 100 MCG: 0.1 INJECTION, SOLUTION INTRAVENOUS at 08:21

## 2022-12-30 RX ADMIN — OXYCODONE 5 MG: 5 TABLET ORAL at 10:01

## 2022-12-30 RX ADMIN — MIDAZOLAM HYDROCHLORIDE 2 MG: 1 INJECTION, SOLUTION INTRAMUSCULAR; INTRAVENOUS at 07:38

## 2022-12-30 RX ADMIN — FENTANYL CITRATE 100 MCG: 50 INJECTION, SOLUTION INTRAMUSCULAR; INTRAVENOUS at 07:42

## 2022-12-30 RX ADMIN — PROPOFOL 160 MG: 10 INJECTION, EMULSION INTRAVENOUS at 07:42

## 2022-12-30 RX ADMIN — GLYCOPYRROLATE 0.4 MG: 0.2 INJECTION, SOLUTION INTRAMUSCULAR; INTRAVENOUS at 09:13

## 2022-12-30 RX ADMIN — DEXAMETHASONE SODIUM PHOSPHATE 10 MG: 10 INJECTION INTRAMUSCULAR; INTRAVENOUS at 07:55

## 2022-12-30 RX ADMIN — ONDANSETRON 4 MG: 2 INJECTION INTRAMUSCULAR; INTRAVENOUS at 07:55

## 2022-12-30 ASSESSMENT — ENCOUNTER SYMPTOMS
EYE DISCHARGE: 0
SORE THROAT: 1
COUGH: 0
ABDOMINAL PAIN: 0

## 2022-12-30 ASSESSMENT — PAIN SCALES - GENERAL
PAINLEVEL_OUTOF10: 4
PAINLEVEL_OUTOF10: 4

## 2022-12-30 ASSESSMENT — PAIN DESCRIPTION - LOCATION: LOCATION: NOSE

## 2022-12-30 ASSESSMENT — PAIN - FUNCTIONAL ASSESSMENT: PAIN_FUNCTIONAL_ASSESSMENT: 0-10

## 2022-12-30 ASSESSMENT — PAIN DESCRIPTION - DESCRIPTORS: DESCRIPTORS: DISCOMFORT

## 2022-12-30 NOTE — DISCHARGE INSTRUCTIONS
Nasal/Sinus Surgery      -If needed after surgery, sleep and rest with your head elevated to decrease swelling and pressure.    -The first day or two after surgery, you may have some mild bloody drainage. You can wear a gauze pad under your nose to catch these drippings. This can be changed as needed. CALL THE OFFICE IF YOU HAVE TO CHANGE THE GAUZE MORE THAN 3 TIMES IN 30 MINUTES. -Numbness to the front teeth, roof of mouth is normal after nasal surgery, and will resolve after about 8 weeks. -VERY IMPORTANT. Nasal irrigation with a normal saline rinse after surgery is important after surgery. This is begun the day of surgery. You cannot do this too much. At a minimum of 5 time a day. -You may have splints in your nose; these need to be kept clean. Irrigating the nose helps keep them clean and open.    -Nasal Packing is generally not used. -You may irrigate blood clots from your nose for up to 2 weeks after surgery. IRRIGATION IS VERY IMPORTANT. You may use additionally use Afrin 2-3 times daily for the first 3 days to decrease congestion and bleeding.      -You will be on an oral antibiotic and oral steroid for one week after surgery.     -Use the nebulizer with the antibiotic and steroid capsule(s) starting one week after surgery.    -Do not blow your nose until you've had your post-operative visit and gotten clearance from your doctor.    -If you need to sneeze, sneeze with your mouth open.    -No Aspirin or Ibuprofen for one week after surgery, unless approved by your doctor.    -Nasal congestion after nasal surgery is normal and will usually resolve once your splints are removed. Keep in mind, this is not immediate as you will still be swollen and healing. MEDICATION INTERACTION:    During your procedure you potentially received a medication or medications which may reduce the effectiveness of oral contraceptives.  Please consider other forms of contraception for 1 month following your procedure if you are currently using oral contraceptives as your primary form of birth control. In addition to this, we recommend continuing your oral contraceptive as prescribed, unless otherwise instructed by your physician, during this time. After general anesthesia or intravenous sedation, for 24 hours or while taking prescription Narcotics:  Limit your activities  A responsible adult needs to be with you for the next 24 hours  Do not drive and operate hazardous machinery  Do not make important personal or business decisions  Do not drink alcoholic beverages  If you have not urinated within 8 hours after discharge, and you are experiencing discomfort from urinary retention, please go to the nearest ED. If you have sleep apnea and have a CPAP machine, please use it for all naps and sleeping. Please use caution when taking narcotics and any of your home medications that may cause drowsiness. *  Please give a list of your current medications to your Primary Care Provider. *  Please update this list whenever your medications are discontinued, doses are      changed, or new medications (including over-the-counter products) are added. *  Please carry medication information at all times in case of emergency situations. These are general instructions for a healthy lifestyle:  No smoking/ No tobacco products/ Avoid exposure to second hand smoke  Surgeon General's Warning:  Quitting smoking now greatly reduces serious risk to your health. Obesity, smoking, and sedentary lifestyle greatly increases your risk for illness  A healthy diet, regular physical exercise & weight monitoring are important for maintaining a healthy lifestyle    You may be retaining fluid if you have a history of heart failure or if you experience any of the following symptoms:  Weight gain of 3 pounds or more overnight or 5 pounds in a week, increased swelling in our hands or feet or shortness of breath while lying flat in bed.   Please call your doctor as soon as you notice any of these symptoms; do not wait until your next office visit.

## 2022-12-30 NOTE — ANESTHESIA POSTPROCEDURE EVALUATION
Department of Anesthesiology  Postprocedure Note    Patient: Gwendolyn Martínez  MRN: [id number]  YOB: 1950  Date of evaluation: 12/30/2022      Procedure Summary     Date: 12/30/22 [facility] / [facility] / [facility]    Anesthesia Start: 1830 Anesthesia Stop: 0940    Procedures:       SINUS ENDOSCOPY FUNCTIONAL SURGERY IMAGE GUIDED with Right-Sided Maxillary Antrostomies with Tissue Removal, Right-Sided  Total Ethmoidectomy, Propel Stent (Right: Nose)      SEPTOPLASTY GRAFT (Nose)      INFERIOR TURBINATE REDUCTION (Bilateral: Nose) Diagnosis:       Chronic rhinitis      Nasal obstruction      Deviated nasal septum      Hypertrophy, nasal, turbinate      (Chronic rhinitis [J31.0])      (Nasal obstruction [J34.89])      (Deviated nasal septum [J34.2])      (Hypertrophy of both inferior nasal turbinates [J34.3])    Surgeons: Mikal Mejia DO Responsible Provider: Jayson [facility] MD    Anesthesia Type: general ASA Status: 3          Anesthesia Type: No value filed.     Chester Phase I: Chester Score: 8    Chester Phase II: Chester Score: 10      Anesthesia Post Evaluation    Patient location during evaluation: PACU  Patient participation: complete - patient participated  Level of consciousness: awake and alert  Airway patency: patent  Nausea & Vomiting: no nausea  Cardiovascular status: hemodynamically stable  Respiratory status: acceptable  Hydration status: euvolemic

## 2022-12-30 NOTE — OP NOTE
[place]  OPERATIVE REPORT    Name:  Colette Tamayo  MR#:  [id number]  :  1950  ACCOUNT #:  [de-identified]  DATE OF SERVICE:  2022    PREOPERATIVE DIAGNOSES:  Chronic rhinosinusitis, deviated nasal septum, turbinate hypertrophy. POSTOPERATIVE DIAGNOSES:  Chronic rhinosinusitis, deviated nasal septum, turbinate hypertrophy. PROCEDURES PERFORMED:  Septoplasty, bilateral submucous resection of inferior turbinates, endoscopic sinus surgery with image navigation, right-sided maxillary antrostomy with tissue removal, right-sided total ethmoidectomy, right-sided frontal sinusotomy, Propel stent placement. SURGEON:  Horacio Silver DO    ASSISTANT: None. ANESTHESIA: General anesthesia with endotracheal tube. COMPLICATIONS:  None. SPECIMENS REMOVED:  Right maxillary sinus contents for culture. IMPLANTS: Propel stents. ESTIMATED BLOOD LOSS:  Less than 50 mL. FINDINGS:  Significant right-sided paranasal sinus disease and inflammation changes with fungus ball present in the right maxillary sinus. DISPOSITION:  Stable to PACU. INDICATIONS FOR PROCEDURE:  This is a 80-year-old male with a very longstanding history of chronic rhinosinusitis, greater on the right side as compared to the left, found in the office to have chronic sinusitis changes which were not responsive to long-term medical therapy regimen and after definitive maximal medical therapy, CT imaging was performed which did confirm chronic right-sided paranasal sinus disease and therefore, all risks, benefits, and alternatives were reviewed for surgical interventions. Informed consent was obtained and signed. He was scheduled for the operating room. DESCRIPTION OF PROCEDURE:  The patient was brought from the preoperative waiting area to the operating room and laid supine on the operating room table by the anesthesia team.  General anesthesia was induced. Endotracheal tube was placed.   A time-out was then performed. He was then prepped and draped in the usual sterile fashion. Afrin-soaked nasal pledgets were placed on either side of the nasal cavity and instillation to the nasal septum of approximately 5 mL of 1% lidocaine with 1:100,000 epinephrine was placed and the image navigation surgical device was brought into the surgical field and appropriately calibrated for the patient with imaging. Next, the small nasal speculum was then used to visualize the nasal cavity which revealed a bony deviated septum towards the right narrowing of the right middle meatus and next, a hemitransfixion incision was then performed in the left nasal cavity taking down to the level of the submucoperichondrium which was then found to be significantly scarred down from either a prior septal surgery or trauma. Therefore, in a very careful manner, the old scar tissue bands were removed revealing a deviated vomer bone and perpendicular plate of the ethmoid with minimal residual cartilaginous tissue remaining. Deviated portions of the bony nasal septum were then removed in a piecewise fashion using double-action Sarthak-Hauser forceps and re-evaluation with nasal endoscopy revealed there was a significant improvement to the middle meatus narrowing. Therefore, the nasal septum was then irrigated with several hundred milliliters of sterile saline and reapproximating the hemitransfixion site with the simple interrupted 4-0 chromic sutures and then the submucosal flaps were then reapproximated with a running 4-0 plain gut suture in a mattress style fashion. Next, nasal endoscopy was performed on the right side revealing significant inflammatory changes, polypoid diminished changes to the right middle turbinate and right middle meatus with purulent material present.   Next, under image navigation, the area of the right natural maxillary os was located and palpated using a [place] probe, ballpoint seeker probe and then placed into the right maxillary sinus infundibulum and widened anteriorly and an uncinectomy and maxillary antrostomy was then performed using a pediatric backbiter and a microdebrider StraightShot device. Next, the remaining portion of the horizontal and vertical components of the uncinate bone were removed in a piecewise fashion using a straight and upbiting Thru-Cutting forceps and removed with Blakesley forceps. This revealed the interior contents of the right maxillary sinus to be completely full with a fungus ball which was suctioned and irrigated for removal revealing a very inflamed and edematous mucosal lining of the right maxillary sinus. A wide maxillary antrostomy was then performed using the StraightShot microdebrider and Thru-Cutting forceps. Next, the anterior ethmoid bulla was highly inflamed due to the close proximity with chronic inflammation and this was opened up using a J-curette and microdebrider and then through the basal lamella and into the posterior ethmoid cells and a wide total ethmoidectomy was then performed using blunt dissection and all performed under image navigation to ensure that there was no inadvertent injury into the orbit or the skull base. Next, the residual inflammatory changes did extend towards the frontal ethmoid recess, therefore a right frontal sinusotomy was then also performed using up Thru-Cutting forceps, J-curette and Blakesley forceps until the frontal recess was widely patent and removed the polypoid edematous changes of the mucosal lining.   Next, several hundred milliliters of sterile saline was then used to irrigate the right frontal, ethmoid, and maxillary sinuses and suctioned for removal revealing a significantly improvement to the inflamed tissue that was now mostly removed and the sinuses were widely patent  and at this time, Propel stents were placed, one regular size Propel to the right middle meatus and a Contour-style Propel stent to the right maxillary antrostomy site and at this time, the inferior turbinates were addressed due to their hypertrophy where a single stab incision was placed on the anterior face of the bilateral inferior turbinates and then a submucosal plane was then raised posteriorly using a Nandini elevator and in a submucosal fashion, soft tissue and bone were then removed using a 2 mm StraightShot microdebrider for a volumetric decrease of the size and following this, the inferior turbinates were then both outfractured using a Craig elevator. Hemostasis was satisfactorily achieved with topical pressure with Afrin-soaked pledgets and next Betancur splints were placed on either side of the nasal septum and sutured in place with a single transcolumellar 4-0 Prolene suture and at this time, the care of the patient was transferred back over to Anesthesia where he awoke from anesthesia with no complications and was extubated successfully. He was then transferred to the PACU in stable condition.       [address] [place] [address]      [id number]  D:  12/30/2022 9:55  T:  12/30/2022 13:37  JOB #:  [id number]

## 2022-12-30 NOTE — H&P
HPI:  Abdoulaye Wright is a 67 y.o. male seen Established   Chief Complaint   Patient presents with    Follow-up     Patient presents today for 4 week recheck . Patient states that he has not noticed much difference or improvement . He has some throat discomfort . Patient states that he has some PND due to nasal sprays. 28-year-old male presents for follow-up evaluation now approximately 3 weeks status post last evaluation with the finding of chronic right-sided rhinosinusitis. He has just recently finished 3 total weeks of Augmentin therapy for which he was also using Flonase and saline irrigations. He has times where he cannot get the saline irrigations to even flow from the right side of his nose. Overall there has been limited if any benefit to his symptoms of sinonasal pain and pressure congestion and drainage. As review he does have diabetes mellitus and we have been withholding oral systemic steroids. He had a CT scan done 2 months prior to our original evaluation which showed complete opacification of the right maxillary sinus with internal calcifications and some mucus thickening of the mucosal linings of the right ethmoid sinuses. He has struggled with the symptoms for the last few years. Past Medical History, Past Surgical History, Family history, Social History, and Medications were all reviewed with the patient today and updated as necessary.      Allergies   Allergen Reactions    Other Anaphylaxis     Combid - no longer sold in [place] per patient     Amoxicillin-Pot Clavulanate Diarrhea       Patient Active Problem List   Diagnosis    Persistent atrial fibrillation (HCC)    Type 2 diabetes mellitus with nephropathy (HCC)    Anxiety    Arrhythmia    Diverticulitis    Mass of soft tissue of face    History of shingles    Other dysphagia    Chest pain    Atrial fibrillation (HCC)    Chronic pain    BULMARO on CPAP    Hypersomnia    Unstable angina pectoris (HCC)    Neck mass    S/P excision of lipoma    Neuropraxia of left median nerve    S/P ablation of atrial fibrillation    Hypercholesterolemia    S/P CABG (coronary artery bypass graft)    Calculus of kidney    CAD (coronary artery disease)    Hypertension    HLD (hyperlipidemia)    Thyroid disease    Chronic renal disease, stage III (HCC) [551345]    Chronic rhinitis    Nasal obstruction    Deviated nasal septum    Hypertrophy of both inferior nasal turbinates       Current Outpatient Medications   Medication Sig    benazepril (LOTENSIN) 20 MG tablet Take 1 tablet by mouth daily    glimepiride (AMARYL) 1 MG tablet Take 1 tablet by mouth every morning (before breakfast) TAKE 1 TABLET EVERY DAY BEFORE BREAKFAST    levothyroxine (SYNTHROID) 88 MCG tablet Take 1 tablet by mouth in the morning. TAKE 1 TABLET BY MOUTH EVERY DAY BEFORE BREAKFAST.    omeprazole (PRILOSEC) 20 MG delayed release capsule Take 1 capsule by mouth in the morning. TAKE 1 CAPSULE EVERY DAY. gabapentin (NEURONTIN) 300 MG capsule Take 300 mg by mouth 3 times daily. nitroGLYCERIN (NITROSTAT) 0.4 MG SL tablet Place 1 tablet under the tongue every 5 minutes as needed for Chest pain Up to 3 tabs in 24 hrs. aspirin 81 MG EC tablet Take by mouth    atorvastatin (LIPITOR) 80 MG tablet TAKE 1 TABLET EVERY DAY    cyanocobalamin 1000 MCG tablet Take 1,000 mcg by mouth    dabigatran (PRADAXA) 150 MG capsule TAKE 1 CAPSULE EVERY 12 HOURS    dofetilide (TIKOSYN) 250 MCG capsule Take 250 mcg by mouth every 12 hours    fenofibrate (TRIGLIDE) 160 MG tablet TAKE 1 TABLET EVERY DAY    tamsulosin (FLOMAX) 0.4 MG capsule Take 0.4 mg by mouth every evening     No current facility-administered medications for this visit. Past Medical History:   Diagnosis Date    Anxiety     Arrhythmia     Atrial fib.     CAD (coronary artery disease) 1995    followed by Dr. Kimberly Avery    Calculus of kidney     right renal calculi    Chronic pain     back     DDD (degenerative disc disease)     Diabetes ([facility] [address] type 2; glimeperide as needed per patient ; a1c = 8.0 as of 5/5/2021; check sugars daily avg is 80 per patient; hypoglycemic at 60     Diverticulitis 2013    Endocrine disease     thyroid    GERD (gastroesophageal reflux disease)     controlled with medication     History of EKG 02/15/2021    NSR with incomp RBBB    History of shingles 2013    HLD (hyperlipidemia) 7/5/2016    Hx of echocardiogram 07/01/2019    LVEF >55%    Hypercholesteremia     medication     Hypertension     controlled with medication  per patient     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     anxiety - controlled     S/P CABG (coronary artery bypass graft) 07/05/2016    4 vessel bypass    Sleep apnea     cpap    Sleep disorder 2007    apnea-uses CPAP    Thyroid disease     Hypothyroid       Past Surgical History:   Procedure Laterality Date    [address]  06/25/2019    COLONOSCOPY N/A 7/14/2017    COLONOSCOPY performed by Corey Callaway MD at [facility] ENDOSCOPY    COLONOSCOPY  2010    Dr. Jany Fritz (5yrs)    COLONOSCOPY  07/14/2017    CORONARY ARTERY BYPASS GRAFT      HEENT      nasal surgery 2ndary to obstruction    HEENT  02/2015    cyst removal    OTHER SURGICAL HISTORY      lasik eyes    [place] CARDIAC SURG PROCEDURE UNLIST  6/225/19    ablation    [place] CARDIAC SURG PROCEDURE UNLIST  1999    CABG       Social History     Tobacco Use    Smoking status: Never    Smokeless tobacco: Never   Substance Use Topics    Alcohol use: Not Currently       Family History   Problem Relation Age of Onset    Heart Disease Mother     Heart Disease Father     Heart Disease Brother         ROS:    Review of Systems   Constitutional:  Negative for fever. HENT:  Positive for postnasal drip and sore throat. Negative for ear discharge and ear pain. Eyes:  Negative for discharge. Respiratory:  Negative for cough. Cardiovascular:  Negative for chest pain.    Gastrointestinal:  Negative for abdominal pain.   Endocrine: Negative for cold intolerance. Genitourinary:  Negative for difficulty urinating. Musculoskeletal:  Negative for neck pain. Skin:  Negative for rash. Allergic/Immunologic: Negative for environmental allergies. Neurological:  Negative for dizziness. Hematological:  Negative for adenopathy. Psychiatric/Behavioral:  Negative for agitation. PHYSICAL EXAM:    Resp 18   Ht 5' 9\" (1.753 m)   Wt 177 lb (80.3 kg)   BMI 26.14 kg/m²     Physical Exam       PROCEDURE: Nasal endoscopy  INDICATIONS: Chronic rhinosinusitis  DESCRIPTION: After verbal consent was obtained and a timeout was performed, the 0 degree rigid nasal endoscope was advanced into both nares. The septum was deviated to the right posteriorly with some narrowing of the right middle meatus w/ no perforations. There were no masses seen along the nasal floor or within the nasopharynx. Right nasal endoscopy showed narrowing of the right middle meatus secondary to DNS and significant mucopurulence extruding from the right infundibulum of the maxillary sinus. And the sphenoethmoid recess was was clear. On the L side, the mucosa was healthy and the turbinates were intact. The middle meatus was clear w/ no edema, polyps or mucopurulence and the sphenoethmoid recess was was clear. The scope was then removed and the patient tolerated the procedure well w/ no complications. CT Result (most recent):  CT MAXILLOFACIAL WO CONTRAST 10/19/2022    Narrative  MAXILLOFACIAL CT WITHOUT CONTRAST    HISTORY:  Chronic rhinitis; Chronic sinusitis, unspecified    TECHNIQUE: Noncontrast axial images were obtained through the face. Multiplanar  reformatted images were generated. All CT scans at this facility used dose  modulation, iterative reconstruction and/or weight based dosing when appropriate  to reduce radiation dose to as low as reasonably achievable. COMPARISON:  August 15, 2022    FINDINGS:    Right side:  The frontal sinus is well aerated with patent frontal recess. There  is complete opacification of the maxillary sinus. There is partial calcification  of content centrally. Ethmoid air cells are well aerated. There is aeration of  the right sphenoid sinus. Left side: The frontal sinus is well aerated. The ethmoid air cells are clear. The maxillary sinus is clear with a patent ostium and infundibulum. The sphenoid  sinus is clear. The nasal septum is situated in the midline. The mastoid air cells and middle  ear complexes are clear. Osteoarthritis is present in the right temporomandibular joint and to a lesser  extent in the left TMJ. Impression  Complete opacification right of the right maxillary sinus with  centrally calcified contents. ASSESSMENT and PLAN        ICD-10-CM    1. Chronic rhinosinusitis  J31.0 CT MAXILLOFACIAL WO CONTRAST    J32.9 NASAL ENDOSCOPY,DX      2. Deviated nasal septum  J34.2       3. Nasal turbinate hypertrophy  J34.3           Patient with little or no benefit with 3 weeks of oral Augmentin therapy and Flonase/saline daily. Repeat nasal endoscopy today shows continuation of mucopurulence from the right middle meatus and a right-sided DNS. We once again reviewed over his recent CT scan from 2 months ago which does show complete right-sided opacification and internal calcifications which could also indicate chronic fungal sinusitis. Today's nasal endoscopy also showed continuation of right middle meatus mucopurulence. As he has failed optimal maximal medical therapy we discussed treatment options to include recommending endoscopic sinus surgery at this time. He would also require septoplasty for access and to relieve narrowing of the right middle meatus. I recommend proceeding w/ FESS with right sided maxillary antrostomies, total ethmoidectomies.      CT with evidence of right sided chronic sinusitis     I discussed all the risks of surgery including bleeding, infection, continued sinonasal symptoms, CSF leak, damage to orbit w/ vision changes, and need for further procedures and they would like to proceed. I discussed all the risks of septoplasty and/or SMR of turbinates surgery including bleeding, septal hematoma, septal perforation, continued nasal obstruction, change in sense of smell, CSF leak, and numbness/tingling of upper teeth/lips, and they would like to proceed. In the interim time we will have him initiate twice daily medicated sinonasal rinses with Levaquin and budesonide to be used for the next couple weeks and is well for the first 2 weeks after surgery. Still withholding oral systemic steroids secondary to diabetes mellitus.       Jordan Ribeiro, DO  10/17/2022

## 2022-12-30 NOTE — BRIEF OP NOTE
Brief Postoperative Note      Patient: Adeel Camarillo  YOB: 1950  MRN: [id number]    Date of Procedure: 12/30/2022    Pre-Op Diagnosis: Chronic rhinosinusitis [J31.0]  Nasal obstruction [J34.89]  Deviated nasal septum [J34.2]  Hypertrophy of both inferior nasal turbinates [J34.3]    Post-Op Diagnosis: Same       Procedure(s):  SINUS ENDOSCOPY FUNCTIONAL SURGERY IMAGE GUIDED with Right-Sided Maxillary Antrostomies with Tissue Removal, Right-Sided  Total Ethmoidectomy, Propel Stent  SEPTOPLASTY GRAFT  INFERIOR TURBINATE REDUCTION    Surgeon(s):  Martha Anderson DO    Assistant:  * No surgical staff found *    Anesthesia: General    Estimated Blood Loss (mL): less than 50     Complications: None    Specimens:   ID Type Source Tests Collected by Time Destination   1 : right maxillary sinus Tissue Maxillary Sinus CULTURE, ANAEROBIC, CULTURE, FUNGUS, AFB CULTURE + SMEAR W/RFLX ID FROM CULTURE Martha Anderson DO 12/30/2022 [id number]        Implants:  Implant Name Type Inv.  Item Serial No.  Lot No. LRB No. Used Action   IMPLANT NSL L23MM MOMETASONE FUROATE PROPEL - [id number]  IMPLANT NSL L23MM MOMETASONE FUROATE PROPEL  [employer] [id number] Right 1 Implanted   STENT NSL 370UG MOMETASONE FUROATE BIOABSRB IMPL W/ DEL SYS ORDER BY BX - [id number]  STENT NSL 370UG MOMETASONE FUROATE BIOABSRB IMPL W/ DEL SYS ORDER BY BX  [employer] [id number] Right 1 Implanted         Drains: * No LDAs found *    Findings: Right-sided maxillary sinus fungus ball, chronic sinusitis of right maxillary sinus, right ethmoid sinus, right frontal sinus    Electronically signed by Martha Anderson DO on 12/30/2022 at 9:45 AM

## 2022-12-30 NOTE — ANESTHESIA PRE PROCEDURE
Department of Anesthesiology  Preprocedure Note       Name:  Radha William   Age:  67 y.o.  :  1950                                          MRN:  [id number]         Date:  2022      Surgeon: Lon Soriano):  Timothy Blevins DO    Procedure: Procedure(s):  SINUS ENDOSCOPY FUNCTIONAL SURGERY IMAGE GUIDED with Right-Sided Maxillary Antrostomies with Tissue Removal, Right-Sided  Total Ethmoidectomy, Propel Stent  SEPTOPLASTY GRAFT  INFERIOR TURBINATE REDUCTION    Medications prior to admission:   Prior to Admission medications    Medication Sig Start Date End Date Taking? Authorizing Provider   NONFORMULARY Take by mouth daily Levofloxacin 50 mg/Budesonide 0.75mg   Yes Historical Provider, MD   dabigatran (PRADAXA) 150 MG capsule TAKE 1 CAPSULE EVERY 12 HOURS  Patient taking differently: Take 150 mg by mouth in the morning and 150 mg in the evening. Indications: per pt takes 1st dose at 11 am and 2nd dose at 11pm. TAKE 1 CAPSULE EVERY 12 HOURS.  22   Jose Thomson MD   benazepril (LOTENSIN) 20 MG tablet Take 1 tablet by mouth daily  Patient taking differently: Take 20 mg by mouth Daily with lunch 22   Jose Thomson MD   atorvastatin (LIPITOR) 80 MG tablet Take 1 tablet by mouth daily TAKE 1 TABLET EVERY DAY  Patient taking differently: Take 80 mg by mouth at bedtime TAKE 1 TABLET EVERY DAY 22   Jose Thomson MD   dofetilide (TIKOSYN) 250 MCG capsule TAKE 1 CAPSULE EVERY 12 HOURS  Patient taking differently: Take 250 mcg by mouth 2 times daily Indications: per pt takes 1st dose at 11 am and 2nd dose at 11pm TAKE 1 CAPSULE EVERY 12 HOURS 22   Jose Thomson MD   [facility] BLOOD SUGAR EVERY DAY E11.65 10/31/22   Francia Carrington MD   glimepiride (AMARYL) 1 MG tablet Take 1 tablet by mouth every morning (before breakfast) TAKE 1 TABLET EVERY DAY BEFORE BREAKFAST  Patient taking differently: Take 0.5 mg by mouth every morning (before breakfast) TAKE 1 TABLET EVERY DAY BEFORE BREAKFAST 8/10/22   Alicia Garner MD   levothyroxine (SYNTHROID) 88 MCG tablet Take 1 tablet by mouth in the morning. TAKE 1 TABLET BY MOUTH EVERY DAY BEFORE BREAKFAST. Patient taking differently: Take 88 mcg by mouth Daily Indications: before lunch TAKE 1 TABLET BY MOUTH EVERY DAY BEFORE BREAKFAST 8/10/22   Alicia Garner MD   omeprazole (PRILOSEC) 20 MG delayed release capsule Take 1 capsule by mouth in the morning. TAKE 1 CAPSULE EVERY DAY. Patient taking differently: Take 20 mg by mouth Daily with lunch TAKE 1 CAPSULE EVERY DAY 8/10/22   Alicia Garner MD   gabapentin (NEURONTIN) 300 MG capsule Take 300 mg by mouth 3 times daily. Patient taking differently: Take 300 mg by mouth 3 times daily. Take 300 mg by mouth 3 times daily. 8/10/22 8/10/23  Alicia Garner MD   nitroGLYCERIN (NITROSTAT) 0.4 MG SL tablet Place 1 tablet under the tongue every 5 minutes as needed for Chest pain Up to 3 tabs in 24 hrs.  6/6/22   Montez Crowley MD   aspirin 81 MG EC tablet Take 81 mg by mouth Daily with lunch    Ar Automatic Reconciliation   cyanocobalamin 1000 MCG tablet Take 1,000 mcg by mouth    Ar Automatic Reconciliation   fenofibrate (TRIGLIDE) 160 MG tablet Take 160 mg by mouth Daily with lunch TAKE 1 TABLET EVERY DAY 2/15/22   Ar Automatic Reconciliation   tamsulosin (FLOMAX) 0.4 MG capsule Take 0.4 mg by mouth every evening    Ar Automatic Reconciliation       Current medications:    Current Facility-Administered Medications   Medication Dose Route Frequency Provider Last Rate Last Admin    lidocaine 1 % injection 1 mL  1 mL IntraDERmal Once PRN ABBY Coe MD        lactated ringers infusion   IntraVENous Continuous ABBY Coe MD 75 mL/hr at 12/30/22 0613 New Bag at 12/30/22 2538    sodium chloride flush 0.9 % injection 5-40 mL  5-40 mL IntraVENous 2 times per day ABBY Coe MD        sodium chloride flush 0.9 % injection 5-40 mL  5-40 mL IntraVENous PRN ABBY Gardiner Catie Lorenzo MD        0.9 % sodium chloride infusion   IntraVENous PRN ABBY Rogers MD        midazolam PF (VERSED) injection 2 mg  2 mg IntraVENous Once PRN ABBY Rogers MD           Allergies: Allergies   Allergen Reactions    Other Anaphylaxis     Combid - no longer sold in [place] per patient        Problem List:    Patient Active Problem List   Diagnosis Code    Persistent atrial fibrillation (HCC) I48.19    Type 2 diabetes mellitus with nephropathy (HCC) E11.21    Anxiety F41.9    Arrhythmia I49.9    Diverticulitis K57.92    Mass of soft tissue of face M79.89    History of shingles Z86.19    Other dysphagia R13.19    Chest pain R07.9    Atrial fibrillation ([facility] [address] I48.91    Chronic pain G89.29    BULMARO on CPAP G47.33, Z99.89    Hypersomnia G47.10    Unstable angina pectoris ([address] I20.0    Neck mass R22.1    S/P excision of lipoma Z98.890, Z86.018    Neuropraxia of left median nerve S54. 14XA    S/P ablation of atrial fibrillation Z98.890, Z86.79    Hypercholesterolemia E78.00    S/P CABG (coronary artery bypass graft) Z95.1    Calculus of kidney N20.0    CAD (coronary artery disease) I25.10    Hypertension I10    HLD (hyperlipidemia) E78.5    Thyroid disease E07.9    Chronic renal disease, stage III ([facility]) [853640] N18.30    Chronic rhinitis J31.0    Nasal obstruction J34.89    Deviated nasal septum J34.2    Hypertrophy of both inferior nasal turbinates J34.3       Past Medical History:        Diagnosis Date    Anxiety     Arrhythmia     Atrial fib.     CAD (coronary artery disease) 1995    followed by Dr. Sarah De Luna Calculus of kidney     right renal calculi    Chronic pain     back     DDD (degenerative disc disease)     Diabetes ([address]     type 2; glimeperide as needed per patient ; a1c = 8.0 as of 5/5/2021; check sugars daily avg is 80 per patient; hypoglycemic at 61     Diverticulitis 2013    Endocrine disease     thyroid    GERD (gastroesophageal reflux disease) controlled with medication     History of EKG 02/15/2021    NSR with incomp RBBB    History of shingles 2013    HLD (hyperlipidemia) 7/5/2016    Hx of echocardiogram 07/01/2019    LVEF >55%    Hypercholesteremia     medication     Hypertension     controlled with medication  per patient     Neuropraxia of left median nerve 10/14/2019    Psychiatric disorder     anxiety - controlled     S/P CABG (coronary artery bypass graft) 07/05/2016    4 vessel bypass    Sleep apnea     cpap    Sleep disorder 2007    apnea-uses CPAP    Thyroid disease     Hypothyroid       Past Surgical History:        Procedure Laterality Date   [address]  06/25/2019    COLONOSCOPY N/A 7/14/2017    [facility] [address] [facility] [address]    [facility]. Rose Dang ([facility])    COLONOSCOPY  07/14/2017    CORONARY ARTERY BYPASS GRAFT      quadruple bypass    HEENT      nasal surgery 2ndary to obstruction    HEENT  02/2015    cyst removal    OTHER SURGICAL HISTORY      lasik eyes    [place] CARDIAC SUR[place] PROCEDURE UNLIST  6/225/19    ablation    [place]ARD[place]EDURE [place]  1999    CABG       Social History:    Social History     Tobacco Use    Smoking status: Never    Smokeless tobacco: Never   Substance Use Topics    Alcohol use: Not Currently                                Counseling given: Not Answered      Vital Signs (Current):   Vitals:    11/04/22 1319 12/29/22 1244 12/30/22 0531   BP:   120/75   Pulse:   71   Resp:   18   Temp:   97.4 °F (36.3 °C)   TempSrc:   Temporal   SpO2:   95%   Weight: 174 lb (78.9 kg) 172 lb (78 kg) 173 lb 14.4 oz (78.9 kg)   Height: 5' 9\" (1.753 m) 5' 10\" (1.778 m) 5' 10\" (1.778 m)                                              BP Readings from Last 3 Encounters:   12/30/22 120/75   12/27/22 (!) 158/108   10/03/22 126/74       NPO Status: Time of last liquid consumption: 2300                        Time of last solid consumption: 2100                        Date of last liquid consumption: 12/29/22                        Date of last solid food consumption: 12/29/22    BMI:   Wt Readings from Last 3 Encounters:   12/30/22 173 lb 14.4 oz (78.9 kg)   12/27/22 172 lb (78 kg)   11/28/22 171 lb (77.6 kg)     Body mass index is 24.95 kg/m².     CBC:   Lab Results   Component Value Date/Time    WBC 3.7 04/27/2022 10:28 AM    RBC 4.47 04/27/2022 10:28 AM    HGB 12.8 04/27/2022 10:28 AM    HCT 39.6 04/27/2022 10:28 AM    MCV 89 04/27/2022 10:28 AM    RDW 14.4 04/27/2022 10:28 AM     04/27/2022 10:28 AM       CMP:   Lab Results   Component Value Date/Time     12/30/2022 06:17 AM    K 4.5 12/30/2022 06:17 AM     12/30/2022 06:17 AM    CO2 28 12/30/2022 06:17 AM    BUN 23 12/30/2022 06:17 AM    CREATININE 1.43 12/30/2022 06:17 AM    GFRAA 55 08/10/2022 02:24 PM    AGRATIO 2.0 04/27/2022 10:28 AM    LABGLOM 52 12/30/2022 06:17 AM    GLUCOSE 145 12/30/2022 06:17 AM    PROT 6.6 08/10/2022 02:24 PM    CALCIUM 9.5 12/30/2022 06:17 AM    BILITOT 0.6 08/10/2022 02:24 PM    ALKPHOS 91 08/10/2022 02:24 PM    ALKPHOS 100 04/27/2022 10:28 AM    AST 27 08/10/2022 02:24 PM    ALT 32 08/10/2022 02:24 PM       POC Tests:   Recent Labs     12/30/22  0620   POCGLU 126*       Coags:   Lab Results   Component Value Date/Time    PROTIME 15.0 09/09/2021 07:34 AM    INR 1.2 09/09/2021 07:34 AM       HCG (If Applicable): No results found for: PREGTESTUR, PREGSERUM, HCG, HCGQUANT     ABGs: No results found for: PHART, PO2ART, PKF1VTP, KZZ4ZSX, BEART, F1DGVBQX     Type & Screen (If Applicable):  No results found for: LABABO, LABRH    Drug/Infectious Status (If Applicable):  Lab Results   Component Value Date/Time    HEPCAB <0.1 11/15/2016 07:46 AM       COVID-19 Screening (If Applicable): No results found for: COVID19        Anesthesia Evaluation  Patient summary reviewed and Nursing notes reviewed history of anesthetic complications (Persistent L wrist/arm pain after arterial line): Airway: Mallampati: II  TM distance: <3 FB   Neck ROM: full  Mouth opening: > = 3 FB   Dental: normal exam         Pulmonary: breath sounds clear to auscultation  (+) sleep apnea: on CPAP,                             Cardiovascular:  Exercise tolerance: good (>4 METS),   (+) hypertension:, CAD:, CABG/stent (s/p CABG):, dysrhythmias (Pradaxa held x 3d, on Tikosyn): atrial fibrillation,         Rhythm: regular  Rate: normal                 ROS comment: Cath 2021 - 3/4 bypass grafts patent (occluded D1)  Echo 2019 - normal EF, no sig valve dz  S/p A fib ablation with refractory A fib - on Pradaxa/Tikosyn     Neuro/Psych:   (+) depression/anxiety             GI/Hepatic/Renal:   (+) GERD: well controlled, renal disease (Cr 1.4): CRI,           Endo/Other:    (+) DiabetesType II DM, well controlled, , hypothyroidism::., .                 Abdominal:             Vascular: negative vascular ROS. Other Findings:           Anesthesia Plan      general     ASA 3       Induction: intravenous. Anesthetic plan and risks discussed with patient and spouse.                         Chuy Schwartz MD   12/30/2022

## 2022-12-31 LAB
FUNGAL CULT/SMEAR: NORMAL
SPECIMEN PROCESSING: NORMAL
SPECIMEN SOURCE: NORMAL

## 2023-01-01 LAB
ACID FAST STN SPEC: NEGATIVE
BACTERIA SPEC CULT: ABNORMAL
GRAM STN SPEC: ABNORMAL
GRAM STN SPEC: ABNORMAL
MYCOBACTERIUM SPEC QL CULT: NORMAL
SERVICE CMNT-IMP: ABNORMAL
SPECIMEN PREPARATION: NORMAL
SPECIMEN SOURCE: NORMAL

## 2023-01-03 LAB
FUNGAL CULT/SMEAR: NORMAL
SPECIMEN PROCESSING: NORMAL
SPECIMEN SOURCE: NORMAL

## 2023-01-04 ENCOUNTER — OFFICE VISIT (OUTPATIENT)
Dept: ENT CLINIC | Age: 73
End: 2023-01-04
Payer: MEDICARE

## 2023-01-04 VITALS — RESPIRATION RATE: 17 BRPM | BODY MASS INDEX: 24.77 KG/M2 | WEIGHT: 173 LBS | HEIGHT: 70 IN

## 2023-01-04 DIAGNOSIS — Z98.890 S/P FESS (FUNCTIONAL ENDOSCOPIC SINUS SURGERY): ICD-10-CM

## 2023-01-04 DIAGNOSIS — Z98.890 S/P NASAL SEPTOPLASTY: ICD-10-CM

## 2023-01-04 DIAGNOSIS — J32.9 CHRONIC RHINOSINUSITIS: Primary | ICD-10-CM

## 2023-01-04 DIAGNOSIS — J31.0 CHRONIC RHINOSINUSITIS: Primary | ICD-10-CM

## 2023-01-04 PROCEDURE — 31237 NSL/SINS NDSC SURG BX POLYPC: CPT | Performed by: STUDENT IN AN ORGANIZED HEALTH CARE EDUCATION/TRAINING PROGRAM

## 2023-01-04 PROCEDURE — 99024 POSTOP FOLLOW-UP VISIT: CPT | Performed by: STUDENT IN AN ORGANIZED HEALTH CARE EDUCATION/TRAINING PROGRAM

## 2023-01-04 ASSESSMENT — ENCOUNTER SYMPTOMS
ABDOMINAL PAIN: 0
EYE DISCHARGE: 0
COUGH: 0

## 2023-01-04 NOTE — PROGRESS NOTES
HPI:  Kimberley Prasad is a 67 y.o. male seen Established   Chief Complaint   Patient presents with    [address] [place] [address] f/u of Septoplasty , SMRITS done 12/30/22.          51-year-old male seen for postoperative follow-up evaluation now 5 days status post septoplasty turbinate reduction and right-sided endoscopic sinus surgery. He is doing very well having noted a significant improvement to his nasal airway breathing and less sinonasal pain and pressure. He continues to use saline irrigations as recommended and his prescribed antibiotics. There has been little or no bleeding over the last 48 hours. Past Medical History, Past Surgical History, Family history, Social History, and Medications were all reviewed with the patient today and updated as necessary. Allergies   Allergen Reactions    Other Anaphylaxis     Combid - no longer sold in [place] per patient        Patient Active Problem List   Diagnosis    Persistent atrial fibrillation (HCC)    Type 2 diabetes mellitus with nephropathy (HCC)    Anxiety    Arrhythmia    Diverticulitis    Mass of soft tissue of face    History of shingles    Other dysphagia    Chest pain    Atrial fibrillation (HCC)    Chronic pain    BULMARO on CPAP    Hypersomnia    Unstable angina pectoris (HCC)    Neck mass    S/P excision of lipoma    Neuropraxia of left median nerve    S/P ablation of atrial fibrillation    Hypercholesterolemia    S/P CABG (coronary artery bypass graft)    Calculus of kidney    CAD (coronary artery disease)    Hypertension    HLD (hyperlipidemia)    Thyroid disease    Chronic renal disease, stage III (HCC) [144254]    Chronic rhinosinusitis    Nasal obstruction    Deviated nasal septum    Nasal turbinate hypertrophy       Current Outpatient Medications   Medication Sig    dabigatran (PRADAXA) 150 MG capsule TAKE 1 CAPSULE EVERY 12 HOURS (Patient taking differently: Take 150 mg by mouth in the morning and 150 mg in the evening.  Indications: per pt takes 1st dose at 11 am and 2nd dose at 11pm. TAKE 1 CAPSULE EVERY 12 HOURS.)    benazepril (LOTENSIN) 20 MG tablet Take 1 tablet by mouth daily (Patient taking differently: Take 20 mg by mouth Daily with lunch)    atorvastatin (LIPITOR) 80 MG tablet Take 1 tablet by mouth daily TAKE 1 TABLET EVERY DAY (Patient taking differently: Take 80 mg by mouth at bedtime TAKE 1 TABLET EVERY DAY)    dofetilide (TIKOSYN) 250 MCG capsule TAKE 1 CAPSULE EVERY 12 HOURS (Patient taking differently: Take 250 mcg by mouth 2 times daily Indications: per pt takes 1st dose at 11 am and 2nd dose at 11pm TAKE 1 CAPSULE EVERY 12 HOURS)    NONFORMULARY Take by mouth daily Levofloxacin 50 mg/Budesonide 0.75mg    ONETOUCH VERIO strip CHECK BLOOD SUGAR EVERY DAY E11.65    glimepiride (AMARYL) 1 MG tablet Take 1 tablet by mouth every morning (before breakfast) TAKE 1 TABLET EVERY DAY BEFORE BREAKFAST (Patient taking differently: Take 0.5 mg by mouth every morning (before breakfast) TAKE 1 TABLET EVERY DAY BEFORE BREAKFAST)    levothyroxine (SYNTHROID) 88 MCG tablet Take 1 tablet by mouth in the morning. TAKE 1 TABLET BY MOUTH EVERY DAY BEFORE BREAKFAST. (Patient taking differently: Take 88 mcg by mouth Daily Indications: before lunch TAKE 1 TABLET BY MOUTH EVERY DAY BEFORE BREAKFAST)    omeprazole (PRILOSEC) 20 MG delayed release capsule Take 1 capsule by mouth in the morning. TAKE 1 CAPSULE EVERY DAY. (Patient taking differently: Take 20 mg by mouth Daily with lunch TAKE 1 CAPSULE EVERY DAY)    gabapentin (NEURONTIN) 300 MG capsule Take 300 mg by mouth 3 times daily. (Patient taking differently: Take 300 mg by mouth 3 times daily. Take 300 mg by mouth 3 times daily.)    nitroGLYCERIN (NITROSTAT) 0.4 MG SL tablet Place 1 tablet under the tongue every 5 minutes as needed for Chest pain Up to 3 tabs in 24 hrs.     aspirin 81 MG EC tablet Take 81 mg by mouth Daily with lunch    cyanocobalamin 1000 MCG tablet Take 1,000 mcg by mouth    fenofibrate (TRIGLIDE) 160 MG tablet Take 160 mg by mouth Daily with lunch TAKE 1 TABLET EVERY DAY    tamsulosin (FLOMAX) 0.4 MG capsule Take 0.4 mg by mouth every evening     No current facility-administered medications for this visit. Past Medical History:   Diagnosis Date    Anxiety     Arrhythmia     Atrial fib.     CAD (coronary artery disease) 1995    followed by Dr. Xin Harvey    Calculus of kidney     right renal calculi    Chronic pain     back     DDD (degenerative disc disease)     Diabetes ([address]     type 2; glimeperide as needed per patient ; a1c = 8.0 as of 5/5/2021; check sugars daily avg is 80 per patient; hypoglycemic at 60     Diverticulitis 2013    Endocrine disease     thyroid    GERD (gastroesophageal reflux disease)     controlled with medication     History of EKG 02/15/2021    NSR with incomp RBBB    History of shingles 2013    HLD (hyperlipidemia) 7/5/2016    Hx of echocardiogram 07/01/2019    LVEF >55%    Hypercholesteremia     medication     Hypertension     controlled with medication  per patient     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     anxiety - controlled     S/P CABG (coronary artery bypass graft) 07/05/2016    4 vessel bypass    Sleep apnea     cpap    Sleep disorder 2007    apnea-uses CPAP    Thyroid disease     Hypothyroid       Past Surgical History:   Procedure Laterality Date    [address]  06/25/2019    COLONOSCOPY N/A 7/14/2017    COLONOSCOPY performed by Corey Callaway MD at [facility]  2010    Dr. Jany Fritz (5yrs)    COLONOSCOPY  07/14/2017    CORONARY ARTERY BYPASS GRAFT      quadruple bypass    HEENT      nasal surgery 2ndary to obstruction    HEENT  02/2015    cyst removal    NOSE SURGERY Bilateral 12/30/2022    INFERIOR TURBINATE REDUCTION performed by Jordan Ribeiro DO at [address] [facility] eyes    [place] UNLISTED PROCEDURE CARDIAC SURGERY  6/225/19 ablation    [address] [place]    CABG    SEPTOPLASTY N/A 12/30/2022    SEPTOPLASTY GRAFT performed by Horacio Silver DO at [address] Right 12/30/2022    SINUS ENDOSCOPY FUNCTIONAL SURGERY IMAGE GUIDED with Right-Sided Maxillary Antrostomies with Tissue Removal, Right-Sided  Total Ethmoidectomy, Propel Stent performed by Horacio Silver DO at [facility]       Social History     Tobacco Use    Smoking status: Never    Smokeless tobacco: Never   Substance Use Topics    Alcohol use: Not Currently       Family History   Problem Relation Age of Onset    Heart Disease Mother     Heart Disease Father     Heart Disease Brother         ROS:    Review of Systems   Constitutional:  Negative for fever. HENT:  Positive for congestion. Negative for ear discharge and ear pain. Eyes:  Negative for discharge. Respiratory:  Negative for cough. Cardiovascular:  Negative for chest pain. Gastrointestinal:  Negative for abdominal pain. Endocrine: Negative for cold intolerance. Genitourinary:  Negative for difficulty urinating. Musculoskeletal:  Negative for neck pain. Skin:  Negative for rash. Allergic/Immunologic: Negative for environmental allergies. Neurological:  Negative for dizziness. Hematological:  Negative for adenopathy. Psychiatric/Behavioral:  Negative for agitation. PHYSICAL EXAM:    Resp 17   Ht 5' 10\" (1.778 m)   Wt 173 lb (78.5 kg)   BMI 24.82 kg/m²     Physical Exam  Vitals and nursing note reviewed. Constitutional:       Appearance: Normal appearance. HENT:      Head: Normocephalic and atraumatic. Right Ear: Tympanic membrane, ear canal and external ear normal. There is no impacted cerumen. Left Ear: Tympanic membrane, ear canal and external ear normal. There is no impacted cerumen. Nose: Nose normal. No congestion or rhinorrhea.       Mouth/Throat:      Mouth: Mucous membranes are moist.      Pharynx: Oropharynx is clear. No oropharyngeal exudate or posterior oropharyngeal erythema. Eyes:      General: No scleral icterus. Pulmonary:      Effort: Pulmonary effort is normal.   Musculoskeletal:      Cervical back: Normal range of motion and neck supple. No rigidity. Lymphadenopathy:      Cervical: No cervical adenopathy. Skin:     General: Skin is warm and dry. Neurological:      Mental Status: He is alert and oriented to person, place, and time. Psychiatric:         Mood and Affect: Mood normal.         Behavior: Behavior normal.          Sinonasal debridement:    Betancur splints in proper location. Single anterior suture cut and removed. Splints removed without complication revealing no active epistaxis. Nasal septum in the midline without perforation and inferior turbinates postsurgically reduced. Rhinocaine spray was applied to the bilateral nasal cavities. Moderate mucus and crusted debris was present and debrided from the right-sided middle meatus, frontal recesses, and nasopharynx utilizing 0 degree rigid nasal endoscopy with parallel instrumentation. Patient tolerated the procedure well. ASSESSMENT and PLAN        ICD-10-CM    1. Chronic rhinosinusitis  J31.0     J32.9       2. S/P FESS (functional endoscopic sinus surgery)  Z98.890       3. S/P nasal septoplasty  Z98.890           Patient has been made aware of a normal expected postoperative evaluation following removal of his Betancur splints with the nasal septum in the midline and the incision clean dry and intact and without perforation. Endoscopy does show moderate crusting to the right middle meatus which was debrided under microscopy with propel stents still present in place. He should continue with sinonasal irrigations 3-4 times per day for the next couple weeks and follow-up in 2 weeks for repeat evaluation.   If propel stents still in place we will plan for removal.    Reji Dunbar DO  1/6/2023

## 2023-01-06 LAB
BACTERIA SPEC CULT: NORMAL
SERVICE CMNT-IMP: NORMAL

## 2023-01-07 LAB
FUNGUS SMEAR: NORMAL
SPECIMEN SOURCE: NORMAL

## 2023-01-19 ENCOUNTER — OFFICE VISIT (OUTPATIENT)
Dept: ENT CLINIC | Age: 73
End: 2023-01-19

## 2023-01-19 VITALS — HEIGHT: 70 IN | WEIGHT: 173 LBS | HEART RATE: 79 BPM | OXYGEN SATURATION: 98 % | BODY MASS INDEX: 24.77 KG/M2

## 2023-01-19 DIAGNOSIS — Z98.890 S/P FESS (FUNCTIONAL ENDOSCOPIC SINUS SURGERY): ICD-10-CM

## 2023-01-19 DIAGNOSIS — J31.0 CHRONIC RHINOSINUSITIS: Primary | ICD-10-CM

## 2023-01-19 DIAGNOSIS — J32.9 CHRONIC RHINOSINUSITIS: Primary | ICD-10-CM

## 2023-01-19 ASSESSMENT — ENCOUNTER SYMPTOMS
COUGH: 0
EYE DISCHARGE: 0
ABDOMINAL PAIN: 0

## 2023-01-19 NOTE — PROGRESS NOTES
HPI:  Hilton Li is a 67 y.o. male seen Established   Chief Complaint   Patient presents with    Follow-up     Patient presents today for 2 week sinus surg recheck . Patient states that he is breathing so much better. 75-year-old male presents for a postoperative follow-up evaluation now just under 3 weeks status post septoplasty/SMR of turbinates and right-sided FESS for extensive right-sided chronic fungal sinusitis. He is doing significantly improved over the last couple weeks and notes that he is now breathing out of his nose better than ever before. He has been little or no complaints. He continues to use nasal saline a couple times per day and still has been using his medicated antibiotic/steroid irrigations daily. Past Medical History, Past Surgical History, Family history, Social History, and Medications were all reviewed with the patient today and updated as necessary.      Allergies   Allergen Reactions    Other Anaphylaxis     Combid - no longer sold in [place] per patient        Patient Active Problem List   Diagnosis    Persistent atrial fibrillation (HCC)    Type 2 diabetes mellitus with nephropathy (HCC)    Anxiety    Arrhythmia    Diverticulitis    Mass of soft tissue of face    History of shingles    Other dysphagia    Chest pain    Atrial fibrillation (HCC)    Chronic pain    BULMARO on CPAP    Hypersomnia    Unstable angina pectoris (HCC)    Neck mass    S/P excision of lipoma    Neuropraxia of left median nerve    S/P ablation of atrial fibrillation    Hypercholesterolemia    S/P CABG (coronary artery bypass graft)    Calculus of kidney    CAD (coronary artery disease)    Hypertension    HLD (hyperlipidemia)    Thyroid disease    Chronic renal disease, stage III (HCC) [189666]    Chronic rhinosinusitis    Nasal obstruction    Deviated nasal septum    Nasal turbinate hypertrophy       Current Outpatient Medications   Medication Sig    dabigatran (PRADAXA) 150 MG capsule TAKE 1 CAPSULE EVERY 12 HOURS (Patient taking differently: Take 150 mg by mouth in the morning and 150 mg in the evening. Indications: per pt takes 1st dose at 11 am and 2nd dose at 11pm. TAKE 1 CAPSULE EVERY 12 HOURS.)    benazepril (LOTENSIN) 20 MG tablet Take 1 tablet by mouth daily (Patient taking differently: Take 20 mg by mouth Daily with lunch)    dofetilide (TIKOSYN) 250 MCG capsule TAKE 1 CAPSULE EVERY 12 HOURS (Patient taking differently: Take 250 mcg by mouth 2 times daily Indications: per pt takes 1st dose at 11 am and 2nd dose at 11pm TAKE 1 CAPSULE EVERY 12 HOURS)    NONFORMULARY Take by mouth daily Levofloxacin 50 mg/Budesonide 0.75mg    ONETOUCH VERIO strip CHECK BLOOD SUGAR EVERY DAY E11.65    glimepiride (AMARYL) 1 MG tablet Take 1 tablet by mouth every morning (before breakfast) TAKE 1 TABLET EVERY DAY BEFORE BREAKFAST (Patient taking differently: Take 0.5 mg by mouth every morning (before breakfast) TAKE 1 TABLET EVERY DAY BEFORE BREAKFAST)    levothyroxine (SYNTHROID) 88 MCG tablet Take 1 tablet by mouth in the morning. TAKE 1 TABLET BY MOUTH EVERY DAY BEFORE BREAKFAST. (Patient taking differently: Take 88 mcg by mouth Daily Indications: before lunch TAKE 1 TABLET BY MOUTH EVERY DAY BEFORE BREAKFAST)    omeprazole (PRILOSEC) 20 MG delayed release capsule Take 1 capsule by mouth in the morning. TAKE 1 CAPSULE EVERY DAY. (Patient taking differently: Take 20 mg by mouth Daily with lunch TAKE 1 CAPSULE EVERY DAY)    gabapentin (NEURONTIN) 300 MG capsule Take 300 mg by mouth 3 times daily. (Patient taking differently: Take 300 mg by mouth 3 times daily. Take 300 mg by mouth 3 times daily.)    nitroGLYCERIN (NITROSTAT) 0.4 MG SL tablet Place 1 tablet under the tongue every 5 minutes as needed for Chest pain Up to 3 tabs in 24 hrs.     aspirin 81 MG EC tablet Take 81 mg by mouth Daily with lunch    cyanocobalamin 1000 MCG tablet Take 1,000 mcg by mouth    fenofibrate (TRIGLIDE) 160 MG tablet Take 160 mg by mouth Daily with lunch TAKE 1 TABLET EVERY DAY    tamsulosin (FLOMAX) 0.4 MG capsule Take 0.4 mg by mouth every evening    atorvastatin (LIPITOR) 80 MG tablet Take 1 tablet by mouth daily TAKE 1 TABLET EVERY DAY (Patient not taking: Reported on 1/19/2023)     No current facility-administered medications for this visit. Past Medical History:   Diagnosis Date    Anxiety     Arrhythmia     Atrial fib.     CAD (coronary artery disease) 1995    followed by Dr. Kaitlyn Long    Calculus of kidney     right renal calculi    Chronic pain     back     DDD (degenerative disc disease)     Diabetes ([facility] [address] [address]     type 2; glimeperide as needed per patient ; a1c = 8.0 as of 5/5/2021; check sugars daily avg is 80 per patient; hypoglycemic at 60     Diverticulitis 2013    Endocrine disease     thyroid    GERD (gastroesophageal reflux disease)     controlled with medication     History of EKG 02/15/2021    NSR with incomp RBBB    History of shingles 2013    HLD (hyperlipidemia) 7/5/2016    Hx of echocardiogram 07/01/2019    LVEF >55%    Hypercholesteremia     medication     Hypertension     controlled with medication  per patient     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     anxiety - controlled     S/P CABG (coronary artery bypass graft) 07/05/2016    4 vessel bypass    Sleep apnea     cpap    Sleep disorder 2007    apnea-uses CPAP    Thyroid disease     Hypothyroid       Past Surgical History:   Procedure Laterality Date    [address]  06/25/2019    COLONOSCOPY N/A 7/14/2017    COLONOSCOPY performed by Homero Akhtar MD at [facility]  2010    Dr. Jose Luis Drew (5yrs)    COLONOSCOPY  07/14/2017    CORONARY ARTERY BYPASS GRAFT      quadruple bypass    HEENT      nasal surgery 2ndary to obstruction    HEENT  02/2015    cyst removal    NOSE SURGERY Bilateral 12/30/2022    INFERIOR TURBINATE REDUCTION performed by Kraen Fay DO at [facility] [address]      [facility] eyes    [place] UNLISTED PROCEDURE CARDIAC SURGERY  6/225/19    ablation    [place] UNLISTED PROCEDURE CARDIAC SURGERY  1999    CABG    SEPTOPLASTY N/A 12/30/2022    SEPTOPLASTY GRAFT performed by Sual Sow DO at [address] [place] [address] Right 12/30/2022    SINUS ENDOSCOPY FUNCTIONAL SURGERY IMAGE GUIDED with Right-Sided Maxillary Antrostomies with Tissue Removal, Right-Sided  Total Ethmoidectomy, Propel Stent performed by Saul Sow DO at [facility]       Social History     Tobacco Use    Smoking status: Never    Smokeless tobacco: Never   Substance Use Topics    Alcohol use: Not Currently       Family History   Problem Relation Age of Onset    Heart Disease Mother     Heart Disease Father     Heart Disease Brother         ROS:    Review of Systems   Constitutional:  Negative for fever. HENT:  Negative for ear discharge and ear pain. Eyes:  Negative for discharge. Respiratory:  Negative for cough. Cardiovascular:  Negative for chest pain. Gastrointestinal:  Negative for abdominal pain. Endocrine: Negative for cold intolerance. Genitourinary:  Negative for difficulty urinating. Musculoskeletal:  Negative for neck pain. Skin:  Negative for rash. Allergic/Immunologic: Negative for environmental allergies. Neurological:  Negative for dizziness. Hematological:  Negative for adenopathy. Psychiatric/Behavioral:  Negative for agitation. PHYSICAL EXAM:    Pulse 79   Ht 5' 10\" (1.778 m)   Wt 173 lb (78.5 kg)   SpO2 98%   BMI 24.82 kg/m²     Physical Exam       Sinonasal debridement:    Rhinocaine spray was applied to the bilateral nasal cavities. Nasal septum was at the midline without perforation. Turbinates postsurgically reduced. Moderate mucus and crusted debris was present and debrided from the right-sided middle meatus, frontal recesses, and nasopharynx utilizing 0 degree rigid nasal endoscopy with parallel instrumentation.   Propel stents removed from the right middle meatus with moderate old blood crusting and minimal mucopurulence. Following debridement there was only mild areas of granulation tissue. Overall healing well. Patient tolerated the procedure well. ASSESSMENT and PLAN        ICD-10-CM    1. Chronic rhinosinusitis  J31.0 [place] NASAL/SINUS NDSC SURG W/BX POLYPECT/DBRDMT SPX    J32.9       2. S/P FESS (functional endoscopic sinus surgery)  Z98.890 [place] NASAL/SINUS NDSC SURG W/BX POLYPECT/DBRDMT SPX          Patient was reassured of no concerns on today's nasal endoscopy. He did have right sided nasal endoscopy with debridement with removal of moderate crusting and old propel stent. Following debridement he was reassured of a adequate patent maxillary enterostomy site with minimal granulation tissue. His nasal septum was also at the midline without perforation. Overall he is healing well and a normal postoperative expected exam.  Follow-up in 2 weeks for repeat evaluation and endoscopy and continue to use daily saline sprays/irrigation and he can now discontinue the use of his antibiotic irrigation.     Kortney Quintero DO  1/20/2023

## 2023-01-26 ENCOUNTER — OFFICE VISIT (OUTPATIENT)
Dept: FAMILY MEDICINE CLINIC | Facility: CLINIC | Age: 73
End: 2023-01-26
Payer: MEDICARE

## 2023-01-26 VITALS
HEIGHT: 70 IN | TEMPERATURE: 97.5 F | DIASTOLIC BLOOD PRESSURE: 78 MMHG | BODY MASS INDEX: 25.34 KG/M2 | HEART RATE: 95 BPM | SYSTOLIC BLOOD PRESSURE: 132 MMHG | WEIGHT: 177 LBS | OXYGEN SATURATION: 98 %

## 2023-01-26 DIAGNOSIS — I25.119 CORONARY ARTERY DISEASE WITH ANGINA PECTORIS, UNSPECIFIED VESSEL OR LESION TYPE, UNSPECIFIED WHETHER NATIVE OR TRANSPLANTED HEART (HCC): ICD-10-CM

## 2023-01-26 DIAGNOSIS — S54.12XD NEURAPRAXIA OF LEFT MEDIAN NERVE, SUBSEQUENT ENCOUNTER: ICD-10-CM

## 2023-01-26 DIAGNOSIS — N18.31 STAGE 3A CHRONIC KIDNEY DISEASE (HCC): ICD-10-CM

## 2023-01-26 DIAGNOSIS — E07.9 THYROID DISEASE: Primary | ICD-10-CM

## 2023-01-26 DIAGNOSIS — E11.21 TYPE 2 DIABETES MELLITUS WITH NEPHROPATHY (HCC): ICD-10-CM

## 2023-01-26 DIAGNOSIS — I48.19 PERSISTENT ATRIAL FIBRILLATION (HCC): ICD-10-CM

## 2023-01-26 LAB
BASOPHILS # BLD: 0.1 K/UL (ref 0–0.2)
BASOPHILS NFR BLD: 2 % (ref 0–2)
DIFFERENTIAL METHOD BLD: ABNORMAL
EOSINOPHIL # BLD: 0.1 K/UL (ref 0–0.8)
EOSINOPHIL NFR BLD: 4 % (ref 0.5–7.8)
ERYTHROCYTE [DISTWIDTH] IN BLOOD BY AUTOMATED COUNT: 14.6 % (ref 11.9–14.6)
EST. AVERAGE GLUCOSE BLD GHB EST-MCNC: 177 MG/DL
HBA1C MFR BLD: 7.8 % (ref 4.8–5.6)
HCT VFR BLD AUTO: 41.8 % (ref 41.1–50.3)
HGB BLD-MCNC: 13.1 G/DL (ref 13.6–17.2)
IMM GRANULOCYTES # BLD AUTO: 0 K/UL (ref 0–0.5)
IMM GRANULOCYTES NFR BLD AUTO: 0 % (ref 0–5)
LYMPHOCYTES # BLD: 0.5 K/UL (ref 0.5–4.6)
LYMPHOCYTES NFR BLD: 16 % (ref 13–44)
MCH RBC QN AUTO: 28.7 PG (ref 26.1–32.9)
MCHC RBC AUTO-ENTMCNC: 31.3 G/DL (ref 31.4–35)
MCV RBC AUTO: 91.5 FL (ref 82–102)
MONOCYTES # BLD: 0.4 K/UL (ref 0.1–1.3)
MONOCYTES NFR BLD: 14 % (ref 4–12)
NEUTS SEG # BLD: 2 K/UL (ref 1.7–8.2)
NEUTS SEG NFR BLD: 64 % (ref 43–78)
NRBC # BLD: 0 K/UL (ref 0–0.2)
PLATELET # BLD AUTO: 249 K/UL (ref 150–450)
PMV BLD AUTO: 12 FL (ref 9.4–12.3)
RBC # BLD AUTO: 4.57 M/UL (ref 4.23–5.6)
WBC # BLD AUTO: 3.1 K/UL (ref 4.3–11.1)

## 2023-01-26 PROCEDURE — 2022F DILAT RTA XM EVC RTNOPTHY: CPT | Performed by: FAMILY MEDICINE

## 2023-01-26 PROCEDURE — 3074F SYST BP LT 130 MM HG: CPT | Performed by: FAMILY MEDICINE

## 2023-01-26 PROCEDURE — 3051F HG A1C>EQUAL 7.0%<8.0%: CPT | Performed by: FAMILY MEDICINE

## 2023-01-26 PROCEDURE — 99213 OFFICE O/P EST LOW 20 MIN: CPT | Performed by: FAMILY MEDICINE

## 2023-01-26 PROCEDURE — 1123F ACP DISCUSS/DSCN MKR DOCD: CPT | Performed by: FAMILY MEDICINE

## 2023-01-26 PROCEDURE — G8484 FLU IMMUNIZE NO ADMIN: HCPCS | Performed by: FAMILY MEDICINE

## 2023-01-26 PROCEDURE — G8427 DOCREV CUR MEDS BY ELIG CLIN: HCPCS | Performed by: FAMILY MEDICINE

## 2023-01-26 PROCEDURE — 1036F TOBACCO NON-USER: CPT | Performed by: FAMILY MEDICINE

## 2023-01-26 PROCEDURE — 3017F COLORECTAL CA SCREEN DOC REV: CPT | Performed by: FAMILY MEDICINE

## 2023-01-26 PROCEDURE — G8417 CALC BMI ABV UP PARAM F/U: HCPCS | Performed by: FAMILY MEDICINE

## 2023-01-26 PROCEDURE — 3078F DIAST BP <80 MM HG: CPT | Performed by: FAMILY MEDICINE

## 2023-01-26 RX ORDER — GABAPENTIN 300 MG/1
300 CAPSULE ORAL 3 TIMES DAILY
Qty: 90 CAPSULE | Refills: 5 | Status: SHIPPED | OUTPATIENT
Start: 2023-01-26 | End: 2023-02-25

## 2023-01-26 RX ORDER — OMEPRAZOLE 20 MG/1
20 CAPSULE, DELAYED RELEASE ORAL DAILY
Qty: 30 CAPSULE | Refills: 5 | Status: SHIPPED | OUTPATIENT
Start: 2023-01-26

## 2023-01-26 RX ORDER — GLIMEPIRIDE 1 MG/1
1 TABLET ORAL
Qty: 30 TABLET | Refills: 5 | Status: SHIPPED | OUTPATIENT
Start: 2023-01-26

## 2023-01-26 RX ORDER — LEVOTHYROXINE SODIUM 88 UG/1
88 TABLET ORAL DAILY
Qty: 30 TABLET | Refills: 5 | Status: SHIPPED | OUTPATIENT
Start: 2023-01-26

## 2023-01-26 SDOH — ECONOMIC STABILITY: FOOD INSECURITY: WITHIN THE PAST 12 MONTHS, THE FOOD YOU BOUGHT JUST DIDN'T LAST AND YOU DIDN'T HAVE MONEY TO GET MORE.: NEVER TRUE

## 2023-01-26 SDOH — ECONOMIC STABILITY: FOOD INSECURITY: WITHIN THE PAST 12 MONTHS, YOU WORRIED THAT YOUR FOOD WOULD RUN OUT BEFORE YOU GOT MONEY TO BUY MORE.: NEVER TRUE

## 2023-01-26 ASSESSMENT — PATIENT HEALTH QUESTIONNAIRE - PHQ9
SUM OF ALL RESPONSES TO PHQ QUESTIONS 1-9: 0
SUM OF ALL RESPONSES TO PHQ QUESTIONS 1-9: 0
1. LITTLE INTEREST OR PLEASURE IN DOING THINGS: 0
2. FEELING DOWN, DEPRESSED OR HOPELESS: 0
SUM OF ALL RESPONSES TO PHQ QUESTIONS 1-9: 0
SUM OF ALL RESPONSES TO PHQ9 QUESTIONS 1 & 2: 0
SUM OF ALL RESPONSES TO PHQ QUESTIONS 1-9: 0

## 2023-01-26 ASSESSMENT — SOCIAL DETERMINANTS OF HEALTH (SDOH): HOW HARD IS IT FOR YOU TO PAY FOR THE VERY BASICS LIKE FOOD, HOUSING, MEDICAL CARE, AND HEATING?: NOT VERY HARD

## 2023-01-27 LAB — TSH, 3RD GENERATION: 1.14 UIU/ML (ref 0.36–3.74)

## 2023-02-06 ASSESSMENT — ENCOUNTER SYMPTOMS
EYES NEGATIVE: 1
HEARTBURN: 1
RESPIRATORY NEGATIVE: 1

## 2023-02-07 NOTE — PROGRESS NOTES
HISTORY OF PRESENT ILLNESS  Maximiliano Chinchilla is a 67 y.o. y.o. male    Diabetes  He presents for his follow-up diabetic visit. He has type 2 diabetes mellitus. Symptoms are improving. Thyroid Problem  Presents for follow-up visit. The symptoms have been stable. His past medical history is significant for hyperlipidemia. Gastroesophageal Reflux  He complains of heartburn. This is a recurrent problem. The problem has been unchanged. Hyperlipidemia  This is a recurrent problem. The current episode started more than 1 year ago. The problem is controlled. Allergies   Allergen Reactions    Other Anaphylaxis     Combid - no longer sold in [place] per patient         Current Outpatient Medications   Medication Sig    gabapentin (NEURONTIN) 300 MG capsule Take 1 capsule by mouth 3 times daily for 30 days. Take 300 mg by mouth 3 times daily. omeprazole (PRILOSEC) 20 MG delayed release capsule Take 1 capsule by mouth Daily TAKE 1 CAPSULE EVERY DAY    levothyroxine (SYNTHROID) 88 MCG tablet Take 1 tablet by mouth Daily TAKE 1 TABLET BY MOUTH EVERY DAY BEFORE BREAKFAST    glimepiride (AMARYL) 1 MG tablet Take 1 tablet by mouth every morning (before breakfast) TAKE 1 TABLET EVERY DAY BEFORE BREAKFAST    dabigatran (PRADAXA) 150 MG capsule TAKE 1 CAPSULE EVERY 12 HOURS (Patient taking differently: Take 150 mg by mouth in the morning and 150 mg in the evening.  Indications: per pt takes 1st dose at 11 am and 2nd dose at 11pm. TAKE 1 CAPSULE EVERY 12 HOURS.)    benazepril (LOTENSIN) 20 MG tablet Take 1 tablet by mouth daily (Patient taking differently: Take 20 mg by mouth Daily with lunch)    atorvastatin (LIPITOR) 80 MG tablet Take 1 tablet by mouth daily TAKE 1 TABLET EVERY DAY    dofetilide (TIKOSYN) 250 MCG capsule TAKE 1 CAPSULE EVERY 12 HOURS (Patient taking differently: Take 250 mcg by mouth 2 times daily Indications: per pt takes 1st dose at 11 am and 2nd dose at 11pm TAKE 1 CAPSULE EVERY 12 HOURS)    Erle Scheuermann strip CHECK BLOOD SUGAR EVERY DAY E11.65    nitroGLYCERIN (NITROSTAT) 0.4 MG SL tablet Place 1 tablet under the tongue every 5 minutes as needed for Chest pain Up to 3 tabs in 24 hrs. aspirin 81 MG EC tablet Take 81 mg by mouth Daily with lunch    cyanocobalamin 1000 MCG tablet Take 1,000 mcg by mouth    fenofibrate (TRIGLIDE) 160 MG tablet Take 160 mg by mouth Daily with lunch TAKE 1 TABLET EVERY DAY    tamsulosin (FLOMAX) 0.4 MG capsule Take 0.4 mg by mouth every evening    NONFORMULARY Take by mouth daily Levofloxacin 50 mg/Budesonide 0.75mg (Patient not taking: Reported on 1/26/2023)     No current facility-administered medications for this visit. Past Medical History:   Diagnosis Date    Anxiety     Arrhythmia     Atrial fib.     CAD (coronary artery disease) 1995    followed by Dr. Kellen Reeves    Calculus of kidney     right renal calculi    Chronic pain     back     DDD (degenerative disc disease)     Diabetes ([address]     type 2; glimeperide as needed per patient ; a1c = 8.0 as of 5/5/2021; check sugars daily avg is 80 per patient; hypoglycemic at 60     Diverticulitis 2013    Endocrine disease     thyroid    GERD (gastroesophageal reflux disease)     controlled with medication     History of EKG 02/15/2021    NSR with incomp RBBB    History of shingles 2013    HLD (hyperlipidemia) 7/5/2016    Hx of echocardiogram 07/01/2019    LVEF >55%    Hypercholesteremia     medication     Hypertension     controlled with medication  per patient     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 Geri Lei (5yrs)    COLONOSCOPY  07/14/2017    CORONARY ARTERY BYPASS GRAFT      quadruple bypass    HEENT      nasal surgery 2ndary to obstruction    HEENT  02/2015    cyst removal    NOSE SURGERY Bilateral 12/30/2022    INFERIOR TURBINATE REDUCTION performed by Hakeem Morris DO at [address] [facility] eyes    IN UNLISTED PROCEDURE CARDIAC SURGERY  6/225/19    ablation    [address] [place]    CABG    SEPTOPLASTY N/A 12/30/2022    SEPTOPLASTY GRAFT performed by Hakeem Morris DO at [facility] Right 12/30/2022    SINUS ENDOSCOPY FUNCTIONAL SURGERY IMAGE GUIDED with Right-Sided Maxillary Antrostomies with Tissue Removal, Right-Sided  Total Ethmoidectomy, Propel Stent performed by Hakeem Morris DO at [place] [address] History     Socioeconomic History    Marital status:      Spouse name: Not on file    Number of children: Not on file    Years of education: Not on file    Highest education level: Not on file   Occupational History    Not on file   Tobacco Use    Smoking status: Never    Smokeless tobacco: Never   Vaping Use    Vaping Use: Never used   Substance and Sexual Activity    Alcohol use: Not Currently    Drug use: No    Sexual activity: Not on file   Other Topics Concern    Not on file   Social History Narrative    Not on file     Social Determinants of Health     Financial Resource Strain: Low Risk     Difficulty of Paying Living Expenses: Not very hard   Food Insecurity: No Food Insecurity    Worried About Running Out of Food in the Last Year: Never true    Ran Out of Food in the Last Year: Never true   Transportation Needs: Not on file   Physical Activity: Not on file   Stress: Not on file   Social Connections: Not on file   Intimate Partner Violence: Not on file   Housing Stability: Not on file        Review of Systems   Constitutional: Negative.     HENT: Negative. Eyes: Negative. Respiratory: Negative. Cardiovascular: Negative. Gastrointestinal:  Positive for heartburn. Endocrine: Negative. Genitourinary: Negative. Skin: Negative. Neurological: Negative. Psychiatric/Behavioral: Negative. /78   Pulse 95   Temp 97.5 °F (36.4 °C) (Temporal)   Ht 5' 10\" (1.778 m)   Wt 177 lb (80.3 kg)   SpO2 98%   BMI 25.40 kg/m²      Physical Exam  Constitutional:       General: He is not in acute distress. Appearance: Normal appearance. HENT:      Head: Normocephalic. Nose: Nose normal.   Eyes:      Conjunctiva/sclera: Conjunctivae normal.   Cardiovascular:      Rate and Rhythm: Normal rate. Pulmonary:      Effort: Pulmonary effort is normal.      Breath sounds: Normal breath sounds. Abdominal:      General: Abdomen is flat. Bowel sounds are normal.      Palpations: Abdomen is soft. Musculoskeletal:         General: Normal range of motion. Cervical back: Normal range of motion. Skin:     General: Skin is warm and dry. Neurological:      General: No focal deficit present. Mental Status: He is alert.    Psychiatric:         Mood and Affect: Mood normal.       Admission on 12/30/2022, Discharged on 12/30/2022   Component Date Value Ref Range Status    Ventricular Rate 12/30/2022 65  BPM Final    Atrial Rate 12/30/2022 65  BPM Final    P-R Interval 12/30/2022 182  ms Final    QRS Duration 12/30/2022 82  ms Final    Q-T Interval 12/30/2022 444  ms Final    QTc Calculation (Bazett) 12/30/2022 461  ms Final    P Axis 12/30/2022 77  degrees Final    R Axis 12/30/2022 264  degrees Final    T Axis 12/30/2022 -67  degrees Final    Diagnosis 12/30/2022 Normal sinus rhythm   Final    Magnesium 12/30/2022 2.0  1.8 - 2.4 mg/dL Final    Sodium 12/30/2022 141  133 - 143 mmol/L Final    Potassium 12/30/2022 4.5  3.5 - 5.1 mmol/L Final    Chloride 12/30/2022 108  101 - 110 mmol/L Final    CO2 12/30/2022 28  21 - 32 mmol/L Final Anion Gap 12/30/2022 5  2 - 11 mmol/L Final    Glucose 12/30/2022 145 (A)  65 - 100 mg/dL Final    BUN 12/30/2022 23  8 - 23 MG/DL Final    Creatinine 12/30/2022 1.43  0.8 - 1.5 MG/DL Final    Est, Glom Filt Rate 12/30/2022 52 (A)  >60 ml/min/1.73m2 Final    Comment:      Pediatric calculator link: [employer].at. org/professionals/kdoqi/gfr_calculatorped       These results are not intended for use in patients <25years of age. eGFR results are calculated without a race factor using  the 2021 CKD-EPI equation. Careful clinical correlation is recommended, particularly when comparing to results calculated using previous equations. The CKD-EPI equation is less accurate in patients with extremes of muscle mass, extra-renal metabolism of creatinine, excessive creatine ingestion, or following therapy that affects renal tubular secretion. Calcium 12/30/2022 9.5  8.3 - 10.4 MG/DL Final    POC Glucose 12/30/2022 126 (A)  65 - 100 mg/dL Final    Comment: 47 - 60 mg/dl Consistent with, but not fully diagnostic of hypoglycemia.   101 - 125 mg/dl Impaired fasting glucose/consistent with pre-diabetes mellitus  > 126 mg/dl Fasting glucose consistent with overt diabetes mellitus      Performed by: 12/30/2022 Anabela   Final    Special Requests 12/30/2022     Final                    Value:RIGHT  MAXILLARY SINUS      Gram stain 12/30/2022 20 TO 40 WBCS/OIF   Final    Gram stain 12/30/2022 MODERATE GRAM POSITIVE COCCI    Final    Culture 12/30/2022 MODERATE STAPHYLOCOCCUS AUREUS (A)    Final    Special Requests 12/30/2022     Final                    Value:RIGHT  MAXILLARY SINUS      Culture 12/30/2022 NO ANAEROBES ISOLATED 7 DAYS    Final    Sample Site 12/30/2022 MAXILLARY SINUS    Final    AFB SPECIMEN PROCESSING 12/30/2022 Concentration   Final    AFB Smear 12/30/2022 Negative    Final    Comment: (NOTE)  Performed At: [facility] & [address] [id number]  Kareen Rivera MD [id number]:[id number]      ACID FAST CULTURE 12/30/2022 PENDING   Incomplete    Sample Site 12/30/2022 MAXILLARY SINUS    Final    Specimen Processing 12/30/2022 Direct Inoculation   Final    Fungal Cult/Smear 12/30/2022 Other source received   Final    Comment: (NOTE)  Performed At: [facility] [place]  [address] [place], [place] [id number]  Sanjuana Vega MD [id number]      Source 12/30/2022 MAXILLARY SINUS    Final    Fungus Smear 12/30/2022 Hyphae observed  Negative Final    Comment: (NOTE)  Performed At: [facility] [employer] [address] [place] [id number]  Sanjuana Vega MD [phone]:[id number]      Fungus (Mycology) Culture 12/30/2022 Final Report Below  Negative   Final    REFLEX TO ID 12/30/2022 Comment    Final    Comment: (NOTE)  No yeast or mold isolated after 4 weeks. Performed At: [facility] [address] [id number]  Sanjuana Vega MD [id number]         ASSESSMENT and PLAN    Thyroid disease  -     TSH; Future  -     CBC with Auto Differential; Future  Neurapraxia of left median nerve, subsequent encounter  -     gabapentin (NEURONTIN) 300 MG capsule; Take 1 capsule by mouth 3 times daily for 30 days. Take 300 mg by mouth 3 times daily. , Disp-90 capsule, R-5Normal  Type 2 diabetes mellitus with nephropathy (HCC)  -     Hemoglobin A1C; Future  -     CBC with Auto Differential; Future  Persistent atrial fibrillation (HCC)  Assessment & Plan:   Monitored by specialist- no acute findings meriting change in the plan    Stage 3a chronic kidney disease ([facility]  Assessment & Plan:       Coronary artery disease with angina pectoris, unspecified vessel or lesion type, unspecified whether native or transplanted heart [facility])  Assessment & Plan:   Monitored by specialist- no acute findings meriting change in the plan        Current treatment plan is effective.  no changes in therapy  lab results and schedule for future lab studies reviewed with patient  reviewed diet, exercise and weight control   Cardiovascular risk and specific lipid/ LDL goals reviewed    No follow-ups on file.      Trevor Roblero MD

## 2023-02-09 ENCOUNTER — OFFICE VISIT (OUTPATIENT)
Dept: ENT CLINIC | Age: 73
End: 2023-02-09
Payer: MEDICARE

## 2023-02-09 VITALS — OXYGEN SATURATION: 98 % | HEIGHT: 70 IN | BODY MASS INDEX: 25.34 KG/M2 | WEIGHT: 177 LBS | HEART RATE: 80 BPM

## 2023-02-09 DIAGNOSIS — J31.0 CHRONIC RHINOSINUSITIS: Primary | ICD-10-CM

## 2023-02-09 DIAGNOSIS — Z98.890 S/P FESS (FUNCTIONAL ENDOSCOPIC SINUS SURGERY): ICD-10-CM

## 2023-02-09 DIAGNOSIS — Z98.890 S/P NASAL SEPTOPLASTY: ICD-10-CM

## 2023-02-09 DIAGNOSIS — J32.9 CHRONIC RHINOSINUSITIS: Primary | ICD-10-CM

## 2023-02-09 PROCEDURE — 31237 NSL/SINS NDSC SURG BX POLYPC: CPT | Performed by: STUDENT IN AN ORGANIZED HEALTH CARE EDUCATION/TRAINING PROGRAM

## 2023-02-09 PROCEDURE — 99024 POSTOP FOLLOW-UP VISIT: CPT | Performed by: STUDENT IN AN ORGANIZED HEALTH CARE EDUCATION/TRAINING PROGRAM

## 2023-02-09 ASSESSMENT — ENCOUNTER SYMPTOMS
COUGH: 0
EYE DISCHARGE: 0
ABDOMINAL PAIN: 0

## 2023-02-09 NOTE — PROGRESS NOTES
HPI:  Jonnie Kumar is a 67 y.o. male seen Established   Chief Complaint   Patient presents with    Follow-up     Patient presents today for 2 week sinus recheck . Patient seen for postoperative evaluation for history of right-sided endoscopic sinus surgery, septoplasty, turbinate reduction performed on 12-. Last evaluation was 2 weeks ago and patient continues to do very well and has been continuing to use saline irrigations 2-3 times per day. Notable significant improvement to his nasal airway and no longer having his typical sinusitis symptoms. Past Medical History, Past Surgical History, Family history, Social History, and Medications were all reviewed with the patient today and updated as necessary. Allergies   Allergen Reactions    Other Anaphylaxis     Combid - no longer sold in [place] per patient        Patient Active Problem List   Diagnosis    Persistent atrial fibrillation (HCC)    Type 2 diabetes mellitus with nephropathy (HCC)    Anxiety    Arrhythmia    Diverticulitis    Mass of soft tissue of face    History of shingles    Other dysphagia    Chest pain    Atrial fibrillation (HCC)    Chronic pain    BULMARO on CPAP    Hypersomnia    Unstable angina pectoris (HCC)    Neck mass    S/P excision of lipoma    Neuropraxia of left median nerve    S/P ablation of atrial fibrillation    Hypercholesterolemia    S/P CABG (coronary artery bypass graft)    Calculus of kidney    Coronary artery disease with angina pectoris, unspecified vessel or lesion type, unspecified whether native or transplanted heart (HCC)    Hypertension    HLD (hyperlipidemia)    Thyroid disease    Chronic renal disease, stage III (HCC) [559355]    Chronic rhinosinusitis    Nasal obstruction    Deviated nasal septum    Nasal turbinate hypertrophy       Current Outpatient Medications   Medication Sig    gabapentin (NEURONTIN) 300 MG capsule Take 1 capsule by mouth 3 times daily for 30 days.  Take 300 mg by mouth 3 times daily.    omeprazole (PRILOSEC) 20 MG delayed release capsule Take 1 capsule by mouth Daily TAKE 1 CAPSULE EVERY DAY    levothyroxine (SYNTHROID) 88 MCG tablet Take 1 tablet by mouth Daily TAKE 1 TABLET BY MOUTH EVERY DAY BEFORE BREAKFAST    glimepiride (AMARYL) 1 MG tablet Take 1 tablet by mouth every morning (before breakfast) TAKE 1 TABLET EVERY DAY BEFORE BREAKFAST    dabigatran (PRADAXA) 150 MG capsule TAKE 1 CAPSULE EVERY 12 HOURS (Patient taking differently: Take 150 mg by mouth in the morning and 150 mg in the evening. Indications: per pt takes 1st dose at 11 am and 2nd dose at 11pm. TAKE 1 CAPSULE EVERY 12 HOURS.)    benazepril (LOTENSIN) 20 MG tablet Take 1 tablet by mouth daily (Patient taking differently: Take 20 mg by mouth Daily with lunch)    atorvastatin (LIPITOR) 80 MG tablet Take 1 tablet by mouth daily TAKE 1 TABLET EVERY DAY    dofetilide (TIKOSYN) 250 MCG capsule TAKE 1 CAPSULE EVERY 12 HOURS (Patient taking differently: Take 250 mcg by mouth 2 times daily Indications: per pt takes 1st dose at 11 am and 2nd dose at 11pm TAKE 1 CAPSULE EVERY 12 HOURS)    NONFORMULARY Take by mouth daily Levofloxacin 50 mg/Budesonide 0.75mg    ONETOUCH VERIO strip CHECK BLOOD SUGAR EVERY DAY E11.65    nitroGLYCERIN (NITROSTAT) 0.4 MG SL tablet Place 1 tablet under the tongue every 5 minutes as needed for Chest pain Up to 3 tabs in 24 hrs. aspirin 81 MG EC tablet Take 81 mg by mouth Daily with lunch    cyanocobalamin 1000 MCG tablet Take 1,000 mcg by mouth    fenofibrate (TRIGLIDE) 160 MG tablet Take 160 mg by mouth Daily with lunch TAKE 1 TABLET EVERY DAY    tamsulosin (FLOMAX) 0.4 MG capsule Take 0.4 mg by mouth every evening     No current facility-administered medications for this visit. Past Medical History:   Diagnosis Date    Anxiety     Arrhythmia     Atrial fib.     CAD (coronary artery disease) 1995    followed by Dr. Quinton Guillen    Calculus of kidney     right renal calculi    Chronic pain     back DDD (degenerative disc disease)     Diabetes ([facility] [address] [address]     type 2; glimeperide as needed per patient ; a1c = 8.0 as of 5/5/2021; check sugars daily avg is 80 per patient; hypoglycemic at 60     Diverticulitis 2013    Endocrine disease     thyroid    GERD (gastroesophageal reflux disease)     controlled with medication     History of EKG 02/15/2021    NSR with incomp RBBB    History of shingles 2013    HLD (hyperlipidemia) 7/5/2016    Hx of echocardiogram 07/01/2019    LVEF >55%    Hypercholesteremia     medication     Hypertension     controlled with medication  per patient     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 Israel Bailon (5yrs)    COLONOSCOPY  07/14/2017    CORONARY ARTERY BYPASS GRAFT      quadruple bypass    HEENT      nasal surgery 2ndary to obstruction    HEENT  02/2015    cyst removal    NOSE SURGERY Bilateral 12/30/2022    INFERIOR TURBINATE REDUCTION performed by Duarte Munoz DO at [address]      [facility] eyes    [place] UNLISTED PROCEDURE CARDIAC SURGERY  6/225/19    ablation    [address]    CABG    SEPTOPLASTY N/A 12/30/2022    SEPTOPLASTY GRAFT performed by Duarte Munoz DO at [address] Right 12/30/2022    SINUS ENDOSCOPY FUNCTIONAL SURGERY IMAGE GUIDED with Right-Sided Maxillary Antrostomies with Tissue Removal, Right-Sided  Total Ethmoidectomy, Propel Stent performed by Duarte Munoz DO at [address] History     Tobacco Use Smoking status: Never    Smokeless tobacco: Never   Substance Use Topics    Alcohol use: Not Currently       Family History   Problem Relation Age of Onset    Heart Disease Mother     Heart Disease Father     Heart Disease Brother         ROS:    Review of Systems   Constitutional:  Negative for fever. HENT:  Negative for congestion, ear discharge and ear pain. Eyes:  Negative for discharge. Respiratory:  Negative for cough. Gastrointestinal:  Negative for abdominal pain. Endocrine: Negative for cold intolerance. Genitourinary:  Negative for difficulty urinating. Musculoskeletal:  Negative for neck pain. Skin:  Negative for rash. Allergic/Immunologic: Negative for environmental allergies. Neurological:  Negative for dizziness. Hematological:  Negative for adenopathy. Psychiatric/Behavioral:  Negative for agitation. PHYSICAL EXAM:    Pulse 80   Ht 5' 10\" (1.778 m)   Wt 177 lb (80.3 kg)   SpO2 98%   BMI 25.40 kg/m²     Physical Exam     Sinonasal debridement:    Rhinocaine spray was applied to the bilateral nasal cavities. Nasal septum in the midline without perforation. Turbinates postsurgically reduced. Mild mucus and crusted debris was present and debrided from the right-sided middle meatus, frontal recesses, and nasopharynx utilizing 0 degree rigid nasal endoscopy with parallel instrumentation. Patient tolerated the procedure well. ASSESSMENT and PLAN        ICD-10-CM    1. Chronic rhinosinusitis  J31.0     J32.9       2. S/P FESS (functional endoscopic sinus surgery)  Z98.890       3. S/P nasal septoplasty  A49.584           Patient with less intense mild crusting to the right-sided middle meatus and maxillary antrostomy site debrided under direct rigid endoscopy with parallel instrumentation. He has overall been reassured of a nice expected postoperative evaluation of his septum and sinuses.     I will see him back for follow-up in 6 months or he will call us sooner for follow-up if he begins having sinus related symptoms. He will continue with saline at least daily for the next few weeks.     Martha Anderson, DO  2/13/2023 1

## 2023-02-23 NOTE — PROGRESS NOTES
[address] [place] [address] Court, [address] [facility]  [phone]    Patient Name:  Aleksey Davies  YOB: 1950      Office Visit 2/24/2023    CHIEF COMPLAINT:    Chief Complaint   Patient presents with    Sleep Apnea    Follow-up    Sleep Study         HISTORY OF PRESENT ILLNESS:  Patient is seen today for follow up of BULMARO. He was diagnosed with sleep apnea in 2007 with RDI 69.8/hr with desaturations to 75%. He is prescribed APAP 10-20 cm using a nasal mask. Download reveals 89% compliance over the past 3 months with average nightly use 6 hrs 15 min. He reports having over 19,000 hours on his machine and needs a replacement. The Resmed S11 will be ordered per his request.    He has tried multiple different masks over the past few years. The most tolerable is the [employer] NOLAN but has had difficulty getting supplies. He recently had sinus surgery due to complete opacification of the right maxillary sinus with inferior turbinate reduction. He states that 5 days later, he was able to use CPAP therapy again. ESS is 15/24. He reports daily napping. He attributes this to trouble with the mask and leaks. He notices improvement in his daytime fatigue when he is able to use the machine all night.      Sleep Medicine 2/24/2023 8/26/2022   Sitting and reading 2 0   Watching TV 3 3   Sitting, inactive in a public place (e.g. a theatre or a meeting) 1 2   As a passenger in a car for an hour without a break 3 3   Lying down to rest in the afternoon when circumstances permit 3 3   Sitting and talking to someone 1 0   Sitting quietly after a lunch without alcohol 2 0   In a car, while stopped for a few minutes in traffic 0 0   [place] Sleepiness Score 15 11         Sleep Medicine 2/24/2023 8/26/2022   Sitting and reading 2 0   Watching TV 3 3   Sitting, inactive in a public place (e.g. a theatre or a meeting) 1 2   As a passenger in a car for an hour without a break 3 3   Lying down to rest in the afternoon when circumstances permit 3 3   Sitting and talking to someone 1 0   Sitting quietly after a lunch without alcohol 2 0   In a car, while stopped for a few minutes in traffic 0 0   [place] Sleepiness Score 15 11          Past Medical History:   Diagnosis Date    Anxiety     Arrhythmia     Atrial fib.     CAD (coronary artery disease) 1995    followed by Dr. Oconnor Prudent    Calculus of kidney     right renal calculi    Chronic pain     back     DDD (degenerative disc disease)     Diabetes ([address]     type 2; glimeperide as needed per patient ; a1c = 8.0 as of 5/5/2021; check sugars daily avg is 80 per patient; hypoglycemic at 60     Diverticulitis 2013    Endocrine disease     thyroid    GERD (gastroesophageal reflux disease)     controlled with medication     History of EKG 02/15/2021    NSR with incomp RBBB    History of shingles 2013    HLD (hyperlipidemia) 7/5/2016    Hx of echocardiogram 07/01/2019    LVEF >55%    Hypercholesteremia     medication     Hypertension     controlled with medication  per patient     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     anxiety - controlled     S/P CABG (coronary artery bypass graft) 07/05/2016    4 vessel bypass    Sleep apnea     cpap    Sleep disorder 2007    apnea-uses CPAP    Thyroid disease     Hypothyroid         Patient Active Problem List   Diagnosis    Persistent atrial fibrillation ([address]    Type 2 diabetes mellitus with nephropathy ([address]    Anxiety    Arrhythmia    Diverticulitis    Mass of soft tissue of face    History of shingles    Other dysphagia    Chest pain    Atrial fibrillation (HCC)    Chronic pain    BULMARO on CPAP    Hypersomnia    Unstable angina pectoris (HCC)    Neck mass    S/P excision of lipoma    Neuropraxia of left median nerve    S/P ablation of atrial fibrillation    Hypercholesterolemia    S/P CABG (coronary artery bypass graft)    Calculus of kidney    Coronary artery disease with angina pectoris, unspecified vessel or lesion type, unspecified whether native or transplanted heart [address]    Hypertension    HLD (hyperlipidemia)    Thyroid disease    Chronic renal disease, stage III ([address] [086814]    Chronic rhinosinusitis    Nasal obstruction    Deviated nasal septum    Nasal turbinate hypertrophy          Past Surgical History:   Procedure Laterality Date    [address]  06/25/2019    COLONOSCOPY N/A 7/14/2017    COLONOSCOPY performed by Marilyn Avendano MD at [place] [address]    Dr. Elena Rodgers (5yrs)    COLONOSCOPY  07/14/2017    CORONARY ARTERY BYPASS GRAFT      quadruple bypass    HEENT      nasal surgery 2ndary to obstruction    HEENT  02/2015    cyst removal    NOSE SURGERY Bilateral 12/30/2022    INFERIOR TURBINATE REDUCTION performed by Manuel Ramirez DO at [address] [facility] eyes    [address]  6/225/19    ablation    [address] [place]    CABG    SEPTOPLASTY N/A 12/30/2022    SEPTOPLASTY GRAFT performed by Manuel Ramirez DO at [address] [facility] Right 12/30/2022    SINUS ENDOSCOPY FUNCTIONAL SURGERY IMAGE GUIDED with Right-Sided Maxillary Antrostomies with Tissue Removal, Right-Sided  Total Ethmoidectomy, Propel Stent performed by Manuel Ramirez DO at [address] [facility] [address] History     Socioeconomic History    Marital status:      Spouse name: Not on file    Number of children: Not on file    Years of education: Not on file    Highest education level: Not on file   Occupational History    Not on file   Tobacco Use    Smoking status: Never    Smokeless tobacco: Never   Vaping Use    Vaping Use: Never used   Substance and Sexual Activity    Alcohol use: Not Currently    Drug use: No    Sexual activity: Not on file   Other Topics Concern    Not on file   Social History Narrative    Not on file     Social Determinants of Health     Financial Resource Strain: Low Risk     Difficulty of Paying Living Expenses: Not very hard   Food Insecurity: No Food Insecurity    Worried About Running Out of Food in the Last Year: Never true    Ran Out of Food in the Last Year: Never true   Transportation Needs: Not on file   Physical Activity: Not on file   Stress: Not on file   Social Connections: Not on file   Intimate Partner Violence: Not on file   Housing Stability: Not on file         Family History   Problem Relation Age of Onset    Heart Disease Mother     Heart Disease Father     Heart Disease Brother          Allergies   Allergen Reactions    Other Anaphylaxis     Combid - no longer sold in [place] per patient          Current Outpatient Medications   Medication Sig    gabapentin (NEURONTIN) 300 MG capsule Take 1 capsule by mouth 3 times daily for 30 days. Take 300 mg by mouth 3 times daily. omeprazole (PRILOSEC) 20 MG delayed release capsule Take 1 capsule by mouth Daily TAKE 1 CAPSULE EVERY DAY    levothyroxine (SYNTHROID) 88 MCG tablet Take 1 tablet by mouth Daily TAKE 1 TABLET BY MOUTH EVERY DAY BEFORE BREAKFAST    glimepiride (AMARYL) 1 MG tablet Take 1 tablet by mouth every morning (before breakfast) TAKE 1 TABLET EVERY DAY BEFORE BREAKFAST    dabigatran (PRADAXA) 150 MG capsule TAKE 1 CAPSULE EVERY 12 HOURS (Patient taking differently: Take 150 mg by mouth in the morning and 150 mg in the evening.  Indications: per pt takes 1st dose at 11 am and 2nd dose at 11pm. TAKE 1 CAPSULE EVERY 12 HOURS.)    benazepril (LOTENSIN) 20 MG tablet Take 1 tablet by mouth daily (Patient taking differently: Take 20 mg by mouth Daily with lunch)    atorvastatin (LIPITOR) 80 MG tablet Take 1 tablet by mouth daily TAKE 1 TABLET EVERY DAY    dofetilide (TIKOSYN) 250 MCG capsule TAKE 1 CAPSULE EVERY 12 HOURS (Patient taking differently: Take 250 mcg by mouth 2 times daily Indications: per pt takes 1st dose at 11 am and 2nd dose at 11pm TAKE 1 CAPSULE EVERY 12 HOURS)    NONFORMULARY Take by mouth daily Levofloxacin 50 mg/Budesonide 0.75mg    ONETOUCH VERIO strip CHECK BLOOD SUGAR EVERY DAY E11.65    nitroGLYCERIN (NITROSTAT) 0.4 MG SL tablet Place 1 tablet under the tongue every 5 minutes as needed for Chest pain Up to 3 tabs in 24 hrs. aspirin 81 MG EC tablet Take 81 mg by mouth Daily with lunch    cyanocobalamin 1000 MCG tablet Take 1,000 mcg by mouth    fenofibrate (TRIGLIDE) 160 MG tablet Take 160 mg by mouth Daily with lunch TAKE 1 TABLET EVERY DAY    tamsulosin (FLOMAX) 0.4 MG capsule Take 0.4 mg by mouth every evening     No current facility-administered medications for this visit. REVIEW OF SYSTEMS:   CONSTITUTIONAL:   There is no history of fever, chills, night sweats, weight loss, weight gain, persistent fatigue, or lethargy/hypersomnolence. CARDIAC:   No chest pain, pressure, discomfort, palpitations, orthopnea, murmurs, or edema. GI:   No dysphagia, heartburn reflux, nausea/vomiting, diarrhea, abdominal pain, or bleeding. NEURO:   There is no history of AMS, persistent headache, decreased level of consciousness, seizures, or motor or sensory deficits. PHYSICAL EXAM:    Vitals:    02/24/23 1319   BP: 138/74   Site: Right Upper Arm   Position: Sitting   Pulse: 72   Resp: 14   Temp: 97 °F (36.1 °C)   TempSrc: Skin   SpO2: 95%   Weight: 185 lb (83.9 kg)   Height: 5' 10\" (1.778 m)        Body mass index is 26.54 kg/m². GENERAL APPEARANCE:   The patient is normal weight and in no respiratory distress. HEENT:   PERRL. Conjunctivae unremarkable. Nasal mucosa is without epistaxis, exudate, or polyps. Gums and dentition are unremarkable. There is no oropharyngeal narrowing. NECK/LYMPHATIC:   Symmetrical with no elevation of jugular venous pulsation. Trachea midline. No thyroid enlargement. No cervical adenopathy. LUNGS:   Normal respiratory effort with symmetrical lung expansion.    Breath sounds clear bilaterally. HEART:   There is a regular rate and rhythm. No murmur, rub, or gallop. There is no edema in the lower extremities. ABDOMEN:   Soft and non-tender. No hepatosplenomegaly. Bowel sounds are normal.     NEURO:   The patient is alert and oriented to person, place, and time. Memory appears intact and mood is normal.  No gross sensorimotor deficits are present. ASSESSMENT:  (Medical Decision Making)      Diagnosis Orders   1. BULMARO on CPAP  DME - DURABLE MEDICAL EQUIPMENT   AHI is well controlled, AHI is 1.8/hr.    2. Difficulty using continuous positive airway pressure (CPAP) nasal mask  DME - DURABLE MEDICAL EQUIPMENT   He would like to try the airtouch mask. He has tried multiple different masks but continues to have issues with tolerance, skin irritation etc          PLAN:  Replacement machine will be ordered, Resmed S11. He would like to try the Air Touch mask. Continue nightly compliance    Medicare compliance discussed with patient. Minimally 4 hours nightly, every night, 70% of the time, which is 21/30 days over 4 hours. Our goal is for you to wear the CPAP the entire night's sleep. Follow up in 3 months or sooner if needed     Orders Placed This Encounter   Procedures    DME - [address]  Needs replacement Resmed S11 machine    [facility] [address]  Phone: MENA PRESTIGE S [facility] [address] [address]  Dept: [phone]      Patient Name: Abilio Fall  : 1950  Gender: male  Address: [address] [place] [address]  Patient phone number: [phone] (home)       Primary Insurance: Payor: [address] [place] [address],[address] / Plan: MEDICARE PART A AND B / Product Type: *No Product type* /   Subscriber ID: [id number] - (Medicare)      AMB Supply Order  Order Details     DME Location:    Order Date: 2023   The primary encounter diagnosis was BULMARO on CPAP.  A diagnosis of Difficulty using continuous positive airway pressure (CPAP) nasal mask was also pertinent to this visit. (  X   )New Set-Up     apap machine   (     )[id number] CPAP Unit  (    x ) Auto CPAP Unit  (     ) BiLevel Unit  (     ) Auto BiLevel Unit  (     ) ASV   (     ) Bilevel ST    (     ) Oxygen Concentrator         Length of need: 12 months    Pressure: 10-20  cmH20  EPR:      Starting Ramp Pressure:   cm H20  Ramp Time: min      Patient had a diagnostic Apnea Hypopnea Index (AHI) of :      *SUPPLIES* Replace all as needed, or per coverage guidelines     Masks Type:    (     ) -Full Face Mask (1 per 3 mon)  (    ) -Full Mask (1 per month) Interface/Cushion      (  x   ) -Nasal Mask (1 per 3 mon)  (   x  ) - Nasal Mask (1 per month) Interface/Cushion  (   x  ) -Pillow (2 per mon)  (   x  ) -Klzoocnfi (1 per 6 mon)      _________________________________________________________________          Other Supplies:    (  X   )-Varamelq (1 per 6 mon)  ( X    )-Vpshwm Tubing (1 per 3 mon)  (  X   )- Disposable Filter (2 per mon)  (   X  )-Qvcgrf Humidifier (1 per year)     (  x   )-Uxlfsgxxj (sometimes used with Full Face Mask) (1 per 6 mos)  (     )-Tubing-without heat (1 per 3 mos)  ( X   )-Non-Disposable Filter (1 per 6 mos)  (   x  )-Water Chamber (1 per 6 mos)  (     )-Humidifier non-heated (1 per 5 yrs)      Signed Date: 2/24/2023  Electronically Signed By: DIANNE Shaver - CNP       No orders of the defined types were placed in this encounter. Collaborating Physician: Dr. Cruzito Carpenter    Over 50% of today's office visit was spent in face to face time reviewing test results, prognosis, importance of compliance, education about disease process, benefits of medications, instructions for management of acute flare-ups, and follow up plans. Total face to face time spent with patient was 20 minutes.         DIANNE Shaver CNP  Electronically signed

## 2023-02-24 ENCOUNTER — OFFICE VISIT (OUTPATIENT)
Dept: SLEEP MEDICINE | Age: 73
End: 2023-02-24
Payer: MEDICARE

## 2023-02-24 VITALS
HEIGHT: 70 IN | HEART RATE: 72 BPM | TEMPERATURE: 97 F | OXYGEN SATURATION: 95 % | BODY MASS INDEX: 26.48 KG/M2 | DIASTOLIC BLOOD PRESSURE: 74 MMHG | RESPIRATION RATE: 14 BRPM | SYSTOLIC BLOOD PRESSURE: 138 MMHG | WEIGHT: 185 LBS

## 2023-02-24 DIAGNOSIS — G47.33 OSA ON CPAP: Primary | ICD-10-CM

## 2023-02-24 DIAGNOSIS — Z78.9 DIFFICULTY USING CONTINUOUS POSITIVE AIRWAY PRESSURE (CPAP) NASAL MASK: ICD-10-CM

## 2023-02-24 DIAGNOSIS — Z99.89 OSA ON CPAP: Primary | ICD-10-CM

## 2023-02-24 PROCEDURE — 1123F ACP DISCUSS/DSCN MKR DOCD: CPT | Performed by: NURSE PRACTITIONER

## 2023-02-24 PROCEDURE — 3017F COLORECTAL CA SCREEN DOC REV: CPT | Performed by: NURSE PRACTITIONER

## 2023-02-24 PROCEDURE — G8417 CALC BMI ABV UP PARAM F/U: HCPCS | Performed by: NURSE PRACTITIONER

## 2023-02-24 PROCEDURE — 99213 OFFICE O/P EST LOW 20 MIN: CPT | Performed by: NURSE PRACTITIONER

## 2023-02-24 PROCEDURE — 3078F DIAST BP <80 MM HG: CPT | Performed by: NURSE PRACTITIONER

## 2023-02-24 PROCEDURE — 1036F TOBACCO NON-USER: CPT | Performed by: NURSE PRACTITIONER

## 2023-02-24 PROCEDURE — 3075F SYST BP GE 130 - 139MM HG: CPT | Performed by: NURSE PRACTITIONER

## 2023-02-24 PROCEDURE — G8484 FLU IMMUNIZE NO ADMIN: HCPCS | Performed by: NURSE PRACTITIONER

## 2023-02-24 PROCEDURE — G8427 DOCREV CUR MEDS BY ELIG CLIN: HCPCS | Performed by: NURSE PRACTITIONER

## 2023-02-24 ASSESSMENT — SLEEP AND FATIGUE QUESTIONNAIRES
HOW LIKELY ARE YOU TO NOD OFF OR FALL ASLEEP IN A CAR, WHILE STOPPED FOR A FEW MINUTES IN TRAFFIC: 0
HOW LIKELY ARE YOU TO NOD OFF OR FALL ASLEEP WHILE WATCHING TV: 3
HOW LIKELY ARE YOU TO NOD OFF OR FALL ASLEEP WHILE LYING DOWN TO REST IN THE AFTERNOON WHEN CIRCUMSTANCES PERMIT: 3
ESS TOTAL SCORE: 15
HOW LIKELY ARE YOU TO NOD OFF OR FALL ASLEEP WHILE SITTING QUIETLY AFTER LUNCH WITHOUT ALCOHOL: 2
HOW LIKELY ARE YOU TO NOD OFF OR FALL ASLEEP WHILE SITTING AND READING: 2
HOW LIKELY ARE YOU TO NOD OFF OR FALL ASLEEP WHEN YOU ARE A PASSENGER IN A CAR FOR AN HOUR WITHOUT A BREAK: 3
HOW LIKELY ARE YOU TO NOD OFF OR FALL ASLEEP WHILE SITTING INACTIVE IN A PUBLIC PLACE: 1
HOW LIKELY ARE YOU TO NOD OFF OR FALL ASLEEP WHILE SITTING AND TALKING TO SOMEONE: 1

## 2023-02-24 NOTE — PATIENT INSTRUCTIONS
Replacement machine will be ordered, Resmed S11. He would like to try the Air Touch mask. Continue nightly compliance    Medicare compliance discussed with patient. Minimally 4 hours nightly, every night, 70% of the time, which is 21/30 days over 4 hours. Our goal is for you to wear the CPAP the entire night's sleep. CPAP comfort cover may be able to help with mask leaks. Available for purchase online at [email or web address]

## 2023-04-28 ENCOUNTER — OFFICE VISIT (OUTPATIENT)
Dept: FAMILY MEDICINE CLINIC | Facility: CLINIC | Age: 73
End: 2023-04-28

## 2023-04-28 VITALS
HEIGHT: 70 IN | SYSTOLIC BLOOD PRESSURE: 138 MMHG | TEMPERATURE: 97.3 F | HEART RATE: 92 BPM | WEIGHT: 185 LBS | BODY MASS INDEX: 26.48 KG/M2 | DIASTOLIC BLOOD PRESSURE: 78 MMHG | OXYGEN SATURATION: 99 %

## 2023-04-28 DIAGNOSIS — E55.9 VITAMIN D DEFICIENCY: ICD-10-CM

## 2023-04-28 DIAGNOSIS — Z99.89 OSA ON CPAP: ICD-10-CM

## 2023-04-28 DIAGNOSIS — I48.19 PERSISTENT ATRIAL FIBRILLATION (HCC): ICD-10-CM

## 2023-04-28 DIAGNOSIS — Z00.00 MEDICARE ANNUAL WELLNESS VISIT, SUBSEQUENT: Primary | ICD-10-CM

## 2023-04-28 DIAGNOSIS — E07.9 DISORDER OF THYROID, UNSPECIFIED: ICD-10-CM

## 2023-04-28 DIAGNOSIS — G47.33 OSA ON CPAP: ICD-10-CM

## 2023-04-28 DIAGNOSIS — Z12.11 SCREENING FOR COLON CANCER: ICD-10-CM

## 2023-04-28 DIAGNOSIS — R25.1 TREMOR, UNSPECIFIED: ICD-10-CM

## 2023-04-28 DIAGNOSIS — F32.A DEPRESSION, UNSPECIFIED DEPRESSION TYPE: ICD-10-CM

## 2023-04-28 DIAGNOSIS — Z12.5 SCREENING PSA (PROSTATE SPECIFIC ANTIGEN): ICD-10-CM

## 2023-04-28 DIAGNOSIS — Z95.1 S/P CABG (CORONARY ARTERY BYPASS GRAFT): ICD-10-CM

## 2023-04-28 DIAGNOSIS — E11.21 TYPE 2 DIABETES MELLITUS WITH NEPHROPATHY (HCC): ICD-10-CM

## 2023-04-28 DIAGNOSIS — E78.00 PURE HYPERCHOLESTEROLEMIA, UNSPECIFIED: ICD-10-CM

## 2023-04-28 DIAGNOSIS — I10 ESSENTIAL (PRIMARY) HYPERTENSION: ICD-10-CM

## 2023-04-28 LAB
ALBUMIN SERPL-MCNC: 3.8 G/DL (ref 3.2–4.6)
ALBUMIN/GLOB SERPL: 1.5 (ref 0.4–1.6)
ALP SERPL-CCNC: 94 U/L (ref 50–136)
ALT SERPL-CCNC: 29 U/L (ref 12–65)
ANION GAP SERPL CALC-SCNC: 0 MMOL/L (ref 2–11)
AST SERPL-CCNC: 22 U/L (ref 15–37)
BASOPHILS # BLD: 0 K/UL (ref 0–0.2)
BASOPHILS NFR BLD: 1 % (ref 0–2)
BILIRUB SERPL-MCNC: 0.4 MG/DL (ref 0.2–1.1)
BUN SERPL-MCNC: 22 MG/DL (ref 8–23)
CALCIUM SERPL-MCNC: 9 MG/DL (ref 8.3–10.4)
CHLORIDE SERPL-SCNC: 109 MMOL/L (ref 101–110)
CHOLEST SERPL-MCNC: 160 MG/DL
CO2 SERPL-SCNC: 28 MMOL/L (ref 21–32)
CREAT SERPL-MCNC: 1.5 MG/DL (ref 0.8–1.5)
DIFFERENTIAL METHOD BLD: ABNORMAL
EOSINOPHIL # BLD: 0.1 K/UL (ref 0–0.8)
EOSINOPHIL NFR BLD: 4 % (ref 0.5–7.8)
ERYTHROCYTE [DISTWIDTH] IN BLOOD BY AUTOMATED COUNT: 14.9 % (ref 11.9–14.6)
GLOBULIN SER CALC-MCNC: 2.5 G/DL (ref 2.8–4.5)
GLUCOSE SERPL-MCNC: 375 MG/DL (ref 65–100)
HCT VFR BLD AUTO: 40.6 % (ref 41.1–50.3)
HDLC SERPL-MCNC: 58 MG/DL (ref 40–60)
HDLC SERPL: 2.8
HGB BLD-MCNC: 12.9 G/DL (ref 13.6–17.2)
IMM GRANULOCYTES # BLD AUTO: 0 K/UL (ref 0–0.5)
IMM GRANULOCYTES NFR BLD AUTO: 0 % (ref 0–5)
LDLC SERPL CALC-MCNC: 78.6 MG/DL
LYMPHOCYTES # BLD: 0.5 K/UL (ref 0.5–4.6)
LYMPHOCYTES NFR BLD: 14 % (ref 13–44)
MCH RBC QN AUTO: 28.7 PG (ref 26.1–32.9)
MCHC RBC AUTO-ENTMCNC: 31.8 G/DL (ref 31.4–35)
MCV RBC AUTO: 90.2 FL (ref 82–102)
MONOCYTES # BLD: 0.4 K/UL (ref 0.1–1.3)
MONOCYTES NFR BLD: 11 % (ref 4–12)
NEUTS SEG # BLD: 2.2 K/UL (ref 1.7–8.2)
NEUTS SEG NFR BLD: 70 % (ref 43–78)
NRBC # BLD: 0 K/UL (ref 0–0.2)
PLATELET # BLD AUTO: 228 K/UL (ref 150–450)
PMV BLD AUTO: 12.2 FL (ref 9.4–12.3)
POTASSIUM SERPL-SCNC: 4.5 MMOL/L (ref 3.5–5.1)
PROT SERPL-MCNC: 6.3 G/DL (ref 6.3–8.2)
PSA SERPL-MCNC: 2.9 NG/ML
RBC # BLD AUTO: 4.5 M/UL (ref 4.23–5.6)
SODIUM SERPL-SCNC: 137 MMOL/L (ref 133–143)
TRIGL SERPL-MCNC: 117 MG/DL (ref 35–150)
TSH, 3RD GENERATION: 0.89 UIU/ML (ref 0.36–3.74)
VLDLC SERPL CALC-MCNC: 23.4 MG/DL (ref 6–23)
WBC # BLD AUTO: 3.2 K/UL (ref 4.3–11.1)

## 2023-04-28 RX ORDER — CYANOCOBALAMIN 1000 UG/ML
1000 INJECTION, SOLUTION INTRAMUSCULAR; SUBCUTANEOUS ONCE
Status: SHIPPED | OUTPATIENT
Start: 2023-04-28

## 2023-04-28 RX ORDER — PAROXETINE HYDROCHLORIDE HEMIHYDRATE 12.5 MG/1
12.5 TABLET, FILM COATED, EXTENDED RELEASE ORAL DAILY
Qty: 30 TABLET | Refills: 5 | Status: SHIPPED | OUTPATIENT
Start: 2023-04-28

## 2023-04-28 SDOH — ECONOMIC STABILITY: HOUSING INSECURITY
IN THE LAST 12 MONTHS, WAS THERE A TIME WHEN YOU DID NOT HAVE A STEADY PLACE TO SLEEP OR SLEPT IN A SHELTER (INCLUDING NOW)?: NO

## 2023-04-28 SDOH — ECONOMIC STABILITY: TRANSPORTATION INSECURITY
IN THE PAST 12 MONTHS, HAS LACK OF TRANSPORTATION KEPT YOU FROM MEETINGS, WORK, OR FROM GETTING THINGS NEEDED FOR DAILY LIVING?: NO

## 2023-04-28 SDOH — ECONOMIC STABILITY: INCOME INSECURITY: HOW HARD IS IT FOR YOU TO PAY FOR THE VERY BASICS LIKE FOOD, HOUSING, MEDICAL CARE, AND HEATING?: NOT VERY HARD

## 2023-04-28 SDOH — HEALTH STABILITY: PHYSICAL HEALTH: ON AVERAGE, HOW MANY MINUTES DO YOU ENGAGE IN EXERCISE AT THIS LEVEL?: 10 MIN

## 2023-04-28 SDOH — ECONOMIC STABILITY: FOOD INSECURITY: WITHIN THE PAST 12 MONTHS, THE FOOD YOU BOUGHT JUST DIDN'T LAST AND YOU DIDN'T HAVE MONEY TO GET MORE.: NEVER TRUE

## 2023-04-28 SDOH — HEALTH STABILITY: PHYSICAL HEALTH: ON AVERAGE, HOW MANY DAYS PER WEEK DO YOU ENGAGE IN MODERATE TO STRENUOUS EXERCISE (LIKE A BRISK WALK)?: 2 DAYS

## 2023-04-28 SDOH — ECONOMIC STABILITY: FOOD INSECURITY: WITHIN THE PAST 12 MONTHS, YOU WORRIED THAT YOUR FOOD WOULD RUN OUT BEFORE YOU GOT MONEY TO BUY MORE.: NEVER TRUE

## 2023-04-28 ASSESSMENT — LIFESTYLE VARIABLES
HOW MANY STANDARD DRINKS CONTAINING ALCOHOL DO YOU HAVE ON A TYPICAL DAY: PATIENT DOES NOT DRINK
HOW OFTEN DO YOU HAVE SIX OR MORE DRINKS ON ONE OCCASION: 1
HOW OFTEN DO YOU HAVE A DRINK CONTAINING ALCOHOL: MONTHLY OR LESS
HOW MANY STANDARD DRINKS CONTAINING ALCOHOL DO YOU HAVE ON A TYPICAL DAY: 0
HOW OFTEN DO YOU HAVE A DRINK CONTAINING ALCOHOL: 2

## 2023-04-28 ASSESSMENT — PATIENT HEALTH QUESTIONNAIRE - PHQ9
4. FEELING TIRED OR HAVING LITTLE ENERGY: 3
10. IF YOU CHECKED OFF ANY PROBLEMS, HOW DIFFICULT HAVE THESE PROBLEMS MADE IT FOR YOU TO DO YOUR WORK, TAKE CARE OF THINGS AT HOME, OR GET ALONG WITH OTHER PEOPLE: 0
6. FEELING BAD ABOUT YOURSELF - OR THAT YOU ARE A FAILURE OR HAVE LET YOURSELF OR YOUR FAMILY DOWN: 1
SUM OF ALL RESPONSES TO PHQ QUESTIONS 1-9: 19
9. THOUGHTS THAT YOU WOULD BE BETTER OFF DEAD, OR OF HURTING YOURSELF: 1
7. TROUBLE CONCENTRATING ON THINGS, SUCH AS READING THE NEWSPAPER OR WATCHING TELEVISION: 2
2. FEELING DOWN, DEPRESSED OR HOPELESS: 2
SUM OF ALL RESPONSES TO PHQ QUESTIONS 1-9: 18
3. TROUBLE FALLING OR STAYING ASLEEP: 3
1. LITTLE INTEREST OR PLEASURE IN DOING THINGS: 3
SUM OF ALL RESPONSES TO PHQ QUESTIONS 1-9: 19
8. MOVING OR SPEAKING SO SLOWLY THAT OTHER PEOPLE COULD HAVE NOTICED. OR THE OPPOSITE, BEING SO FIGETY OR RESTLESS THAT YOU HAVE BEEN MOVING AROUND A LOT MORE THAN USUAL: 1
5. POOR APPETITE OR OVEREATING: 3
SUM OF ALL RESPONSES TO PHQ QUESTIONS 1-9: 19
SUM OF ALL RESPONSES TO PHQ9 QUESTIONS 1 & 2: 5

## 2023-04-29 LAB
APPEARANCE UR: CLEAR
BACTERIA URNS QL MICRO: NEGATIVE /HPF
BILIRUB UR QL: NEGATIVE
CASTS URNS QL MICRO: ABNORMAL /LPF (ref 0–2)
COLOR UR: ABNORMAL
EPI CELLS #/AREA URNS HPF: ABNORMAL /HPF (ref 0–5)
EST. AVERAGE GLUCOSE BLD GHB EST-MCNC: 197 MG/DL
GLUCOSE UR STRIP.AUTO-MCNC: 250 MG/DL
HBA1C MFR BLD: 8.5 % (ref 4.8–5.6)
HGB UR QL STRIP: NEGATIVE
KETONES UR QL STRIP.AUTO: NEGATIVE MG/DL
LEUKOCYTE ESTERASE UR QL STRIP.AUTO: NEGATIVE
MUCOUS THREADS URNS QL MICRO: 0 /LPF
NITRITE UR QL STRIP.AUTO: NEGATIVE
PH UR STRIP: 5.5 (ref 5–9)
PROT UR STRIP-MCNC: NEGATIVE MG/DL
RBC #/AREA URNS HPF: ABNORMAL /HPF (ref 0–5)
SP GR UR REFRACTOMETRY: 1.03 (ref 1–1.02)
URINE CULTURE IF INDICATED: ABNORMAL
UROBILINOGEN UR QL STRIP.AUTO: 0.2 EU/DL (ref 0.2–1)
WBC URNS QL MICRO: ABNORMAL /HPF (ref 0–4)

## 2023-05-02 DIAGNOSIS — N40.1 BENIGN PROSTATIC HYPERPLASIA WITH LOWER URINARY TRACT SYMPTOMS, SYMPTOM DETAILS UNSPECIFIED: Primary | ICD-10-CM

## 2023-05-03 DIAGNOSIS — N40.1 BENIGN PROSTATIC HYPERPLASIA WITH LOWER URINARY TRACT SYMPTOMS, SYMPTOM DETAILS UNSPECIFIED: ICD-10-CM

## 2023-05-03 LAB — PSA SERPL-MCNC: 3 NG/ML

## 2023-05-07 ASSESSMENT — ENCOUNTER SYMPTOMS
RESPIRATORY NEGATIVE: 1
EYES NEGATIVE: 1
GASTROINTESTINAL NEGATIVE: 1
CHEST TIGHTNESS: 0

## 2023-05-09 ENCOUNTER — OFFICE VISIT (OUTPATIENT)
Dept: UROLOGY | Age: 73
End: 2023-05-09
Payer: MEDICARE

## 2023-05-09 DIAGNOSIS — N13.8 BENIGN PROSTATIC HYPERPLASIA WITH URINARY OBSTRUCTION: Primary | ICD-10-CM

## 2023-05-09 DIAGNOSIS — N40.1 BENIGN PROSTATIC HYPERPLASIA WITH URINARY OBSTRUCTION: Primary | ICD-10-CM

## 2023-05-09 PROCEDURE — 3017F COLORECTAL CA SCREEN DOC REV: CPT | Performed by: UROLOGY

## 2023-05-09 PROCEDURE — 99213 OFFICE O/P EST LOW 20 MIN: CPT | Performed by: UROLOGY

## 2023-05-09 PROCEDURE — G8417 CALC BMI ABV UP PARAM F/U: HCPCS | Performed by: UROLOGY

## 2023-05-09 PROCEDURE — 1123F ACP DISCUSS/DSCN MKR DOCD: CPT | Performed by: UROLOGY

## 2023-05-09 PROCEDURE — 1036F TOBACCO NON-USER: CPT | Performed by: UROLOGY

## 2023-05-09 PROCEDURE — G8427 DOCREV CUR MEDS BY ELIG CLIN: HCPCS | Performed by: UROLOGY

## 2023-05-09 RX ORDER — TAMSULOSIN HYDROCHLORIDE 0.4 MG/1
0.4 CAPSULE ORAL EVERY EVENING
Qty: 90 CAPSULE | Refills: 3 | Status: SHIPPED | OUTPATIENT
Start: 2023-05-09

## 2023-05-09 ASSESSMENT — ENCOUNTER SYMPTOMS: BACK PAIN: 0

## 2023-05-09 NOTE — PROGRESS NOTES
CARDIAC CATHETERIZATION  06/25/2019    COLONOSCOPY N/A 7/14/2017    COLONOSCOPY performed by Joanna Kent MD at [place] [address]    Dr. Alexandra De (5yrs)    COLONOSCOPY  07/14/2017    CORONARY ARTERY BYPASS GRAFT      quadruple bypass    HEENT      nasal surgery 2ndary to obstruction    HEENT  02/2015    cyst removal    NOSE SURGERY Bilateral 12/30/2022    INFERIOR TURBINATE REDUCTION performed by Brooke Gutierrez DO at [address] eyes    [place] UNLISTED PROCEDURE CARDIAC SURGERY  6/225/19    ablation    [address] [place]    CABG    SEPTOPLASTY N/A 12/30/2022    SEPTOPLASTY GRAFT performed by Brooke Gutierrez DO at [address] [facility] Right 12/30/2022    SINUS ENDOSCOPY FUNCTIONAL SURGERY IMAGE GUIDED with Right-Sided Maxillary Antrostomies with Tissue Removal, Right-Sided  Total Ethmoidectomy, Propel Stent performed by Brooke Gutierrez DO at [facility]     Current Outpatient Medications   Medication Sig Dispense Refill    Fluticasone Propionate (FLONASE NA) by Nasal route      Cholecalciferol (VITAMIN D3 PO) Take by mouth      tamsulosin (FLOMAX) 0.4 MG capsule Take 1 capsule by mouth every evening 90 capsule 3    PARoxetine (PAXIL CR) 12.5 MG extended release tablet Take 1 tablet by mouth daily 30 tablet 5    dofetilide (TIKOSYN) 250 MCG capsule Take 1 capsule by mouth 2 times daily Indications: per pt takes 1st dose at 11 am and 2nd dose at 11pm TAKE 1 CAPSULE EVERY 12 HOURS 60 capsule 0    omeprazole (PRILOSEC) 20 MG delayed release capsule Take 1 capsule by mouth Daily TAKE 1 CAPSULE EVERY DAY 30 capsule 5    levothyroxine (SYNTHROID) 88 MCG tablet Take 1 tablet by mouth Daily TAKE 1 TABLET BY MOUTH EVERY DAY BEFORE BREAKFAST 30 tablet 5    glimepiride (AMARYL) 1 MG tablet Take 1 tablet by mouth every morning (before breakfast) TAKE 1 TABLET EVERY DAY BEFORE BREAKFAST 30 tablet 5    dabigatran

## 2023-05-30 ENCOUNTER — OFFICE VISIT (OUTPATIENT)
Dept: SLEEP MEDICINE | Age: 73
End: 2023-05-30
Payer: MEDICARE

## 2023-05-30 VITALS
HEIGHT: 70 IN | DIASTOLIC BLOOD PRESSURE: 64 MMHG | OXYGEN SATURATION: 99 % | HEART RATE: 84 BPM | RESPIRATION RATE: 16 BRPM | BODY MASS INDEX: 26.05 KG/M2 | WEIGHT: 182 LBS | SYSTOLIC BLOOD PRESSURE: 122 MMHG | TEMPERATURE: 97.2 F

## 2023-05-30 DIAGNOSIS — G47.33 OSA ON CPAP: Primary | ICD-10-CM

## 2023-05-30 DIAGNOSIS — Z99.89 OSA ON CPAP: Primary | ICD-10-CM

## 2023-05-30 PROCEDURE — 1123F ACP DISCUSS/DSCN MKR DOCD: CPT | Performed by: NURSE PRACTITIONER

## 2023-05-30 PROCEDURE — G8417 CALC BMI ABV UP PARAM F/U: HCPCS | Performed by: NURSE PRACTITIONER

## 2023-05-30 PROCEDURE — 3074F SYST BP LT 130 MM HG: CPT | Performed by: NURSE PRACTITIONER

## 2023-05-30 PROCEDURE — 3017F COLORECTAL CA SCREEN DOC REV: CPT | Performed by: NURSE PRACTITIONER

## 2023-05-30 PROCEDURE — 99213 OFFICE O/P EST LOW 20 MIN: CPT | Performed by: NURSE PRACTITIONER

## 2023-05-30 PROCEDURE — 3078F DIAST BP <80 MM HG: CPT | Performed by: NURSE PRACTITIONER

## 2023-05-30 PROCEDURE — G8427 DOCREV CUR MEDS BY ELIG CLIN: HCPCS | Performed by: NURSE PRACTITIONER

## 2023-05-30 PROCEDURE — 1036F TOBACCO NON-USER: CPT | Performed by: NURSE PRACTITIONER

## 2023-05-30 ASSESSMENT — SLEEP AND FATIGUE QUESTIONNAIRES
HOW LIKELY ARE YOU TO NOD OFF OR FALL ASLEEP WHILE SITTING AND TALKING TO SOMEONE: 0
HOW LIKELY ARE YOU TO NOD OFF OR FALL ASLEEP WHILE SITTING QUIETLY AFTER LUNCH WITHOUT ALCOHOL: 1
HOW LIKELY ARE YOU TO NOD OFF OR FALL ASLEEP WHILE SITTING INACTIVE IN A PUBLIC PLACE: 1
HOW LIKELY ARE YOU TO NOD OFF OR FALL ASLEEP IN A CAR, WHILE STOPPED FOR A FEW MINUTES IN TRAFFIC: 0
HOW LIKELY ARE YOU TO NOD OFF OR FALL ASLEEP WHILE SITTING AND READING: 1
HOW LIKELY ARE YOU TO NOD OFF OR FALL ASLEEP WHILE WATCHING TV: 2
HOW LIKELY ARE YOU TO NOD OFF OR FALL ASLEEP WHILE LYING DOWN TO REST IN THE AFTERNOON WHEN CIRCUMSTANCES PERMIT: 2
ESS TOTAL SCORE: 8
HOW LIKELY ARE YOU TO NOD OFF OR FALL ASLEEP WHEN YOU ARE A PASSENGER IN A CAR FOR AN HOUR WITHOUT A BREAK: 1

## 2023-06-14 ASSESSMENT — PATIENT HEALTH QUESTIONNAIRE - PHQ9
9. THOUGHTS THAT YOU WOULD BE BETTER OFF DEAD, OR OF HURTING YOURSELF: 0
10. IF YOU CHECKED OFF ANY PROBLEMS, HOW DIFFICULT HAVE THESE PROBLEMS MADE IT FOR YOU TO DO YOUR WORK, TAKE CARE OF THINGS AT HOME, OR GET ALONG WITH OTHER PEOPLE: 0
SUM OF ALL RESPONSES TO PHQ QUESTIONS 1-9: 3
SUM OF ALL RESPONSES TO PHQ QUESTIONS 1-9: 3
4. FEELING TIRED OR HAVING LITTLE ENERGY: SEVERAL DAYS
9. THOUGHTS THAT YOU WOULD BE BETTER OFF DEAD, OR OF HURTING YOURSELF: NOT AT ALL
SUM OF ALL RESPONSES TO PHQ QUESTIONS 1-9: 3
5. POOR APPETITE OR OVEREATING: NOT AT ALL
3. TROUBLE FALLING OR STAYING ASLEEP: 2
8. MOVING OR SPEAKING SO SLOWLY THAT OTHER PEOPLE COULD HAVE NOTICED. OR THE OPPOSITE, BEING SO FIGETY OR RESTLESS THAT YOU HAVE BEEN MOVING AROUND A LOT MORE THAN USUAL: 0
4. FEELING TIRED OR HAVING LITTLE ENERGY: 1
5. POOR APPETITE OR OVEREATING: 0
SUM OF ALL RESPONSES TO PHQ QUESTIONS 1-9: 3
6. FEELING BAD ABOUT YOURSELF - OR THAT YOU ARE A FAILURE OR HAVE LET YOURSELF OR YOUR FAMILY DOWN: NOT AT ALL
6. FEELING BAD ABOUT YOURSELF - OR THAT YOU ARE A FAILURE OR HAVE LET YOURSELF OR YOUR FAMILY DOWN: 0
3. TROUBLE FALLING OR STAYING ASLEEP: MORE THAN HALF THE DAYS
7. TROUBLE CONCENTRATING ON THINGS, SUCH AS READING THE NEWSPAPER OR WATCHING TELEVISION: 0
10. IF YOU CHECKED OFF ANY PROBLEMS, HOW DIFFICULT HAVE THESE PROBLEMS MADE IT FOR YOU TO DO YOUR WORK, TAKE CARE OF THINGS AT HOME, OR GET ALONG WITH OTHER PEOPLE: NOT DIFFICULT AT ALL
8. MOVING OR SPEAKING SO SLOWLY THAT OTHER PEOPLE COULD HAVE NOTICED. OR THE OPPOSITE - BEING SO FIDGETY OR RESTLESS THAT YOU HAVE BEEN MOVING AROUND A LOT MORE THAN USUAL: NOT AT ALL
7. TROUBLE CONCENTRATING ON THINGS, SUCH AS READING THE NEWSPAPER OR WATCHING TELEVISION: NOT AT ALL

## 2023-06-16 ENCOUNTER — OFFICE VISIT (OUTPATIENT)
Dept: FAMILY MEDICINE CLINIC | Facility: CLINIC | Age: 73
End: 2023-06-16
Payer: MEDICARE

## 2023-06-16 VITALS
TEMPERATURE: 97.6 F | SYSTOLIC BLOOD PRESSURE: 118 MMHG | HEART RATE: 104 BPM | WEIGHT: 177 LBS | DIASTOLIC BLOOD PRESSURE: 72 MMHG | OXYGEN SATURATION: 97 % | BODY MASS INDEX: 25.34 KG/M2 | HEIGHT: 70 IN

## 2023-06-16 DIAGNOSIS — L57.0 ACTINIC KERATOSES: Primary | ICD-10-CM

## 2023-06-16 DIAGNOSIS — F32.A DEPRESSION, UNSPECIFIED DEPRESSION TYPE: ICD-10-CM

## 2023-06-16 DIAGNOSIS — Z95.1 S/P CABG (CORONARY ARTERY BYPASS GRAFT): ICD-10-CM

## 2023-06-16 DIAGNOSIS — I48.19 PERSISTENT ATRIAL FIBRILLATION (HCC): ICD-10-CM

## 2023-06-16 DIAGNOSIS — G47.33 OSA ON CPAP: ICD-10-CM

## 2023-06-16 DIAGNOSIS — Z99.89 OSA ON CPAP: ICD-10-CM

## 2023-06-16 PROCEDURE — 99214 OFFICE O/P EST MOD 30 MIN: CPT | Performed by: FAMILY MEDICINE

## 2023-06-16 PROCEDURE — G8417 CALC BMI ABV UP PARAM F/U: HCPCS | Performed by: FAMILY MEDICINE

## 2023-06-16 PROCEDURE — 1036F TOBACCO NON-USER: CPT | Performed by: FAMILY MEDICINE

## 2023-06-16 PROCEDURE — 1123F ACP DISCUSS/DSCN MKR DOCD: CPT | Performed by: FAMILY MEDICINE

## 2023-06-16 PROCEDURE — 3017F COLORECTAL CA SCREEN DOC REV: CPT | Performed by: FAMILY MEDICINE

## 2023-06-16 PROCEDURE — 3074F SYST BP LT 130 MM HG: CPT | Performed by: FAMILY MEDICINE

## 2023-06-16 PROCEDURE — G8427 DOCREV CUR MEDS BY ELIG CLIN: HCPCS | Performed by: FAMILY MEDICINE

## 2023-06-16 PROCEDURE — 3078F DIAST BP <80 MM HG: CPT | Performed by: FAMILY MEDICINE

## 2023-06-16 RX ORDER — PAROXETINE HYDROCHLORIDE HEMIHYDRATE 25 MG/1
25 TABLET, FILM COATED, EXTENDED RELEASE ORAL DAILY
Qty: 30 TABLET | Refills: 3 | Status: SHIPPED | OUTPATIENT
Start: 2023-06-16

## 2023-06-16 ASSESSMENT — PATIENT HEALTH QUESTIONNAIRE - PHQ9
SUM OF ALL RESPONSES TO PHQ QUESTIONS 1-9: 0
9. THOUGHTS THAT YOU WOULD BE BETTER OFF DEAD, OR OF HURTING YOURSELF: 0
8. MOVING OR SPEAKING SO SLOWLY THAT OTHER PEOPLE COULD HAVE NOTICED. OR THE OPPOSITE, BEING SO FIGETY OR RESTLESS THAT YOU HAVE BEEN MOVING AROUND A LOT MORE THAN USUAL: 0
6. FEELING BAD ABOUT YOURSELF - OR THAT YOU ARE A FAILURE OR HAVE LET YOURSELF OR YOUR FAMILY DOWN: 0
10. IF YOU CHECKED OFF ANY PROBLEMS, HOW DIFFICULT HAVE THESE PROBLEMS MADE IT FOR YOU TO DO YOUR WORK, TAKE CARE OF THINGS AT HOME, OR GET ALONG WITH OTHER PEOPLE: 0
3. TROUBLE FALLING OR STAYING ASLEEP: 0
SUM OF ALL RESPONSES TO PHQ QUESTIONS 1-9: 0
7. TROUBLE CONCENTRATING ON THINGS, SUCH AS READING THE NEWSPAPER OR WATCHING TELEVISION: 0
SUM OF ALL RESPONSES TO PHQ QUESTIONS 1-9: 0
4. FEELING TIRED OR HAVING LITTLE ENERGY: 0
5. POOR APPETITE OR OVEREATING: 0
SUM OF ALL RESPONSES TO PHQ QUESTIONS 1-9: 0
2. FEELING DOWN, DEPRESSED OR HOPELESS: 0

## 2023-06-26 ASSESSMENT — ENCOUNTER SYMPTOMS
EYES NEGATIVE: 1
RESPIRATORY NEGATIVE: 1
GASTROINTESTINAL NEGATIVE: 1

## 2023-07-06 ENCOUNTER — OFFICE VISIT (OUTPATIENT)
Age: 73
End: 2023-07-06

## 2023-07-06 VITALS
DIASTOLIC BLOOD PRESSURE: 80 MMHG | HEART RATE: 76 BPM | BODY MASS INDEX: 25.05 KG/M2 | SYSTOLIC BLOOD PRESSURE: 138 MMHG | WEIGHT: 175 LBS | HEIGHT: 70 IN

## 2023-07-06 DIAGNOSIS — I49.9 CARDIAC ARRHYTHMIA, UNSPECIFIED CARDIAC ARRHYTHMIA TYPE: ICD-10-CM

## 2023-07-06 DIAGNOSIS — I20.0 UNSTABLE ANGINA PECTORIS (HCC): ICD-10-CM

## 2023-07-06 DIAGNOSIS — I48.19 PERSISTENT ATRIAL FIBRILLATION (HCC): Primary | ICD-10-CM

## 2023-07-06 ASSESSMENT — ENCOUNTER SYMPTOMS
ALLERGIC/IMMUNOLOGIC NEGATIVE: 1
EYES NEGATIVE: 1
RESPIRATORY NEGATIVE: 1
GASTROINTESTINAL NEGATIVE: 1

## 2023-07-06 NOTE — PROGRESS NOTES
[facility] CARDIOLOGY  [address] [facility], [facility], [address]  PHONE: [phone]        23      NAME:  Abigail Day  : 1950  MRN: [id number]     Referring Cardiologist: Harish Lord MD     Reason for Consultation: Atrial fibrillation     ASSESSMENT and PLAN:  Diagnoses and all orders for this visit:      1. Persistent atrial fibrillation ([address]), [id number] = 5, on Pradaxa, Tikosyn. S/p AF/AFL ablation 2019.       2. S/P CABG/Aortocoronary bypass status, performed in . 3. Essential hypertension      4. Coronary artery disease without angina pectoris, unspecified vessel or lesion type, unspecified whether native or transplanted heart      5. Controlled type 2 diabetes mellitus with hyperglycemia ([address])      6. Anxiety      7. Pure hypercholesterolemia      8. Neuropraxia of median nerve. 9. COVID infection 2022      10. Benign essential tremor     77-year-old male with a history of persistent atrial fibrillation which is now drug refractory. He has previously undergone an AF ablation. He remains on Tikosyn and Pradaxa. -AF - Continue Pradaxa indefinitely. Now back in AF, utility of longterm Tikosyn? Consider cardioversion in  if remains OOR. He will wait until this fall.      -Maxillary sinus issues - s/p sinus surgery, doing much better.     -Benign essential tremor - improved movement with Paxil. Sees Dr. Domitila Acosta as well.      -Achilles tendon injury - continue conservative mgmt. -EP Follow up in 6 months or PRN. We fully reviewed the risks and benefits of SARA/DCCV. Paige risks of esphogeal injury were discussed in addition skin irritation/burns, very small risk of stroke/heart attack or death and/or recurrent arrhythmia. Patient has been instructed and agrees to call our office with any issues or other concerns related to their cardiac condition(s) and/or complaint(s). No follow-up provider specified.     Thank you for allowing

## 2023-08-02 ENCOUNTER — CLINICAL DOCUMENTATION (OUTPATIENT)
Dept: SURGERY | Age: 73
End: 2023-08-02

## 2023-08-02 NOTE — PROGRESS NOTES
I have reviewed the patient's chart and consider the patient an acceptable risk for screening colonoscopy without a formal office visit. We will contact the patient to give the details of the bowel prep and to schedule screening colonoscopy in the near future. Colonoscopy: 2017 [facility] Crowder (5 yr repeat recommendation)   Anticoagulation:PRADAXA- will get pre procedure hold clearance by Dr Jose Enrique Flores. Family Hx: non contributory       Once the colonoscopy has been completed, the Health Maintenance will be updated accordingly.      DIANNE Molina - CNP

## 2023-08-10 ENCOUNTER — OFFICE VISIT (OUTPATIENT)
Dept: ENT CLINIC | Age: 73
End: 2023-08-10
Payer: MEDICARE

## 2023-08-10 VITALS
RESPIRATION RATE: 17 BRPM | BODY MASS INDEX: 24.77 KG/M2 | HEART RATE: 77 BPM | WEIGHT: 173 LBS | HEIGHT: 70 IN | OXYGEN SATURATION: 98 %

## 2023-08-10 DIAGNOSIS — J31.0 CHRONIC RHINOSINUSITIS: Primary | ICD-10-CM

## 2023-08-10 DIAGNOSIS — J32.9 CHRONIC RHINOSINUSITIS: Primary | ICD-10-CM

## 2023-08-10 DIAGNOSIS — Z98.890 S/P NASAL SEPTOPLASTY: ICD-10-CM

## 2023-08-10 DIAGNOSIS — Z98.890 S/P FESS (FUNCTIONAL ENDOSCOPIC SINUS SURGERY): ICD-10-CM

## 2023-08-10 PROCEDURE — 1036F TOBACCO NON-USER: CPT | Performed by: STUDENT IN AN ORGANIZED HEALTH CARE EDUCATION/TRAINING PROGRAM

## 2023-08-10 PROCEDURE — 1123F ACP DISCUSS/DSCN MKR DOCD: CPT | Performed by: STUDENT IN AN ORGANIZED HEALTH CARE EDUCATION/TRAINING PROGRAM

## 2023-08-10 PROCEDURE — 99213 OFFICE O/P EST LOW 20 MIN: CPT | Performed by: STUDENT IN AN ORGANIZED HEALTH CARE EDUCATION/TRAINING PROGRAM

## 2023-08-10 PROCEDURE — 3017F COLORECTAL CA SCREEN DOC REV: CPT | Performed by: STUDENT IN AN ORGANIZED HEALTH CARE EDUCATION/TRAINING PROGRAM

## 2023-08-10 PROCEDURE — 31237 NSL/SINS NDSC SURG BX POLYPC: CPT | Performed by: STUDENT IN AN ORGANIZED HEALTH CARE EDUCATION/TRAINING PROGRAM

## 2023-08-10 PROCEDURE — G8427 DOCREV CUR MEDS BY ELIG CLIN: HCPCS | Performed by: STUDENT IN AN ORGANIZED HEALTH CARE EDUCATION/TRAINING PROGRAM

## 2023-08-10 PROCEDURE — G8420 CALC BMI NORM PARAMETERS: HCPCS | Performed by: STUDENT IN AN ORGANIZED HEALTH CARE EDUCATION/TRAINING PROGRAM

## 2023-08-10 ASSESSMENT — ENCOUNTER SYMPTOMS
SINUS PAIN: 0
WHEEZING: 0
EYE DISCHARGE: 0
DIARRHEA: 0
CONSTIPATION: 0
EYE PAIN: 0
SHORTNESS OF BREATH: 0
SINUS PRESSURE: 0
APNEA: 0
FACIAL SWELLING: 0
COUGH: 0
CHOKING: 0
STRIDOR: 0
NAUSEA: 0
EYE ITCHING: 0

## 2023-08-10 NOTE — PROGRESS NOTES
daily for 30 days. Take 300 mg by mouth 3 times daily. Current Facility-Administered Medications   Medication Dose Route Frequency    cyanocobalamin injection 1,000 mcg  1,000 mcg IntraMUSCular Once       Past Medical History:   Diagnosis Date    Anxiety     Arrhythmia     Atrial fib.     CAD (coronary artery disease) 1995    followed by Dr. Armin Beatty    Calculus of kidney     right renal calculi    Chronic pain     back     DDD (degenerative disc disease)     Diabetes ([address])     type 2; glimeperide as needed per patient ; a1c = 8.0 as of 5/5/2021; check sugars daily avg is 80 per patient; hypoglycemic at 60     Diverticulitis 2013    Endocrine disease     thyroid    GERD (gastroesophageal reflux disease)     controlled with medication     History of EKG 02/15/2021    NSR with incomp RBBB    History of shingles 2013    HLD (hyperlipidemia) 7/5/2016    Hx of echocardiogram 07/01/2019    LVEF >55%    Hypercholesteremia     medication     Hypertension     controlled with medication  per patient     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     anxiety - controlled     S/P CABG (coronary artery bypass graft) 07/05/2016    4 vessel bypass    Sleep apnea     cpap    Sleep disorder 2007    apnea-uses CPAP    Thyroid disease     Hypothyroid       Past Surgical History:   Procedure Laterality Date    [address]  06/25/2019    COLONOSCOPY N/A 7/14/2017    COLONOSCOPY performed by Santy Payne MD at [address]  2010    Dr. Larry Kern (5yrs)    COLONOSCOPY  07/14/2017    CORONARY ARTERY BYPASS GRAFT      quadruple bypass    HEENT      nasal surgery 2ndary to obstruction    HEENT  02/2015    cyst removal    NOSE SURGERY Bilateral 12/30/2022    INFERIOR TURBINATE REDUCTION performed by Hua Rangel DO at [address] [facility] HISTORY      lasik eyes    [place] UNLISTED PROCEDURE CARDIAC SURGERY  6/225/19    ablation    [place] UNLISTED

## 2023-08-16 RX ORDER — TAMSULOSIN HYDROCHLORIDE 0.4 MG/1
0.4 CAPSULE ORAL DAILY
Qty: 30 CAPSULE | Refills: 0 | Status: SHIPPED | OUTPATIENT
Start: 2023-08-16

## 2023-08-25 ENCOUNTER — PATIENT MESSAGE (OUTPATIENT)
Dept: FAMILY MEDICINE CLINIC | Facility: CLINIC | Age: 73
End: 2023-08-25

## 2023-08-25 DIAGNOSIS — G89.29 CHRONIC PAIN OF LEFT ANKLE: Primary | ICD-10-CM

## 2023-08-25 DIAGNOSIS — M25.572 CHRONIC PAIN OF LEFT ANKLE: Primary | ICD-10-CM

## 2023-09-16 ASSESSMENT — PATIENT HEALTH QUESTIONNAIRE - PHQ9
8. MOVING OR SPEAKING SO SLOWLY THAT OTHER PEOPLE COULD HAVE NOTICED. OR THE OPPOSITE - BEING SO FIDGETY OR RESTLESS THAT YOU HAVE BEEN MOVING AROUND A LOT MORE THAN USUAL: SEVERAL DAYS
5. POOR APPETITE OR OVEREATING: 0
4. FEELING TIRED OR HAVING LITTLE ENERGY: NOT AT ALL
6. FEELING BAD ABOUT YOURSELF - OR THAT YOU ARE A FAILURE OR HAVE LET YOURSELF OR YOUR FAMILY DOWN: 0
1. LITTLE INTEREST OR PLEASURE IN DOING THINGS: NOT AT ALL
5. POOR APPETITE OR OVEREATING: NOT AT ALL
2. FEELING DOWN, DEPRESSED OR HOPELESS: 0
6. FEELING BAD ABOUT YOURSELF - OR THAT YOU ARE A FAILURE OR HAVE LET YOURSELF OR YOUR FAMILY DOWN: NOT AT ALL
10. IF YOU CHECKED OFF ANY PROBLEMS, HOW DIFFICULT HAVE THESE PROBLEMS MADE IT FOR YOU TO DO YOUR WORK, TAKE CARE OF THINGS AT HOME, OR GET ALONG WITH OTHER PEOPLE: NOT DIFFICULT AT ALL
1. LITTLE INTEREST OR PLEASURE IN DOING THINGS: 0
SUM OF ALL RESPONSES TO PHQ QUESTIONS 1-9: 2
SUM OF ALL RESPONSES TO PHQ9 QUESTIONS 1 & 2: 0
10. IF YOU CHECKED OFF ANY PROBLEMS, HOW DIFFICULT HAVE THESE PROBLEMS MADE IT FOR YOU TO DO YOUR WORK, TAKE CARE OF THINGS AT HOME, OR GET ALONG WITH OTHER PEOPLE: 0
9. THOUGHTS THAT YOU WOULD BE BETTER OFF DEAD, OR OF HURTING YOURSELF: 0
9. THOUGHTS THAT YOU WOULD BE BETTER OFF DEAD, OR OF HURTING YOURSELF: NOT AT ALL
SUM OF ALL RESPONSES TO PHQ QUESTIONS 1-9: 2
7. TROUBLE CONCENTRATING ON THINGS, SUCH AS READING THE NEWSPAPER OR WATCHING TELEVISION: 0
SUM OF ALL RESPONSES TO PHQ QUESTIONS 1-9: 2
SUM OF ALL RESPONSES TO PHQ QUESTIONS 1-9: 2
4. FEELING TIRED OR HAVING LITTLE ENERGY: 0
SUM OF ALL RESPONSES TO PHQ QUESTIONS 1-9: 2
8. MOVING OR SPEAKING SO SLOWLY THAT OTHER PEOPLE COULD HAVE NOTICED. OR THE OPPOSITE, BEING SO FIGETY OR RESTLESS THAT YOU HAVE BEEN MOVING AROUND A LOT MORE THAN USUAL: 1
3. TROUBLE FALLING OR STAYING ASLEEP: 1
2. FEELING DOWN, DEPRESSED OR HOPELESS: NOT AT ALL
3. TROUBLE FALLING OR STAYING ASLEEP: SEVERAL DAYS
7. TROUBLE CONCENTRATING ON THINGS, SUCH AS READING THE NEWSPAPER OR WATCHING TELEVISION: NOT AT ALL

## 2023-09-18 ENCOUNTER — OFFICE VISIT (OUTPATIENT)
Dept: FAMILY MEDICINE CLINIC | Facility: CLINIC | Age: 73
End: 2023-09-18

## 2023-09-18 VITALS
OXYGEN SATURATION: 99 % | SYSTOLIC BLOOD PRESSURE: 138 MMHG | BODY MASS INDEX: 24.48 KG/M2 | DIASTOLIC BLOOD PRESSURE: 76 MMHG | TEMPERATURE: 98.1 F | HEART RATE: 73 BPM | HEIGHT: 70 IN | WEIGHT: 171 LBS

## 2023-09-18 DIAGNOSIS — G89.29 CHRONIC PAIN OF LEFT ANKLE: Primary | ICD-10-CM

## 2023-09-18 DIAGNOSIS — M25.572 CHRONIC PAIN OF LEFT ANKLE: Primary | ICD-10-CM

## 2023-09-18 DIAGNOSIS — G89.29 CHRONIC HEEL PAIN, LEFT: ICD-10-CM

## 2023-09-18 DIAGNOSIS — M79.672 CHRONIC HEEL PAIN, LEFT: ICD-10-CM

## 2023-09-18 DIAGNOSIS — S54.12XD NEURAPRAXIA OF LEFT MEDIAN NERVE, SUBSEQUENT ENCOUNTER: ICD-10-CM

## 2023-09-18 DIAGNOSIS — E11.65 TYPE 2 DIABETES MELLITUS WITH HYPERGLYCEMIA, UNSPECIFIED WHETHER LONG TERM INSULIN USE (HCC): ICD-10-CM

## 2023-09-18 RX ORDER — GABAPENTIN 300 MG/1
300 CAPSULE ORAL 3 TIMES DAILY
Qty: 270 CAPSULE | Refills: 3 | Status: SHIPPED | OUTPATIENT
Start: 2023-09-18 | End: 2024-09-12

## 2023-09-18 RX ORDER — GLIMEPIRIDE 1 MG/1
1 TABLET ORAL
Qty: 90 TABLET | Refills: 3 | Status: SHIPPED | OUTPATIENT
Start: 2023-09-18

## 2023-09-18 RX ORDER — OMEPRAZOLE 20 MG/1
20 CAPSULE, DELAYED RELEASE ORAL DAILY
Qty: 90 CAPSULE | OUTPATIENT
Start: 2023-09-18

## 2023-09-18 RX ORDER — OMEPRAZOLE 20 MG/1
20 CAPSULE, DELAYED RELEASE ORAL DAILY
Qty: 90 CAPSULE | Refills: 3 | Status: SHIPPED | OUTPATIENT
Start: 2023-09-18

## 2023-09-18 RX ORDER — LEVOTHYROXINE SODIUM 88 UG/1
88 TABLET ORAL
Qty: 90 TABLET | OUTPATIENT
Start: 2023-09-18

## 2023-09-18 RX ORDER — PAROXETINE HYDROCHLORIDE HEMIHYDRATE 25 MG/1
25 TABLET, FILM COATED, EXTENDED RELEASE ORAL DAILY
Qty: 30 TABLET | Refills: 5 | Status: SHIPPED | OUTPATIENT
Start: 2023-09-18

## 2023-09-18 RX ORDER — GLIMEPIRIDE 1 MG/1
1 TABLET ORAL
Qty: 90 TABLET | OUTPATIENT
Start: 2023-09-18

## 2023-09-18 RX ORDER — LEVOTHYROXINE SODIUM 88 UG/1
88 TABLET ORAL DAILY
Qty: 90 TABLET | Refills: 1 | Status: SHIPPED | OUTPATIENT
Start: 2023-09-18

## 2023-09-18 RX ORDER — BLOOD SUGAR DIAGNOSTIC
STRIP MISCELLANEOUS
Qty: 100 STRIP | Refills: 5 | Status: SHIPPED | OUTPATIENT
Start: 2023-09-18

## 2023-09-25 RX ORDER — FENOFIBRATE 160 MG/1
160 TABLET ORAL
Qty: 90 TABLET | Refills: 3 | Status: SHIPPED | OUTPATIENT
Start: 2023-09-25

## 2023-09-25 NOTE — TELEPHONE ENCOUNTER
Requested Prescriptions     Pending Prescriptions Disp Refills    fenofibrate (TRIGLIDE) 160 MG tablet 90 tablet 3     Sig: Take 1 tablet by mouth Daily with lunch TAKE 1 TABLET EVERY DAY

## 2023-10-01 ASSESSMENT — ENCOUNTER SYMPTOMS
GASTROINTESTINAL NEGATIVE: 1
RESPIRATORY NEGATIVE: 1
EYES NEGATIVE: 1

## 2023-10-02 ENCOUNTER — OFFICE VISIT (OUTPATIENT)
Dept: ORTHOPEDIC SURGERY | Age: 73
End: 2023-10-02

## 2023-10-02 VITALS — HEIGHT: 70 IN | WEIGHT: 173 LBS | BODY MASS INDEX: 24.77 KG/M2

## 2023-10-02 DIAGNOSIS — M79.672 CHRONIC HEEL PAIN, LEFT: ICD-10-CM

## 2023-10-02 DIAGNOSIS — G89.29 CHRONIC HEEL PAIN, LEFT: ICD-10-CM

## 2023-10-02 DIAGNOSIS — M65.28 CALCIFIC ACHILLES TENDINITIS OF LEFT LOWER EXTREMITY: Primary | ICD-10-CM

## 2023-10-02 RX ORDER — LIDOCAINE 5% 5 G/100G
CREAM TOPICAL
Qty: 45 G | Refills: 2 | Status: SHIPPED | OUTPATIENT
Start: 2023-10-02

## 2023-10-02 NOTE — PROGRESS NOTES
The patient was prescribed and fitted with a heelbo heel protector for the left foot. Patient read and signed documenting they understand and agree to [facility]'s current DME return policy.

## 2023-10-02 NOTE — PROGRESS NOTES
Name: Jaqui Ramirez  YOB: 1950  Gender: male  MRN: [id number]    CC: Left Achilles/heel pain    HPI:   10/02/2023: Initial visit: Left posterior heel pain: Over 2 years duration: Never pain-free    ROS/Meds/PSH/PMH/FH/SH: reviewed today    Tobacco:  reports that he has never smoked. He has never used smokeless tobacco.   Type 2 Diabetes: ESRD 3    Physical Examination:  Patient appears to be alert and oriented with acceptable appearance. No obvious distress or SOB  CV: appears to have acceptable vascular color and capillary refill  Neuro: appears to have mostly intact light touch sensation   Skin: Left posterior heel Achilles thickening  MS: Standing: Plantigrade: Gait full  Left = insertional Achilles pain; no pain above the insertion; no Achilles deficiency or gap  Left = full ankle/foot motor; 5/5 strength; no instability or crepitance    XR: Left: Standing AP lateral mortise ankle plus AP oblique foot taken today with calcific insertional Achilles; Tommie's;  XR Impression:  As above      Reviewed Test/Records/Documents:   12/15/2009: Diagnoses: Right neuralgic medial ankle pain  09/27/2022: Dr. Amanuel Roland: Left insertional Achilles tendinitis/retrocalcaneal bursitis: Medrol Dosepak, night splint and PT  09/18/2023: Dr. Rick Guerrero: Reflects chronic left ankle/heel pain     Injection: No indication    Assessment:    Left chronic calcific insertional Achilles tendinitis    Plan:   The patient and I discussed the above assessment. We explored treatment options. Unfortunately he has been dealing with this painful heel for over 2 years  He completed conservative care with medication, boot, [facility] and rest  At this time, he has exhausted conservative care except for items/discussion outlined today   Plan is MRI scan and surgical consultation after MRI scan.     Advanced medical imaging: Left ankle MRI scan: Assess insertional Achilles tendon tearing with calcific insertional Achilles

## 2023-10-03 NOTE — PROGRESS NOTES
Pt walked into recovery dept. Patient pre-assessment complete for CVN scheduled for Dr Rowdy Garza, arrival time [phone]. Patient verified using . Patient instructed to bring a list of all home medications on the day of procedure. NPO status reinforced. Instructed they can take all other medications excluding vitamins & supplements. Patient verbalizes understanding of all instructions & denies any questions at this time.

## 2023-10-04 ENCOUNTER — HOSPITAL ENCOUNTER (OUTPATIENT)
Dept: CARDIAC CATH/INVASIVE PROCEDURES | Age: 73
Discharge: HOME | End: 2023-10-04
Attending: INTERNAL MEDICINE | Admitting: INTERNAL MEDICINE
Payer: MEDICARE

## 2023-10-04 DIAGNOSIS — I48.19 PERSISTENT ATRIAL FIBRILLATION (HCC): ICD-10-CM

## 2023-10-04 LAB
EKG ATRIAL RATE: 70 BPM
EKG DIAGNOSIS: NORMAL
EKG P-R INTERVAL: 192 MS
EKG Q-T INTERVAL: 456 MS
EKG QRS DURATION: 86 MS
EKG QTC CALCULATION (BAZETT): 492 MS
EKG R AXIS: 47 DEGREES
EKG T AXIS: 45 DEGREES
EKG VENTRICULAR RATE: 70 BPM

## 2023-10-04 PROCEDURE — 93010 ELECTROCARDIOGRAM REPORT: CPT | Performed by: INTERNAL MEDICINE

## 2023-10-04 PROCEDURE — 93005 ELECTROCARDIOGRAM TRACING: CPT | Performed by: INTERNAL MEDICINE

## 2023-10-04 NOTE — PROGRESS NOTES
Patient received to [facility] room # 16  Ambulatory from [facility]. Patient scheduled for CVN today with Dr Cam Harper. Procedure reviewed & questions answered, voiced good understanding consent obtained & placed on chart. All medications and medical history reviewed. Will prep patient per orders. Patient & family updated on plan of care. The patient has a fraility score of 3-MANAGING WELL, based on ambulation.

## 2023-10-16 ENCOUNTER — OFFICE VISIT (OUTPATIENT)
Dept: ORTHOPEDIC SURGERY | Age: 73
End: 2023-10-16
Payer: MEDICARE

## 2023-10-16 DIAGNOSIS — M76.62 ACHILLES TENDINITIS, LEFT LEG: Primary | ICD-10-CM

## 2023-10-16 PROCEDURE — G8427 DOCREV CUR MEDS BY ELIG CLIN: HCPCS | Performed by: ORTHOPAEDIC SURGERY

## 2023-10-16 PROCEDURE — 3017F COLORECTAL CA SCREEN DOC REV: CPT | Performed by: ORTHOPAEDIC SURGERY

## 2023-10-16 PROCEDURE — G8420 CALC BMI NORM PARAMETERS: HCPCS | Performed by: ORTHOPAEDIC SURGERY

## 2023-10-16 PROCEDURE — 1123F ACP DISCUSS/DSCN MKR DOCD: CPT | Performed by: ORTHOPAEDIC SURGERY

## 2023-10-16 PROCEDURE — 1036F TOBACCO NON-USER: CPT | Performed by: ORTHOPAEDIC SURGERY

## 2023-10-16 PROCEDURE — G8484 FLU IMMUNIZE NO ADMIN: HCPCS | Performed by: ORTHOPAEDIC SURGERY

## 2023-10-16 PROCEDURE — 99214 OFFICE O/P EST MOD 30 MIN: CPT | Performed by: ORTHOPAEDIC SURGERY

## 2023-10-16 NOTE — PROGRESS NOTES
factors  Studies ordered: NO XR needed @ Next Visit    Weight-bearing status: WBAT        Return to work/work restrictions: none  No medications given    Next today. He is about 3 years out and is failed all conservative treatment. His MRI certainly shows significant pathology. He understands that with his neuropathy in his glycemic control he is certainly slightly higher risk for having a wound complication postoperatively from this. At this point he feels like is worth the risk and I think he would do well given the strongly palpable pulses. He like to proceed. left  Achilles secondary reconstruction/repair with possible FHL transfer and Haglunds excision    Outpatient - 45 minutes    Anesthesia - Choice, Prone position  Power rasp, suture bridge, possible biotenodesis screws         R/C outlined, dony. re-rupture of Achilles, infection of the skin, tendon and bone and the possibility of chronic pain. Casting and potential one year recovery outlined. The patient accepts and would like to proceed with surgery. We discussed that operative versus non-operative treatment of Achilles tendon ruptures can both yield satisfactory results. We also discussed the increased risk of re-rupture with non-operative treatment of Achilles tears and that typical return to sports in most patients is 6 months after surgery. The patient understands the pros and cons of each choice and wishes to proceed with surgery.

## 2023-10-17 ENCOUNTER — CLINICAL DOCUMENTATION (OUTPATIENT)
Dept: ORTHOPEDIC SURGERY | Age: 73
End: 2023-10-17

## 2023-10-17 PROBLEM — M76.62 ACHILLES TENDINITIS, LEFT LEG: Status: ACTIVE | Noted: 2023-10-17

## 2023-10-17 NOTE — PROGRESS NOTES
Sent for Cardiac Clearance for Medication hold of Pradaxa 2-3 day hold to [facility] Cardiology Dr. Cole Tenorio.

## 2023-10-18 ENCOUNTER — TELEPHONE (OUTPATIENT)
Age: 73
End: 2023-10-18

## 2023-10-18 NOTE — TELEPHONE ENCOUNTER
Cardiac clearance  Physician/ practice requesting: Dr. Sharif Regan orthopaedic  : anna montenegro   Contact phone number:[phone]  Fax number: [phone]  Date of surgery/ procedure: 10/27/23  Type of surgery/ procedure: left achilles reconstruction/repair with possible flexor hallicus longus transfer and haglunds exclsion  Type of anesthesia: choice  Type of clearance: medication hold  Medication to hold: pradaxa  Days to hold: 2-3 days prior

## 2023-10-20 NOTE — TELEPHONE ENCOUNTER
The patient a low CV risk for a low to moderate risk surgery. Hold the blood thinner for 2-3 days and restart when able.      PERRY

## 2023-10-20 NOTE — TELEPHONE ENCOUNTER
Spoke with Ange Tinajero from [employer] and she confirmed they did receive pre op risk assessment via Epic. Confirmed pt can hold Eliquis for 2-3 days prior to procedure.

## 2023-10-25 NOTE — PERIOP NOTE
Patient verified name and . Order for consent found in EHR and matches case posting; patient verifies procedure. Type 1B surgery, phone assessment complete. Orders received. Labs per surgeon: Unknown  Labs per anesthesia protocol: SQBS DOS, EKG, BMP, Magnesium    Patient answered medical/surgical history questions at their best of ability. All prior to admission medications documented in [facility]. If you have not heard from [address] by 2 pm, please call [phone]. No food or drink after midnight night before surgery, which includes any gum, mints, candy, or ice chips. Patient instructed to take the following medications the day of surgery according to anesthesia guidelines with a small sip of water: aspirin 81, gabapentin, levothyroxine. On the day before surgery please take Acetaminophen 1000mg in the morning and then again before bed. You may substitute for Tylenol 650 mg. Hold all vitamins 7 days prior to surgery and NSAIDS 5 days prior to surgery. Prescription meds to hold: benazepril, Tikosyn, fenofibrate; patient will get hold instructions for Pradaxa from surgeon office. Clearance noted in chart for pradaxa hold. Patient instructed on the following:    > Arrive at [facility], time of arrival to be called the day before by 1700  > NPO after midnight, unless otherwise indicated, including gum, mints, and ice chips  > Responsible adult must drive patient to the hospital, stay during surgery, and patient will need supervision 24 hours after anesthesia  > Use antibacterial soap in shower the night before surgery and on the morning of surgery  > All piercings must be removed prior to arrival.    > Leave all valuables (money and jewelry) at home but bring insurance card and ID on DOS.   > You may be required to pay a deductible or co-pay on the day of your procedure.  You can pre-pay by calling [phone] if your surgery is at the [facility] or [phone] if your surgery

## 2023-10-25 NOTE — PERIOP NOTE
Dear Mr. Leila Lovingkyara you for completing your phone assessment with me today. Here are your requested surgery instructions. Please call #[phone] with any questions/concerns. Your surgery is scheduled at [facility]: [address], [address]. Please arrive at Outpatient Entrance. The Pre-op department (#[phone] for Outpatient) will call you on the business day before your surgery with your arrival time. If you have any questions on the day of surgery, please call the pre-op department at the telephone number above. If you have not received a telephone call from Outpatient by 3 pm the business day before surgery, please call Outpatient # listed above. If you are sick the day of surgery: fever >100 deg F, coughing up colored mucus, or have abdominal sickness (intractable nausea, vomiting or diarrhea) please call [phone] the day of surgery as early as possible and speak to a nurse about your symptoms. They will advise you on next steps. No food or drink after midnight which includes any gum, mints, candy, or ice chips. Please take these medications on the morning of surgery with a small sip of water: aspirin 81, gabapentin, levothyroxine. On the day before surgery take Acetaminophen 1000mg in the morning and at bedtime OR Acetaminophen 650mg in the morning, afternoon and bedtime. Please stop all vitamins/supplements 7 days prior to surgery and stop all NSAIDS (ibuprofen, naproxen, aleve, motrin, advil) 5 days before your surgery. A responsible adult must drive you to the hospital, remain in the building during surgery and you will need adult supervision for 24 hours after anesthesia. Please use an antibacterial soap (Dial, Safeguard, etc.) the night before surgery and on the morning of surgery. Change sheets on your bed night before surgery.    Do NOT wear: deodorant, make-up, nail polish, lotions, cologne, perfumes, powders or

## 2023-10-26 ENCOUNTER — ANESTHESIA EVENT (OUTPATIENT)
Dept: SURGERY | Age: 73
End: 2023-10-26
Payer: MEDICARE

## 2023-10-26 NOTE — PERIOP NOTE
Preop department called to notify patient of arrival time for scheduled procedure. Instructions given to   - Arrive at [address] [facility]. - Remain NPO after midnight, unless otherwise indicated, including gum, mints, and ice chips. - Have a responsible adult to drive patient to the hospital, stay during surgery, and patient will need supervision 24 hours after anesthesia. - Use antibacterial soap in shower the night before surgery and on the morning of surgery.        Was patient contacted: yes, pt called back  Voicemail left:   Numbers contacted: [phone]   Arrival time: 1000

## 2023-10-26 NOTE — PERIOP NOTE
Preop department called to notify patient of arrival time for scheduled procedure. Instructions given to   - Arrive at [address] [facility]. - Remain NPO after midnight, unless otherwise indicated, including gum, mints, and ice chips. - Have a responsible adult to drive patient to the hospital, stay during surgery, and patient will need supervision 24 hours after anesthesia. - Use antibacterial soap in shower the night before surgery and on the morning of surgery.        Was patient contacted: no  Voicemail left: yes  Numbers contacted: [phone]   Arrival time: 1000

## 2023-10-26 NOTE — PERIOP NOTE
Preop department called to notify patient of arrival time for scheduled procedure. Instructions given to   - Arrive at [address] [facility]. - Remain NPO after midnight, unless otherwise indicated, including gum, mints, and ice chips. - Have a responsible adult to drive patient to the hospital, stay during surgery, and patient will need supervision 24 hours after anesthesia. - Use antibacterial soap in shower the night before surgery and on the morning of surgery.        Was patient contacted: no   Voicemail left: x2  Numbers contacted: [phone]   Arrival time: 1000

## 2023-10-27 ENCOUNTER — HOSPITAL ENCOUNTER (OUTPATIENT)
Age: 73
Setting detail: OUTPATIENT SURGERY
Discharge: HOME | End: 2023-10-27
Attending: ORTHOPAEDIC SURGERY | Admitting: ORTHOPAEDIC SURGERY
Payer: MEDICARE

## 2023-10-27 ENCOUNTER — ANESTHESIA (OUTPATIENT)
Dept: SURGERY | Age: 73
End: 2023-10-27
Payer: MEDICARE

## 2023-10-27 VITALS
WEIGHT: 170 LBS | RESPIRATION RATE: 14 BRPM | OXYGEN SATURATION: 96 % | DIASTOLIC BLOOD PRESSURE: 65 MMHG | TEMPERATURE: 97.5 F | BODY MASS INDEX: 24.34 KG/M2 | SYSTOLIC BLOOD PRESSURE: 139 MMHG | HEART RATE: 61 BPM | HEIGHT: 70 IN

## 2023-10-27 DIAGNOSIS — G89.18 ACUTE POST-OPERATIVE PAIN: Primary | ICD-10-CM

## 2023-10-27 LAB
ANION GAP SERPL CALC-SCNC: 5 MMOL/L (ref 2–11)
BUN SERPL-MCNC: 31 MG/DL (ref 8–23)
CALCIUM SERPL-MCNC: 8.9 MG/DL (ref 8.3–10.4)
CHLORIDE SERPL-SCNC: 114 MMOL/L (ref 101–110)
CO2 SERPL-SCNC: 26 MMOL/L (ref 21–32)
CREAT SERPL-MCNC: 1.3 MG/DL (ref 0.8–1.5)
EKG DIAGNOSIS: NORMAL
EKG Q-T INTERVAL: 420 MS
EKG QRS DURATION: 88 MS
EKG QTC CALCULATION (BAZETT): 433 MS
EKG R AXIS: 117 DEGREES
EKG T AXIS: 56 DEGREES
EKG VENTRICULAR RATE: 64 BPM
GLUCOSE BLD STRIP.AUTO-MCNC: 77 MG/DL (ref 65–100)
GLUCOSE SERPL-MCNC: 73 MG/DL (ref 65–100)
MAGNESIUM SERPL-MCNC: 2 MG/DL (ref 1.8–2.4)
POTASSIUM SERPL-SCNC: 3.8 MMOL/L (ref 3.5–5.1)
SERVICE CMNT-IMP: NORMAL
SODIUM SERPL-SCNC: 145 MMOL/L (ref 133–143)

## 2023-10-27 PROCEDURE — 83735 ASSAY OF MAGNESIUM: CPT

## 2023-10-27 PROCEDURE — 6360000002 HC RX W HCPCS: Performed by: NURSE ANESTHETIST, CERTIFIED REGISTERED

## 2023-10-27 PROCEDURE — 3600000014 HC SURGERY LEVEL 4 ADDTL 15MIN: Performed by: ORTHOPAEDIC SURGERY

## 2023-10-27 PROCEDURE — 64445 NJX AA&/STRD SCIATIC NRV IMG: CPT | Performed by: ANESTHESIOLOGY

## 2023-10-27 PROCEDURE — 6360000002 HC RX W HCPCS: Performed by: NURSE PRACTITIONER

## 2023-10-27 PROCEDURE — 3700000001 HC ADD 15 MINUTES (ANESTHESIA): Performed by: ORTHOPAEDIC SURGERY

## 2023-10-27 PROCEDURE — 7100000000 HC PACU RECOVERY - FIRST 15 MIN: Performed by: ORTHOPAEDIC SURGERY

## 2023-10-27 PROCEDURE — 3600000004 HC SURGERY LEVEL 4 BASE: Performed by: ORTHOPAEDIC SURGERY

## 2023-10-27 PROCEDURE — 2709999900 HC NON-CHARGEABLE SUPPLY: Performed by: ORTHOPAEDIC SURGERY

## 2023-10-27 PROCEDURE — 2500000003 HC RX 250 WO HCPCS: Performed by: NURSE ANESTHETIST, CERTIFIED REGISTERED

## 2023-10-27 PROCEDURE — 6360000002 HC RX W HCPCS: Performed by: ANESTHESIOLOGY

## 2023-10-27 PROCEDURE — 7100000010 HC PHASE II RECOVERY - FIRST 15 MIN: Performed by: ORTHOPAEDIC SURGERY

## 2023-10-27 PROCEDURE — 3700000000 HC ANESTHESIA ATTENDED CARE: Performed by: ORTHOPAEDIC SURGERY

## 2023-10-27 PROCEDURE — 82962 GLUCOSE BLOOD TEST: CPT

## 2023-10-27 PROCEDURE — 2580000003 HC RX 258: Performed by: NURSE PRACTITIONER

## 2023-10-27 PROCEDURE — 7100000001 HC PACU RECOVERY - ADDTL 15 MIN: Performed by: ORTHOPAEDIC SURGERY

## 2023-10-27 PROCEDURE — 2720000010 HC SURG SUPPLY STERILE: Performed by: ORTHOPAEDIC SURGERY

## 2023-10-27 PROCEDURE — 6370000000 HC RX 637 (ALT 250 FOR IP): Performed by: ANESTHESIOLOGY

## 2023-10-27 PROCEDURE — 2580000003 HC RX 258: Performed by: NURSE ANESTHETIST, CERTIFIED REGISTERED

## 2023-10-27 PROCEDURE — 2580000003 HC RX 258: Performed by: ANESTHESIOLOGY

## 2023-10-27 PROCEDURE — 64447 NJX AA&/STRD FEMORAL NRV IMG: CPT | Performed by: ANESTHESIOLOGY

## 2023-10-27 PROCEDURE — 80048 BASIC METABOLIC PNL TOTAL CA: CPT

## 2023-10-27 PROCEDURE — C1713 ANCHOR/SCREW BN/BN,TIS/BN: HCPCS | Performed by: ORTHOPAEDIC SURGERY

## 2023-10-27 DEVICE — HEALICOIL KNOTLESS PK  NST
Type: IMPLANTABLE DEVICE | Status: FUNCTIONAL
Brand: HEALICOIL

## 2023-10-27 DEVICE — TWINFIX ULTRA 4.5 MM PK SUTURE                                    ANCHOR WITH TWO NO.2 ULTRABRAID                                    SUTURES BLUE, BLUE-COBRAID WITH NEEDLES
Type: IMPLANTABLE DEVICE | Status: FUNCTIONAL
Brand: TWINFIX

## 2023-10-27 DEVICE — SCREW INTFR L15MM DIA6.25MM BIOCOMPOSITE FACILITATE IORT: Type: IMPLANTABLE DEVICE | Status: FUNCTIONAL

## 2023-10-27 RX ORDER — DEXAMETHASONE SODIUM PHOSPHATE 4 MG/ML
INJECTION, SOLUTION INTRA-ARTICULAR; INTRALESIONAL; INTRAMUSCULAR; INTRAVENOUS; SOFT TISSUE
Status: COMPLETED | OUTPATIENT
Start: 2023-10-27 | End: 2023-10-27

## 2023-10-27 RX ORDER — LIDOCAINE HYDROCHLORIDE 10 MG/ML
1 INJECTION, SOLUTION INFILTRATION; PERINEURAL
Status: DISCONTINUED | OUTPATIENT
Start: 2023-10-27 | End: 2023-10-27 | Stop reason: HOSPADM

## 2023-10-27 RX ORDER — ONDANSETRON 2 MG/ML
INJECTION INTRAMUSCULAR; INTRAVENOUS PRN
Status: DISCONTINUED | OUTPATIENT
Start: 2023-10-27 | End: 2023-10-27 | Stop reason: SDUPTHER

## 2023-10-27 RX ORDER — FENTANYL CITRATE 50 UG/ML
100 INJECTION, SOLUTION INTRAMUSCULAR; INTRAVENOUS
Status: COMPLETED | OUTPATIENT
Start: 2023-10-27 | End: 2023-10-27

## 2023-10-27 RX ORDER — HYDROMORPHONE HYDROCHLORIDE 2 MG/ML
0.5 INJECTION, SOLUTION INTRAMUSCULAR; INTRAVENOUS; SUBCUTANEOUS EVERY 5 MIN PRN
Status: DISCONTINUED | OUTPATIENT
Start: 2023-10-27 | End: 2023-10-27 | Stop reason: HOSPADM

## 2023-10-27 RX ORDER — SODIUM CHLORIDE, SODIUM LACTATE, POTASSIUM CHLORIDE, CALCIUM CHLORIDE 600; 310; 30; 20 MG/100ML; MG/100ML; MG/100ML; MG/100ML
INJECTION, SOLUTION INTRAVENOUS CONTINUOUS
Status: DISCONTINUED | OUTPATIENT
Start: 2023-10-27 | End: 2023-10-27 | Stop reason: HOSPADM

## 2023-10-27 RX ORDER — ROPIVACAINE HYDROCHLORIDE 5 MG/ML
INJECTION, SOLUTION EPIDURAL; INFILTRATION; PERINEURAL
Status: COMPLETED | OUTPATIENT
Start: 2023-10-27 | End: 2023-10-27

## 2023-10-27 RX ORDER — FENTANYL CITRATE 50 UG/ML
INJECTION, SOLUTION INTRAMUSCULAR; INTRAVENOUS PRN
Status: DISCONTINUED | OUTPATIENT
Start: 2023-10-27 | End: 2023-10-27 | Stop reason: SDUPTHER

## 2023-10-27 RX ORDER — SODIUM CHLORIDE 0.9 % (FLUSH) 0.9 %
5-40 SYRINGE (ML) INJECTION EVERY 12 HOURS SCHEDULED
Status: DISCONTINUED | OUTPATIENT
Start: 2023-10-27 | End: 2023-10-27 | Stop reason: HOSPADM

## 2023-10-27 RX ORDER — CEPHALEXIN 500 MG/1
500 CAPSULE ORAL 4 TIMES DAILY
Qty: 12 CAPSULE | Refills: 0 | Status: SHIPPED | OUTPATIENT
Start: 2023-10-27

## 2023-10-27 RX ORDER — ROCURONIUM BROMIDE 10 MG/ML
INJECTION, SOLUTION INTRAVENOUS PRN
Status: DISCONTINUED | OUTPATIENT
Start: 2023-10-27 | End: 2023-10-27 | Stop reason: SDUPTHER

## 2023-10-27 RX ORDER — SODIUM CHLORIDE, SODIUM LACTATE, POTASSIUM CHLORIDE, CALCIUM CHLORIDE 600; 310; 30; 20 MG/100ML; MG/100ML; MG/100ML; MG/100ML
INJECTION, SOLUTION INTRAVENOUS CONTINUOUS PRN
Status: DISCONTINUED | OUTPATIENT
Start: 2023-10-27 | End: 2023-10-27 | Stop reason: SDUPTHER

## 2023-10-27 RX ORDER — PROCHLORPERAZINE EDISYLATE 5 MG/ML
5 INJECTION INTRAMUSCULAR; INTRAVENOUS
Status: DISCONTINUED | OUTPATIENT
Start: 2023-10-27 | End: 2023-10-27 | Stop reason: HOSPADM

## 2023-10-27 RX ORDER — SODIUM CHLORIDE 0.9 % (FLUSH) 0.9 %
5-40 SYRINGE (ML) INJECTION PRN
Status: DISCONTINUED | OUTPATIENT
Start: 2023-10-27 | End: 2023-10-27 | Stop reason: HOSPADM

## 2023-10-27 RX ORDER — OXYCODONE HYDROCHLORIDE 5 MG/1
5 TABLET ORAL EVERY 6 HOURS PRN
Qty: 20 TABLET | Refills: 0 | Status: SHIPPED | OUTPATIENT
Start: 2023-10-27 | End: 2023-11-01

## 2023-10-27 RX ORDER — POTASSIUM CHLORIDE 20 MEQ/1
20 TABLET, EXTENDED RELEASE ORAL ONCE
Status: COMPLETED | OUTPATIENT
Start: 2023-10-27 | End: 2023-10-27

## 2023-10-27 RX ORDER — MIDAZOLAM HYDROCHLORIDE 2 MG/2ML
2 INJECTION, SOLUTION INTRAMUSCULAR; INTRAVENOUS
Status: COMPLETED | OUTPATIENT
Start: 2023-10-27 | End: 2023-10-27

## 2023-10-27 RX ORDER — ONDANSETRON 2 MG/ML
4 INJECTION INTRAMUSCULAR; INTRAVENOUS
Status: DISCONTINUED | OUTPATIENT
Start: 2023-10-27 | End: 2023-10-27 | Stop reason: HOSPADM

## 2023-10-27 RX ORDER — DEXAMETHASONE SODIUM PHOSPHATE 4 MG/ML
INJECTION, SOLUTION INTRA-ARTICULAR; INTRALESIONAL; INTRAMUSCULAR; INTRAVENOUS; SOFT TISSUE PRN
Status: DISCONTINUED | OUTPATIENT
Start: 2023-10-27 | End: 2023-10-27 | Stop reason: SDUPTHER

## 2023-10-27 RX ORDER — GLYCOPYRROLATE 0.2 MG/ML
INJECTION INTRAMUSCULAR; INTRAVENOUS PRN
Status: DISCONTINUED | OUTPATIENT
Start: 2023-10-27 | End: 2023-10-27 | Stop reason: SDUPTHER

## 2023-10-27 RX ORDER — EPHEDRINE SULFATE/0.9% NACL/PF 50 MG/5 ML
SYRINGE (ML) INTRAVENOUS PRN
Status: DISCONTINUED | OUTPATIENT
Start: 2023-10-27 | End: 2023-10-27 | Stop reason: SDUPTHER

## 2023-10-27 RX ORDER — OXYCODONE HYDROCHLORIDE 5 MG/1
5 TABLET ORAL
Status: DISCONTINUED | OUTPATIENT
Start: 2023-10-27 | End: 2023-10-27 | Stop reason: HOSPADM

## 2023-10-27 RX ORDER — LABETALOL HYDROCHLORIDE 5 MG/ML
10 INJECTION, SOLUTION INTRAVENOUS
Status: DISCONTINUED | OUTPATIENT
Start: 2023-10-27 | End: 2023-10-27 | Stop reason: HOSPADM

## 2023-10-27 RX ORDER — PROPOFOL 10 MG/ML
INJECTION, EMULSION INTRAVENOUS PRN
Status: DISCONTINUED | OUTPATIENT
Start: 2023-10-27 | End: 2023-10-27 | Stop reason: SDUPTHER

## 2023-10-27 RX ORDER — LIDOCAINE HYDROCHLORIDE 20 MG/ML
INJECTION, SOLUTION EPIDURAL; INFILTRATION; INTRACAUDAL; PERINEURAL PRN
Status: DISCONTINUED | OUTPATIENT
Start: 2023-10-27 | End: 2023-10-27 | Stop reason: SDUPTHER

## 2023-10-27 RX ORDER — HYDROMORPHONE HYDROCHLORIDE 2 MG/ML
0.25 INJECTION, SOLUTION INTRAMUSCULAR; INTRAVENOUS; SUBCUTANEOUS EVERY 5 MIN PRN
Status: DISCONTINUED | OUTPATIENT
Start: 2023-10-27 | End: 2023-10-27 | Stop reason: HOSPADM

## 2023-10-27 RX ORDER — SODIUM CHLORIDE 9 MG/ML
INJECTION, SOLUTION INTRAVENOUS PRN
Status: DISCONTINUED | OUTPATIENT
Start: 2023-10-27 | End: 2023-10-27 | Stop reason: HOSPADM

## 2023-10-27 RX ORDER — ACETAMINOPHEN 500 MG
1000 TABLET ORAL ONCE
Status: COMPLETED | OUTPATIENT
Start: 2023-10-27 | End: 2023-10-27

## 2023-10-27 RX ORDER — HYDRALAZINE HYDROCHLORIDE 20 MG/ML
10 INJECTION INTRAMUSCULAR; INTRAVENOUS
Status: DISCONTINUED | OUTPATIENT
Start: 2023-10-27 | End: 2023-10-27 | Stop reason: HOSPADM

## 2023-10-27 RX ADMIN — PHENYLEPHRINE HYDROCHLORIDE 100 MCG: 0.1 INJECTION, SOLUTION INTRAVENOUS at 12:52

## 2023-10-27 RX ADMIN — GLYCOPYRROLATE 0.1 MG: 0.2 INJECTION INTRAMUSCULAR; INTRAVENOUS at 11:57

## 2023-10-27 RX ADMIN — SODIUM CHLORIDE, SODIUM LACTATE, POTASSIUM CHLORIDE, AND CALCIUM CHLORIDE: 600; 310; 30; 20 INJECTION, SOLUTION INTRAVENOUS at 11:57

## 2023-10-27 RX ADMIN — ROCURONIUM BROMIDE 40 MG: 50 INJECTION, SOLUTION INTRAVENOUS at 12:08

## 2023-10-27 RX ADMIN — DEXAMETHASONE SODIUM PHOSPHATE 5 MG: 4 INJECTION, SOLUTION INTRA-ARTICULAR; INTRALESIONAL; INTRAMUSCULAR; INTRAVENOUS; SOFT TISSUE at 11:43

## 2023-10-27 RX ADMIN — CEFAZOLIN SODIUM 2000 MG: 100 INJECTION, POWDER, LYOPHILIZED, FOR SOLUTION INTRAVENOUS at 10:33

## 2023-10-27 RX ADMIN — FENTANYL CITRATE 50 MCG: 50 INJECTION, SOLUTION INTRAMUSCULAR; INTRAVENOUS at 11:37

## 2023-10-27 RX ADMIN — MIDAZOLAM 1 MG: 1 INJECTION INTRAMUSCULAR; INTRAVENOUS at 11:37

## 2023-10-27 RX ADMIN — Medication 5 MG: at 12:28

## 2023-10-27 RX ADMIN — ROPIVACAINE HYDROCHLORIDE 25 ML: 5 INJECTION, SOLUTION EPIDURAL; INFILTRATION; PERINEURAL at 11:37

## 2023-10-27 RX ADMIN — FENTANYL CITRATE 50 MCG: 50 INJECTION, SOLUTION INTRAMUSCULAR; INTRAVENOUS at 12:06

## 2023-10-27 RX ADMIN — POTASSIUM CHLORIDE 20 MEQ: 1500 TABLET, EXTENDED RELEASE ORAL at 11:35

## 2023-10-27 RX ADMIN — LIDOCAINE HYDROCHLORIDE 70 MG: 20 INJECTION, SOLUTION EPIDURAL; INFILTRATION; INTRACAUDAL; PERINEURAL at 12:06

## 2023-10-27 RX ADMIN — PROPOFOL 150 MG: 10 INJECTION, EMULSION INTRAVENOUS at 12:06

## 2023-10-27 RX ADMIN — Medication 2 G: at 12:21

## 2023-10-27 RX ADMIN — PHENYLEPHRINE HYDROCHLORIDE 100 MCG: 0.1 INJECTION, SOLUTION INTRAVENOUS at 12:34

## 2023-10-27 RX ADMIN — ROPIVACAINE HYDROCHLORIDE 15 ML: 5 INJECTION, SOLUTION EPIDURAL; INFILTRATION; PERINEURAL at 11:43

## 2023-10-27 RX ADMIN — ONDANSETRON 4 MG: 2 INJECTION INTRAMUSCULAR; INTRAVENOUS at 12:14

## 2023-10-27 RX ADMIN — ACETAMINOPHEN 1000 MG: 500 TABLET, FILM COATED ORAL at 10:33

## 2023-10-27 RX ADMIN — DEXAMETHASONE SODIUM PHOSPHATE 5 MG: 4 INJECTION, SOLUTION INTRAMUSCULAR; INTRAVENOUS at 11:37

## 2023-10-27 RX ADMIN — DEXAMETHASONE SODIUM PHOSPHATE 4 MG: 4 INJECTION, SOLUTION INTRA-ARTICULAR; INTRALESIONAL; INTRAMUSCULAR; INTRAVENOUS; SOFT TISSUE at 12:14

## 2023-10-27 RX ADMIN — SODIUM CHLORIDE, POTASSIUM CHLORIDE, SODIUM LACTATE AND CALCIUM CHLORIDE: 600; 310; 30; 20 INJECTION, SOLUTION INTRAVENOUS at 10:32

## 2023-10-27 RX ADMIN — SUGAMMADEX 200 MG: 100 INJECTION, SOLUTION INTRAVENOUS at 12:46

## 2023-10-27 ASSESSMENT — PAIN SCALES - GENERAL: PAINLEVEL_OUTOF10: 0

## 2023-10-27 NOTE — OP NOTE
Operative Note    Patient:Clifton Rodriguez  MRN: [id number]    Date Of Surgery: 10/27/2023    Surgeon: Gerald Medina MD    Assistant Surgeon: None    Pre Op Diagnosis:  Pre-Op Diagnosis Codes:     * Achilles tendinitis, left leg [M76.62]      Post Op Diagnosis:   same    Procedures Performed:  Left secondary repair of Achilles tendon with debridement, 33730  Left flexor hallucis longus tendon transfer, 93732  Left Tommie's deformity excision the calcaneus, 74317    Implants:   Implant Name Type Inv. Item Serial No.  Lot No. LRB No. Used Action   ANCHOR SUT 2 DIA4.5MM DIA PEEK NONABSORBABLE ULTBRAID DB - [id number]  ANCHOR SUT 2 DIA4.5MM DIA PEEK NONABSORBABLE [facility] AND [facility]PH ENDOSCOPY-WD [id number] Left 2 Implanted   ANCHOR SUTURE 5.0 MM KNOTLESS HEALICOIL - [id number]  ANCHOR SUTURE 5.0 MM KNOTLESS HEALICOIL  [employer] NEPH ENDOSCOPY-WD [id number] Left 2 Implanted   SCREW INTFR L15MM DIA6. 25MM BIOCOMPOSITE FACILITATE IORT - [id number]  SCREW INTFR L15MM DIA6. 25MM BIOCOMPOSITE FACILITATE IORT  [employer]-WD [id number] Left 1 Implanted       Anesthesia:  Choice    Blood Loss:  minimal    Tourniquet:  Estimated thigh 30 minutes    Pre Operative Abx:   Ancef 2g            Pre Operative Course:  Chris Mccall is a 68 y.o. 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 Operation In Detail:  Patient was evaluated in the preoperative area. We had a long discussion about the procedure and postoperative protocols. The patient was then brought back to the operating room suite and placed in the operating room table. A timeout was taken to identify the patient, procedure being performed, and laterality. After this the patient was prepped and draped in the normal sterile fashion using a Betadine solution and/or a ChloraPrep solution.   A timeout was then taken to identify the patient his name, date of birth, laterality, and procedure being

## 2023-10-27 NOTE — ANESTHESIA PROCEDURE NOTES
Airway  Date/Time: 10/27/2023 12:09 AM  Urgency: elective    Airway not difficult    General Information and Staff    Patient location during procedure: OR  Resident/CRNA: DIANNE Reyes CRNA  Performed: resident/CRNA   Performed by: DIANNE Reyes CRNA  Authorized by: Joel Sebastian DO      Indications and Patient Condition  Indications for airway management: anesthesia  Spontaneous Ventilation: absent  Sedation level: deep  Preoxygenated: yes  Patient position: sniffing  MILS not maintained throughout  Mask difficulty assessment: vent by bag mask + OA or adjuvant +/- NMBA    Final Airway Details  Final airway type: endotracheal airway      Successful airway: ETT  Cuffed: yes   Successful intubation technique: direct laryngoscopy  Facilitating devices/methods: intubating stylet  Endotracheal tube insertion site: oral  Blade: Faulkner  Blade size: #2  ETT size (mm): 7.5  Cormack-Lehane Classification: grade IIa - partial view of glottis  Placement verified by: chest auscultation and capnometry   Measured from: lips  Number of attempts at approach: 1  Ventilation between attempts: bag mask    Additional Comments  Atraumatic. Oral structures and dentition unchanged.  No resistance when passing ETT through cords
Peripheral Block    Patient location during procedure: pre-op  Reason for block: post-op pain management and at surgeon's request  Start time: 10/27/2023 11:43 AM  End time: 10/27/2023 11:44 AM  Staffing  Performed: anesthesiologist   Anesthesiologist: Josh Mcfarland DO  Performed by: Josh Mcfarland DO  Authorized by: Josh Mcfarland DO    Preanesthetic Checklist  Completed: patient identified, IV checked, site marked, risks and benefits discussed, surgical/procedural consents, equipment checked, pre-op evaluation, timeout performed, anesthesia consent given, oxygen available and monitors applied/VS acknowledged  Peripheral Block   Patient position: supine  Prep: ChloraPrep  Provider prep: mask and sterile gloves  Patient monitoring: cardiac monitor, continuous pulse ox, frequent blood pressure checks, responsive to questions and oxygen  Block type: Femoral  Adductor canal  Laterality: left  Injection technique: single-shot  Guidance: ultrasound guided  Local infiltration: lidocaine  Infiltration strength: 1 %  Local infiltration: lidocaine  Dose: 3 mL    Needle   Needle type: insulated echogenic nerve stimulator needle   Needle gauge: 20 G  Needle localization: ultrasound guidance  Needle length: 10 cm  Assessment   Injection assessment: negative aspiration for heme, no paresthesia on injection, local visualized surrounding nerve on ultrasound and no intravascular symptoms  Paresthesia pain: none  Slow fractionated injection: yes  Hemodynamics: stable  Real-time US image taken/store: yes  Outcomes: uncomplicated and patient tolerated procedure well    Additional Notes  R/B/I discussed at length with pt including damage to nerve or muscle. Needle inserted under real time Ultrasound guidance and placed in close proximity to nerve being blocked. Ultrasound was used in real time to visualize spread of local anesthetic around nerve being blocked.   Nerve and surrounding structures appeared
appeared grossly normal.  A permanent Ultrasound image was stored in the chart  Medications Administered  dexamethasone (DECADRON) injection 4 mg/mL - Perineural   5 mg - 10/27/2023 11:37:00 AM  ropivacaine (NAROPIN) injection 0.5% - Perineural   25 mL - 10/27/2023 11:37:00 AM

## 2023-10-27 NOTE — ANESTHESIA PRE PROCEDURE
Department of Anesthesiology  Preprocedure Note       Name:  Governor Lanza   Age:  68 y.o.  :  1950                                          MRN:  [id number]         Date:  10/27/2023      Surgeon: Isaac Isidro):  Guicho Snell MD    Procedure: Procedure(s):  Left Achilles secondary recostruction/Repair with possible Flexor hallucis longus and haglunds excision. Medications prior to admission:   Prior to Admission medications    Medication Sig Start Date End Date Taking? Authorizing Provider   mupirocin (BACTROBAN) 2 % ointment APPLY TO SITES TWICE DAILY FOR 2 WEEKS. 23   Provider, MD Sergio   Lidocaine 5 % CREA Topical 5% Lidocaine and 5% Neurontin to apply to affected area up to 4 times/day as needed for pain 10/2/23   Efrain Quiroga MD   fenofibrate (TRIGLIDE) 160 MG tablet Take 1 tablet by mouth Daily with lunch TAKE 1 TABLET EVERY DAY 23   Loly Costa MD   gabapentin (NEURONTIN) 300 MG capsule Take 1 capsule by mouth 3 times daily for 360 days. Take 300 mg by mouth 3 times daily.  23  Sara David MD   glimepiride (AMARYL) 1 MG tablet Take 1 tablet by mouth every morning (before breakfast) TAKE 1 TABLET EVERY DAY BEFORE BREAKFAST 23   Sara David MD   levothyroxine (SYNTHROID) 88 MCG tablet Take 1 tablet by mouth Daily TAKE 1 TABLET BY MOUTH EVERY DAY BEFORE BREAKFAST 23   Sara David MD   omeprazole (PRILOSEC) 20 MG delayed release capsule Take 1 capsule by mouth Daily TAKE 1 CAPSULE EVERY DAY 23   Sara David MD   PARoxetine (PAXIL CR) 25 MG extended release tablet Take 1 tablet by mouth daily  Patient taking differently: Take 1 tablet by mouth every evening 23   Sara David MD   blood glucose test strips [facility]) strip DAILY 23   Sara David MD   tamsulosin (FLOMAX) 0.4 MG capsule Take 1 capsule by mouth daily  Patient taking differently: Take 1 capsule by mouth every evening 23   Smith Tran

## 2023-10-27 NOTE — H&P
20 MG delayed release capsule Take 1 capsule by mouth Daily TAKE 1 CAPSULE EVERY DAY 9/18/23   Mark Chavira MD   PARoxetine (PAXIL CR) 25 MG extended release tablet Take 1 tablet by mouth daily  Patient taking differently: Take 1 tablet by mouth every evening 9/18/23   Mark Chavira MD   blood glucose test strips [facility]) strip DAILY 9/18/23   Mark Chavira MD   tamsulosin (FLOMAX) 0.4 MG capsule Take 1 capsule by mouth daily  Patient taking differently: Take 1 capsule by mouth every evening 8/16/23   Jenny Saxena MD   Cholecalciferol (VITAMIN D3 PO) Take by mouth Daily with lunch    ProviderSergio MD   dofetilide (TIKOSYN) 250 MCG capsule Take 1 capsule by mouth 2 times daily Indications: per pt takes 1st dose at 11 am and 2nd dose at 11pm TAKE 1 CAPSULE EVERY 12 HOURS 4/14/23   Odilia Finn MD   dabigatran (PRADAXA) 150 MG capsule TAKE 1 CAPSULE EVERY 12 HOURS  Patient taking differently: Take 1 capsule by mouth in the morning and 1 capsule in the evening. Indications: per pt takes 1st dose at 11 am and 2nd dose at 11pm. TAKE 1 CAPSULE EVERY 12 HOURS. 12/27/22   Odilia Finn MD   benazepril (LOTENSIN) 20 MG tablet Take 1 tablet by mouth daily  Patient taking differently: Take 1 tablet by mouth Daily with lunch 12/27/22   Odilia Finn MD   atorvastatin (LIPITOR) 80 MG tablet Take 1 tablet by mouth daily TAKE 1 TABLET EVERY DAY  Patient taking differently: Take 1 tablet by mouth every evening TAKE 1 TABLET EVERY DAY 12/27/22   Odilia Finn MD   nitroGLYCERIN (NITROSTAT) 0.4 MG SL tablet Place 1 tablet under the tongue every 5 minutes as needed for Chest pain Up to 3 tabs in 24 hrs.  6/6/22   Odilia Finn MD   aspirin 81 MG EC tablet Take 1 tablet by mouth Daily with lunch    Automatic Reconciliation, Ar   cyanocobalamin 1000 MCG tablet Take 1 tablet by mouth    Automatic Reconciliation, Ar       The surgery is planned for the Left Achilles secondary

## 2023-10-27 NOTE — ANESTHESIA POSTPROCEDURE EVALUATION
Department of Anesthesiology  Postprocedure Note    Patient: Rayna Tovar  MRN: [id number]  YOB: 1950  Date of evaluation: 10/27/2023      Procedure Summary     Date: 10/27/23 Room / Location: [facility] OP OR 01 / [facility]    Anesthesia Start: 2537 Anesthesia Stop: 9974    Procedure: Left Achilles secondary recostruction/Repair with Flexor hallucis longus and haglunds excision.  (Left: Ankle) Diagnosis:       Achilles tendinitis, left leg      (Achilles tendinitis, left leg [M76.62])    Surgeons: Cleo Erickson MD Responsible Provider: Cady Ignacio DO    Anesthesia Type: General ASA Status: 3          Anesthesia Type: General    Chester Phase I: Chester Score: 8    Chester Phase II:        Anesthesia Post Evaluation    Patient location during evaluation: PACU  Level of consciousness: awake and alert  Airway patency: patent  Nausea & Vomiting: no nausea  Complications: no  Cardiovascular status: hemodynamically stable  Respiratory status: acceptable  Hydration status: euvolemic  Pain management: satisfactory to patient

## 2023-11-13 ENCOUNTER — OFFICE VISIT (OUTPATIENT)
Dept: ORTHOPEDIC SURGERY | Age: 73
End: 2023-11-13

## 2023-11-13 DIAGNOSIS — M76.62 ACHILLES TENDINITIS, LEFT LEG: Primary | ICD-10-CM

## 2023-11-13 NOTE — PROGRESS NOTES
Name: Forrest Garibay  YOB: 1950  Gender: male  MRN: [id number]    Procedure Performed:Left Achilles secondary recostruction/Repair with Flexor hallucis longus and haglunds excision. - Left        Date of Procedure: 10/27/2023      Subjective: Doing well      Physical Examination: Sutures are removed today. Wound is healing well without sign of infection or DVT        Imaging:   No imaging reviewed          Assessment:   Achilles tendon reconstruction      Plan:   4 This is a chronic illness/condition with exacerbation and progression  Treatment at this time: Bracing: Placed in: 3D boot  Studies ordered: NO XR needed @ Next Visit    Weight-bearing status: WBAT        Return to work/work restrictions: none  No medications given    He is placed a walker boot today weightbearing as tolerated. Return in 4 weeks. He will continue with aspirin.

## 2023-12-01 RX ORDER — BENAZEPRIL HYDROCHLORIDE 20 MG/1
20 TABLET ORAL
Qty: 90 TABLET | Refills: 3 | Status: SHIPPED | OUTPATIENT
Start: 2023-12-01

## 2023-12-01 NOTE — TELEPHONE ENCOUNTER
Requested Prescriptions     Pending Prescriptions Disp Refills    benazepril (LOTENSIN) 20 MG tablet [Pharmacy Med Name: BENAZEPRIL HYDROCHLORIDE 20 MG Tablet] 90 tablet 3     Sig: Take 1 tablet by mouth Daily with lunch

## 2023-12-11 ENCOUNTER — OFFICE VISIT (OUTPATIENT)
Dept: ORTHOPEDIC SURGERY | Age: 73
End: 2023-12-11

## 2023-12-11 DIAGNOSIS — M76.62 ACHILLES TENDINITIS, LEFT LEG: Primary | ICD-10-CM

## 2023-12-11 PROCEDURE — 99024 POSTOP FOLLOW-UP VISIT: CPT | Performed by: NURSE PRACTITIONER

## 2023-12-12 ENCOUNTER — HOSPITAL ENCOUNTER (OUTPATIENT)
Dept: PHYSICAL THERAPY | Age: 73
Setting detail: RECURRING SERIES
Discharge: HOME | End: 2023-12-15
Payer: MEDICARE

## 2023-12-12 DIAGNOSIS — M62.81 MUSCLE WEAKNESS (GENERALIZED): ICD-10-CM

## 2023-12-12 DIAGNOSIS — R26.9 GAIT DIFFICULTY: ICD-10-CM

## 2023-12-12 DIAGNOSIS — M25.572 PAIN IN LEFT ANKLE AND JOINTS OF LEFT FOOT: Primary | ICD-10-CM

## 2023-12-12 PROCEDURE — 97161 PT EVAL LOW COMPLEX 20 MIN: CPT

## 2023-12-12 PROCEDURE — 97110 THERAPEUTIC EXERCISES: CPT

## 2023-12-12 ASSESSMENT — PAIN SCALES - GENERAL: PAINLEVEL_OUTOF10: 3

## 2023-12-12 NOTE — THERAPY EVALUATION
normalization of gait  Improve strength to at least 4+/5  to promote improvement/normalization of gait       Outcome Measure: Tool Used: FOOT AND ANKLE ABILITY MEASURE  Score:  Initial: 23/84 for ADL's  Sports:0/32 Most Recent: X (Date: -- )   Interpretation of Score: For the \"Activities of Daily Living\", there are 21 questions each scored on a 5 point scale with 0 representing \"Unable to do\" and 4 representing \"No difficulty\". The lower the score, the greater the functional disability. 84/84 represents no disability. Minimal detectable change is 5.7 points. With the addition of the 8 questions in the \"Sports Subscale,\" there are 29 questions, each scored on a 5 point scale with 0 representing \"Unable to do\" and 4 representing \"No difficulty\". The lower the score, the greater the functional disability. 116/116 represents no disability. Minimal detectable change is 12.3 points. Medical Necessity:   > Patient is expected to demonstrate progress in strength, range of motion, and balance to allow for return to PLOF. Reason For Services/Other Comments:  > Patient will require skilled therapeutic intervention to address impairments assessed and allow for return to PLOF      Regarding [facility] therapy, I certify that the treatment plan above will be carried out by a therapist or under their direction.   Thank you for this referral,  Tata Wyman PT     Referring Physician Signature: Sam Remy, [address]

## 2023-12-15 NOTE — PROGRESS NOTES
Jesse Penn  : 1950  Primary: Medicare Part A And B (Medicare)  Secondary: [place] [address] [address]  Phone: [phone]  Fax: [phone]    Plan of Care/Certification Expiration Date: 02/10/24        Plan of Care/Certification Expiration Date:  Plan of Care/Certification Expiration Date: 02/10/24    Frequency/Duration:      Time In/Out:   Time In: 1430  Time Out: 7842      PT Visit Info: Total # of Visits to Date: 1      Visit Count:  1    OUTPATIENT PHYSICAL THERAPY:   Treatment Note 2023       Episode  (Left Achilles Tendon Reconstruction)               Treatment Diagnosis:    Pain in left ankle and joints of left foot  Muscle weakness (generalized)  Gait difficulty  Medical/Referring Diagnosis:    Achilles tendinitis, left leg [M76.62]    Referring Physician:  Anup Elias APRN - CNP MD Orders:  PT Eval and Treat   Return MD Appt:  24   Date of Onset:  Onset Date: 10/27/23     Allergies: Other  Restrictions/Precautions:   None      Interventions Planned (Treatment may consist of any combination of the following):     See Assessment Note    Subjective Comments:   Patient with complaints of left ankle pain, stiffness and difficulty walking long distances  Initial Pain Level[de-identified]     3/10  Post Session Pain Level:       3/10  Medications Last Reviewed:  2023  Updated Objective Findings:  See Evaluation Note from today  Treatment   THERAPEUTIC EXERCISE: (23 minutes):    Exercises per grid below to improve mobility. Required minimal visual and verbal cues to promote proper body alignment. Progressed range and repetitions as indicated.    Date:  23 Date:   Date:     Activity/Exercise Parameters Parameters Parameters         Ankle pumps 20x     Ankle circles 20x     SLR 10x     Bridges 10x     Hip abd 10x     Toe curls 20x             [employer] Portal Access Code: [email or web address]  URL:

## 2023-12-22 ENCOUNTER — OFFICE VISIT (OUTPATIENT)
Dept: FAMILY MEDICINE CLINIC | Facility: CLINIC | Age: 73
End: 2023-12-22

## 2023-12-22 VITALS
HEART RATE: 83 BPM | DIASTOLIC BLOOD PRESSURE: 68 MMHG | SYSTOLIC BLOOD PRESSURE: 140 MMHG | BODY MASS INDEX: 24.77 KG/M2 | HEIGHT: 70 IN | OXYGEN SATURATION: 99 % | WEIGHT: 173 LBS | TEMPERATURE: 98.2 F

## 2023-12-22 DIAGNOSIS — E11.65 TYPE 2 DIABETES MELLITUS WITH HYPERGLYCEMIA, UNSPECIFIED WHETHER LONG TERM INSULIN USE (HCC): Primary | ICD-10-CM

## 2023-12-22 DIAGNOSIS — F32.A DEPRESSION, UNSPECIFIED DEPRESSION TYPE: ICD-10-CM

## 2023-12-22 DIAGNOSIS — E07.9 THYROID DISEASE: ICD-10-CM

## 2023-12-22 DIAGNOSIS — R53.83 OTHER FATIGUE: ICD-10-CM

## 2023-12-22 DIAGNOSIS — R97.20 ELEVATED PSA: ICD-10-CM

## 2023-12-22 LAB
BASOPHILS # BLD: 0.1 K/UL (ref 0–0.2)
BASOPHILS NFR BLD: 2 % (ref 0–2)
DIFFERENTIAL METHOD BLD: ABNORMAL
EOSINOPHIL # BLD: 0.2 K/UL (ref 0–0.8)
EOSINOPHIL NFR BLD: 5 % (ref 0.5–7.8)
ERYTHROCYTE [DISTWIDTH] IN BLOOD BY AUTOMATED COUNT: 14.7 % (ref 11.9–14.6)
EST. AVERAGE GLUCOSE BLD GHB EST-MCNC: 160 MG/DL
HBA1C MFR BLD: 7.2 % (ref 4.8–5.6)
HCT VFR BLD AUTO: 40.7 % (ref 41.1–50.3)
HGB BLD-MCNC: 12.6 G/DL (ref 13.6–17.2)
IMM GRANULOCYTES # BLD AUTO: 0 K/UL (ref 0–0.5)
IMM GRANULOCYTES NFR BLD AUTO: 0 % (ref 0–5)
LYMPHOCYTES # BLD: 0.4 K/UL (ref 0.5–4.6)
LYMPHOCYTES NFR BLD: 13 % (ref 13–44)
MCH RBC QN AUTO: 28.1 PG (ref 26.1–32.9)
MCHC RBC AUTO-ENTMCNC: 31 G/DL (ref 31.4–35)
MCV RBC AUTO: 90.6 FL (ref 82–102)
MONOCYTES # BLD: 0.6 K/UL (ref 0.1–1.3)
MONOCYTES NFR BLD: 17 % (ref 4–12)
NEUTS SEG # BLD: 2.2 K/UL (ref 1.7–8.2)
NEUTS SEG NFR BLD: 63 % (ref 43–78)
NRBC # BLD: 0 K/UL (ref 0–0.2)
PLATELET # BLD AUTO: 199 K/UL (ref 150–450)
PMV BLD AUTO: 11.3 FL (ref 9.4–12.3)
PSA SERPL-MCNC: 3.2 NG/ML
RBC # BLD AUTO: 4.49 M/UL (ref 4.23–5.6)
TSH, 3RD GENERATION: 1.18 UIU/ML (ref 0.36–3.74)
WBC # BLD AUTO: 3.4 K/UL (ref 4.3–11.1)

## 2023-12-27 ENCOUNTER — HOSPITAL ENCOUNTER (OUTPATIENT)
Dept: PHYSICAL THERAPY | Age: 73
Setting detail: RECURRING SERIES
Discharge: HOME | End: 2023-12-30
Payer: MEDICARE

## 2023-12-27 DIAGNOSIS — M62.81 MUSCLE WEAKNESS (GENERALIZED): ICD-10-CM

## 2023-12-27 DIAGNOSIS — M25.572 PAIN IN LEFT ANKLE AND JOINTS OF LEFT FOOT: Primary | ICD-10-CM

## 2023-12-27 DIAGNOSIS — R26.9 GAIT DIFFICULTY: ICD-10-CM

## 2023-12-27 PROCEDURE — 97140 MANUAL THERAPY 1/> REGIONS: CPT

## 2023-12-27 PROCEDURE — 97110 THERAPEUTIC EXERCISES: CPT

## 2023-12-27 NOTE — PROGRESS NOTES
PT SFOORPT [facility]   1/15/2024  1:00 PM Desdune-Delicia Matos, PT SFOORPT [facility]   1/17/2024  1:00 PM DesduDelicia Nichole, PT SFOORPT [facility]   1/22/2024  9:00 AM Hanna Young APRN - CNP POAI [facility] AMB   1/22/2024  1:00 PM DesduDelicia Nichole, PT SFOORPT SFO   1/24/2024  1:45 PM DesduDelicia Nichole, PT SFOORPT [facility]   1/29/2024  1:00 PM JetduDelicia Nichole, PT SFOORPT [facility]   1/31/2024  1:00 PM Desdune-Delicia Matos, PT SFOORPT [facility]   2/8/2024 11:15 AM Sandra Rock DO ENTFI [facility] AMB   5/7/2024 11:00 AM PGU GYPSY BLOOD DRAWS PGUMAU [facility] AMB   5/10/2024  1:20 PM Alex Timmons MD HTROXI [facility] AMB   5/14/2024 11:00 AM Antonio Flores MD PGUMAU [facility] AMB   5/30/2024  1:40 PM DIANNE Boone - GEOVANNY PSCD [facility] AMB

## 2023-12-28 ENCOUNTER — PATIENT MESSAGE (OUTPATIENT)
Dept: ORTHOPEDIC SURGERY | Age: 73
End: 2023-12-28

## 2023-12-28 DIAGNOSIS — M65.28 CALCIFIC ACHILLES TENDINITIS OF LEFT LOWER EXTREMITY: ICD-10-CM

## 2023-12-28 DIAGNOSIS — M76.62 ACHILLES TENDINITIS, LEFT LEG: Primary | ICD-10-CM

## 2023-12-28 NOTE — TELEPHONE ENCOUNTER
From: Suzie Camacho  To: [address]: 12/28/2023 12:48 PM EST  Subject: Left Heel Surgery     I have started my [place] with Delicia. I'm getting a little concerned about the wound not healing. I'm very concerned because of my diabetic situation. I have been soaking it in Epsom salts every night since my appt with you. It seems to becoming more painful. I will be seeing Anette Dacosta on 1/2/24 for my 3rd visit. I'm very happy with her.      Thanks, Hakeem Stevens

## 2024-01-02 ENCOUNTER — HOSPITAL ENCOUNTER (OUTPATIENT)
Dept: PHYSICAL THERAPY | Age: 74
Setting detail: RECURRING SERIES
Discharge: HOME | End: 2024-01-05
Payer: MEDICARE

## 2024-01-02 PROCEDURE — 97110 THERAPEUTIC EXERCISES: CPT

## 2024-01-02 PROCEDURE — 97140 MANUAL THERAPY 1/> REGIONS: CPT

## 2024-01-02 RX ORDER — ATORVASTATIN CALCIUM 80 MG/1
80 TABLET, FILM COATED ORAL DAILY
Qty: 90 TABLET | Refills: 3 | Status: SHIPPED | OUTPATIENT
Start: 2024-01-02

## 2024-01-03 ASSESSMENT — PAIN SCALES - GENERAL: PAINLEVEL_OUTOF10: 3

## 2024-01-03 NOTE — PROGRESS NOTES
Clifton Merchant  : 1950  Primary: Medicare Part A And B (Medicare)  Secondary: [employer]ARP HE[employer] CARE MEDICARE SUPP [facility]  [address]  [address]  [facility] [address]  Phone: [phone]  Fax: [phone]    Plan of Care/Certification Expiration Date: 02/10/24        Plan of Care/Certification Expiration Date:  Plan of Care/Certification Expiration Date: 02/10/24    Frequency/Duration:      Time In/Out:   Time In: 1600  Time Out: 1645      PT Visit Info:    Total # of Visits to Date: 2      Visit Count:  3    OUTPATIENT PHYSICAL THERAPY:   Treatment Note 2024       Episode  (Left Achilles Tendon Reconstruction)               Treatment Diagnosis:    No data found  Medical/Referring Diagnosis:    Achilles tendinitis, left leg [M76.62]    Referring Physician:  Cristel Barajas APRN - CNP  MD Orders:  PT Eval and Treat   Return MD Appt:  24   Date of Onset:  Onset Date: 10/27/23     Allergies:   Other  Restrictions/Precautions:   None      Interventions Planned (Treatment may consist of any combination of the following):     See Assessment Note    Subjective Comments:   Patient reports his heel is feeling less sensitive, but still sensitive when he unknowingly hits his heel  Initial Pain Level::     3/10  Post Session Pain Level:       2/10  Medications Last Reviewed:  2024  Updated Objective Findings:  None Today  Treatment   MANUAL THERAPY: (10 minutes):   Soft tissue mobilization was utilized and necessary because of the patient's restricted motion of soft tissue.   Scar mobs,   STM/MFR of gastrocs, achilles tendon glide     THERAPEUTIC EXERCISE: (35 minutes):    Exercises per grid below to improve mobility.  Required minimal visual and verbal cues to promote proper body alignment.  Progressed range and repetitions as indicated.   Date:  23 Date:  23 Date:  24   Activity/Exercise Parameters Parameters Parameters   Nu step   10' level 1    Bike   6min L5

## 2024-01-05 ENCOUNTER — HOSPITAL ENCOUNTER (OUTPATIENT)
Dept: PHYSICAL THERAPY | Age: 74
Setting detail: RECURRING SERIES
Discharge: HOME | End: 2024-01-08
Payer: MEDICARE

## 2024-01-05 PROCEDURE — 97110 THERAPEUTIC EXERCISES: CPT

## 2024-01-07 ASSESSMENT — PAIN SCALES - GENERAL: PAINLEVEL_OUTOF10: 3

## 2024-01-08 ENCOUNTER — HOSPITAL ENCOUNTER (OUTPATIENT)
Dept: PHYSICAL THERAPY | Age: 74
Setting detail: RECURRING SERIES
Discharge: HOME | End: 2024-01-11
Payer: MEDICARE

## 2024-01-08 PROCEDURE — 97110 THERAPEUTIC EXERCISES: CPT

## 2024-01-08 ASSESSMENT — PAIN SCALES - GENERAL: PAINLEVEL_OUTOF10: 4

## 2024-01-08 NOTE — PROGRESS NOTES
Clifton Merchant  : 1950  Primary: Medicare Part A And B (Medicare)  Secondary: [employer]ARP HEALTH CARE MEDICARE SUPP [facility]  [address]  [address]  [facility] [address]  Phone: [phone]  Fax: [phone]    Plan of Care/Certification Expiration Date: 02/10/24        Plan of Care/Certification Expiration Date:  Plan of Care/Certification Expiration Date: 02/10/24    Frequency/Duration:      Time In/Out:   Time In: 1015  Time Out: 1100      PT Visit Info:    Total # of Visits to Date: 4      Visit Count:  4    OUTPATIENT PHYSICAL THERAPY:   Treatment Note 2024       Episode  (Left Achilles Tendon Reconstruction)               Treatment Diagnosis:    No data found  Medical/Referring Diagnosis:    Achilles tendinitis, left leg [M76.62]    Referring Physician:  Cristel Barajas APRN - CNP MD Orders:  PT Eval and Treat   Return MD Appt:  24   Date of Onset:  Onset Date: 10/27/23     Allergies:   Other  Restrictions/Precautions:   None      Interventions Planned (Treatment may consist of any combination of the following):     See Assessment Note    Subjective Comments:   Patient reports his heel is healing well  Initial Pain Level::     3/10  Post Session Pain Level:       2/10  Medications Last Reviewed:  2024  Updated Objective Findings:  None Today  Treatment   MANUAL THERAPY: (5 minutes):   Soft tissue mobilization was utilized and necessary because of the patient's restricted motion of soft tissue.   Scar mobs,   STM/MFR of gastrocs, achilles tendon glide     THERAPEUTIC EXERCISE: (40 minutes):    Exercises per grid below to improve mobility.  Required minimal visual and verbal cues to promote proper body alignment.  Progressed range and repetitions as indicated.   Date:  23 Date:  23 Date:  24 Date:  24   Activity/Exercise Parameters Parameters Parameters    Nu step   10' level 1     Bike   6min L5 6 min L5-4   Ankle pumps 20x      Ankle circles 20x

## 2024-01-08 NOTE — PROGRESS NOTES
Clifton Merchant  : 1950  Primary: Medicare Part A And B (Medicare)  Secondary: [employer]ARP HEALTH CARE MEDICARE SUPP [facility]  [address]  [address]  [facility] [address]  Phone: [phone]  Fax: [phone]    Plan of Care/Certification Expiration Date: 02/10/24        Plan of Care/Certification Expiration Date:  Plan of Care/Certification Expiration Date: 02/10/24    Frequency/Duration:      Time In/Out:   Time In: 1300  Time Out: 1400      PT Visit Info:    Total # of Visits to Date: 4      Visit Count:  5    OUTPATIENT PHYSICAL THERAPY:   Treatment Note 2024       Episode  (Left Achilles Tendon Reconstruction)               Treatment Diagnosis:    No data found  Medical/Referring Diagnosis:    Achilles tendinitis, left leg [M76.62]    Referring Physician:  Cristel Barajas APRN - CNP  MD Orders:  PT Eval and Treat   Return MD Appt:  24   Date of Onset:  Onset Date: 10/27/23     Allergies:   Other  Restrictions/Precautions:   None      Interventions Planned (Treatment may consist of any combination of the following):     See Assessment Note    Subjective Comments:   Patient reports his heel is healing well  Initial Pain Level::     4/10  Post Session Pain Level:       3/10  Medications Last Reviewed:  2024  Updated Objective Findings:  None Today  Treatment   MANUAL THERAPY: (0 minutes):   Soft tissue mobilization was utilized and necessary because of the patient's restricted motion of soft tissue.   Scar mobs,   STM/MFR of gastrocs, achilles tendon glide     THERAPEUTIC EXERCISE: (40 minutes):    Exercises per grid below to improve mobility.  Required minimal visual and verbal cues to promote proper body alignment.  Progressed range and repetitions as indicated.   Date:  23 Date:  23 Date:  24 Date:  24 Date:  24   Activity/Exercise Parameters Parameters Parameters     Nu step   10' level 1      Bike   6min L5 6 min L5-4 7 min L4   Ankle pumps 20x

## 2024-01-10 ENCOUNTER — HOSPITAL ENCOUNTER (OUTPATIENT)
Dept: PHYSICAL THERAPY | Age: 74
Setting detail: RECURRING SERIES
Discharge: HOME | End: 2024-01-13
Payer: MEDICARE

## 2024-01-10 PROCEDURE — 97110 THERAPEUTIC EXERCISES: CPT

## 2024-01-10 ASSESSMENT — PAIN SCALES - GENERAL: PAINLEVEL_OUTOF10: 4

## 2024-01-10 NOTE — PROGRESS NOTES
daily - 7 x weekly - 2 sets - 10 reps  - Long Sitting Ankle Eversion with Resistance  - 1 x daily - 7 x weekly - 3 sets - 10 reps  - Long Sitting Eccentric Ankle Plantar Flexion with Resistance  - 1 x daily - 7 x weekly - 3 sets - 10 reps  - Great Toe Flexion with Resistance  - 1 x daily - 7 x weekly - 2 sets - 10 reps  - Single Leg Balance with Clock Reach  - 1 x daily - 7 x weekly - 3 sets - 10 reps      Treatment/Session Summary:    Treatment Assessment:   Patient showing steadily improving strength in calf, though continues to require a slow progression with exercises due to Afib symptoms which he self monitors  Communication/Consultation:  None today  Equipment provided today:  None  Recommendations/Intent for next treatment session: Next visit will focus on ROM, strengthening, manual therapy, gait and proprioceptive training, modalities as needed.    >Total Treatment Billable Duration:  45 minutes (40 minutes TE, 5 minutes manual)  Time In: 1300  Time Out: 1355    Delicia Ladd, PT         Charge Capture  [employer] Portal  Appt Desk     Future Appointments   Date Time Provider Department Center   1/15/2024  1:00 PM [facility] PRN PROVIDER [facility]   1/17/2024  1:00 PM Cony Kim, PT [facility]   1/22/2024  9:00 AM Cristel Barajas APRN - CNP [facility]   1/22/2024  1:00 PM Delicia Ladd, PT [facility]   1/24/2024  1:45 PM Delicia Ladd, PT [facility]   1/29/2024  1:00 PM Delicia Ladd, PT [facility]   1/31/2024  1:00 PM Delicia Ladd, PT [facility]   2/8/2024 11:15 AM Sandra Rock DO [facility]   5/7/2024 11:00 AM PGU GYPSY BLOOD DRAWS [facility]   5/10/2024  1:20 PM Danilo Hahn MD [facility]   5/14/2024 11:00 AM Noah Queen MD [facility]   5/30/2024  1:40 PM Brigid Geronimo, DIANNE - CNP PSCD [facility]

## 2024-01-15 ENCOUNTER — HOSPITAL ENCOUNTER (OUTPATIENT)
Dept: PHYSICAL THERAPY | Age: 74
Setting detail: RECURRING SERIES
Discharge: HOME | End: 2024-01-18
Payer: MEDICARE

## 2024-01-15 PROCEDURE — 97110 THERAPEUTIC EXERCISES: CPT

## 2024-01-15 ASSESSMENT — PAIN SCALES - GENERAL: PAINLEVEL_OUTOF10: 4

## 2024-01-15 ASSESSMENT — PAIN DESCRIPTION - LOCATION: LOCATION: ANKLE

## 2024-01-15 NOTE — PROGRESS NOTES
Cony KURTZ, PT [facility]   1/22/2024  9:00 AM Cristel Barajas, APRN - CNP [facility]   1/22/2024  1:00 PM Delicia Ladd, PT [facility]   1/24/2024  1:45 PM DesduDelicia Nichole, PT [facility]   1/29/2024  1:00 PM Delicia Ladd, PT [facility]   1/31/2024  1:00 PM Delicia Ladd, PT [facility]   2/8/2024 11:15 AM Sandra Rock DO ENTFI [facility]   5/7/2024 11:00 AM PGU GYPSY BLOOD DRAWS [facility]   5/10/2024  1:20 PM Danilo Hahn MD HTF [facility]   5/14/2024 11:00 AM Noah Queen MD [facility]   5/30/2024  1:40 PM Brigid Geronimo, APRN - CNP [facility]

## 2024-01-16 NOTE — PROGRESS NOTES
Clifton Merchant  : 1950  Primary: Medicare Part A And B (Medicare)  Secondary: AARP HEALTH CARE MEDICARE SUPP [facility]  [address]  [address]  [facility] [address]  Phone: [phone]  Fax: [phone]    Plan of Care/Certification Expiration Date: 02/10/24        Plan of Care/Certification Expiration Date:  Plan of Care/Certification Expiration Date: 02/10/24    Frequency/Duration:      Time In/Out:   Time In: 1300  Time Out: 1345      PT Visit Info:    Total # of Visits to Date: 8  Progress Note Counter: 8      Visit Count: 8    OUTPATIENT PHYSICAL THERAPY:   Treatment Note 2024       Episode  (Left Achilles Tendon Reconstruction)               Treatment Diagnosis:    No data found  Medical/Referring Diagnosis:    Achilles tendinitis, left leg [M76.62]    Referring Physician:  Cristel Barajas APRN - CNP MD Orders:  PT Eval and Treat   Return MD Appt:  24   Date of Onset:  Onset Date: 10/27/23     Allergies: Other  Restrictions/Precautions:   None      Interventions Planned (Treatment may consist of any combination of the following):     See Assessment Note    Subjective Comments:   Patient reports he's frustrated with slow progress. He still c/o ankle stiffness and soreness at the site of Halunds removal.     Initial Pain Level:: Left Foot, Ankle 4/10  Post Session Pain Level:  Left  Foot, Ankle 4/10   Medications Last Reviewed:  2024  Updated Objective Findings:  None Today  Treatment   MANUAL THERAPY: (0 minutes):   Soft tissue mobilization was utilized and necessary because of the patient's restricted motion of soft tissue.   Scar mobs,   STM/MFR of gastrocs, achilles tendon glide     THERAPEUTIC EXERCISE: (45 minutes):    Exercises per grid below to improve mobility. Required minimal visual and verbal cues to promote proper body alignment. Progressed range and repetitions as indicated.   Date:  1/10/24 Date:  1/15/24 Date:  24   Activity/Exercise

## 2024-01-17 ENCOUNTER — HOSPITAL ENCOUNTER (OUTPATIENT)
Dept: PHYSICAL THERAPY | Age: 74
Setting detail: RECURRING SERIES
Discharge: HOME | End: 2024-01-20
Payer: MEDICARE

## 2024-01-17 PROCEDURE — 97110 THERAPEUTIC EXERCISES: CPT

## 2024-01-17 ASSESSMENT — PAIN DESCRIPTION - PAIN TYPE: TYPE: CHRONIC PAIN

## 2024-01-17 ASSESSMENT — PAIN DESCRIPTION - LOCATION: LOCATION: FOOT;ANKLE

## 2024-01-17 ASSESSMENT — PAIN SCALES - GENERAL: PAINLEVEL_OUTOF10: 4

## 2024-01-17 ASSESSMENT — PAIN DESCRIPTION - ORIENTATION: ORIENTATION: LEFT

## 2024-01-17 ASSESSMENT — PAIN DESCRIPTION - DESCRIPTORS: DESCRIPTORS: ACHING;TIGHTNESS;TENDER

## 2024-01-22 ENCOUNTER — OFFICE VISIT (OUTPATIENT)
Dept: ORTHOPEDIC SURGERY | Age: 74
End: 2024-01-22

## 2024-01-22 ENCOUNTER — HOSPITAL ENCOUNTER (OUTPATIENT)
Dept: PHYSICAL THERAPY | Age: 74
Setting detail: RECURRING SERIES
Discharge: HOME | End: 2024-01-25
Payer: MEDICARE

## 2024-01-22 DIAGNOSIS — M65.28 CALCIFIC ACHILLES TENDINITIS OF LEFT LOWER EXTREMITY: Primary | ICD-10-CM

## 2024-01-22 DIAGNOSIS — M76.62 ACHILLES TENDINITIS, LEFT LEG: ICD-10-CM

## 2024-01-22 PROCEDURE — 99024 POSTOP FOLLOW-UP VISIT: CPT | Performed by: NURSE PRACTITIONER

## 2024-01-22 PROCEDURE — 97110 THERAPEUTIC EXERCISES: CPT

## 2024-01-22 ASSESSMENT — PAIN SCALES - GENERAL: PAINLEVEL_OUTOF10: 4

## 2024-01-22 NOTE — PROGRESS NOTES
GYPSY BLOOD DRAWS [facility]   5/10/2024  1:20 PM Danilo Hahn MD [facility]   5/14/2024 11:00 AM [facility]   5/30/2024  1:40 PM Brig[facility]eronimo, [facility] - [facility]

## 2024-01-22 NOTE — PROGRESS NOTES
Name: Clifton Merchant  YOB: 1950  Gender: male  MRN: [id number]    Procedure Performed:Left Achilles secondary recostruction/Repair with Flexor hallucis longus and haglunds excision. - Left           Date of Procedure: 10/27/2023      Subjective: He notes that he is progressing well in therapy.  He reports about 3 weeks ago finally feeling like he turned a corner for the better.  He is aware of the amount of time that it takes to fully recover from the surgery.      Physical Examination: Incisional area is almost completely healed and there is just a pinpoint scab still remaining with no signs of infection to this area.  He has palpable pulses and intact sensation to the foot.  He has 5 of 5 strength to the Achilles repair.  He is able to do a double leg toe raise today with some effort.  Patient has minimal swelling noted today.        Imaging:   No imaging reviewed          Assessment:   Status post left secondary Achilles reconstruction with FHL tendon transfer and Tommie's excision.      Plan:   3 This is stable chronic illness/condition  Treatment at this time: Physical Therapy, there are no longer any restrictions on his levels of activity he may do as he can tolerate.  He will follow-up in 3 months or sooner if needed.  Studies ordered: NO XR needed @ Next Visit    Weight-bearing status: WBAT        Return to work/work restrictions: none  No medications given

## 2024-01-24 ENCOUNTER — HOSPITAL ENCOUNTER (OUTPATIENT)
Dept: PHYSICAL THERAPY | Age: 74
Setting detail: RECURRING SERIES
Discharge: HOME | End: 2024-01-27
Payer: MEDICARE

## 2024-01-24 PROCEDURE — 97110 THERAPEUTIC EXERCISES: CPT

## 2024-01-24 PROCEDURE — 97140 MANUAL THERAPY 1/> REGIONS: CPT

## 2024-01-24 ASSESSMENT — PAIN SCALES - GENERAL: PAINLEVEL_OUTOF10: 4

## 2024-01-24 NOTE — THERAPY RECERTIFICATION
Clifton Merchant  : 1950  Primary: Medicare Part A And B (Medicare)  Secondary: [facility]ARP HEALTH CARE MEDICARE [facility]PP [facility]  [address]  [address]  [facility] [address]  Phone: [phone]  Fax: [phone] Plan Frequency: 2 x week got 6-8 weeks    Plan of Care/Certification Expiration Date: 24        Plan of Care/Certification Expiration Date:  Plan of Care/Certification Expiration Date: 24    Frequency/Duration: Plan Frequency: 2 x week got 6-8 weeks      Time In/Out:   Time In: 1345      PT Visit Info:    Plan Frequency: 2 x week got 6-8 weeks  Total # of Visits to Date: 10  Progress Note Counter: 8      Visit Count:  10                                                                OUTPATIENT PHYSICAL THERAPY:                                                                       Recertification 2024                                                       Episode (Left Achilles Tendon Reconstruction)         Treatment Diagnosis:     No data found  Medical/Referring Diagnosis:    Achilles tendinitis, left leg [M76.62]    Referring Physician:  Cristel Barajas APRN - CNP MD Orders:  PT Eval and Treat   Return MD Appt:  24  Date of Onset:  Onset Date: 10/27/23     Allergies:  Other  Restrictions/Precautions:    None      Medications Last Reviewed:  2024     SUBJECTIVE   As of 2024, Clifton Merchant has attended 10 out of 10 scheduled visits, with 0 cancellation(s) and 0 no shows.    Compalints Currents: Reports continued difficulty with descending stairs, easier going up.  Able to perfrom most of his ADL's.   Tender spot on center of heel remains persistent, and notes 50% improvement overall  Patient Stated Goal(s):  \"To return to cycling\"  Initial Pain Level:      4/10   Post Session Pain Level:     3/10  Past Medical History/Comorbidities:   Mr. Merchant  has a past medical history of Anxiety, Arrhythmia, CAD (coronary artery disease), Calculus of

## 2024-01-24 NOTE — PROGRESS NOTES
Clifton Merchant  : 1950  Primary: Medicare Part A And B (Medicare)  Secondary: [employer]ARP HEALTH CARE MEDICARE SUPP [facility]  [address]  [address]  [facility] [address]  Phone: [phone]  Fax: [phone] Plan Frequency: 2 x week got 6-8 weeks  Plan of Care/Certification Expiration Date: 24        Plan of Care/Certification Expiration Date:  Plan of Care/Certification Expiration Date: 24    Frequency/Duration: Plan Frequency: 2 x week got 6-8 weeks      Time In/Out:   Time In: 1345  Time Out: 1455      PT Visit Info:    Plan Frequency: 2 x week got 6-8 weeks  Total # of Visits to Date: 10  Progress Note Counter: 8      Visit Count: 10    OUTPATIENT PHYSICAL THERAPY:   Treatment Note 2024       Episode  (Left Achilles Tendon Reconstruction)               Treatment Diagnosis:    No data found  Medical/Referring Diagnosis:    Achilles tendinitis, left leg [M76.62]    Referring Physician:  Cristel Barajas APRN - CNP  MD Orders:  PT Eval and Treat   Return MD Appt:  24   Date of Onset:  Onset Date: 10/27/23     Allergies: Other  Restrictions/Precautions:   None      Interventions Planned (Treatment may consist of any combination of the following):     See Assessment Note    Subjective Comments:    Reports continued difficulty with descending stairs, easier going up.  Able to perfrom most of his ADL's.   Tender spot on center of heel remains persistent, and notes 50% improvement overall     Initial Pain Level::     4/10  Post Session Pain Level:       3/10   Medications Last Reviewed:  2024  Updated Objective Findings:  See Recertification Note from today  Treatment   MANUAL THERAPY: (10 minutes):   Soft tissue mobilization was utilized and necessary because of the patient's restricted motion of soft tissue.   Scar mobs,   STM/MFR of gastrocs, achilles tendon glide  Talocrual distraction and PA mos for DF grade 3     THERAPEUTIC EXERCISE: (45 minutes):    Exercises

## 2024-01-29 ENCOUNTER — HOSPITAL ENCOUNTER (OUTPATIENT)
Dept: PHYSICAL THERAPY | Age: 74
Setting detail: RECURRING SERIES
Discharge: HOME | End: 2024-02-01
Payer: MEDICARE

## 2024-01-29 PROCEDURE — 97110 THERAPEUTIC EXERCISES: CPT

## 2024-01-29 RX ORDER — DOFETILIDE 0.25 MG/1
CAPSULE ORAL
Qty: 180 CAPSULE | Refills: 3 | Status: SHIPPED | OUTPATIENT
Start: 2024-01-29

## 2024-01-29 ASSESSMENT — PAIN SCALES - GENERAL: PAINLEVEL_OUTOF10: 4

## 2024-01-29 NOTE — PROGRESS NOTES
Clifton Merchant  : 1950  Primary: Medicare Part A And B (Medicare)  Secondary: [employer]ARP HEALTH CARE MEDICARE SUPP [facility]  [address]  [address]  [facility] [address]  Phone: [phone]  Fax: [phone] Plan Frequency: 2 x week got 6-8 weeks  Plan of Care/Certification Expiration Date: 24        Plan of Care/Certification Expiration Date:  Plan of Care/Certification Expiration Date: 24    Frequency/Duration: Plan Frequency: 2 x week got 6-8 weeks      Time In/Out:   Time In: 1300  Time Out: 1400      PT Visit Info:    Plan Frequency: 2 x week got 6-8 weeks  Total # of Visits to Date: 11  Progress Note Counter: 8      Visit Count: 11    OUTPATIENT PHYSICAL THERAPY:   Treatment Note 2024       Episode  (Left Achilles Tendon Reconstruction)               Treatment Diagnosis:    No data found  Medical/Referring Diagnosis:    Achilles tendinitis, left leg [M76.62]    Referring Physician:  Cristel Barajas APRN - CNP MD Orders:  PT Eval and Treat   Return MD Appt:  24   Date of Onset:  Onset Date: 10/27/23     Allergies: Other  Restrictions/Precautions:   None      Interventions Planned (Treatment may consist of any combination of the following):     See Assessment Note    Subjective Comments:    Reports doing better today, just feels stiff    Initial Pain Level::     4/10  Post Session Pain Level:       2/10   Medications Last Reviewed:  2024  Updated Objective Findings:  None Today  Treatment   MANUAL THERAPY: (0 minutes):   Soft tissue mobilization was utilized and necessary because of the patient's restricted motion of soft tissue.   Scar mobs,   STM/MFR of gastrocs, achilles tendon glide  Talocrual distraction and PA mos for DF grade 3     THERAPEUTIC EXERCISE: (45 minutes):    Exercises per grid below to improve mobility. Required minimal visual and verbal cues to promote proper body alignment. Progressed range and repetitions as indicated.   Date:  1/10/24

## 2024-01-29 NOTE — TELEPHONE ENCOUNTER
Requested Prescriptions     Pending Prescriptions Disp Refills    dofetilide (TIKOSYN) 250 MCG capsule [Pharmacy Med Name: DOFETILIDE 250 MCG Capsule] 180 capsule 3     Sig: TAKE 1 CAPSULE EVERY 12 HOURS

## 2024-01-31 ENCOUNTER — HOSPITAL ENCOUNTER (OUTPATIENT)
Dept: PHYSICAL THERAPY | Age: 74
Setting detail: RECURRING SERIES
Discharge: HOME | End: 2024-02-03
Payer: MEDICARE

## 2024-01-31 PROCEDURE — 97110 THERAPEUTIC EXERCISES: CPT

## 2024-01-31 ASSESSMENT — PAIN SCALES - GENERAL: PAINLEVEL_OUTOF10: 3

## 2024-01-31 NOTE — PROGRESS NOTES
Clifton Merchant  : 1950  Primary: Medicare Part A And B (Medicare)  Secondary: [employer]ARP HEALTH CARE MEDICARE SUPP [facility]  [address]  [address]  [facility] [address]  Phone: [phone]  Fax: [phone] Plan Frequency: 2 x week got 6-8 weeks  Plan of Care/Certification Expiration Date: 24        Plan of Care/Certification Expiration Date:  Plan of Care/Certification Expiration Date: 24    Frequency/Duration: Plan Frequency: 2 x week got 6-8 weeks      Time In/Out:   Time In: 1300  Time Out: 1400      PT Visit Info:    Plan Frequency: 2 x week got 6-8 weeks  Total # of Visits to Date: 11  Progress Note Counter: 8      Visit Count: 12    OUTPATIENT PHYSICAL THERAPY:   Treatment Note 2024       Episode  (Left Achilles Tendon Reconstruction)               Treatment Diagnosis:    No data found  Medical/Referring Diagnosis:    Achilles tendinitis, left leg [M76.62]    Referring Physician:  Cristel Barajas APRN - CNP MD Orders:  PT Eval and Treat   Return MD Appt:  24   Date of Onset:  Onset Date: 10/27/23     Allergies: Other  Restrictions/Precautions:   None      Interventions Planned (Treatment may consist of any combination of the following):     See Assessment Note    Subjective Comments:    Patient still with complaints of heel pain at times when that area is touched    Initial Pain Level::     3/10  Post Session Pain Level:       2/10   Medications Last Reviewed:  2024  Updated Objective Findings:  None Today  Treatment   MANUAL THERAPY: (5 minutes):   Soft tissue mobilization was utilized and necessary because of the patient's restricted motion of soft tissue.   Scar mobs,   STM/MFR of gastrocs, achilles tendon glide  Talocrual distraction and PA mos for DF grade 3     THERAPEUTIC EXERCISE: (40 minutes):    Exercises per grid below to improve mobility. Required minimal visual and verbal cues to promote proper body alignment. Progressed range and

## 2024-02-05 ENCOUNTER — HOSPITAL ENCOUNTER (OUTPATIENT)
Dept: PHYSICAL THERAPY | Age: 74
Setting detail: RECURRING SERIES
Discharge: HOME | End: 2024-02-08
Payer: MEDICARE

## 2024-02-05 PROCEDURE — 97140 MANUAL THERAPY 1/> REGIONS: CPT

## 2024-02-05 PROCEDURE — 97110 THERAPEUTIC EXERCISES: CPT

## 2024-02-05 ASSESSMENT — PAIN SCALES - GENERAL: PAINLEVEL_OUTOF10: 4

## 2024-02-05 NOTE — PROGRESS NOTES
Clifton Merchant  : 1950  Primary: Medicare Part A And B (Medicare)  Secondary: [employer]ARP HEALTH CARE MEDICARE SUPP [facility]  [address]  [address]  [facility] [address]  Phone: [phone]  Fax: [phone] Plan Frequency: 2 x week got 6-8 weeks  Plan of Care/Certification Expiration Date: 24        Plan of Care/Certification Expiration Date:  Plan of Care/Certification Expiration Date: 24    Frequency/Duration: Plan Frequency: 2 x week got 6-8 weeks      Time In/Out:          PT Visit Info:    Plan Frequency: 2 x week got 6-8 weeks  Total # of Visits to Date: 11  Progress Note Counter: 8      Visit Count: 13    OUTPATIENT PHYSICAL THERAPY:   Treatment Note 2024       Episode  (Left Achilles Tendon Reconstruction)               Treatment Diagnosis:    No data found  Medical/Referring Diagnosis:    Achilles tendinitis, left leg [M76.62]    Referring Physician:  Cristel Barajas APRN - CNP MD Orders:  PT Eval and Treat   Return MD Appt:  24   Date of Onset:  Onset Date: 10/27/23     Allergies: Other  Restrictions/Precautions:   None      Interventions Planned (Treatment may consist of any combination of the following):     See Assessment Note    Subjective Comments:    Patient reports feeling stiff and sore, and difficluty going down stairs reciprocally    Initial Pain Level::      /10  Post Session Pain Level:        /10   Medications Last Reviewed:  2024  Updated Objective Findings:  None Today  Treatment   MANUAL THERAPY: (10 minutes):   Soft tissue mobilization was utilized and necessary because of the patient's restricted motion of soft tissue.   Scar mobs,   STM/MFR of gastrocs, achilles tendon glide in hook lying and functional mobs with lunge on step  Talocrual distraction and PA mos for DF grade 3     THERAPEUTIC EXERCISE: (35 minutes):    Exercises per grid below to improve mobility. Required minimal visual and verbal cues to promote proper body alignment.

## 2024-02-07 ENCOUNTER — HOSPITAL ENCOUNTER (OUTPATIENT)
Dept: PHYSICAL THERAPY | Age: 74
Setting detail: RECURRING SERIES
Discharge: HOME | End: 2024-02-10
Payer: MEDICARE

## 2024-02-07 PROCEDURE — 97140 MANUAL THERAPY 1/> REGIONS: CPT

## 2024-02-07 PROCEDURE — 97110 THERAPEUTIC EXERCISES: CPT

## 2024-02-07 ASSESSMENT — PAIN SCALES - GENERAL: PAINLEVEL_OUTOF10: 4

## 2024-02-07 NOTE — PROGRESS NOTES
Clifton Merchant  : 1950  Primary: Medicare Part A And B (Medicare)  Secondary: [employer] MEDICARE SUPP [facility]  [address]  [address]  [facility] [address]  Phone: [phone]  Fax: [phone] Plan Frequency: 2 x week got 6-8 weeks  Plan of Care/Certification Expiration Date: 24        Plan of Care/Certification Expiration Date:  Plan of Care/Certification Expiration Date: 24    Frequency/Duration: Plan Frequency: 2 x week got 6-8 weeks      Time In/Out:   Time In: 1300  Time Out: 1400      PT Visit Info:    Plan Frequency: 2 x week got 6-8 weeks  Total # of Visits to Date: 13  Progress Note Counter: 8      Visit Count: 14    OUTPATIENT PHYSICAL THERAPY:   Treatment Note 2024       Episode  (Left Achilles Tendon Reconstruction)               Treatment Diagnosis:    No data found  Medical/Referring Diagnosis:    Achilles tendinitis, left leg [M76.62]    Referring Physician:  Cristel Barajas APRN - CNP MD Orders:  PT Eval and Treat   Return MD Appt:  24   Date of Onset:  Onset Date: 10/27/23     Allergies: Other  Restrictions/Precautions:   None      Interventions Planned (Treatment may consist of any combination of the following):     See Assessment Note    Subjective Comments:    Patient reports his ankle feels looser by report    Initial Pain Level::     4/10  Post Session Pain Level:       2/10   Medications Last Reviewed:  2024  Updated Objective Findings:  None Today  Treatment   MANUAL THERAPY: (10 minutes):   Soft tissue mobilization was utilized and necessary because of the patient's restricted motion of soft tissue.   STM/MFR of gastrocs, achilles tendon glide in hook lying and prone  Talocrual distraction and PA mos for DF grade 3     THERAPEUTIC EXERCISE: (35 minutes):    Exercises per grid below to improve mobility. Required minimal visual and verbal cues to promote proper body alignment. Progressed range and repetitions as indicated.

## 2024-02-08 ENCOUNTER — OFFICE VISIT (OUTPATIENT)
Dept: ENT CLINIC | Age: 74
End: 2024-02-08
Payer: MEDICARE

## 2024-02-08 VITALS
BODY MASS INDEX: 25.34 KG/M2 | RESPIRATION RATE: 18 BRPM | WEIGHT: 177 LBS | DIASTOLIC BLOOD PRESSURE: 78 MMHG | SYSTOLIC BLOOD PRESSURE: 120 MMHG | HEIGHT: 70 IN

## 2024-02-08 DIAGNOSIS — Z98.890 S/P FESS (FUNCTIONAL ENDOSCOPIC SINUS SURGERY): ICD-10-CM

## 2024-02-08 DIAGNOSIS — Z98.890 S/P NASAL SEPTOPLASTY: ICD-10-CM

## 2024-02-08 DIAGNOSIS — J32.9 CHRONIC RHINOSINUSITIS: Primary | ICD-10-CM

## 2024-02-08 PROCEDURE — 3074F SYST BP LT 130 MM HG: CPT | Performed by: STUDENT IN AN ORGANIZED HEALTH CARE EDUCATION/TRAINING PROGRAM

## 2024-02-08 PROCEDURE — G8427 DOCREV CUR MEDS BY ELIG CLIN: HCPCS | Performed by: STUDENT IN AN ORGANIZED HEALTH CARE EDUCATION/TRAINING PROGRAM

## 2024-02-08 PROCEDURE — 3078F DIAST BP <80 MM HG: CPT | Performed by: STUDENT IN AN ORGANIZED HEALTH CARE EDUCATION/TRAINING PROGRAM

## 2024-02-08 PROCEDURE — 3017F COLORECTAL CA SCREEN DOC REV: CPT | Performed by: STUDENT IN AN ORGANIZED HEALTH CARE EDUCATION/TRAINING PROGRAM

## 2024-02-08 PROCEDURE — G8417 CALC BMI ABV UP PARAM F/U: HCPCS | Performed by: STUDENT IN AN ORGANIZED HEALTH CARE EDUCATION/TRAINING PROGRAM

## 2024-02-08 PROCEDURE — G8484 FLU IMMUNIZE NO ADMIN: HCPCS | Performed by: STUDENT IN AN ORGANIZED HEALTH CARE EDUCATION/TRAINING PROGRAM

## 2024-02-08 PROCEDURE — 1036F TOBACCO NON-USER: CPT | Performed by: STUDENT IN AN ORGANIZED HEALTH CARE EDUCATION/TRAINING PROGRAM

## 2024-02-08 PROCEDURE — 99213 OFFICE O/P EST LOW 20 MIN: CPT | Performed by: STUDENT IN AN ORGANIZED HEALTH CARE EDUCATION/TRAINING PROGRAM

## 2024-02-08 PROCEDURE — 1123F ACP DISCUSS/DSCN MKR DOCD: CPT | Performed by: STUDENT IN AN ORGANIZED HEALTH CARE EDUCATION/TRAINING PROGRAM

## 2024-02-08 NOTE — PROGRESS NOTES
HPI:  Clifton Merchant is a 73 y.o. male seen Established   Chief Complaint   Patient presents with    Follow-up     Patient presents today for 6 MO sinus recheck . Patient states that he has a pocket in the R maxillary sinus that he states empties with rinses , his breathing has improved significantly .          73-year-old male seen for a follow-up evaluation history of chronic rhinosinusitis status post septo/FESS for a rather significant right-sided chronic rhinosinusitis on 12/30/2022.  He had no concerns on his postoperative evaluations and this is his first follow-up since February 2023.  He continues to have a significant improvement in regards to his chronic nasal obstruction and has had minimal concerns to his sinuses.  He does continue with some saline use intermittently and notices that there is some times where a pocket or portion of saline will spontaneously empty later after rinsing.  He had notable right-sided maxillary for sinus crusting on his last evaluation 6 months ago.    Past Medical History, Past Surgical History, Family history, Social History, and Medications were all reviewed with the patient today and updated as necessary.     Allergies   Allergen Reactions    Other Anaphylaxis     Combid - no longer sold in [place] per patient        Patient Active Problem List   Diagnosis    Persistent atrial fibrillation (HCC)    Type 2 diabetes mellitus with nephropathy (HCC)    Anxiety    Arrhythmia    Diverticulitis    Mass of soft tissue of face    History of shingles    Other dysphagia    Chest pain    Atrial fibrillation (HCC)    Chronic pain    BULMARO on CPAP    Hypersomnia    Unstable angina pectoris (HCC)    Neck mass    S/P excision of lipoma    Neuropraxia of left median nerve    S/P ablation of atrial fibrillation    Hypercholesterolemia    S/P CABG (coronary artery bypass graft)    Calculus of kidney    Coronary artery disease with angina pectoris, unspecified vessel or lesion type, unspecified

## 2024-02-12 ENCOUNTER — HOSPITAL ENCOUNTER (OUTPATIENT)
Dept: PHYSICAL THERAPY | Age: 74
Setting detail: RECURRING SERIES
Discharge: HOME | End: 2024-02-15
Payer: MEDICARE

## 2024-02-12 PROCEDURE — 97110 THERAPEUTIC EXERCISES: CPT

## 2024-02-12 PROCEDURE — 97140 MANUAL THERAPY 1/> REGIONS: CPT

## 2024-02-12 RX ORDER — LEVOTHYROXINE SODIUM 88 UG/1
TABLET ORAL
Qty: 90 TABLET | Refills: 3 | OUTPATIENT
Start: 2024-02-12

## 2024-02-12 RX ORDER — DABIGATRAN ETEXILATE 150 MG/1
CAPSULE ORAL
Qty: 180 CAPSULE | Refills: 0 | Status: SHIPPED | OUTPATIENT
Start: 2024-02-12

## 2024-02-12 NOTE — PROGRESS NOTES
Clifton Merchant  : 1950  Primary: Medicare Part A And B (Medicare)  Secondary: [employer] MEDICARE SUPP [facility]  [address]  [address]  [facility] [address]  Phone: [phone]  Fax: [phone] Plan Frequency: 2 x week got 6-8 weeks  Plan of Care/Certification Expiration Date: 24        Plan of Care/Certification Expiration Date:  Plan of Care/Certification Expiration Date: 24    Frequency/Duration: Plan Frequency: 2 x week got 6-8 weeks      Time In/Out:   Time In: 1300  Time Out: 1355      PT Visit Info:    Plan Frequency: 2 x week got 6-8 weeks  Total # of Visits to Date: 15  Progress Note Counter: 8      Visit Count: 15    OUTPATIENT PHYSICAL THERAPY:   Treatment Note 2024       Episode  (Left Achilles Tendon Reconstruction)               Treatment Diagnosis:    No data found  Medical/Referring Diagnosis:    Achilles tendinitis, left leg [M76.62]    Referring Physician:  Cristel Barajas APRN - CNP MD Orders:  PT Eval and Treat   Return MD Appt:  24   Date of Onset:  Onset Date: 10/27/23     Allergies: Other  Restrictions/Precautions:   None      Interventions Planned (Treatment may consist of any combination of the following):     See Assessment Note    Subjective Comments:    Patient reports that he struck his ankle on the bed rail of his bed over the weekend, doing better topday    Initial Pain Level::     4/10  Post Session Pain Level:       2/10   Medications Last Reviewed:  2024  Updated Objective Findings:  None Today  Treatment   MANUAL THERAPY: (15 minutes):   Soft tissue mobilization was utilized and necessary because of the patient's restricted motion of soft tissue.   STM/MFR of scar distally and achilles tendon glide  Talocrual distraction and PA mos for DF grade 3     THERAPEUTIC EXERCISE: (30 minutes):    Exercises per grid below to improve mobility. Required minimal visual and verbal cues to promote proper body alignment.

## 2024-02-12 NOTE — TELEPHONE ENCOUNTER
Requested Prescriptions     Signed Prescriptions Disp Refills    dabigatran (PRADAXA) 150 MG capsule 180 capsule 0     Sig: TAKE 1 CAPSULE EVERY 12 HOURS     Authorizing Provider: COLE DIEGO     Ordering User: EROS CHINCHILLA      Rx verified.  Set up appointment for 3/12/24

## 2024-02-14 ENCOUNTER — HOSPITAL ENCOUNTER (OUTPATIENT)
Dept: PHYSICAL THERAPY | Age: 74
Setting detail: RECURRING SERIES
Discharge: HOME | End: 2024-02-17
Payer: MEDICARE

## 2024-02-14 PROCEDURE — 97110 THERAPEUTIC EXERCISES: CPT

## 2024-02-14 PROCEDURE — 97140 MANUAL THERAPY 1/> REGIONS: CPT

## 2024-02-14 NOTE — PROGRESS NOTES
Clifton Merchant  : 1950  Primary: Medicare Part A And B (Medicare)  Secondary: [employer] MEDICARE SUPP [facility]  [address]  [address]  [facility] [address]  Phone: [phone]  Fax: [phone] Plan Frequency: 2 x week got 6-8 weeks  Plan of Care/Certification Expiration Date: 24        Plan of Care/Certification Expiration Date:  Plan of Care/Certification Expiration Date: 24    Frequency/Duration: Plan Frequency: 2 x week got 6-8 weeks      Time In/Out:   Time In: 1300  Time Out: 1355      PT Visit Info:    Plan Frequency: 2 x week got 6-8 weeks  Total # of Visits to Date: 16  Progress Note Counter: 8      Visit Count: 16    OUTPATIENT PHYSICAL THERAPY:   Treatment Note 2024       Episode  (Left Achilles Tendon Reconstruction)               Treatment Diagnosis:    No data found  Medical/Referring Diagnosis:    Achilles tendinitis, left leg [M76.62]    Referring Physician:  Cristel Barajas APRN - CNP MD Orders:  PT Eval and Treat   Return MD Appt:  24   Date of Onset:  Onset Date: 10/27/23     Allergies: Other  Restrictions/Precautions:   None      Interventions Planned (Treatment may consist of any combination of the following):     See Assessment Note    Subjective Comments:    Patient reports doing well  today, notin discomfort is with wearing shoes    Initial Pain Level::     3/10  Post Session Pain Level:       2/10   Medications Last Reviewed:  2024  Updated Objective Findings:  None Today  Treatment   MANUAL THERAPY: (10 minutes):   Soft tissue mobilization was utilized and necessary because of the patient's restricted motion of soft tissue.   STM/MFR of scar distally and achilles tendon glide  Talocrual distraction and PA mos for DF grade 3     THERAPEUTIC EXERCISE: (35 minutes):    Exercises per grid below to improve mobility. Required minimal visual and verbal cues to promote proper body alignment. Progressed range and repetitions as

## 2024-02-19 ENCOUNTER — HOSPITAL ENCOUNTER (OUTPATIENT)
Dept: PHYSICAL THERAPY | Age: 74
Setting detail: RECURRING SERIES
Discharge: HOME | End: 2024-02-22
Payer: MEDICARE

## 2024-02-19 PROCEDURE — 97110 THERAPEUTIC EXERCISES: CPT

## 2024-02-19 ASSESSMENT — PAIN SCALES - GENERAL: PAINLEVEL_OUTOF10: 3

## 2024-02-19 NOTE — PROGRESS NOTES
Clifton Merchant  : 1950  Primary: Medicare Part A And B (Medicare)  Secondary: [employer] MEDICARE SUPP [facility]  [address]  [address]  [facility] [address]  Phone: [phone]  Fax: [phone] Plan Frequency: 2 x week got 6-8 weeks  Plan of Care/Certification Expiration Date: 24        Plan of Care/Certification Expiration Date:  Plan of Care/Certification Expiration Date: 24    Frequency/Duration: Plan Frequency: 2 x week got 6-8 weeks      Time In/Out:   Time In: 1300  Time Out: 1400      PT Visit Info:    Plan Frequency: 2 x week got 6-8 weeks  Total # of Visits to Date: 16  Progress Note Counter: 8      Visit Count: 17    OUTPATIENT PHYSICAL THERAPY:   Treatment Note 2024       Episode  (Left Achilles Tendon Reconstruction)               Treatment Diagnosis:    No data found  Medical/Referring Diagnosis:    Achilles tendinitis, left leg [M76.62]    Referring Physician:  Cristel Barajas APRN - CNP MD Orders:  PT Eval and Treat   Return MD Appt:  24   Date of Onset:  Onset Date: 10/27/23     Allergies: Other  Restrictions/Precautions:   None      Interventions Planned (Treatment may consist of any combination of the following):     See Assessment Note    Subjective Comments:    Patient reports doing well  today, continues to do his riding outdoors    Initial Pain Level::     310  Post Session Pain Level:        /10   Medications Last Reviewed:  2024  Updated Objective Findings:  None Today  Treatment   MANUAL THERAPY: (0 minutes):   Soft tissue mobilization was utilized and necessary because of the patient's restricted motion of soft tissue.   STM/MFR of scar distally and achilles tendon glide  Talocrual distraction and PA mos for DF grade 3     THERAPEUTIC EXERCISE: (45 minutes):    Exercises per grid below to improve mobility. Required minimal visual and verbal cues to promote proper body alignment. Progressed range and repetitions as

## 2024-02-21 ENCOUNTER — HOSPITAL ENCOUNTER (OUTPATIENT)
Dept: PHYSICAL THERAPY | Age: 74
Setting detail: RECURRING SERIES
Discharge: HOME | End: 2024-02-24
Payer: MEDICARE

## 2024-02-21 PROCEDURE — 97140 MANUAL THERAPY 1/> REGIONS: CPT

## 2024-02-21 PROCEDURE — 97110 THERAPEUTIC EXERCISES: CPT

## 2024-02-21 ASSESSMENT — PAIN SCALES - GENERAL: PAINLEVEL_OUTOF10: 3

## 2024-02-21 NOTE — PROGRESS NOTES
Clifton Merchant  : 1950  Primary: Medicare Part A And B (Medicare)  Secondary: [employer] MEDICARE SUPP [facility]  [address]  [address]  [facility] [address]  Phone: [phone]  Fax: [phone] Plan Frequency: 2 x week got 6-8 weeks  Plan of Care/Certification Expiration Date: 24        Plan of Care/Certification Expiration Date:  Plan of Care/Certification Expiration Date: 24    Frequency/Duration: Plan Frequency: 2 x week got 6-8 weeks      Time In/Out:   Time In: 1030  Time Out: 1140      PT Visit Info:    Plan Frequency: 2 x week got 6-8 weeks  Total # of Visits to Date: 16  Progress Note Counter: 8      Visit Count: 18    OUTPATIENT PHYSICAL THERAPY:   Treatment Note 2024       Episode  (Left Achilles Tendon Reconstruction)               Treatment Diagnosis:    No data found  Medical/Referring Diagnosis:    Achilles tendinitis, left leg [M76.62]    Referring Physician:  Cristel Barajas APRN - CNP MD Orders:  PT Eval and Treat   Return MD Appt:  24   Date of Onset:  Onset Date: 10/27/23     Allergies: Other  Restrictions/Precautions:   None      Interventions Planned (Treatment may consist of any combination of the following):     See Assessment Note    Subjective Comments:    Patient reports having some mild heel soreness today  Initial Pain Level::     3/10  Post Session Pain Level:       3/10   Medications Last Reviewed:  2024  Updated Objective Findings:  None Today  Treatment   MANUAL THERAPY: (10 minutes):   Soft tissue mobilization was utilized and necessary because of the patient's restricted motion of soft tissue.   STM/MFR of scar distally and achilles tendon glide  Talocrual distraction and PA mos for DF grade 3     THERAPEUTIC EXERCISE: (45 minutes):    Exercises per grid below to improve mobility. Required minimal visual and verbal cues to promote proper body alignment. Progressed range and repetitions as indicated.

## 2024-02-26 ENCOUNTER — HOSPITAL ENCOUNTER (OUTPATIENT)
Dept: PHYSICAL THERAPY | Age: 74
Setting detail: RECURRING SERIES
Discharge: HOME | End: 2024-02-29
Payer: MEDICARE

## 2024-02-26 PROCEDURE — 97140 MANUAL THERAPY 1/> REGIONS: CPT

## 2024-02-26 PROCEDURE — 97110 THERAPEUTIC EXERCISES: CPT

## 2024-02-26 ASSESSMENT — PAIN SCALES - GENERAL: PAINLEVEL_OUTOF10: 4

## 2024-02-26 NOTE — PROGRESS NOTES
Clifton Merchant  : 1950  Primary: Medicare Part A And B (Medicare)  Secondary: [employer] MEDICARE SUPP [facility]  [address]  [address]  [facility] [address]  Phone: [phone]  Fax: [phone] Plan Frequency: 2 x week got 6-8 weeks  Plan of Care/Certification Expiration Date: 24        Plan of Care/Certification Expiration Date:  Plan of Care/Certification Expiration Date: 24    Frequency/Duration: Plan Frequency: 2 x week got 6-8 weeks      Time In/Out:   Time In: 1300  Time Out: 1345      PT Visit Info:    Plan Frequency: 2 x week got 6-8 weeks  Total # of Visits to Date: 19  Progress Note Counter: 8      Visit Count: 19    OUTPATIENT PHYSICAL THERAPY:   Treatment Note 2024       Episode  (Left Achilles Tendon Reconstruction)               Treatment Diagnosis:    No data found  Medical/Referring Diagnosis:    Achilles tendinitis, left leg [M76.62]    Referring Physician:  Cristel Barajas APRN - CNP MD Orders:  PT Eval and Treat   Return MD Appt:  24   Date of Onset:  Onset Date: 10/27/23     Allergies: Other  Restrictions/Precautions:   None      Interventions Planned (Treatment may consist of any combination of the following):     See Assessment Note    Subjective Comments:    Patient reports having some mild heel soreness today  Initial Pain Level::     4/10  Post Session Pain Level:       3/10   Medications Last Reviewed:  2024  Updated Objective Findings:  None Today  Treatment   MANUAL THERAPY: (10 minutes):   Soft tissue mobilization was utilized and necessary because of the patient's restricted motion of soft tissue.   STM/MFR of scar distally and achilles tendon glide  Talocrual distraction and PA mos for DF grade 3     THERAPEUTIC EXERCISE: (30 minutes):    Exercises per grid below to improve mobility. Required minimal visual and verbal cues to promote proper body alignment. Progressed range and repetitions as indicated.   Date:  24

## 2024-02-28 ENCOUNTER — HOSPITAL ENCOUNTER (OUTPATIENT)
Dept: PHYSICAL THERAPY | Age: 74
Setting detail: RECURRING SERIES
Discharge: HOME | End: 2024-03-02
Payer: MEDICARE

## 2024-02-28 PROCEDURE — 97110 THERAPEUTIC EXERCISES: CPT

## 2024-02-28 ASSESSMENT — PAIN SCALES - GENERAL: PAINLEVEL_OUTOF10: 3

## 2024-02-29 NOTE — PROGRESS NOTES
daily - 7 x weekly - 3 sets - 10 reps    Treatment/Session Summary:    Treatment Assessment: Patient tolerated treatment well focusing on mobility of great toe which he is having difficulty performing, and also reports numbness in hsi foot  Communication/Consultation: None today  Equipment provided today: None  Recommendations/Intent for next treatment session: Next visit will focus on ROM, strengthening, manual therapy, gait and proprioceptive training, modalities as needed.    >Total Treatment Billable Duration: 40 min (30 min therex, 10 minutes manual)  Delicia Ladd, PT       Charge Capture  [facility]  Appt Desk     Future Appointments   Date Time Provider Department Center   3/4/2024  1:45 PM Delicia Ladd, PT [facility]   3/6/2024  1:00 PM Delicia Ladd, PT [facility]   3/11/2024  1:45 PM Delicia Ladd, PT [facility]   3/12/2024  1:00 PM Guy Delacruz MD [facility]   3/13/2024  1:00 PM Delicia Ladd, PT [facility]   3/18/2024  1:00 PM Delicia Ladd, PT [facility]   3/20/2024  1:00 PM Delicia Ladd, PT [facility]   3/25/2024  1:00 PM Delicia Ladd, PT [facility]   3/27/2024  1:45 PM Delicia Ladd, PT [facility]   4/22/2024  8:20 AM Cristel Barajas, APRN - CNP [facility]   5/7/2024 11:00 AM PGU GYPSY BLOOD DRAWS [facility]   5/9/2024 11:30 AM Sandra Rock, DO [facility]   5/10/2024  1:20 PM Danilo Hahn MD [facility]   5/14/2024 11:00 AM Noah Queen MD [facility]   5/30/2024  1:40 PM Brigid Geronimo, DIANNE - CNP PSCD [facility]

## 2024-03-04 ENCOUNTER — HOSPITAL ENCOUNTER (OUTPATIENT)
Dept: PHYSICAL THERAPY | Age: 74
Setting detail: RECURRING SERIES
Discharge: HOME | End: 2024-03-07

## 2024-03-04 DIAGNOSIS — M62.81 MUSCLE WEAKNESS (GENERALIZED): ICD-10-CM

## 2024-03-04 DIAGNOSIS — M25.572 PAIN IN LEFT ANKLE AND JOINTS OF LEFT FOOT: Primary | ICD-10-CM

## 2024-03-04 DIAGNOSIS — R26.9 GAIT DIFFICULTY: ICD-10-CM

## 2024-03-04 PROCEDURE — 97110 THERAPEUTIC EXERCISES: CPT

## 2024-03-04 ASSESSMENT — PAIN SCALES - GENERAL: PAINLEVEL_OUTOF10: 3

## 2024-03-04 NOTE — PROGRESS NOTES
Clifton Merchant  : 1950  Primary: Medicare Part A And B (Medicare)  Secondary: [employer] MEDICARE SUPP [facility]  [address]  [address]  [facility] [address]  Phone: [phone]  Fax: [phone] Plan Frequency: 2 x week got 6-8 weeks  Plan of Care/Certification Expiration Date: 24        Plan of Care/Certification Expiration Date:  Plan of Care/Certification Expiration Date: 24    Frequency/Duration: Plan Frequency: 2 x week got 6-8 weeks      Time In/Out:   Time In: 1345  Time Out: 1445      PT Visit Info:    Plan Frequency: 2 x week got 6-8 weeks  Total # of Visits to Date: 21  Progress Note Counter: 8      Visit Count: 21    OUTPATIENT PHYSICAL THERAPY:   Treatment Note 3/4/2024       Episode  (Left Achilles Tendon Reconstruction)               Treatment Diagnosis:    Pain in left ankle and joints of left foot  Muscle weakness (generalized)  Gait difficulty   Medical/Referring Diagnosis:    Achilles tendinitis, left leg [M76.62]    Referring Physician:  Cristel Barajas, APRN - CNP  MD Orders:  PT Eval and Treat   Return MD Appt:  24   Date of Onset:  Onset Date: 10/27/23     Allergies: Other  Restrictions/Precautions:   None      Interventions Planned (Treatment may consist of any combination of the following):     See Assessment Note    Subjective Comments:    Patient still having discomfort in achilles pain  Initial Pain Level::     3/10  Post Session Pain Level:       3/10   Medications Last Reviewed:  3/4/2024  Updated Objective Findings:   see progress note for today  Treatment   MANUAL THERAPY: (0 minutes):   Soft tissue mobilization was utilized and necessary because of the patient's restricted motion of soft tissue.   STM/MFR of scar distally and plantarfascia  Talocrual distraction and PA mobs for DF grade 3  Great toe flex and ext mobs at MTP grade 3     THERAPEUTIC EXERCISE: (40 minutes):    Exercises per grid below to improve mobility. Required

## 2024-03-04 NOTE — THERAPY RECERTIFICATION
Clifton Merchant  : 1950  Primary: Medicare Part A And B (Medicare)  Secondary: [employer] MEDICARE SUPP [facility]  [address]  [address]  [facility] [address]  Phone: [phone]  Fax: [phone] Plan Frequency: 2 x week got 6-8 weeks    Plan of Care/Certification Expiration Date: 24        Plan of Care/Certification Expiration Date:  Plan of Care/Certification Expiration Date: 24    Frequency/Duration: Plan Frequency: 2 x week got 6-8 weeks      Time In/Out:   Time In: 1345  Time Out: 1445      PT Visit Info:    Plan Frequency: 2 x week got 6-8 weeks  Total # of Visits to Date: 21  Progress Note Counter: 8      Visit Count:  21                                                                OUTPATIENT PHYSICAL THERAPY:                                                                       Progress Report 3/4/2024                                                       Episode (Left Achilles Tendon Reconstruction)         Treatment Diagnosis:     No data found  Medical/Referring Diagnosis:    Achilles tendinitis, left leg [M76.62]    Referring Physician:  Cristel Barajas APRN - CNP MD Orders:  PT Eval and Treat   Return MD Appt:  24  Date of Onset:  Onset Date: 10/27/23     Allergies:  Other  Restrictions/Precautions:    None      Medications Last Reviewed:  3/4/2024     SUBJECTIVE   As of 3/4/2024, Clifton Merchant has attended 21 out of 10 scheduled visits, with 0 cancellation(s) and 0 no shows.    Compalints Currents: Reports continued difficulty with descending stairs, and when stepping out he shower lhe has difficulty due to elevated shower step.  Tolerateding cycles 6 miles, which is 50% of his ususal distance.   Tender spot on center of heel remains persistent, and notes 50% improvement overall  Patient Stated Goal(s):  \"To return to cycling\"  Initial Pain Level:      3/10   Post Session Pain Level:     310  Past Medical History/Comorbidities:

## 2024-03-05 NOTE — PROGRESS NOTES
Clifton Merchant  : 1950  Primary: Medicare Part A And B (Medicare)  Secondary: [employer] MEDICARE SUPP [facility]  [address]  [address]  [facility] [address]  Phone: [phone]  Fax: [phone] Plan Frequency: 2 x week got 6-8 weeks    Plan of Care/Certification Expiration Date: 24        Plan of Care/Certification Expiration Date:  Plan of Care/Certification Expiration Date: 24    Frequency/Duration: Plan Frequency: 2 x week got 6-8 weeks      Time In/Out:   Time In: 1345  Time Out: 1445      PT Visit Info:    Plan Frequency: 2 x week got 6-8 weeks  Total # of Visits to Date: 21  Progress Note Counter: 8      Visit Count:  21                                                                OUTPATIENT PHYSICAL THERAPY:                                                                       Progress Report 3/4/2024                                                       Episode (Left Achilles Tendon Reconstruction)         Treatment Diagnosis:     No data found  Medical/Referring Diagnosis:    Achilles tendinitis, left leg [M76.62]    Referring Physician:  Cristel Barajas APRN - CNP MD Orders:  PT Eval and Treat   Return MD Appt:  24  Date of Onset:  Onset Date: 10/27/23     Allergies:  Other  Restrictions/Precautions:    None      Medications Last Reviewed:  3/4/2024     SUBJECTIVE   As of 3/4/2024, Clifton Merchant has attended 21 out of 10 scheduled visits, with 0 cancellation(s) and 0 no shows.    Compalints Currents: Reports continued difficulty with descending stairs, and when stepping out he shower lhe has difficulty due to elevated shower step.  Tolerateding cycles 6 miles, which is 50% of his ususal distance.   Tender spot on center of heel remains persistent, and notes 50% improvement overall  Patient Stated Goal(s):  \"To return to cycling\"  Initial Pain Level:      3/10   Post Session Pain Level:     310  Past Medical History/Comorbidities:

## 2024-03-06 ENCOUNTER — HOSPITAL ENCOUNTER (OUTPATIENT)
Dept: PHYSICAL THERAPY | Age: 74
Setting detail: RECURRING SERIES
Discharge: HOME | End: 2024-03-09

## 2024-03-06 PROCEDURE — 97110 THERAPEUTIC EXERCISES: CPT

## 2024-03-06 ASSESSMENT — PAIN SCALES - GENERAL: PAINLEVEL_OUTOF10: 3

## 2024-03-06 NOTE — PROGRESS NOTES
Clifton Merchant  : 1950  Primary: Medicare Part A And B (Medicare)  Secondary: [employer] MEDICARE SUPP [facility]  [address]  [address]  [facility] [address]  Phone: [phone]  Fax: [phone] Plan Frequency: 2 x week got 6-8 weeks  Plan of Care/Certification Expiration Date: 24        Plan of Care/Certification Expiration Date:  Plan of Care/Certification Expiration Date: 24    Frequency/Duration: Plan Frequency: 2 x week got 6-8 weeks      Time In/Out:   Time In: 1300  Time Out: 1400      PT Visit Info:    Plan Frequency: 2 x week got 6-8 weeks  Total # of Visits to Date: 22  Progress Note Counter: 8      Visit Count: 22    OUTPATIENT PHYSICAL THERAPY:   Treatment Note 3/6/2024       Episode  (Left Achilles Tendon Reconstruction)               Treatment Diagnosis:    Pain in left ankle and joints of left foot  Muscle weakness (generalized)  Gait difficulty   Medical/Referring Diagnosis:    Achilles tendinitis, left leg [M76.62]    Referring Physician:  Cristel Barajas, DIANNE - CNP  MD Orders:  PT Eval and Treat   Return MD Appt:  24   Date of Onset:  Onset Date: 10/27/23     Allergies: Other  Restrictions/Precautions:   None      Interventions Planned (Treatment may consist of any combination of the following):     See Assessment Note    Subjective Comments:    Patient still having discomfort in achilles pain  Initial Pain Level::     3/10  Post Session Pain Level:       3/10   Medications Last Reviewed:  3/6/2024  Updated Objective Findings:  None Today  Treatment   MANUAL THERAPY: (0 minutes):   Soft tissue mobilization was utilized and necessary because of the patient's restricted motion of soft tissue.   STM/MFR of scar distally and plantarfascia  Talocrual distraction and PA mobs for DF grade 3  Great toe flex and ext mobs at MTP grade 3     THERAPEUTIC EXERCISE: (45 minutes):    Exercises per grid below to improve mobility. Required minimal visual and

## 2024-03-11 ENCOUNTER — HOSPITAL ENCOUNTER (OUTPATIENT)
Dept: PHYSICAL THERAPY | Age: 74
Setting detail: RECURRING SERIES
Discharge: HOME | End: 2024-03-14
Payer: MEDICARE

## 2024-03-11 PROCEDURE — 97110 THERAPEUTIC EXERCISES: CPT

## 2024-03-11 ASSESSMENT — PAIN SCALES - GENERAL: PAINLEVEL_OUTOF10: 3

## 2024-03-11 NOTE — PROGRESS NOTES
Clifton Merchant  : 1950  Primary:  (Medicare)  Secondary:  [facility]  [address]  [address]  [facility] [address]  Phone: [phone]  Fax: [phone] Plan Frequency: 2 x week got 6-8 weeks  Plan of Care/Certification Expiration Date: 24        Plan of Care/Certification Expiration Date:  Plan of Care/Certification Expiration Date: 24    Frequency/Duration: Plan Frequency: 2 x week got 6-8 weeks      Time In/Out:   Time In: 1345  Time Out: 1440      PT Visit Info:    Plan Frequency: 2 x week got 6-8 weeks  Total # of Visits to Date: 22  Progress Note Counter: 8      Visit Count: 23    OUTPATIENT PHYSICAL THERAPY:   Treatment Note 3/11/2024       Episode  (Left Achilles Tendon Reconstruction)               Treatment Diagnosis:    Pain in left ankle and joints of left foot  Muscle weakness (generalized)  Gait difficulty   Medical/Referring Diagnosis:    Achilles tendinitis, left leg [M76.62]    Referring Physician:  Cristel Barajas APRN - CNP MD Orders:  PT Eval and Treat   Return MD Appt:  24   Date of Onset:  Onset Date: 10/27/23     Allergies: Other  Restrictions/Precautions:   None      Interventions Planned (Treatment may consist of any combination of the following):     See Assessment Note    Subjective Comments:    Patient still having discomfort in achilles pain  Initial Pain Level::     3/10  Post Session Pain Level:       3/10   Medications Last Reviewed:  3/11/2024  Updated Objective Findings:  None Today  Treatment   MANUAL THERAPY: (5 minutes):   Soft tissue mobilization was utilized and necessary because of the patient's restricted motion of soft tissue.   STM/MFR of scar distally and plantarfascia (not performed)  Talocrual distraction and PA mobs for DF grade 3  Great toe flex and ext mobs at MTP grade 3     THERAPEUTIC EXERCISE: (40 minutes):    Exercises per grid below to improve mobility. Required minimal visual and verbal cues to promote proper

## 2024-03-12 ENCOUNTER — OFFICE VISIT (OUTPATIENT)
Age: 74
End: 2024-03-12
Payer: MEDICARE

## 2024-03-12 VITALS
WEIGHT: 179.8 LBS | HEART RATE: 79 BPM | DIASTOLIC BLOOD PRESSURE: 70 MMHG | SYSTOLIC BLOOD PRESSURE: 122 MMHG | HEIGHT: 70 IN | BODY MASS INDEX: 25.74 KG/M2

## 2024-03-12 DIAGNOSIS — I48.19 PERSISTENT ATRIAL FIBRILLATION (HCC): Primary | ICD-10-CM

## 2024-03-12 PROCEDURE — 99214 OFFICE O/P EST MOD 30 MIN: CPT | Performed by: INTERNAL MEDICINE

## 2024-03-12 PROCEDURE — G8484 FLU IMMUNIZE NO ADMIN: HCPCS | Performed by: INTERNAL MEDICINE

## 2024-03-12 PROCEDURE — 1036F TOBACCO NON-USER: CPT | Performed by: INTERNAL MEDICINE

## 2024-03-12 PROCEDURE — 3078F DIAST BP <80 MM HG: CPT | Performed by: INTERNAL MEDICINE

## 2024-03-12 PROCEDURE — 1123F ACP DISCUSS/DSCN MKR DOCD: CPT | Performed by: INTERNAL MEDICINE

## 2024-03-12 PROCEDURE — 3017F COLORECTAL CA SCREEN DOC REV: CPT | Performed by: INTERNAL MEDICINE

## 2024-03-12 PROCEDURE — G8427 DOCREV CUR MEDS BY ELIG CLIN: HCPCS | Performed by: INTERNAL MEDICINE

## 2024-03-12 PROCEDURE — G8417 CALC BMI ABV UP PARAM F/U: HCPCS | Performed by: INTERNAL MEDICINE

## 2024-03-12 PROCEDURE — 3074F SYST BP LT 130 MM HG: CPT | Performed by: INTERNAL MEDICINE

## 2024-03-12 PROCEDURE — 93000 ELECTROCARDIOGRAM COMPLETE: CPT | Performed by: INTERNAL MEDICINE

## 2024-03-12 RX ORDER — DABIGATRAN ETEXILATE 150 MG/1
CAPSULE ORAL
Qty: 180 CAPSULE | Refills: 3 | Status: SHIPPED | OUTPATIENT
Start: 2024-03-12

## 2024-03-12 ASSESSMENT — ENCOUNTER SYMPTOMS
GASTROINTESTINAL NEGATIVE: 1
EYES NEGATIVE: 1
ALLERGIC/IMMUNOLOGIC NEGATIVE: 1
RESPIRATORY NEGATIVE: 1

## 2024-03-12 NOTE — PROGRESS NOTES
[facility] CARDIOLOGY  [address] [place] [address], [address]  [place] [address]  PHONE: [phone]        24      NAME:  Clifton Merchant  : 1950  MRN: [id number]     Referring Cardiologist: Enio Weinstein MD     Reason for Consultation: Atrial fibrillation     ASSESSMENT and PLAN:  Diagnoses and all orders for this visit:      1. Persistent atrial fibrillation (HCC), [id number] = 5, on Pradaxa, Tikosyn. S/p AF/AFL ablation 2019.       2. S/P CABG/Aortocoronary bypass status, performed in .      3. Essential hypertension      4. Coronary artery disease without angina pectoris, unspecified vessel or lesion type, unspecified whether native or transplanted heart      5. Controlled type 2 diabetes mellitus with hyperglycemia (HCC)      6. Anxiety      7. Pure hypercholesterolemia      8. Neuropraxia of median nerve.      9. COVID infection 2022      10. Benign essential tremor     73-year-old male with a history of persistent atrial fibrillation which is now drug refractory. He has previously undergone an AF ablation. He remains on Tikosyn and Pradaxa.      -AF - Continue Pradaxa indefinitely. Now back in AF, utility of longterm Tikosyn? Consider cardioversion in  if remains OOR. Will obtain monitor today to assess AF burden. If he decides to not undergo cardioversion or repeat ablation, will plan to stop Tikosyn.      -Maxillary sinus issues - s/p sinus surgery, doing much better.     -Benign essential tremor - improved movement with Paxil. Sees Dr. Dani Weinstein as well.      -Achilles tendon injury - improved but taking time to heal.      -EP Follow up in 6 months or PRN.     Patient has been instructed and agrees to call our office with any issues or other concerns related to their cardiac condition(s) and/or complaint(s).    No follow-up provider specified.    Thank you for allowing me to participate in the electrophysiologic care of Mr. Clifton Merchant. Please contact me if any

## 2024-03-13 ENCOUNTER — HOSPITAL ENCOUNTER (OUTPATIENT)
Dept: PHYSICAL THERAPY | Age: 74
Setting detail: RECURRING SERIES
Discharge: HOME | End: 2024-03-16
Payer: MEDICARE

## 2024-03-13 PROCEDURE — 97110 THERAPEUTIC EXERCISES: CPT

## 2024-03-13 ASSESSMENT — PAIN SCALES - GENERAL: PAINLEVEL_OUTOF10: 3

## 2024-03-13 NOTE — PROGRESS NOTES
Lateral walking   2 x 40 ft with OTB at knees  2 x 40 ft with LTB at toes 2 x 50 ft with LTB  2 x 40 ft with OTB  6 x 15ft w LTB   4 x 40 ft with mini squat w focus on calf recruitment   Step down Front step 6\" 2 x 10 Front step 6\" 2 x 10   Side step 2 x 10 4\" step   4\" with side tap 2 x 10  4\" front step down 2 x 10   Lunges        4\" step retro lunge to knee raise   Stdg GS stretch on slant board           Monster walk 2 x 40 ft forward/retro with  OTB 2 x 50 ft forward/retro with  LTB  2 x  ft0 forward/retro with  OTB    2 x 40 ft with LTB, forwards and backwards   Dead lifts      RDL's with airex heel raise 15x w cues RDL's with airex heel raise 2 x 10 w 10#    Squats 2 x 10x squat with heel raise       2 x 10  slow  On airex pad 2 x10 with blue weighted ball barefoot   Rhythmic stabilization     2 minutes all planes 2 minutes all planes 2 minutes all planes 2 minutes all planes   Treadmill        Progressive elevation 0-7% x 8 minutes at 1.0 mph   MODALITIES: (15  minutes): no charge       *  Vasopneumatic Therapy utilizing the Game Ready system in order to provide analgesia and reduce inflammation and edema.        [employer] Portal Access Code: [email or web address]  URL: [email or web address]  Date: 01/03/2024  Prepared by: Delicia Ladd    Exercises  - Long Sitting Ankle Pumps  - 2-3 x daily - 7 x weekly - 2 sets - 10 reps  - Supine Ankle Circles  - 2-3 x daily - 7 x weekly - 2 sets - 10 reps  - Active Straight Leg Raise with Quad Set  - 1-2 x daily - 7 x weekly - 2 sets - 10 reps  - Supine Bridge  - 1-2 x daily - 7 x weekly - 2 sets - 10 reps  - Sidelying Hip Abduction  - 1-2 x daily - 7 x weekly - 2 sets - 10 reps  - Towel Scrunches  - 2-3 x daily - 7 x weekly - 2 sets - 10 reps  - Long Sitting Ankle Inversion with Resistance  - 1-2 x daily - 7 x weekly - 2 sets - 10 reps  - Long Sitting Ankle Eversion with Resistance  - 1 x daily - 7 x weekly - 3 sets - 10 reps  - Long Sitting Eccentric Ankle

## 2024-03-18 ENCOUNTER — HOSPITAL ENCOUNTER (OUTPATIENT)
Dept: PHYSICAL THERAPY | Age: 74
Setting detail: RECURRING SERIES
Discharge: HOME | End: 2024-03-21
Payer: MEDICARE

## 2024-03-18 PROCEDURE — 97110 THERAPEUTIC EXERCISES: CPT

## 2024-03-18 ASSESSMENT — PAIN SCALES - GENERAL: PAINLEVEL_OUTOF10: 2

## 2024-03-18 NOTE — PROGRESS NOTES
Clifton Merchant  : 1950  Primary: Medicare Part A And B (Medicare)  Secondary: [employer] MEDICARE SUPP [facility]  [address]  [address]  [facility] [address]  Phone: [phone]  Fax: [phone] Plan Frequency: 2 x week got 6-8 weeks  Plan of Care/Certification Expiration Date: 24        Plan of Care/Certification Expiration Date:  Plan of Care/Certification Expiration Date: 24    Frequency/Duration: Plan Frequency: 2 x week got 6-8 weeks      Time In/Out:   Time In: 1300  Time Out: 1400      PT Visit Info:    Plan Frequency: 2 x week got 6-8 weeks  Total # of Visits to Date: 25  Progress Note Counter: 8      Visit Count: 25    OUTPATIENT PHYSICAL THERAPY:   Treatment Note 3/18/2024       Episode  (Left Achilles Tendon Reconstruction)               Treatment Diagnosis:    Pain in left ankle and joints of left foot  Muscle weakness (generalized)  Gait difficulty   Medical/Referring Diagnosis:    Achilles tendinitis, left leg [M76.62]    Referring Physician:  Cristel Barajas, DIANNE - CNP  MD Orders:  PT Eval and Treat   Return MD Appt:  24   Date of Onset:  Onset Date: 10/27/23     Allergies: Other  Restrictions/Precautions:   None      Interventions Planned (Treatment may consist of any combination of the following):     See Assessment Note    Subjective Comments:    Patient reports he is being monitored for his cardiac afib  Initial Pain Level::     2/10  Post Session Pain Level:       2/10   Medications Last Reviewed:  3/18/2024  Updated Objective Findings:  None Today  Treatment   MANUAL THERAPY: (0 minutes):   Soft tissue mobilization was utilized and necessary because of the patient's restricted motion of soft tissue.   STM/MFR of scar distally and plantarfascia (not performed)  Talocrual distraction and PA mobs for DF grade 3  Great toe flex and ext mobs at MTP grade 3     THERAPEUTIC EXERCISE: (45 minutes):    Exercises per grid below to improve mobility.  ACS Team

## 2024-03-20 ENCOUNTER — HOSPITAL ENCOUNTER (OUTPATIENT)
Dept: PHYSICAL THERAPY | Age: 74
Setting detail: RECURRING SERIES
End: 2024-03-20
Payer: MEDICARE

## 2024-03-20 NOTE — PROGRESS NOTES
Clifton Merchant  : 1950  Primary: Medicare Part A And B  Secondary: [employer] MEDICARE SUPP [facility]  [address]   [address] [place] [address]  Phone: [phone]  Fax: [phone] Plan Frequency: 2 x week got 6-8 weeks    Plan of Care/Certification Expiration Date: 24      PT Visit Info:  Total # of Visits to Date: 25  Progress Note Counter: 8  Canceled Appointment: 1         OUTPATIENT PHYSICAL THERAPY 3/20/2024     Appt Desk   Episode   MyChart      Mr. Merchant was a cancellation;  steffany Ladd, PT    Future Appointments   Date Time Provider Department Center   3/20/2024  1:00 PM Delicia Ladd, PT [facility]   3/25/2024  1:00 PM Delicia Ladd, PT [facility]   3/27/2024  1:45 PM Delicia Ladd, PT [facility]   2024  8:20 AM Cristel Barajas APRN - CNP POAI [facility]   2024 11:00 AM PGU GYPSY BLOOD DRAWS [facility]   2024 11:30 AM Sandra Rock,  ENTFI [facility]   5/10/2024  1:20 PM Danilo Hahn MD HTF [facility]   2024 11:00 AM Noah Queen MD PGUMAU [facility]   2024  1:40 PM Brigid Geronimo, APRN - CNP PSCD [facility]   2024  2:30 PM Guy Delacruz MD UCDG [facility]

## 2024-03-25 ENCOUNTER — HOSPITAL ENCOUNTER (OUTPATIENT)
Dept: PHYSICAL THERAPY | Age: 74
Setting detail: RECURRING SERIES
Discharge: HOME | End: 2024-03-28
Payer: MEDICARE

## 2024-03-25 PROCEDURE — 97110 THERAPEUTIC EXERCISES: CPT

## 2024-03-25 ASSESSMENT — PAIN SCALES - GENERAL: PAINLEVEL_OUTOF10: 2

## 2024-03-25 NOTE — THERAPY RECERTIFICATION
Clifton Merchant  : 1950  Primary: Medicare Part A And B (Medicare)  Secondary: [employer] [facility] [facility]  [address]  [address]  [facility] [address]  Phone: [phone]  Fax: [phone] Plan Frequency: 1-2 weeks for 6 weeks    Plan of Care/Certification Expiration Date: 24        Plan of Care/Certification Expiration Date:  Plan of Care/Certification Expiration Date: 24    Frequency/Duration: Plan Frequency: 1-2 weeks for 6 weeks      Time In/Out:   Time In: 1300  Time Out: 1345      PT Visit Info:    Plan Frequency: 1-2 weeks for 6 weeks  Total # of Visits to Date: 26  Progress Note Counter: 8  Canceled Appointment: 1      Visit Count:  26                                                                OUTPATIENT PHYSICAL THERAPY:                                                                       Recertification 3/25/2024                                                       Episode (Left Achilles Tendon Reconstruction)         Treatment Diagnosis:     No data found  Medical/Referring Diagnosis:    Achilles tendinitis, left leg [M76.62]    Referring Physician:  Cristel Barajas APRN - CNP MD Orders:  PT Eval and Treat   Return MD Appt:  24  Date of Onset:  Onset Date: 10/27/23     Allergies:  Other  Restrictions/Precautions:    None      Medications Last Reviewed:  3/25/2024     SUBJECTIVE   As of 3/25/2024, Clifton Merchant has attended 26 out of 27 scheduled visits, with 0 cancellation(s) and 0 no shows.    Compalints Currents: Reports continued difficulty with descending stairs, and when stepping out he shower lhe has difficulty due to elevated shower step and needs to be cautious with that.  Currently he feels that his balance is still compromised.  80% improvement reported overall, now cycling 10 miles  Patient Stated Goal(s):  \"To return to cycling\"  Initial Pain Level:      2/10   Post Session Pain Level:     2/10  Past Medical

## 2024-03-25 NOTE — PROGRESS NOTES
inv/belem 3 x 10   SLS  -[place] 2 x 10 with hip abd and ext    Clock toe tap with SLS x 3  3 x 20 right step over low ivanna [place] 2 x 10 with hip abd and ext   Lateral walking    2 x 40 ft with OTB  6 x 15ft w LTB   4 x 40 ft with mini squat w focus on calf recruitment 6 x 16 ft with LTB    Step down   Side step 2 x 10 4\" step   4\" with side tap 2 x 10  4\" front step down 2 x 10 4\" with side tap 2 x 10 4\" with side step  2 x 10  4\" with retro step 2 x 10   Lunges      4\" step retro lunge to knee raise     Stdg GS stretch on slant board           Monster walk  2 x  ft0 forward/retro with  OTB    2 x 40 ft with LTB, forwards and backwards 2 x 16 ft with LTB, forwards and backwards    Dead lifts    RDL's with airex heel raise 15x w cues RDL's with airex heel raise 2 x 10 w 10#      Squats    2 x 10  slow  On airex pad 2 x10 with blue weighted ball barefoot Incline wedge 10x  Wall squats single assit 10x  Dbl leg wall squats 10x 10x from raised mat   Rhythmic stabilization   2 minutes all planes 2 minutes all planes 2 minutes all planes 2 minutes all planes     Treadmill      Progressive elevation 0-7% x 8 minutes at 1.0 mph Progressive elevation -7% x 6 minutes at 1.0 mph  2 minutes retro Progressive elevation 5-10% x 10 minutes at 1.0 mph   MODALITIES: (0  minutes): no charge       *  Vasopneumatic Therapy utilizing the Game Ready system in order to provide analgesia and reduce inflammation and edema.        [employer] Portal Access Code: [email or web address]  URL: [email or web address]  Date: 01/03/2024  Prepared by: Delicia Landa-Carlo    Exercises  - Long Sitting Ankle Pumps  - 2-3 x daily - 7 x weekly - 2 sets - 10 reps  - Supine Ankle Circles  - 2-3 x daily - 7 x weekly - 2 sets - 10 reps  - Active Straight Leg Raise with Quad Set  - 1-2 x daily - 7 x weekly - 2 sets - 10 reps  - Supine Bridge  - 1-2 x daily - 7 x weekly - 2 sets - 10 reps  - Sidelying Hip Abduction  - 1-2 x daily - 7 x weekly - 2 sets

## 2024-03-27 ENCOUNTER — HOSPITAL ENCOUNTER (OUTPATIENT)
Dept: PHYSICAL THERAPY | Age: 74
Setting detail: RECURRING SERIES
Discharge: HOME | End: 2024-03-30
Payer: MEDICARE

## 2024-03-27 ENCOUNTER — TELEPHONE (OUTPATIENT)
Age: 74
End: 2024-03-27

## 2024-03-27 PROCEDURE — 97110 THERAPEUTIC EXERCISES: CPT

## 2024-03-27 ASSESSMENT — PAIN SCALES - GENERAL: PAINLEVEL_OUTOF10: 2

## 2024-03-27 NOTE — PROGRESS NOTES
Clifton Merchant  : 1950  Primary: Medicare Part A And B (Medicare)  Secondary: [employer] MEDICARE SUPP [facility]  [address]  [address]  [facility] [address]  Phone: [phone]  Fax: [phone] Plan Frequency: 1-2 weeks for 6 weeks  Plan of Care/Certification Expiration Date: 24        Plan of Care/Certification Expiration Date:  Plan of Care/Certification Expiration Date: 24    Frequency/Duration: Plan Frequency: 1-2 weeks for 6 weeks      Time In/Out:   Time In: 1345      PT Visit Info:    Plan Frequency: 1-2 weeks for 6 weeks  Total # of Visits to Date: 27  Progress Note Counter: 8  Canceled Appointment: 1      Visit Count: 27    OUTPATIENT PHYSICAL THERAPY:   Treatment Note 3/27/2024       Episode  (Left Achilles Tendon Reconstruction)               Treatment Diagnosis:    Pain in left ankle and joints of left foot  Muscle weakness (generalized)  Gait difficulty   Medical/Referring Diagnosis:    Achilles tendinitis, left leg [M76.62]    Referring Physician:  Cristel Barajas, DIANNE - CNP  MD Orders:  PT Eval and Treat   Return MD Appt:  24   Date of Onset:  Onset Date: 10/27/23     Allergies: Other  Restrictions/Precautions:   None      Interventions Planned (Treatment may consist of any combination of the following):     See Assessment Note    Subjective Comments:    Patient reports doing well  Initial Pain Level::     2/10  Post Session Pain Level:       2/10   Medications Last Reviewed:  3/27/2024  Updated Objective Findings:  None Today  Treatment   MANUAL THERAPY: (0 minutes):   Soft tissue mobilization was utilized and necessary because of the patient's restricted motion of soft tissue.   STM/MFR of scar distally and plantarfascia (not performed)  Talocrual distraction and PA mobs for DF grade 3  Great toe flex and ext mobs at MTP grade 3     THERAPEUTIC EXERCISE: (45 minutes):    Exercises per grid below to improve mobility. Required minimal visual

## 2024-03-27 NOTE — TELEPHONE ENCOUNTER
----- Message from Guy Delacurz MD sent at 3/26/2024  5:12 PM EDT -----  See if he has any interest for cardioversion or ablation, if not stay the course.   ----- Message -----  From: Tahmina Howell MA  Sent: 3/26/2024   3:49 PM EDT  To: Guy Delacruz MD    Consider cardioversion in 2024 if remains OOR. Will obtain monitor today to assess AF burden. If he decides to not undergo cardioversion or repeat ablation, will plan to stop Tikosyn.     Any advisings as of now  ----- Message -----  From: Guy Delacruz MD  Sent: 3/25/2024   4:37 PM EDT  To: Tahmina Howell MA    100% AF, rate controlled   ----- Message -----  From: Guy Delacruz MD  Sent: 3/25/2024   4:32 PM EDT  To: Guy Delacruz MD

## 2024-03-28 NOTE — TELEPHONE ENCOUNTER
Spoke to pt made him aware of Dr Michel results. Per Dr Delacruz pt has the option of a procedure or to continue his same regimen until his follow up to discuss. Pt would like to continuehis regimen but would like to follow up sooner. Pt scheduled for follow up on 4/25/24 in [place] office to discuss options.

## 2024-04-08 ENCOUNTER — HOSPITAL ENCOUNTER (OUTPATIENT)
Dept: PHYSICAL THERAPY | Age: 74
Setting detail: RECURRING SERIES
Discharge: HOME | End: 2024-04-11
Payer: MEDICARE

## 2024-04-08 PROCEDURE — 97110 THERAPEUTIC EXERCISES: CPT

## 2024-04-08 ASSESSMENT — PAIN SCALES - GENERAL: PAINLEVEL_OUTOF10: 1

## 2024-04-08 NOTE — PROGRESS NOTES
10 4\" with side tap 2 x 10 4\" with side step  2 x 10  4\" with retro step 2 x 10 6\" with side step  2 x 10  6\" with retro step 2 x 10    Lunges    4\" step retro lunge to knee raise       Monster walk    2 x 40 ft with LTB, forwards and backwards 2 x 16 ft with LTB, forwards and backwards  2 x 20 fwd/ back with BTB    Dead lifts  RDL's with airex heel raise 15x w cues RDL's with airex heel raise 2 x 10 w 10#          2 x 10  slow  On airex pad 2 x10 with blue weighted ball barefoot Incline wedge 10x  Wall squats single assit 10x  Dbl leg wall squats 10x 10x from raised mat     Rhythmic stabilization  2 minutes all planes 2 minutes all planes 2 minutes all planes       Treadmill    Progressive elevation 0-7% x 8 minutes at 1.0 mph Progressive elevation -7% x 6 minutes at 1.0 mph  2 minutes retro Progressive elevation 5-10% x 10 minutes at 1.0 mph Progressive elevation 5-10% x 10 mins 1.2 mph    Shuttle leg press       Dble leg press 2 x 10 w 50#  Single leg press 2 x 10  Dble leg press 2 x 10 w 62#  Single leg press 2 x 10 62#   Functional/agility drill        Lateral step/ladder drill slow to faster steps   Tandem walk 4 x 15ft   MODALITIES: (15 minutes): no charge       *  Vasopneumatic Therapy utilizing the Game Ready system in order to provide analgesia and reduce inflammation and edema.        [employer] Portal Access Code: [email or web address]  URL: [email or web address]  Date: 01/03/2024  Prepared by: Delicia Landa-Carlo    Exercises  - Long Sitting Ankle Pumps  - 2-3 x daily - 7 x weekly - 2 sets - 10 reps  - Supine Ankle Circles  - 2-3 x daily - 7 x weekly - 2 sets - 10 reps  - Active Straight Leg Raise with Quad Set  - 1-2 x daily - 7 x weekly - 2 sets - 10 reps  - Supine Bridge  - 1-2 x daily - 7 x weekly - 2 sets - 10 reps  - Sidelying Hip Abduction  - 1-2 x daily - 7 x weekly - 2 sets - 10 reps  - Towel Scrunches  - 2-3 x daily - 7 x weekly - 2 sets - 10 reps  - Long Sitting Ankle Inversion with

## 2024-04-11 RX ORDER — PAROXETINE HYDROCHLORIDE HEMIHYDRATE 25 MG/1
25 TABLET, FILM COATED, EXTENDED RELEASE ORAL EVERY EVENING
Qty: 30 TABLET | Refills: 5 | Status: SHIPPED | OUTPATIENT
Start: 2024-04-11

## 2024-04-15 ENCOUNTER — HOSPITAL ENCOUNTER (OUTPATIENT)
Dept: PHYSICAL THERAPY | Age: 74
Setting detail: RECURRING SERIES
Discharge: HOME | End: 2024-04-18
Payer: MEDICARE

## 2024-04-15 PROCEDURE — 97110 THERAPEUTIC EXERCISES: CPT

## 2024-04-15 ASSESSMENT — PAIN SCALES - GENERAL: PAINLEVEL_OUTOF10: 1

## 2024-04-15 NOTE — PROGRESS NOTES
Resistance  - 1 x daily - 7 x weekly - 2 sets - 10 reps  - Single Leg Balance with Clock Reach  - 1 x daily - 7 x weekly - 3 sets - 10 reps    Treatment/Session Summary:    Treatment Assessment: Patient tolerated treatment well;  unable to perform single heel lift with wall  Recommendations/Intent for next treatment session: Next visit will focus on ROM, strengthening, manual therapy, gait and proprioceptive training, modalities as needed.    >Total Treatment Billable Duration: 45 min (45 min therex,)  Delicia Ladd, PT       [employer]ge [employer]  [employer] Portal  Appt Desk     Future Appointments   Date Time Provider Department Center   4/22/2024  8:20 AM Cristel Barajas APRN - CNP POAI [facility]   4/22/2024  1:00 PM Delicia Ladd, PT [facility]   4/25/2024  9:45 AM Guy Delacruz MD DG [facility]   4/29/2024  1:00 PM Delicia Ladd, PT [facility]   5/7/2024 11:00 AM PGU GYPSY BLOOD DRAWS PGUMAU [facility]   5/9/2024 11:30 AM Sandra Rock, DO GARZA [facility]   5/10/2024  1:20 PM Danilo Hahn MD [facility]   5/14/2024 11:00 AM Noah Queen MD PGUMAU [facility]   5/30/2024  1:40 PM Brigid Geronimo, APRN - CNP PSCD [facility]   6/24/2024  2:30 PM Guy Delacurz MD UCDG [facility]

## 2024-04-22 ENCOUNTER — HOSPITAL ENCOUNTER (OUTPATIENT)
Dept: PHYSICAL THERAPY | Age: 74
Setting detail: RECURRING SERIES
Discharge: HOME | End: 2024-04-25
Payer: MEDICARE

## 2024-04-22 ENCOUNTER — OFFICE VISIT (OUTPATIENT)
Dept: ORTHOPEDIC SURGERY | Age: 74
End: 2024-04-22
Payer: MEDICARE

## 2024-04-22 DIAGNOSIS — G89.29 CHRONIC HEEL PAIN, LEFT: ICD-10-CM

## 2024-04-22 DIAGNOSIS — M76.62 ACHILLES TENDINITIS, LEFT LEG: Primary | ICD-10-CM

## 2024-04-22 DIAGNOSIS — M65.28 CALCIFIC ACHILLES TENDINITIS OF LEFT LOWER EXTREMITY: ICD-10-CM

## 2024-04-22 DIAGNOSIS — M79.672 CHRONIC HEEL PAIN, LEFT: ICD-10-CM

## 2024-04-22 PROCEDURE — G8417 CALC BMI ABV UP PARAM F/U: HCPCS | Performed by: NURSE PRACTITIONER

## 2024-04-22 PROCEDURE — 97110 THERAPEUTIC EXERCISES: CPT

## 2024-04-22 PROCEDURE — 99213 OFFICE O/P EST LOW 20 MIN: CPT | Performed by: NURSE PRACTITIONER

## 2024-04-22 PROCEDURE — 3017F COLORECTAL CA SCREEN DOC REV: CPT | Performed by: NURSE PRACTITIONER

## 2024-04-22 PROCEDURE — G8428 CUR MEDS NOT DOCUMENT: HCPCS | Performed by: NURSE PRACTITIONER

## 2024-04-22 PROCEDURE — 1123F ACP DISCUSS/DSCN MKR DOCD: CPT | Performed by: NURSE PRACTITIONER

## 2024-04-22 PROCEDURE — 1036F TOBACCO NON-USER: CPT | Performed by: NURSE PRACTITIONER

## 2024-04-22 ASSESSMENT — PAIN SCALES - GENERAL: PAINLEVEL_OUTOF10: 1

## 2024-04-22 ASSESSMENT — PAIN DESCRIPTION - PAIN TYPE: TYPE: CHRONIC PAIN

## 2024-04-22 NOTE — PROGRESS NOTES
Name: Clifton Merchant  YOB: 1950  Gender: male  MRN: [id number]    Procedure Performed:Left Achilles secondary recostruction/Repair with Flexor hallucis longus and haglunds excision. - Left           Date of Procedure: 10/27/2023      Subjective: Patient reports still continue with therapy as he feels like he is receiving continued benefits.  He notes that he has biked over 100 miles this month.  He feels like he is progressing however he does note that the foot overall still feels pretty stiff.      Physical Examination: All incisional areas well-healed.  There are no signs of infection to the foot today.  He has palpable pulses and intact sensation to the foot.  He has 5 of 5 strength to the Achilles repair.  He able to do a double leg toe raise with significant effort today.  There is minimal swelling to the posterior heel and ankle today.  He does have some tenderness with palpation on both medial and lateral sides of the incision.        Imaging:   No imaging reviewed          Assessment:   Status post left Achilles secondary reconstruction with FHL tendon transfer and Tommie's excision.      Plan:   3 This is stable chronic illness/condition  Treatment at this time: Time with no intervention and Physical Therapy, a new order for therapy was given, patient will follow-up on a as needed basis.  Studies ordered: NO XR needed @ Next Visit    Weight-bearing status: WBAT        Return to work/work restrictions: none  No medications given

## 2024-04-22 NOTE — PROGRESS NOTES
Clifton Merchant  : 1950  Primary: Medicare Part A And B (Medicare)  Secondary: [employer] HE[employer] MEDICARE SUPP [facility]  [address]  [address]  [facility] [address]  Phone: [phone]  Fax: [phone] Plan Frequency: 1-2 weeks for 6 weeks  Plan of Care/Certification Expiration Date: 24        Plan of Care/Certification Expiration Date:  Plan of Care/Certification Expiration Date: 24    Frequency/Duration: Plan Frequency: 1-2 weeks for 6 weeks      Time In/Out:   Time In: 1300  Time Out: 1400      PT Visit Info:    Plan Frequency: 1-2 weeks for 6 weeks  Total # of Visits to Date: 30  Progress Note Counter: 8  Canceled Appointment: 1      Visit Count: 30    OUTPATIENT PHYSICAL THERAPY:   Treatment Note 2024       Episode  (Left Achilles Tendon Reconstruction)               Treatment Diagnosis:    Pain in left ankle and joints of left foot  Muscle weakness (generalized)  Gait difficulty   Medical/Referring Diagnosis:    Achilles tendinitis, left leg [M76.62]    Referring Physician:  Cristel Barajas, DIANNE - CNP  MD Orders:  PT Eval and Treat   Return MD Appt:  24   Date of Onset:  Onset Date: 10/27/23     Allergies: Other  Restrictions/Precautions:   None      Interventions Planned (Treatment may consist of any combination of the following):     See Assessment Note    Subjective Comments:    Patient reports doing well, feels strength is still an issue; limited ability to perform single heel raise  Initial Pain Level::     1/10  Post Session Pain Level:       1/10   Medications Last Reviewed:  2024  Updated Objective Findings:  None Today  Treatment   MANUAL THERAPY: (0 minutes):   Soft tissue mobilization was utilized and necessary because of the patient's restricted motion of soft tissue.   STM/MFR of scar distally and plantarfascia (not performed)  Talocrual distraction and PA mobs for DF grade 3  Great toe flex and ext mobs at MTP grade 3     THERAPEUTIC

## 2024-04-24 ENCOUNTER — TELEPHONE (OUTPATIENT)
Dept: FAMILY MEDICINE CLINIC | Facility: CLINIC | Age: 74
End: 2024-04-24

## 2024-04-24 DIAGNOSIS — I10 ESSENTIAL (PRIMARY) HYPERTENSION: ICD-10-CM

## 2024-04-24 DIAGNOSIS — Z00.00 MEDICARE ANNUAL WELLNESS VISIT, SUBSEQUENT: ICD-10-CM

## 2024-04-24 DIAGNOSIS — E07.9 THYROID DISEASE: ICD-10-CM

## 2024-04-24 DIAGNOSIS — R97.20 ELEVATED PSA: ICD-10-CM

## 2024-04-24 DIAGNOSIS — E11.65 TYPE 2 DIABETES MELLITUS WITH HYPERGLYCEMIA, UNSPECIFIED WHETHER LONG TERM INSULIN USE (HCC): Primary | ICD-10-CM

## 2024-04-24 DIAGNOSIS — E78.00 PURE HYPERCHOLESTEROLEMIA, UNSPECIFIED: ICD-10-CM

## 2024-04-24 NOTE — TELEPHONE ENCOUNTER
Patient prompted via [employer] to go to a walk-in lab facility to have labs drawn for upcoming AWV scheduled on 5/10/24.   Please place lab orders.

## 2024-04-25 ENCOUNTER — OFFICE VISIT (OUTPATIENT)
Age: 74
End: 2024-04-25
Payer: MEDICARE

## 2024-04-25 VITALS
SYSTOLIC BLOOD PRESSURE: 118 MMHG | DIASTOLIC BLOOD PRESSURE: 86 MMHG | BODY MASS INDEX: 25.62 KG/M2 | HEART RATE: 78 BPM | HEIGHT: 70 IN | WEIGHT: 179 LBS

## 2024-04-25 DIAGNOSIS — R97.20 ELEVATED PSA: ICD-10-CM

## 2024-04-25 DIAGNOSIS — E11.65 TYPE 2 DIABETES MELLITUS WITH HYPERGLYCEMIA, UNSPECIFIED WHETHER LONG TERM INSULIN USE (HCC): ICD-10-CM

## 2024-04-25 DIAGNOSIS — Z00.00 MEDICARE ANNUAL WELLNESS VISIT, SUBSEQUENT: ICD-10-CM

## 2024-04-25 DIAGNOSIS — E78.00 PURE HYPERCHOLESTEROLEMIA, UNSPECIFIED: ICD-10-CM

## 2024-04-25 DIAGNOSIS — I10 ESSENTIAL (PRIMARY) HYPERTENSION: ICD-10-CM

## 2024-04-25 DIAGNOSIS — E07.9 THYROID DISEASE: ICD-10-CM

## 2024-04-25 DIAGNOSIS — I48.19 PERSISTENT ATRIAL FIBRILLATION (HCC): Primary | ICD-10-CM

## 2024-04-25 LAB
ALBUMIN SERPL-MCNC: 4.1 G/DL (ref 3.2–4.6)
ALBUMIN/GLOB SERPL: 1.6 (ref 1–1.9)
ALP SERPL-CCNC: 84 U/L (ref 40–129)
ALT SERPL-CCNC: 24 U/L (ref 12–65)
ANION GAP SERPL CALC-SCNC: 9 MMOL/L (ref 9–18)
APPEARANCE UR: CLEAR
AST SERPL-CCNC: 29 U/L (ref 15–37)
BACTERIA URNS QL MICRO: NEGATIVE /HPF
BASOPHILS # BLD: 0 K/UL (ref 0–0.2)
BASOPHILS NFR BLD: 1 % (ref 0–2)
BILIRUB SERPL-MCNC: 0.7 MG/DL (ref 0–1.2)
BILIRUB UR QL: NEGATIVE
BUN SERPL-MCNC: 30 MG/DL (ref 8–23)
CALCIUM SERPL-MCNC: 9.8 MG/DL (ref 8.8–10.2)
CASTS URNS QL MICRO: ABNORMAL /LPF (ref 0–2)
CHLORIDE SERPL-SCNC: 106 MMOL/L (ref 98–107)
CHOLEST SERPL-MCNC: 154 MG/DL (ref 0–200)
CO2 SERPL-SCNC: 30 MMOL/L (ref 20–28)
COLOR UR: ABNORMAL
CREAT SERPL-MCNC: 1.34 MG/DL (ref 0.8–1.3)
DIFFERENTIAL METHOD BLD: ABNORMAL
EOSINOPHIL # BLD: 0.2 K/UL (ref 0–0.8)
EOSINOPHIL NFR BLD: 7 % (ref 0.5–7.8)
EPI CELLS #/AREA URNS HPF: ABNORMAL /HPF (ref 0–5)
ERYTHROCYTE [DISTWIDTH] IN BLOOD BY AUTOMATED COUNT: 15.2 % (ref 11.9–14.6)
EST. AVERAGE GLUCOSE BLD GHB EST-MCNC: 169 MG/DL
GLOBULIN SER CALC-MCNC: 2.6 G/DL (ref 2.3–3.5)
GLUCOSE SERPL-MCNC: 102 MG/DL (ref 70–99)
GLUCOSE UR STRIP.AUTO-MCNC: NEGATIVE MG/DL
HBA1C MFR BLD: 7.5 % (ref 0–5.6)
HCT VFR BLD AUTO: 42.8 % (ref 41.1–50.3)
HDLC SERPL-MCNC: 64 MG/DL (ref 40–60)
HDLC SERPL: 2.4 (ref 0–5)
HGB BLD-MCNC: 13.2 G/DL (ref 13.6–17.2)
HGB UR QL STRIP: NEGATIVE
IMM GRANULOCYTES # BLD AUTO: 0 K/UL (ref 0–0.5)
IMM GRANULOCYTES NFR BLD AUTO: 0 % (ref 0–5)
KETONES UR QL STRIP.AUTO: NEGATIVE MG/DL
LDLC SERPL CALC-MCNC: 77 MG/DL (ref 0–100)
LEUKOCYTE ESTERASE UR QL STRIP.AUTO: NEGATIVE
LYMPHOCYTES # BLD: 0.8 K/UL (ref 0.5–4.6)
LYMPHOCYTES NFR BLD: 23 % (ref 13–44)
MCH RBC QN AUTO: 27.8 PG (ref 26.1–32.9)
MCHC RBC AUTO-ENTMCNC: 30.8 G/DL (ref 31.4–35)
MCV RBC AUTO: 90.1 FL (ref 82–102)
MONOCYTES # BLD: 0.5 K/UL (ref 0.1–1.3)
MONOCYTES NFR BLD: 15 % (ref 4–12)
MUCOUS THREADS URNS QL MICRO: 0 /LPF
NEUTS SEG # BLD: 1.9 K/UL (ref 1.7–8.2)
NEUTS SEG NFR BLD: 55 % (ref 43–78)
NITRITE UR QL STRIP.AUTO: NEGATIVE
NRBC # BLD: 0 K/UL (ref 0–0.2)
PH UR STRIP: 5 (ref 5–9)
PLATELET # BLD AUTO: 234 K/UL (ref 150–450)
PMV BLD AUTO: 12 FL (ref 9.4–12.3)
POTASSIUM SERPL-SCNC: 4.6 MMOL/L (ref 3.5–5.1)
PROT SERPL-MCNC: 6.7 G/DL (ref 6.3–8.2)
PROT UR STRIP-MCNC: ABNORMAL MG/DL
PSA SERPL-MCNC: 3.4 NG/ML (ref 0–4)
RBC # BLD AUTO: 4.75 M/UL (ref 4.23–5.6)
RBC #/AREA URNS HPF: ABNORMAL /HPF (ref 0–5)
SODIUM SERPL-SCNC: 144 MMOL/L (ref 136–145)
SP GR UR REFRACTOMETRY: 1.03 (ref 1–1.02)
TRIGL SERPL-MCNC: 68 MG/DL (ref 0–150)
TSH, 3RD GENERATION: 1.36 UIU/ML (ref 0.27–4.2)
URINE CULTURE IF INDICATED: ABNORMAL
UROBILINOGEN UR QL STRIP.AUTO: 0.2 EU/DL (ref 0.2–1)
VLDLC SERPL CALC-MCNC: 14 MG/DL (ref 6–23)
WBC # BLD AUTO: 3.5 K/UL (ref 4.3–11.1)
WBC URNS QL MICRO: ABNORMAL /HPF (ref 0–4)

## 2024-04-25 PROCEDURE — G8417 CALC BMI ABV UP PARAM F/U: HCPCS | Performed by: INTERNAL MEDICINE

## 2024-04-25 PROCEDURE — 99215 OFFICE O/P EST HI 40 MIN: CPT | Performed by: INTERNAL MEDICINE

## 2024-04-25 PROCEDURE — 1036F TOBACCO NON-USER: CPT | Performed by: INTERNAL MEDICINE

## 2024-04-25 PROCEDURE — 3017F COLORECTAL CA SCREEN DOC REV: CPT | Performed by: INTERNAL MEDICINE

## 2024-04-25 PROCEDURE — 3074F SYST BP LT 130 MM HG: CPT | Performed by: INTERNAL MEDICINE

## 2024-04-25 PROCEDURE — 3079F DIAST BP 80-89 MM HG: CPT | Performed by: INTERNAL MEDICINE

## 2024-04-25 PROCEDURE — 1123F ACP DISCUSS/DSCN MKR DOCD: CPT | Performed by: INTERNAL MEDICINE

## 2024-04-25 PROCEDURE — G8427 DOCREV CUR MEDS BY ELIG CLIN: HCPCS | Performed by: INTERNAL MEDICINE

## 2024-04-25 PROCEDURE — 93000 ELECTROCARDIOGRAM COMPLETE: CPT | Performed by: INTERNAL MEDICINE

## 2024-04-25 NOTE — PROGRESS NOTES
ROS:  A comprehensive review of systems was performed with the pertinent positives and negatives as noted in the HPI in addition to:  Review of Systems      PHYSICAL EXAM:   /86   Pulse 78   Ht 1.778 m (5' 10\")   Wt 81.2 kg (179 lb)   BMI 25.68 kg/m²      Wt Readings from Last 3 Encounters:   04/25/24 81.2 kg (179 lb)   03/12/24 81.6 kg (179 lb 12.8 oz)   02/08/24 80.3 kg (177 lb)     BP Readings from Last 3 Encounters:   04/25/24 118/86   03/12/24 122/70   02/08/24 120/78       Gen: Well appearing, well developed, no acute distress  Eyes: Pupils equal, round. Extraocular movements are intact  ENT: Oropharynx clear, no oral lesions, normal dentition  CV: S1S2, IRIR, no murmurs, rubs or gallops, normal JVD, no carotid bruits, normal distal pulses, no RADHA  Pulm: Clear to auscultation bilaterally, no accessory muscle uses, no wheezes or rales  GI: Soft, NT, ND, +BS  Neuro: Alert and oriented, nonfocal  Psych: Appropriate affect  Skin: Normal color and skin turgor  MSK: Normal muscle bulk and tone    Medical problems and test results were reviewed with the patient today.     No results found for any visits on 04/25/24.

## 2024-04-29 ENCOUNTER — HOSPITAL ENCOUNTER (OUTPATIENT)
Dept: PHYSICAL THERAPY | Age: 74
Setting detail: RECURRING SERIES
Discharge: HOME | End: 2024-05-02
Payer: MEDICARE

## 2024-04-29 PROCEDURE — 97110 THERAPEUTIC EXERCISES: CPT

## 2024-04-29 ASSESSMENT — PAIN SCALES - GENERAL: PAINLEVEL_OUTOF10: 1

## 2024-04-29 NOTE — PROGRESS NOTES
Clifton Merchant  : 1950  Primary: Medicare Part A And B (Medicare)  Secondary: [employer] HE[employer] C[employer] MEDICARE SUPP [facility]  [address]  [address]  [facility] [address]  Phone: [phone]  Fax: [phone] Plan Frequency: 1-2 weeks for 6 weeks  Plan of Care/Certification Expiration Date: 24        Plan of Care/Certification Expiration Date:  Plan of Care/Certification Expiration Date: 24    Frequency/Duration: Plan Frequency: 1-2 weeks for 6 weeks      Time In/Out:   Time In: 1300  Time Out: 1400      PT Visit Info:    Plan Frequency: 1-2 weeks for 6 weeks  Total # of Visits to Date: 31  Progress Note Counter: 8  Canceled Appointment: 1      Visit Count: 31    OUTPATIENT PHYSICAL THERAPY:   Treatment Note 2024       Episode  (Left Achilles Tendon Reconstruction)               Treatment Diagnosis:    Pain in left ankle and joints of left foot  Muscle weakness (generalized)  Gait difficulty   Medical/Referring Diagnosis:    Achilles tendinitis, left leg [M76.62]    Referring Physician:  Cristel Barajas, DIANNE - CNP  MD Orders:  PT Eval and Treat   Return MD Appt:  24   Date of Onset:  Onset Date: 10/27/23     Allergies: Patient has no known allergies.  Restrictions/Precautions:   None      Interventions Planned (Treatment may consist of any combination of the following):     See Assessment Note    Subjective Comments:    Patient states improved big toe function, still limited ability to perform single heel raise  Initial Pain Level::     1/10  Post Session Pain Level:       1/10   Medications Last Reviewed:  2024  Updated Objective Findings:  None Today  Treatment   MANUAL THERAPY: (0 minutes):   Soft tissue mobilization was utilized and necessary because of the patient's restricted motion of soft tissue.   STM/MFR of scar distally and plantarfascia (not performed)  Talocrual distraction and PA mobs for DF grade 3  Great toe flex and ext mobs at MTP grade 3

## 2024-05-06 ENCOUNTER — HOSPITAL ENCOUNTER (OUTPATIENT)
Dept: PHYSICAL THERAPY | Age: 74
Setting detail: RECURRING SERIES
Discharge: HOME | End: 2024-05-09
Payer: MEDICARE

## 2024-05-06 DIAGNOSIS — M25.572 PAIN IN LEFT ANKLE AND JOINTS OF LEFT FOOT: Primary | ICD-10-CM

## 2024-05-06 DIAGNOSIS — R26.9 GAIT DIFFICULTY: ICD-10-CM

## 2024-05-06 DIAGNOSIS — M62.81 MUSCLE WEAKNESS (GENERALIZED): ICD-10-CM

## 2024-05-06 PROCEDURE — 97110 THERAPEUTIC EXERCISES: CPT

## 2024-05-06 ASSESSMENT — PAIN SCALES - GENERAL: PAINLEVEL_OUTOF10: 0

## 2024-05-06 NOTE — PROGRESS NOTES
BTB  2 x 20 fwd/ back with OTB at ankle 2 x 30 fwd/ back with OTB at ankle 3 x 30 fwd/ back with OTB at ankle 3 x 30 fwd/ back with OTB at ankle   Dead lifts   Inclined dead lifts with 10# 10x neutral stance  10x staggered stance      Rhythmic stabilization         Treadmill Progressive elevation 5-10% x 10 mins 1.2 mph  Progressive elevation 5-10% x 10 mins 1.2 mph 5% at 1.3 mph x 8 mins 1.3 mph 3-4% x 10 mins 1.3 mph 3-4% x 10 mins   Shuttle leg press Dble leg press 2 x 10 w 50#  Single leg press 2 x 10  Dble leg press 2 x 10 w 62#  Single leg press 2 x 10 62# Dble leg press 2 x 10 w 62#  Single leg press 2 x 10 62#      Functional/agility drill  Lateral step/ladder drill slow to faster steps   Tandem walk 4 x 15ft Standing Isometric Single Leg Heel Raise at Wall  10x  Squats 2 x 10 with cues to plant through feet    MODALITIES: (10 minutes): no charge       *  Vasopneumatic Therapy utilizing the [employer] system in order to provide analgesia and reduce inflammation and edema.        [employer] Portal Access Code: [email or web address]  URL: [email or web address]  Date: 05/06/2024  Prepared by: Delicia Landa-Carlo    Exercises  - Supine Ankle Circles  - 2-3 x daily - 7 x weekly - 2 sets - 10 reps  - Active Straight Leg Raise with Quad Set  - 1 x daily - 7 x weekly - 3 sets - 10 reps  - Supine Bridge  - 1 x daily - 7 x weekly - 3 sets - 10 reps  - Sidelying Hip Abduction  - 1 x daily - 7 x weekly - 3 sets - 10 reps  - Towel Scrunches  - 2-3 x daily - 7 x weekly - 2 sets - 10 reps  - Long Sitting Ankle Inversion with Resistance  - 1-2 x daily - 7 x weekly - 3 sets - 10-15 reps  - Long Sitting Ankle Eversion with Resistance  - 1-2 x daily - 3 x weekly - 3 sets - 10-15 reps  - Long Sitting Eccentric Ankle Plantar Flexion with Resistance  - 1-2 x daily - 3 x weekly - 3 sets - 10-15 reps  - Standing Heel Raise with Support  - 1-2 x daily - 7 x weekly - 3 sets - 10 reps  - Single Leg Balance with Clock Reach  - 1 x

## 2024-05-07 ENCOUNTER — NURSE ONLY (OUTPATIENT)
Dept: UROLOGY | Age: 74
End: 2024-05-07

## 2024-05-07 DIAGNOSIS — N13.8 BENIGN PROSTATIC HYPERPLASIA WITH URINARY OBSTRUCTION: ICD-10-CM

## 2024-05-07 DIAGNOSIS — N40.1 BENIGN PROSTATIC HYPERPLASIA WITH URINARY OBSTRUCTION: ICD-10-CM

## 2024-05-07 LAB — PSA SERPL-MCNC: 3.2 NG/ML (ref 0–4)

## 2024-05-07 SDOH — ECONOMIC STABILITY: INCOME INSECURITY: HOW HARD IS IT FOR YOU TO PAY FOR THE VERY BASICS LIKE FOOD, HOUSING, MEDICAL CARE, AND HEATING?: NOT VERY HARD

## 2024-05-07 SDOH — ECONOMIC STABILITY: FOOD INSECURITY: WITHIN THE PAST 12 MONTHS, YOU WORRIED THAT YOUR FOOD WOULD RUN OUT BEFORE YOU GOT MONEY TO BUY MORE.: NEVER TRUE

## 2024-05-07 SDOH — ECONOMIC STABILITY: FOOD INSECURITY: WITHIN THE PAST 12 MONTHS, THE FOOD YOU BOUGHT JUST DIDN'T LAST AND YOU DIDN'T HAVE MONEY TO GET MORE.: NEVER TRUE

## 2024-05-07 SDOH — HEALTH STABILITY: PHYSICAL HEALTH: ON AVERAGE, HOW MANY DAYS PER WEEK DO YOU ENGAGE IN MODERATE TO STRENUOUS EXERCISE (LIKE A BRISK WALK)?: 4 DAYS

## 2024-05-07 SDOH — HEALTH STABILITY: PHYSICAL HEALTH: ON AVERAGE, HOW MANY MINUTES DO YOU ENGAGE IN EXERCISE AT THIS LEVEL?: 100 MIN

## 2024-05-07 ASSESSMENT — PATIENT HEALTH QUESTIONNAIRE - PHQ9
SUM OF ALL RESPONSES TO PHQ QUESTIONS 1-9: 0
SUM OF ALL RESPONSES TO PHQ QUESTIONS 1-9: 0
1. LITTLE INTEREST OR PLEASURE IN DOING THINGS: NOT AT ALL
2. FEELING DOWN, DEPRESSED OR HOPELESS: NOT AT ALL
SUM OF ALL RESPONSES TO PHQ QUESTIONS 1-9: 0
SUM OF ALL RESPONSES TO PHQ QUESTIONS 1-9: 0
SUM OF ALL RESPONSES TO PHQ9 QUESTIONS 1 & 2: 0

## 2024-05-07 ASSESSMENT — LIFESTYLE VARIABLES
HOW OFTEN DO YOU HAVE A DRINK CONTAINING ALCOHOL: MONTHLY OR LESS
HOW MANY STANDARD DRINKS CONTAINING ALCOHOL DO YOU HAVE ON A TYPICAL DAY: PATIENT DOES NOT DRINK
HOW OFTEN DO YOU HAVE SIX OR MORE DRINKS ON ONE OCCASION: 1
HOW OFTEN DO YOU HAVE A DRINK CONTAINING ALCOHOL: 2
HOW MANY STANDARD DRINKS CONTAINING ALCOHOL DO YOU HAVE ON A TYPICAL DAY: 0

## 2024-05-08 NOTE — THERAPY DISCHARGE
5-     Special Tests: Girth fig 8:  Right  51.8 cm        Left: 51.0 cm      1/24/24  NA          3/4/24  49.7 cm                          Single leg heel rasie:  minimal heel                            Single leg stance   Right 22 sec   Left 7 sec                         5/6/24   Right 25 secs                 Left   ASSESSMENT   Current Assessment:  On re-exam patient continues to show improvement in ROM, strength and functional status.  Still has strength deficits with single heel raise but has shown improvement with this also.  Patient is independent and safe with HEP and guidelines for progression of strengthening with discharge.      Goals: (Goals have been discussed and agreed upon with patient.)  Short-Term Functional Goals: Time Frame: 3 weeks  Independent HEP of ankle ROM and open chain strengthening exercises (MET)  Decrease pain to less than 2/10 with standing for meal prep (MET)  Increase ankle DF to 5 or greater to increase patient's ease with ambulation (MET)    Discharge Goals: Time Frame: 6-8 weeks  Patient will improve FAAM score to   65/84 to increase overall functional molbility (MET)  Increase DF to 10 degree to promote normalization of gait (MET)  Improve strength to at least 4+/5  to promote improvement/normalization of gait (NOT MET)  To be able to perform 10 reps with single heel raise (NOT MET)  Increase single leg balance to at least 20 sec to improve balance confidence (MET)       Outcome Measure:   Tool Used: FOOT AND ANKLE ABILITY MEASURE  Score:  Initial: 23/84 for ADL's  Sports:0/32    49/84 ADLS   4/32 Sports (Date: 1/24/24 )  49/84 ADLS   12/32 Sports (Date: 1/24/24 ) 67/84 ADL's  12/32 Sports (Date: 3/25/24)   Interpretation of Score: For the \"Activities of Daily Living\", there are 21 questions each scored on a 5 point scale with 0 representing \"Unable to do\" and 4 representing \"No difficulty\".  The lower the score, the greater the functional disability. 84/84 represents no

## 2024-05-09 ENCOUNTER — OFFICE VISIT (OUTPATIENT)
Dept: ENT CLINIC | Age: 74
End: 2024-05-09
Payer: MEDICARE

## 2024-05-09 VITALS
DIASTOLIC BLOOD PRESSURE: 66 MMHG | RESPIRATION RATE: 18 BRPM | HEIGHT: 70 IN | WEIGHT: 178 LBS | SYSTOLIC BLOOD PRESSURE: 118 MMHG | BODY MASS INDEX: 25.48 KG/M2

## 2024-05-09 DIAGNOSIS — Z98.890 S/P FESS (FUNCTIONAL ENDOSCOPIC SINUS SURGERY): ICD-10-CM

## 2024-05-09 DIAGNOSIS — J32.9 CHRONIC RHINOSINUSITIS: Primary | ICD-10-CM

## 2024-05-09 PROCEDURE — 3074F SYST BP LT 130 MM HG: CPT | Performed by: STUDENT IN AN ORGANIZED HEALTH CARE EDUCATION/TRAINING PROGRAM

## 2024-05-09 PROCEDURE — 3017F COLORECTAL CA SCREEN DOC REV: CPT | Performed by: STUDENT IN AN ORGANIZED HEALTH CARE EDUCATION/TRAINING PROGRAM

## 2024-05-09 PROCEDURE — 1036F TOBACCO NON-USER: CPT | Performed by: STUDENT IN AN ORGANIZED HEALTH CARE EDUCATION/TRAINING PROGRAM

## 2024-05-09 PROCEDURE — 1123F ACP DISCUSS/DSCN MKR DOCD: CPT | Performed by: STUDENT IN AN ORGANIZED HEALTH CARE EDUCATION/TRAINING PROGRAM

## 2024-05-09 PROCEDURE — 3078F DIAST BP <80 MM HG: CPT | Performed by: STUDENT IN AN ORGANIZED HEALTH CARE EDUCATION/TRAINING PROGRAM

## 2024-05-09 PROCEDURE — G8427 DOCREV CUR MEDS BY ELIG CLIN: HCPCS | Performed by: STUDENT IN AN ORGANIZED HEALTH CARE EDUCATION/TRAINING PROGRAM

## 2024-05-09 PROCEDURE — 31237 NSL/SINS NDSC SURG BX POLYPC: CPT | Performed by: STUDENT IN AN ORGANIZED HEALTH CARE EDUCATION/TRAINING PROGRAM

## 2024-05-09 PROCEDURE — 99213 OFFICE O/P EST LOW 20 MIN: CPT | Performed by: STUDENT IN AN ORGANIZED HEALTH CARE EDUCATION/TRAINING PROGRAM

## 2024-05-09 PROCEDURE — G8417 CALC BMI ABV UP PARAM F/U: HCPCS | Performed by: STUDENT IN AN ORGANIZED HEALTH CARE EDUCATION/TRAINING PROGRAM

## 2024-05-09 RX ORDER — AMOXICILLIN AND CLAVULANATE POTASSIUM 875; 125 MG/1; MG/1
1 TABLET, FILM COATED ORAL 2 TIMES DAILY
Qty: 28 TABLET | Refills: 0 | Status: SHIPPED | OUTPATIENT
Start: 2024-05-09 | End: 2024-05-23

## 2024-05-09 ASSESSMENT — ENCOUNTER SYMPTOMS
SINUS PAIN: 0
SHORTNESS OF BREATH: 0
COUGH: 0
CHOKING: 0
DIARRHEA: 0
WHEEZING: 0
STRIDOR: 0
FACIAL SWELLING: 0
EYE PAIN: 0
CONSTIPATION: 0
NAUSEA: 0
SINUS PRESSURE: 0
EYE DISCHARGE: 0
EYE ITCHING: 0
APNEA: 0

## 2024-05-09 NOTE — PROGRESS NOTES
HPI:  Clifton Merchant is a 73 y.o. male seen Established   Chief Complaint   Patient presents with    Follow-up     Patient presents today for 3 MO sinus recheck . Patient states that he has been well with no current sinus problems.         73-year-old male seen for a follow-up evaluation history of chronic rhinosinusitis status post septo/FESS for a rather significant right-sided chronic rhinosinusitis on 12/30/2022.  He was evaluated in February 2024 which was essentially a 1 year follow-up.  He did note long-term continued benefit following surgery but a couple months of sinonasal congestion and discharge.  He was found to have right-sided recurrent sinusitis and recommended medicated sinus irrigations which he has been utilizing budesonide/Levaquin initially daily and now using it 2 or 3 times per week and has a notable improvement.    Past Medical History, Past Surgical History, Family history, Social History, and Medications were all reviewed with the patient today and updated as necessary.     No Known Allergies    Patient Active Problem List   Diagnosis    Persistent atrial fibrillation (HCC)    Type 2 diabetes mellitus with nephropathy (HCC)    Anxiety    Arrhythmia    Diverticulitis    Mass of soft tissue of face    History of shingles    Other dysphagia    Chest pain    Atrial fibrillation (HCC)    Chronic pain    BULMARO on CPAP    Hypersomnia    Unstable angina pectoris (HCC)    Neck mass    S/P excision of lipoma    Neuropraxia of left median nerve    S/P ablation of atrial fibrillation    Hypercholesterolemia    S/P CABG (coronary artery bypass graft)    Calculus of kidney    Coronary artery disease with angina pectoris, unspecified vessel or lesion type, unspecified whether native or transplanted heart (HCC)    Hypertension    HLD (hyperlipidemia)    Thyroid disease    Chronic renal disease, stage III (HCC) [[id number]]    Chronic rhinosinusitis    Nasal obstruction    Deviated nasal septum    Nasal

## 2024-05-10 ENCOUNTER — OFFICE VISIT (OUTPATIENT)
Dept: FAMILY MEDICINE CLINIC | Facility: CLINIC | Age: 74
End: 2024-05-10

## 2024-05-10 VITALS
HEIGHT: 70 IN | WEIGHT: 178 LBS | SYSTOLIC BLOOD PRESSURE: 112 MMHG | TEMPERATURE: 97.2 F | BODY MASS INDEX: 25.48 KG/M2 | OXYGEN SATURATION: 99 % | HEART RATE: 84 BPM | DIASTOLIC BLOOD PRESSURE: 72 MMHG

## 2024-05-10 DIAGNOSIS — I48.19 PERSISTENT ATRIAL FIBRILLATION (HCC): ICD-10-CM

## 2024-05-10 DIAGNOSIS — N18.31 STAGE 3A CHRONIC KIDNEY DISEASE (HCC): ICD-10-CM

## 2024-05-10 DIAGNOSIS — F32.A DEPRESSION, UNSPECIFIED DEPRESSION TYPE: ICD-10-CM

## 2024-05-10 DIAGNOSIS — I25.119 CORONARY ARTERY DISEASE WITH ANGINA PECTORIS, UNSPECIFIED VESSEL OR LESION TYPE, UNSPECIFIED WHETHER NATIVE OR TRANSPLANTED HEART (HCC): ICD-10-CM

## 2024-05-10 DIAGNOSIS — Z95.1 S/P CABG (CORONARY ARTERY BYPASS GRAFT): ICD-10-CM

## 2024-05-10 DIAGNOSIS — Z00.00 MEDICARE ANNUAL WELLNESS VISIT, SUBSEQUENT: Primary | ICD-10-CM

## 2024-05-10 DIAGNOSIS — E11.21 TYPE 2 DIABETES MELLITUS WITH NEPHROPATHY (HCC): ICD-10-CM

## 2024-05-10 DIAGNOSIS — G47.33 OSA ON CPAP: ICD-10-CM

## 2024-05-10 RX ORDER — LEVOTHYROXINE SODIUM 88 UG/1
88 TABLET ORAL DAILY
Qty: 90 TABLET | Refills: 1 | Status: SHIPPED | OUTPATIENT
Start: 2024-05-10

## 2024-05-10 ASSESSMENT — PATIENT HEALTH QUESTIONNAIRE - PHQ9
7. TROUBLE CONCENTRATING ON THINGS, SUCH AS READING THE NEWSPAPER OR WATCHING TELEVISION: NOT AT ALL
SUM OF ALL RESPONSES TO PHQ QUESTIONS 1-9: 0
SUM OF ALL RESPONSES TO PHQ9 QUESTIONS 1 & 2: 0
3. TROUBLE FALLING OR STAYING ASLEEP: NOT AT ALL
9. THOUGHTS THAT YOU WOULD BE BETTER OFF DEAD, OR OF HURTING YOURSELF: NOT AT ALL
6. FEELING BAD ABOUT YOURSELF - OR THAT YOU ARE A FAILURE OR HAVE LET YOURSELF OR YOUR FAMILY DOWN: NOT AT ALL
1. LITTLE INTEREST OR PLEASURE IN DOING THINGS: NOT AT ALL
2. FEELING DOWN, DEPRESSED OR HOPELESS: NOT AT ALL
SUM OF ALL RESPONSES TO PHQ QUESTIONS 1-9: 0
SUM OF ALL RESPONSES TO PHQ QUESTIONS 1-9: 0
8. MOVING OR SPEAKING SO SLOWLY THAT OTHER PEOPLE COULD HAVE NOTICED. OR THE OPPOSITE, BEING SO FIGETY OR RESTLESS THAT YOU HAVE BEEN MOVING AROUND A LOT MORE THAN USUAL: NOT AT ALL
10. IF YOU CHECKED OFF ANY PROBLEMS, HOW DIFFICULT HAVE THESE PROBLEMS MADE IT FOR YOU TO DO YOUR WORK, TAKE CARE OF THINGS AT HOME, OR GET ALONG WITH OTHER PEOPLE: NOT DIFFICULT AT ALL
4. FEELING TIRED OR HAVING LITTLE ENERGY: NOT AT ALL
5. POOR APPETITE OR OVEREATING: NOT AT ALL
SUM OF ALL RESPONSES TO PHQ QUESTIONS 1-9: 0

## 2024-05-14 ENCOUNTER — OFFICE VISIT (OUTPATIENT)
Dept: UROLOGY | Age: 74
End: 2024-05-14
Payer: MEDICARE

## 2024-05-14 DIAGNOSIS — N13.8 BENIGN PROSTATIC HYPERPLASIA WITH URINARY OBSTRUCTION: Primary | ICD-10-CM

## 2024-05-14 DIAGNOSIS — N40.1 BENIGN PROSTATIC HYPERPLASIA WITH URINARY OBSTRUCTION: Primary | ICD-10-CM

## 2024-05-14 LAB
BILIRUBIN, URINE, POC: NEGATIVE
BLOOD URINE, POC: NEGATIVE
GLUCOSE URINE, POC: 250
KETONES, URINE, POC: NEGATIVE
LEUKOCYTE ESTERASE, URINE, POC: NEGATIVE
NITRITE, URINE, POC: NEGATIVE
PH, URINE, POC: 6 (ref 4.6–8)
PROTEIN,URINE, POC: NEGATIVE
SPECIFIC GRAVITY, URINE, POC: 1.01 (ref 1–1.03)
URINALYSIS CLARITY, POC: NORMAL
URINALYSIS COLOR, POC: NORMAL
UROBILINOGEN, POC: NORMAL

## 2024-05-14 PROCEDURE — 1036F TOBACCO NON-USER: CPT | Performed by: UROLOGY

## 2024-05-14 PROCEDURE — 99213 OFFICE O/P EST LOW 20 MIN: CPT | Performed by: UROLOGY

## 2024-05-14 PROCEDURE — 1123F ACP DISCUSS/DSCN MKR DOCD: CPT | Performed by: UROLOGY

## 2024-05-14 PROCEDURE — 81003 URINALYSIS AUTO W/O SCOPE: CPT | Performed by: UROLOGY

## 2024-05-14 PROCEDURE — G8417 CALC BMI ABV UP PARAM F/U: HCPCS | Performed by: UROLOGY

## 2024-05-14 PROCEDURE — G8427 DOCREV CUR MEDS BY ELIG CLIN: HCPCS | Performed by: UROLOGY

## 2024-05-14 PROCEDURE — 3017F COLORECTAL CA SCREEN DOC REV: CPT | Performed by: UROLOGY

## 2024-05-14 RX ORDER — TAMSULOSIN HYDROCHLORIDE 0.4 MG/1
0.4 CAPSULE ORAL EVERY EVENING
Qty: 90 CAPSULE | Refills: 3 | Status: SHIPPED | OUTPATIENT
Start: 2024-05-14

## 2024-05-14 ASSESSMENT — ENCOUNTER SYMPTOMS: BACK PAIN: 0

## 2024-05-14 NOTE — PROGRESS NOTES
Laterality Date    ACHILLES TENDON SURGERY Left 10/27/2023    Left Achilles secondary recostruction/Repair with Flexor hallucis longus and haglunds excision. performed by Jae Juárez III, MD at [facility]    ANGIOPLASTY  1995    CARDIAC CATHETERIZATION  06/25/2019    COLONOSCOPY N/A 7/14/2017    COLONOSCOPY performed by DELVIS Crowder MD at [facility] ENDOSCOPY    COLONOSCOPY  2010    Dr. Kwok (5yrs)    COLONOSCOPY  07/14/2017    CORONARY ARTERY BYPASS GRAFT      quadruple bypass    HEENT      nasal surgery 2ndary to obstruction    HEENT  02/2015    cyst removal    NOSE SURGERY Bilateral 12/30/2022    INFERIOR TURBINATE REDUCTION performed by Sandra Rock DO at [facility] MA[facility] OR    OTHER SURGICAL HISTORY      lasik eyes    MO UNLISTED PROCEDURE CARDIAC SURGERY  6/225/19    ablation    MO UNLISTED PROCEDURE CARDIAC SURGERY  1999    CABG    SEPTOPLASTY N/A 12/30/2022    SEPTOPLASTY GRAFT performed by Sandra Rock DO at [facility] MAIN OR    SINUS ENDOSCOPY Right 12/30/2022    SINUS ENDOSCOPY FUNCTIONAL SURGERY IMAGE GUIDED with Right-Sided Maxillary Antrostomies with Tissue Removal, Right-Sided  Total Ethmoidectomy, Propel Stent performed by Sandra Rock DO at [facility] MAIN OR    SINUS SURGERY  1972    deviated septum surgery    TONSILLECTOMY  1957     Current Outpatient Medications   Medication Sig Dispense Refill    levothyroxine (SYNTHROID) 88 MCG tablet Take 1 tablet by mouth Daily TAKE 1 TABLET BY MOUTH EVERY DAY BEFORE BREAKFAST 90 tablet 1    amoxicillin-clavulanate (AUGMENTIN) 875-125 MG per tablet Take 1 tablet by mouth 2 times daily for 14 days 28 tablet 0    apixaban (ELIQUIS) 5 MG TABS tablet Take 1 tablet by mouth 2 times daily 60 tablet 5    PARoxetine (PAXIL CR) 25 MG extended release tablet Take 1 tablet by mouth every evening 30 tablet 5    dofetilide (TIKOSYN) 250 MCG capsule TAKE 1 CAPSULE EVERY 12 HOURS 180 capsule 3    atorvastatin (LIPITOR) 80 MG tablet TAKE 1 TABLET EVERY DAY 90

## 2024-05-25 ASSESSMENT — ENCOUNTER SYMPTOMS
HEARTBURN: 1
RESPIRATORY NEGATIVE: 1
SHORTNESS OF BREATH: 0
EYES NEGATIVE: 1
CHEST TIGHTNESS: 0

## 2024-05-25 NOTE — PROGRESS NOTES
HISTORY OF PRESENT ILLNESS  Clifton Merchant is a 73 y.o. y.o. male    Hypertension  This is a recurrent problem. The current episode started more than 1 year ago. The problem has been rapidly improving since onset. The problem is controlled. Associated symptoms include anxiety. Pertinent negatives include no chest pain, palpitations or shortness of breath.   Anxiety  Presents for follow-up (In remission on paxil) visit. Patient reports no chest pain, palpitations or shortness of breath.     His past medical history is significant for CAD.   Atrial Fibrillation  Presents for follow-up (followed by cardiology scheduled for ablation) visit. Symptoms are negative for chest pain, palpitations and shortness of breath. Past medical history includes CAD.   Gastroesophageal Reflux  He complains of heartburn. He reports no chest pain. This is a recurrent problem. The problem has been gradually improving.   Coronary Artery Disease  Presents for follow-up visit. Pertinent negatives include no chest pain, chest pressure, chest tightness, palpitations or shortness of breath. Compliance with diet is good. Compliance with exercise is good. Compliance with medications is good.   Sleep Problem  This is a recurrent (sleep apnea on C-PAP) problem. The current episode started more than 1 year ago. The problem has been gradually improving. Pertinent negatives include no chest pain.   Diabetes  He presents for his follow-up diabetic visit. He has type 2 diabetes mellitus. His disease course has been improving. Pertinent negatives for diabetes include no chest pain.       No Known Allergies     Current Outpatient Medications   Medication Sig    levothyroxine (SYNTHROID) 88 MCG tablet Take 1 tablet by mouth Daily TAKE 1 TABLET BY MOUTH EVERY DAY BEFORE BREAKFAST    PARoxetine (PAXIL CR) 25 MG extended release tablet Take 1 tablet by mouth every evening    dofetilide (TIKOSYN) 250 MCG capsule TAKE 1 CAPSULE EVERY 12 HOURS    atorvastatin

## 2024-05-29 NOTE — PROGRESS NOTES
[facility]  [address]  [place] [address]  [phone]    Patient Name:  Clifton Merchant  YOB: 1950      Office Visit 5/30/2024    CHIEF COMPLAINT:    Chief Complaint   Patient presents with    Follow-up         HISTORY OF PRESENT ILLNESS:  Patient is seen today for follow-up of sleep apnea.  He has a history of severe sleep apnea from PSG in 2007 with RDI 69.8 events per hour with desaturations to 75%.  He is prescribed auto CPAP 10 to 20 cm using a nasal mask.  Download reveals 99% compliance over the last year with average nightly use 7 hours and 46 minutes.  AHI is 3.5 events per hour with average pressure use 10.9 to 13.8 cm.  He denies any issues with the pressure.  He is getting supplies through [employer].    His main concern today is his vivid dreams.  He states that started about November.  He wakes up after 3 to 4 hours sweating.  He has very vivid, strange dreams.  He denies REM behavior disorder.  No parasomnias.  He does not take melatonin.  No weight changes and no sleep aids.  He states that he moves around a lot in his sleep.  No alcohol or over-the-counter sleep aids.  He wakes up hyperventilating.  He states that he can fall to sleep very quickly.  He started taking Paxil about a year ago for depression and wonders if the Paxil is causing him to twitch in the night.  He had surgery on his Achilles tendon in October.  AHI was elevated up to 30 events per hour during that time but returned to baseline around December.  He is in permanent A-fib and has an upcoming ablation next month.  He has been on Eliquis for the past 5 weeks.    He can tell that the quality of his sleep has decreased.  He is napping 1-1/2 hours during the afternoon and then again in the evening.  He uses a nasal mask but denies any dry mouth.    Download        [place] Sleepiness Scale         5/30/2024     1:35 PM 5/30/2023     1:02 PM 2/24/2023     1:25 PM 8/26/2022    11:00

## 2024-05-30 ENCOUNTER — OFFICE VISIT (OUTPATIENT)
Dept: SLEEP MEDICINE | Age: 74
End: 2024-05-30
Payer: MEDICARE

## 2024-05-30 VITALS
HEIGHT: 70 IN | OXYGEN SATURATION: 96 % | BODY MASS INDEX: 25.34 KG/M2 | TEMPERATURE: 97.2 F | RESPIRATION RATE: 18 BRPM | WEIGHT: 177 LBS | HEART RATE: 68 BPM | DIASTOLIC BLOOD PRESSURE: 82 MMHG | SYSTOLIC BLOOD PRESSURE: 120 MMHG

## 2024-05-30 DIAGNOSIS — G47.33 OSA ON CPAP: Primary | ICD-10-CM

## 2024-05-30 DIAGNOSIS — G47.10 HYPERSOMNIA: ICD-10-CM

## 2024-05-30 DIAGNOSIS — I48.19 PERSISTENT ATRIAL FIBRILLATION (HCC): ICD-10-CM

## 2024-05-30 DIAGNOSIS — R68.89 VIVID DREAM: ICD-10-CM

## 2024-05-30 PROCEDURE — 3017F COLORECTAL CA SCREEN DOC REV: CPT | Performed by: NURSE PRACTITIONER

## 2024-05-30 PROCEDURE — G8427 DOCREV CUR MEDS BY ELIG CLIN: HCPCS | Performed by: NURSE PRACTITIONER

## 2024-05-30 PROCEDURE — 1123F ACP DISCUSS/DSCN MKR DOCD: CPT | Performed by: NURSE PRACTITIONER

## 2024-05-30 PROCEDURE — 3079F DIAST BP 80-89 MM HG: CPT | Performed by: NURSE PRACTITIONER

## 2024-05-30 PROCEDURE — 1036F TOBACCO NON-USER: CPT | Performed by: NURSE PRACTITIONER

## 2024-05-30 PROCEDURE — 99214 OFFICE O/P EST MOD 30 MIN: CPT | Performed by: NURSE PRACTITIONER

## 2024-05-30 PROCEDURE — 3074F SYST BP LT 130 MM HG: CPT | Performed by: NURSE PRACTITIONER

## 2024-05-30 PROCEDURE — G8417 CALC BMI ABV UP PARAM F/U: HCPCS | Performed by: NURSE PRACTITIONER

## 2024-05-30 ASSESSMENT — SLEEP AND FATIGUE QUESTIONNAIRES
HOW LIKELY ARE YOU TO NOD OFF OR FALL ASLEEP WHILE SITTING QUIETLY AFTER LUNCH WITHOUT ALCOHOL: HIGH CHANCE OF DOZING
ESS TOTAL SCORE: 11
HOW LIKELY ARE YOU TO NOD OFF OR FALL ASLEEP WHILE SITTING INACTIVE IN A PUBLIC PLACE: WOULD NEVER DOZE
HOW LIKELY ARE YOU TO NOD OFF OR FALL ASLEEP WHILE SITTING AND TALKING TO SOMEONE: WOULD NEVER DOZE
HOW LIKELY ARE YOU TO NOD OFF OR FALL ASLEEP WHILE SITTING AND READING: HIGH CHANCE OF DOZING
HOW LIKELY ARE YOU TO NOD OFF OR FALL ASLEEP WHILE WATCHING TV: HIGH CHANCE OF DOZING
HOW LIKELY ARE YOU TO NOD OFF OR FALL ASLEEP IN A CAR, WHILE STOPPED FOR A FEW MINUTES IN TRAFFIC: WOULD NEVER DOZE
HOW LIKELY ARE YOU TO NOD OFF OR FALL ASLEEP WHILE LYING DOWN TO REST IN THE AFTERNOON WHEN CIRCUMSTANCES PERMIT: WOULD NEVER DOZE
HOW LIKELY ARE YOU TO NOD OFF OR FALL ASLEEP WHEN YOU ARE A PASSENGER IN A CAR FOR AN HOUR WITHOUT A BREAK: MODERATE CHANCE OF DOZING

## 2024-05-30 NOTE — PATIENT INSTRUCTIONS
Change settings to auto CPAP 12 to 20 cm.  Check overnight oximetry on PAP therapy  Renew supplies to [employer]    Overnight Oximetry is a device with a watch and a finger probe that you wear when you sleep. It measures your heart rate and the amount of oxygen in your blood. This test records the data, return the device back to your medical equipment company, and the data is sent to our office for review. You will be called with the results.

## 2024-06-11 ENCOUNTER — ANESTHESIA EVENT (OUTPATIENT)
Dept: CARDIAC CATH/INVASIVE PROCEDURES | Age: 74
End: 2024-06-11
Payer: MEDICARE

## 2024-06-11 DIAGNOSIS — I48.19 PERSISTENT ATRIAL FIBRILLATION (HCC): ICD-10-CM

## 2024-06-11 LAB
ANION GAP SERPL CALC-SCNC: 10 MMOL/L (ref 9–18)
BUN SERPL-MCNC: 26 MG/DL (ref 8–23)
CALCIUM SERPL-MCNC: 9.1 MG/DL (ref 8.8–10.2)
CHLORIDE SERPL-SCNC: 105 MMOL/L (ref 98–107)
CO2 SERPL-SCNC: 28 MMOL/L (ref 20–28)
CREAT SERPL-MCNC: 1.37 MG/DL (ref 0.8–1.3)
ERYTHROCYTE [DISTWIDTH] IN BLOOD BY AUTOMATED COUNT: 15.4 % (ref 11.9–14.6)
GLUCOSE SERPL-MCNC: 134 MG/DL (ref 70–99)
HCT VFR BLD AUTO: 39.8 % (ref 41.1–50.3)
HGB BLD-MCNC: 12.6 G/DL (ref 13.6–17.2)
INR PPP: 1.1
MCH RBC QN AUTO: 27.8 PG (ref 26.1–32.9)
MCHC RBC AUTO-ENTMCNC: 31.7 G/DL (ref 31.4–35)
MCV RBC AUTO: 87.9 FL (ref 82–102)
NRBC # BLD: 0 K/UL (ref 0–0.2)
PLATELET # BLD AUTO: 216 K/UL (ref 150–450)
PMV BLD AUTO: 12.1 FL (ref 9.4–12.3)
POTASSIUM SERPL-SCNC: 4.4 MMOL/L (ref 3.5–5.1)
PROTHROMBIN TIME: 15 SEC (ref 11.3–14.9)
RBC # BLD AUTO: 4.53 M/UL (ref 4.23–5.6)
SODIUM SERPL-SCNC: 142 MMOL/L (ref 136–145)
WBC # BLD AUTO: 3.4 K/UL (ref 4.3–11.1)

## 2024-06-11 NOTE — PROGRESS NOTES
Patient pre-assessment complete for Afib Ablation with Dr. Delacruz scheduled for 24 at 0730, arrival time 0600. Patient verified using . Patient instructed to bring a list of home medications on the day of procedure. NPO status reinforced. Patient instructed to follow EP Information Sheet given at appointment. Patient verbalizes understanding of all instructions & denies any questions at this time.

## 2024-06-12 ENCOUNTER — HOSPITAL ENCOUNTER (OUTPATIENT)
Age: 74
Setting detail: OUTPATIENT SURGERY
Discharge: HOME | End: 2024-06-12
Attending: INTERNAL MEDICINE | Admitting: INTERNAL MEDICINE
Payer: MEDICARE

## 2024-06-12 ENCOUNTER — APPOINTMENT (OUTPATIENT)
Dept: CARDIAC CATH/INVASIVE PROCEDURES | Age: 74
End: 2024-06-12
Attending: INTERNAL MEDICINE
Payer: MEDICARE

## 2024-06-12 ENCOUNTER — ANESTHESIA (OUTPATIENT)
Dept: CARDIAC CATH/INVASIVE PROCEDURES | Age: 74
End: 2024-06-12
Payer: MEDICARE

## 2024-06-12 VITALS
RESPIRATION RATE: 17 BRPM | HEART RATE: 64 BPM | OXYGEN SATURATION: 95 % | BODY MASS INDEX: 25.34 KG/M2 | WEIGHT: 177 LBS | HEIGHT: 70 IN | DIASTOLIC BLOOD PRESSURE: 76 MMHG | SYSTOLIC BLOOD PRESSURE: 135 MMHG | TEMPERATURE: 97.2 F

## 2024-06-12 DIAGNOSIS — I48.91 ATRIAL FIBRILLATION (HCC): ICD-10-CM

## 2024-06-12 DIAGNOSIS — I48.19 PERSISTENT ATRIAL FIBRILLATION (HCC): Primary | ICD-10-CM

## 2024-06-12 LAB
ACT BLD: 275 SECS (ref 70–128)
ECHO BSA: 1.99 M2
ECHO BSA: 1.99 M2
EKG ATRIAL RATE: 89 BPM
EKG DIAGNOSIS: NORMAL
EKG DIAGNOSIS: NORMAL
EKG Q-T INTERVAL: 468 MS
EKG Q-T INTERVAL: 502 MS
EKG QRS DURATION: 92 MS
EKG QRS DURATION: 98 MS
EKG QTC CALCULATION (BAZETT): 468 MS
EKG QTC CALCULATION (BAZETT): 517 MS
EKG R AXIS: -13 DEGREES
EKG R AXIS: -74 DEGREES
EKG T AXIS: 70 DEGREES
EKG T AXIS: 77 DEGREES
EKG VENTRICULAR RATE: 60 BPM
EKG VENTRICULAR RATE: 64 BPM
GLUCOSE BLD STRIP.AUTO-MCNC: 157 MG/DL (ref 65–100)
MAGNESIUM SERPL-MCNC: 1.9 MG/DL (ref 1.8–2.4)
SERVICE CMNT-IMP: ABNORMAL

## 2024-06-12 PROCEDURE — 83735 ASSAY OF MAGNESIUM: CPT

## 2024-06-12 PROCEDURE — 6360000002 HC RX W HCPCS: Performed by: NURSE ANESTHETIST, CERTIFIED REGISTERED

## 2024-06-12 PROCEDURE — 2580000003 HC RX 258: Performed by: STUDENT IN AN ORGANIZED HEALTH CARE EDUCATION/TRAINING PROGRAM

## 2024-06-12 PROCEDURE — 93622 COMP EP EVAL L VENTR PAC&REC: CPT | Performed by: INTERNAL MEDICINE

## 2024-06-12 PROCEDURE — 7100000001 HC PACU RECOVERY - ADDTL 15 MIN: Performed by: INTERNAL MEDICINE

## 2024-06-12 PROCEDURE — 6360000002 HC RX W HCPCS: Performed by: INTERNAL MEDICINE

## 2024-06-12 PROCEDURE — 7100000000 HC PACU RECOVERY - FIRST 15 MIN: Performed by: INTERNAL MEDICINE

## 2024-06-12 PROCEDURE — 82962 GLUCOSE BLOOD TEST: CPT

## 2024-06-12 PROCEDURE — 93312 ECHO TRANSESOPHAGEAL: CPT

## 2024-06-12 PROCEDURE — 2500000003 HC RX 250 WO HCPCS: Performed by: NURSE ANESTHETIST, CERTIFIED REGISTERED

## 2024-06-12 PROCEDURE — 85347 COAGULATION TIME ACTIVATED: CPT

## 2024-06-12 PROCEDURE — 2709999900 HC NON-CHARGEABLE SUPPLY: Performed by: INTERNAL MEDICINE

## 2024-06-12 PROCEDURE — C1760 CLOSURE DEV, VASC: HCPCS | Performed by: INTERNAL MEDICINE

## 2024-06-12 PROCEDURE — 93005 ELECTROCARDIOGRAM TRACING: CPT | Performed by: INTERNAL MEDICINE

## 2024-06-12 PROCEDURE — C1759 CATH, INTRA ECHOCARDIOGRAPHY: HCPCS | Performed by: INTERNAL MEDICINE

## 2024-06-12 PROCEDURE — 2720000010 HC SURG SUPPLY STERILE: Performed by: INTERNAL MEDICINE

## 2024-06-12 PROCEDURE — C1732 CATH, EP, DIAG/ABL, 3D/VECT: HCPCS | Performed by: INTERNAL MEDICINE

## 2024-06-12 PROCEDURE — C1893 INTRO/SHEATH, FIXED,NON-PEEL: HCPCS | Performed by: INTERNAL MEDICINE

## 2024-06-12 PROCEDURE — 93312 ECHO TRANSESOPHAGEAL: CPT | Performed by: INTERNAL MEDICINE

## 2024-06-12 PROCEDURE — 93325 DOPPLER ECHO COLOR FLOW MAPG: CPT | Performed by: INTERNAL MEDICINE

## 2024-06-12 PROCEDURE — C1766 INTRO/SHEATH,STRBLE,NON-PEEL: HCPCS | Performed by: INTERNAL MEDICINE

## 2024-06-12 PROCEDURE — 93657 TX L/R ATRIAL FIB ADDL: CPT | Performed by: INTERNAL MEDICINE

## 2024-06-12 PROCEDURE — C1730 CATH, EP, 19 OR FEW ELECT: HCPCS | Performed by: INTERNAL MEDICINE

## 2024-06-12 PROCEDURE — 93656 COMPRE EP EVAL ABLTJ ATR FIB: CPT | Performed by: INTERNAL MEDICINE

## 2024-06-12 PROCEDURE — C1894 INTRO/SHEATH, NON-LASER: HCPCS | Performed by: INTERNAL MEDICINE

## 2024-06-12 PROCEDURE — 3700000000 HC ANESTHESIA ATTENDED CARE: Performed by: INTERNAL MEDICINE

## 2024-06-12 PROCEDURE — 93320 DOPPLER ECHO COMPLETE: CPT | Performed by: INTERNAL MEDICINE

## 2024-06-12 PROCEDURE — 3700000001 HC ADD 15 MINUTES (ANESTHESIA): Performed by: INTERNAL MEDICINE

## 2024-06-12 PROCEDURE — 93655 ICAR CATH ABLTJ DSCRT ARRHYT: CPT | Performed by: INTERNAL MEDICINE

## 2024-06-12 RX ORDER — LIDOCAINE HYDROCHLORIDE 10 MG/ML
1 INJECTION, SOLUTION INFILTRATION; PERINEURAL
Status: DISCONTINUED | OUTPATIENT
Start: 2024-06-12 | End: 2024-06-12 | Stop reason: HOSPADM

## 2024-06-12 RX ORDER — ROCURONIUM BROMIDE 10 MG/ML
INJECTION, SOLUTION INTRAVENOUS PRN
Status: DISCONTINUED | OUTPATIENT
Start: 2024-06-12 | End: 2024-06-12 | Stop reason: SDUPTHER

## 2024-06-12 RX ORDER — SUCRALFATE 1 G/1
1 TABLET ORAL 4 TIMES DAILY
Qty: 120 TABLET | Refills: 0 | Status: SHIPPED | OUTPATIENT
Start: 2024-06-12

## 2024-06-12 RX ORDER — HEPARIN SODIUM 1000 [USP'U]/ML
INJECTION, SOLUTION INTRAVENOUS; SUBCUTANEOUS PRN
Status: DISCONTINUED | OUTPATIENT
Start: 2024-06-12 | End: 2024-06-12 | Stop reason: SDUPTHER

## 2024-06-12 RX ORDER — HYDROMORPHONE HYDROCHLORIDE 2 MG/ML
0.5 INJECTION, SOLUTION INTRAMUSCULAR; INTRAVENOUS; SUBCUTANEOUS EVERY 5 MIN PRN
Status: DISCONTINUED | OUTPATIENT
Start: 2024-06-12 | End: 2024-06-12 | Stop reason: HOSPADM

## 2024-06-12 RX ORDER — NALOXONE HYDROCHLORIDE 0.4 MG/ML
INJECTION, SOLUTION INTRAMUSCULAR; INTRAVENOUS; SUBCUTANEOUS PRN
Status: DISCONTINUED | OUTPATIENT
Start: 2024-06-12 | End: 2024-06-12 | Stop reason: HOSPADM

## 2024-06-12 RX ORDER — PROPOFOL 10 MG/ML
INJECTION, EMULSION INTRAVENOUS PRN
Status: DISCONTINUED | OUTPATIENT
Start: 2024-06-12 | End: 2024-06-12 | Stop reason: SDUPTHER

## 2024-06-12 RX ORDER — HEPARIN SODIUM AND DEXTROSE 5000; 5 [USP'U]/100ML; G/100ML
INJECTION INTRAVENOUS CONTINUOUS PRN
Status: DISCONTINUED | OUTPATIENT
Start: 2024-06-12 | End: 2024-06-12 | Stop reason: SDUPTHER

## 2024-06-12 RX ORDER — SODIUM CHLORIDE 0.9 % (FLUSH) 0.9 %
5-40 SYRINGE (ML) INJECTION EVERY 12 HOURS SCHEDULED
Status: DISCONTINUED | OUTPATIENT
Start: 2024-06-12 | End: 2024-06-12 | Stop reason: HOSPADM

## 2024-06-12 RX ORDER — SODIUM CHLORIDE 9 MG/ML
INJECTION, SOLUTION INTRAVENOUS PRN
Status: DISCONTINUED | OUTPATIENT
Start: 2024-06-12 | End: 2024-06-12 | Stop reason: HOSPADM

## 2024-06-12 RX ORDER — DEXAMETHASONE SODIUM PHOSPHATE 4 MG/ML
INJECTION, SOLUTION INTRA-ARTICULAR; INTRALESIONAL; INTRAMUSCULAR; INTRAVENOUS; SOFT TISSUE PRN
Status: DISCONTINUED | OUTPATIENT
Start: 2024-06-12 | End: 2024-06-12 | Stop reason: SDUPTHER

## 2024-06-12 RX ORDER — MIDAZOLAM HYDROCHLORIDE 2 MG/2ML
2 INJECTION, SOLUTION INTRAMUSCULAR; INTRAVENOUS
Status: DISCONTINUED | OUTPATIENT
Start: 2024-06-12 | End: 2024-06-12 | Stop reason: HOSPADM

## 2024-06-12 RX ORDER — ONDANSETRON 2 MG/ML
INJECTION INTRAMUSCULAR; INTRAVENOUS PRN
Status: DISCONTINUED | OUTPATIENT
Start: 2024-06-12 | End: 2024-06-12 | Stop reason: SDUPTHER

## 2024-06-12 RX ORDER — SODIUM CHLORIDE, SODIUM LACTATE, POTASSIUM CHLORIDE, CALCIUM CHLORIDE 600; 310; 30; 20 MG/100ML; MG/100ML; MG/100ML; MG/100ML
INJECTION, SOLUTION INTRAVENOUS CONTINUOUS
Status: DISCONTINUED | OUTPATIENT
Start: 2024-06-12 | End: 2024-06-12 | Stop reason: HOSPADM

## 2024-06-12 RX ORDER — PROCHLORPERAZINE EDISYLATE 5 MG/ML
5 INJECTION INTRAMUSCULAR; INTRAVENOUS
Status: DISCONTINUED | OUTPATIENT
Start: 2024-06-12 | End: 2024-06-12 | Stop reason: HOSPADM

## 2024-06-12 RX ORDER — FENTANYL CITRATE 50 UG/ML
100 INJECTION, SOLUTION INTRAMUSCULAR; INTRAVENOUS
Status: DISCONTINUED | OUTPATIENT
Start: 2024-06-12 | End: 2024-06-12 | Stop reason: HOSPADM

## 2024-06-12 RX ORDER — ADENOSINE 3 MG/ML
INJECTION, SOLUTION INTRAVENOUS PRN
Status: DISCONTINUED | OUTPATIENT
Start: 2024-06-12 | End: 2024-06-12 | Stop reason: HOSPADM

## 2024-06-12 RX ORDER — LIDOCAINE HYDROCHLORIDE 20 MG/ML
INJECTION, SOLUTION EPIDURAL; INFILTRATION; INTRACAUDAL; PERINEURAL PRN
Status: DISCONTINUED | OUTPATIENT
Start: 2024-06-12 | End: 2024-06-12 | Stop reason: SDUPTHER

## 2024-06-12 RX ORDER — COLCHICINE 0.6 MG/1
0.6 TABLET ORAL 2 TIMES DAILY PRN
Qty: 20 TABLET | Refills: 0 | Status: SHIPPED | OUTPATIENT
Start: 2024-06-12 | End: 2024-06-22

## 2024-06-12 RX ORDER — OXYCODONE HYDROCHLORIDE 5 MG/1
5 TABLET ORAL
Status: DISCONTINUED | OUTPATIENT
Start: 2024-06-12 | End: 2024-06-12 | Stop reason: HOSPADM

## 2024-06-12 RX ORDER — ONDANSETRON 2 MG/ML
4 INJECTION INTRAMUSCULAR; INTRAVENOUS
Status: DISCONTINUED | OUTPATIENT
Start: 2024-06-12 | End: 2024-06-12 | Stop reason: HOSPADM

## 2024-06-12 RX ORDER — PROTAMINE SULFATE 10 MG/ML
INJECTION, SOLUTION INTRAVENOUS PRN
Status: DISCONTINUED | OUTPATIENT
Start: 2024-06-12 | End: 2024-06-12 | Stop reason: SDUPTHER

## 2024-06-12 RX ORDER — SODIUM CHLORIDE 0.9 % (FLUSH) 0.9 %
5-40 SYRINGE (ML) INJECTION PRN
Status: DISCONTINUED | OUTPATIENT
Start: 2024-06-12 | End: 2024-06-12 | Stop reason: HOSPADM

## 2024-06-12 RX ORDER — PANTOPRAZOLE SODIUM 40 MG/1
40 TABLET, DELAYED RELEASE ORAL 2 TIMES DAILY
Qty: 60 TABLET | Refills: 0 | Status: SHIPPED | OUTPATIENT
Start: 2024-06-12 | End: 2024-07-12

## 2024-06-12 RX ADMIN — DEXAMETHASONE SODIUM PHOSPHATE 4 MG: 4 INJECTION, SOLUTION INTRAMUSCULAR; INTRAVENOUS at 08:46

## 2024-06-12 RX ADMIN — PROPOFOL 150 MG: 10 INJECTION, EMULSION INTRAVENOUS at 07:51

## 2024-06-12 RX ADMIN — HEPARIN SODIUM 200 UNITS: 1000 INJECTION INTRAVENOUS; SUBCUTANEOUS at 08:46

## 2024-06-12 RX ADMIN — SODIUM CHLORIDE, SODIUM LACTATE, POTASSIUM CHLORIDE, AND CALCIUM CHLORIDE: 600; 310; 30; 20 INJECTION, SOLUTION INTRAVENOUS at 07:43

## 2024-06-12 RX ADMIN — PROTAMINE SULFATE 20 MG: 10 INJECTION, SOLUTION INTRAVENOUS at 09:24

## 2024-06-12 RX ADMIN — PROTAMINE SULFATE 20 MG: 10 INJECTION, SOLUTION INTRAVENOUS at 09:25

## 2024-06-12 RX ADMIN — PROTAMINE SULFATE 20 MG: 10 INJECTION, SOLUTION INTRAVENOUS at 09:28

## 2024-06-12 RX ADMIN — PROTAMINE SULFATE 20 MG: 10 INJECTION, SOLUTION INTRAVENOUS at 09:26

## 2024-06-12 RX ADMIN — PROTAMINE SULFATE 20 MG: 10 INJECTION, SOLUTION INTRAVENOUS at 09:27

## 2024-06-12 RX ADMIN — HEPARIN SODIUM 7000 UNITS: 1000 INJECTION INTRAVENOUS; SUBCUTANEOUS at 08:22

## 2024-06-12 RX ADMIN — ROCURONIUM BROMIDE 50 MG: 10 INJECTION, SOLUTION INTRAVENOUS at 07:51

## 2024-06-12 RX ADMIN — HEPARIN SODIUM 7000 UNITS: 1000 INJECTION INTRAVENOUS; SUBCUTANEOUS at 08:31

## 2024-06-12 RX ADMIN — ONDANSETRON 4 MG: 2 INJECTION INTRAMUSCULAR; INTRAVENOUS at 08:46

## 2024-06-12 RX ADMIN — LIDOCAINE HYDROCHLORIDE 100 MG: 20 INJECTION, SOLUTION EPIDURAL; INFILTRATION; INTRACAUDAL; PERINEURAL at 07:51

## 2024-06-12 RX ADMIN — HEPARIN SODIUM AND DEXTROSE 40 UNITS/KG/HR: 5000; 5 INJECTION INTRAVENOUS at 08:31

## 2024-06-12 RX ADMIN — SUGAMMADEX 200 MG: 100 INJECTION, SOLUTION INTRAVENOUS at 09:27

## 2024-06-12 ASSESSMENT — PAIN - FUNCTIONAL ASSESSMENT
PAIN_FUNCTIONAL_ASSESSMENT: NONE - DENIES PAIN
PAIN_FUNCTIONAL_ASSESSMENT: NONE - DENIES PAIN

## 2024-06-12 NOTE — PROGRESS NOTES
Assisted OOB & ambulated to BR & on unit. Tolerated activity without difficulty. Bilat groin without bleeding or hematoma

## 2024-06-12 NOTE — ANESTHESIA PRE PROCEDURE
Danilo Hahn MD   omeprazole (PRILOSEC) 20 MG delayed release capsule Take 1 capsule by mouth Daily TAKE 1 CAPSULE EVERY DAY 9/18/23   Danilo Hahn MD   blood glucose test strips (ONETOUCH VERIO) strip DAILY 9/18/23   Danilo Hahn MD   Cholecalciferol (VITAMIN D3 PO) Take by mouth Daily with lunch    Provider, MD Sergio   nitroGLYCERIN (NITROSTAT) 0.4 MG SL tablet Place 1 tablet under the tongue every 5 minutes as needed for Chest pain Up to 3 tabs in 24 hrs. 6/6/22   Guy Delacruz MD   aspirin 81 MG EC tablet Take 1 tablet by mouth Daily with lunch    Automatic Reconciliation, Ar   cyanocobalamin 1000 MCG tablet Take 1 tablet by mouth    Automatic Reconciliation, Ar       Current medications:    Current Facility-Administered Medications   Medication Dose Route Frequency Provider Last Rate Last Admin   • lidocaine 1 % injection 1 mL  1 mL IntraDERmal Once PRN Chuck Aleman MD       • fentaNYL (SUBLIMAZE) injection 100 mcg  100 mcg IntraVENous Once PRN Chuck Aleman MD       • lactated ringers IV soln infusion   IntraVENous Continuous Chuck Aleman MD       • sodium chloride flush 0.9 % injection 5-40 mL  5-40 mL IntraVENous 2 times per day Chuck Aleman MD       • sodium chloride flush 0.9 % injection 5-40 mL  5-40 mL IntraVENous PRN Chuck Aleman MD       • 0.9 % sodium chloride infusion   IntraVENous PRN Chuck Aleman MD       • midazolam PF (VERSED) injection 2 mg  2 mg IntraVENous Once PRN Chuck Aleman MD           Allergies:  No Known Allergies    Problem List:    Patient Active Problem List   Diagnosis Code   • Persistent atrial fibrillation (HCC) I48.19   • Anxiety F41.9   • Arrhythmia I49.9   • Diverticulitis K57.92   • Mass of soft tissue of face M79.89   • History of shingles Z86.19   • Other dysphagia R13.19   • Chest pain R07.9   • Atrial fibrillation (HCC) I48.91   • Chronic pain G89.29   • BULMARO on CPAP G47.33   • Hypersomnia G47.10   • Unstable angina pectoris (HCC)

## 2024-06-12 NOTE — PROGRESS NOTES
Assisted OOB & ambulated to BR & on unit. Tolerated activity without difficulty. Bilat groin without bleeding or hematoma    Discharge instructions given per orders, voiced good understanding of groin care, medications & follow up care. Denies any questions

## 2024-06-12 NOTE — PROGRESS NOTES
Report received from PACU RN. Procedural finding communicated. Intra procedural medication administration reviewed. Progression of care discussed.    Patient received into CPRU room 5, Post afib ablation    Access site without bleeding or swelling. Bilat groin    Patient instructed to limit movement of bilat lower extremity.    Routine post procedural vital signs & site assessment initiated.

## 2024-06-12 NOTE — DISCHARGE INSTRUCTIONS
Atrial Fib Ablation Discharge Instructions    1 - Check the puncture site frequently for swelling or bleeding. If you see any bleeding, lie down and apply pressure over the area with a clean town or washcloth. Notify your doctor for any redness, swelling, drainage or oozing from the puncture site. Notify your doctor for any fever or chills.    2 - If the leg with the puncture becomes cold, numb or painful, call [facility] at  [phone].    3 - Activity should be limited for the next 48 hours. Climb stairs as little as possible and avoid any stooping, bending or strenuous activity for 48 hours. No heavy lifting (anything over 10 pounds) for three days.    4 - Do not drive for the first 24 hours post ablation.    5 - You may resume your usual diet. Drink more fluids than usual.    6 - Have a responsible person drive you home and stay with you for at least 24 hours after your ablation.    7 -You may remove the bandage from your Right & Left Groin in 24 hours. You may shower in 24 hours. No tub baths, hot tubs or swimming for one week. Do not place any lotions, creams, powders, ointments over the puncture site for one week. You may place a clean band-aid over the puncture site each day for 5 days. Change this daily.    8 - Continue medicines for now including your blood thinner eliquis, and your rhythm medicine tikosyn. These medicines should be continued until EP follow up.     9 - You will need to start Carafate, Colchicine and Protonix for one month. This is to protect your esophagus from a possible injury during the ablation procedure.     10 - If chest pain occurs (especially when taking a deep breath), it is likely due to pericarditis or inflammation around your heart sac which is related to the ablation procedure. It usually responds to ibuprofen at 400-600 mg every 6-8 hours as needed. If feels severe or unrelieved, please call office to discuss symptoms with

## 2024-06-12 NOTE — ANESTHESIA POSTPROCEDURE EVALUATION
Department of Anesthesiology  Postprocedure Note    Patient: Clifton Merchant  MRN: [id number]  YOB: 1950  Date of evaluation: 6/12/2024    Procedure Summary       Date: 06/12/24 Room / Location: [facility] EP 2 ALL EVENTS / [facility] CARDIAC CATH LAB    Anesthesia Start: 0736 Anesthesia Stop: 0947    Procedure: Ablation A-fib w complete ep study Diagnosis:       Persistent atrial fibrillation (HCC)      (Atrial fibrillation (HCC) [I48.91])    Providers: Guy Delacruz MD Responsible Provider: Chuck Aleman MD    Anesthesia Type: general ASA Status: 3            Anesthesia Type: No value filed.    Chester Phase I: Chester Score: 10    Chester Phase II:      Anesthesia Post Evaluation    Patient location during evaluation: PACU  Patient participation: complete - patient participated  Level of consciousness: awake  Airway patency: patent  Nausea & Vomiting: no nausea and no vomiting  Cardiovascular status: blood pressure returned to baseline  Respiratory status: acceptable  Hydration status: euvolemic  Comments: -----------------------------------------------------            06/12/24 06/12/24 06/12/24 06/12/24               1030      1045      1100      1115     -----------------------------------------------------   BP:     (!) 149/71(!) 132/54  136/65    135/60     Pulse:      58        54        57        62       Resp:       15        13        13        14       Temp:                                              TempSrc:                                           SpO2:      97%       99%       95%       98%       Weight:                                            Height:                                           -----------------------------------------------------    Pain management: adequate    There were no known notable events for this encounter.

## 2024-06-12 NOTE — PROCEDURES
MVP devices were used to close the venotomy sites. The MVP tools were advanced into the 8 Fr and 10 Fr sheaths, respectively; the sheaths were then removed. The collagen was then deployed and a 30 second dwell time was performed to allow for expansion of the collagen. The delivery tools were then removed with adequate hemostasis.    The patient tolerated the procedure well with no acute complications recognized. The patient left the EP lab in stable condition, in normal sinus rhythm. Just prior to pulling shealths, the ICE catheter was used to obtain ultrasound images and revealed no evidence of pericardial effusion.    ABLATION DATA:  Total procedure time: 76 minutes   Total PV/posterior wall ablation time: 8 minutes  Total RF ablation time: 10 minutes 27 seconds  LA Volume: 136 cc     Baseline Intervals:  AH interval: 120 msec  HV interval: 52 msec  SD interval: 225 msec  QRS duration: 98 msec  QT interval: 490 msec  RR interval: 851 msec    EP Study  AV Wenchebach: 670 msec  AV lenard ERP: < or equal to 700/660 msec  VA Wenchebach: 620 msec    Plan of care: The patient will be placed in the same-day discharge protocol, 3-4 hour flat time, followed by ambulation as tolerated and will continue anticoagulation as prescribed pre-procedure.    Impression:   1.  Successful pulmonary vein isolation (PVAI) of the 4 pulmonary veins and posterior wall isolation.  2.  Successful ablation of atypical mitral flutter.  3.  Comprehensive EP study with abnormal intra-atrial, AV lenard and infrahissian conduction times.    Plan:   1.  Continue OAC (Eliquis) indefinitely until EP follow up.  2.  Continue AAD (Tikosyn) for at least 4-6 weeks and stop if no AF.  3.  Family updated with plan.  4.  Routine cardiology/EP follow up as scheduled or PRN.   5.  Consideration of pacemaker if clinical symptomatic bradycardia is determined given abnormal conduction times.   6.  Patient and family updated about small risk of atrioesophageal

## 2024-06-12 NOTE — H&P
Sandra Rock DO at [facility] MAIN OR    SINUS SURGERY   1972     deviated septum surgery    TONSILLECTOMY   1957         Family History         Family History   Problem Relation Age of Onset    Heart Disease Mother      Heart Disease Father      Heart Disease Brother           Social History            Tobacco Use    Smoking status: Never       Passive exposure: Past    Smokeless tobacco: Never   Substance Use Topics    Alcohol use: Not Currently         ROS:  A comprehensive review of systems was performed with the pertinent positives and negatives as noted in the HPI in addition to:  Review of Systems, none.    PHYSICAL EXAM:   /86   Pulse 78   Ht 1.778 m (5' 10\")   Wt 81.2 kg (179 lb)   BMI 25.68 kg/m²           Wt Readings from Last 3 Encounters:   04/25/24 81.2 kg (179 lb)   03/12/24 81.6 kg (179 lb 12.8 oz)   02/08/24 80.3 kg (177 lb)          BP Readings from Last 3 Encounters:   04/25/24 118/86   03/12/24 122/70   02/08/24 120/78         Gen: Well appearing, well developed, no acute distress  Eyes: Pupils equal, round. Extraocular movements are intact  ENT: Oropharynx clear, no oral lesions, normal dentition  CV: S1S2, IRIR, no murmurs, rubs or gallops, normal JVD, no carotid bruits, normal distal pulses, no RADHA  Pulm: Clear to auscultation bilaterally, no accessory muscle uses, no wheezes or rales  GI: Soft, NT, ND, +BS  Neuro: Alert and oriented, nonfocal  Psych: Appropriate affect  Skin: Normal color and skin turgor  MSK: Normal muscle bulk and tone     Medical problems and test results were reviewed with the patient today.

## 2024-06-17 ENCOUNTER — TELEPHONE (OUTPATIENT)
Dept: SLEEP MEDICINE | Age: 74
End: 2024-06-17

## 2024-06-17 NOTE — TELEPHONE ENCOUNTER
----- Message from Clifton Merchant sent at 6/14/2024  2:48 AM EDT -----  Regarding: Sleep Update - Thursday [id number]  Contact: [phone]  Brigid Geronimo wanted to know:     -Last two weeks my night sweats and weird dreams have lessened, very few only 1 or 2 times a week.    -I had an ablation on Wed morning 6/12/24. Feel pretty good after the surgery. Sleeping much better. Never heard from [facility] about the Overnight Oximetry, probably need to wait and see what happens with my Afib recover.     -Started Sucralfate 1gm tablet 4x a day - (for 4 weeks)  -I have Colchicine 0.6 mg 2x a day for pain.      Thanks,  -Megan Merchant

## 2024-07-11 ENCOUNTER — OFFICE VISIT (OUTPATIENT)
Age: 74
End: 2024-07-11
Payer: MEDICARE

## 2024-07-11 VITALS
SYSTOLIC BLOOD PRESSURE: 132 MMHG | HEART RATE: 58 BPM | BODY MASS INDEX: 24.62 KG/M2 | DIASTOLIC BLOOD PRESSURE: 68 MMHG | WEIGHT: 172 LBS | HEIGHT: 70 IN

## 2024-07-11 DIAGNOSIS — Z95.1 S/P CABG (CORONARY ARTERY BYPASS GRAFT): ICD-10-CM

## 2024-07-11 DIAGNOSIS — I10 HYPERTENSION, UNSPECIFIED TYPE: ICD-10-CM

## 2024-07-11 DIAGNOSIS — I48.19 PERSISTENT ATRIAL FIBRILLATION (HCC): Primary | ICD-10-CM

## 2024-07-11 PROCEDURE — 1036F TOBACCO NON-USER: CPT | Performed by: NURSE PRACTITIONER

## 2024-07-11 PROCEDURE — 3017F COLORECTAL CA SCREEN DOC REV: CPT | Performed by: NURSE PRACTITIONER

## 2024-07-11 PROCEDURE — 3075F SYST BP GE 130 - 139MM HG: CPT | Performed by: NURSE PRACTITIONER

## 2024-07-11 PROCEDURE — 3078F DIAST BP <80 MM HG: CPT | Performed by: NURSE PRACTITIONER

## 2024-07-11 PROCEDURE — 93000 ELECTROCARDIOGRAM COMPLETE: CPT | Performed by: NURSE PRACTITIONER

## 2024-07-11 PROCEDURE — G8420 CALC BMI NORM PARAMETERS: HCPCS | Performed by: NURSE PRACTITIONER

## 2024-07-11 PROCEDURE — 1123F ACP DISCUSS/DSCN MKR DOCD: CPT | Performed by: NURSE PRACTITIONER

## 2024-07-11 PROCEDURE — G8427 DOCREV CUR MEDS BY ELIG CLIN: HCPCS | Performed by: NURSE PRACTITIONER

## 2024-07-11 PROCEDURE — 99214 OFFICE O/P EST MOD 30 MIN: CPT | Performed by: NURSE PRACTITIONER

## 2024-07-11 NOTE — PROGRESS NOTES
[address], [address]  [place] [address]  PHONE: [phone]  1950    SUBJECTIVE:   Clifton Merchant is a 73 y.o. male seen for a follow up visit regarding the following:     Chief Complaint   Patient presents with    Atrial Fibrillation       HPI:    Clifton Merchant is a very pleasant 73 y.o. male with a past medical and cardiac history significant for CAD s/p CABG, HTN, and AF.  Pt was seen by EP for consult for afib management.      Patient presents for one month post afib ablation follow up. Patient reports feeling well.  No chest pain or shortness of breath. Still with fatigue and low energy. Rare palpitations.  EKG today shows SR with PACs.   Post op colchicine, Carafate, and Protonix stopped.        OAC will be continued    [id number] Score: 5  Disclaimer: Risk Score calculation is dependent on accuracy of patient problem list and past encounter diagnosis.         Cardiac PMH: (Old records have been reviewed and summarized below)  TTE (8/10/16): EF 50-55%, mod to severe LAE   MPI (9/9/16): normal perfusion      EKG:  (EKG has been independently visualized by me with interpretation below): Atrial fibrillaiton, rightward axis, NSST changes. QTC stable.       ECHO: 6/7/20204: EF 45-50%       Previous Heart Catheterization: 2021, History of CABG   Severe multivessel CAD  3/4 patent bypass grafts (LIMA to LAD, SVG to RCA, SVG to OM1)  Occluded SVG to D1, conduit fills via LM could be target for revasc but vessel is sub 2 mm in diameter  LVEDP normal  LV gram deferred in order to reduce contrast exposure      Stress Test: 9/2016   Left Ventricular Size: normal.  Transient Ischemic Dilatation: not present.  Gated cine images could not be performed due to atrial fibrillation.    CONCLUSION:   1. Stress EKG: Normal.  2. SPECT Perfusion Imaging: Normal Perfusion.  3. LV Systolic Function is  not calculated due to inability to gate images   due to atrial fibrillation.

## 2024-07-12 DIAGNOSIS — S54.12XD NEURAPRAXIA OF LEFT MEDIAN NERVE, SUBSEQUENT ENCOUNTER: ICD-10-CM

## 2024-07-14 RX ORDER — GABAPENTIN 300 MG/1
CAPSULE ORAL
Qty: 270 CAPSULE | Refills: 3 | Status: SHIPPED | OUTPATIENT
Start: 2024-07-14 | End: 2025-05-07

## 2024-07-14 RX ORDER — OMEPRAZOLE 20 MG/1
20 CAPSULE, DELAYED RELEASE ORAL DAILY
Qty: 90 CAPSULE | Refills: 3 | Status: SHIPPED | OUTPATIENT
Start: 2024-07-14

## 2024-08-05 RX ORDER — FENOFIBRATE 160 MG/1
TABLET ORAL
Qty: 90 TABLET | Refills: 3 | Status: SHIPPED | OUTPATIENT
Start: 2024-08-05

## 2024-08-05 NOTE — TELEPHONE ENCOUNTER
Requested Prescriptions     Pending Prescriptions Disp Refills    fenofibrate (TRIGLIDE) 160 MG tablet [Pharmacy Med Name: FENOFIBRATE 160 MG Tablet] 90 tablet 3     Sig: TAKE 1 TABLET EVERY DAY WITH LUNCH    Verified rx in last OV date 7/11/24. Pharmacy confirmed. Erx as requested

## 2024-08-22 ENCOUNTER — OFFICE VISIT (OUTPATIENT)
Dept: FAMILY MEDICINE CLINIC | Facility: CLINIC | Age: 74
End: 2024-08-22

## 2024-08-22 VITALS
WEIGHT: 175 LBS | DIASTOLIC BLOOD PRESSURE: 68 MMHG | HEART RATE: 76 BPM | OXYGEN SATURATION: 98 % | HEIGHT: 70 IN | SYSTOLIC BLOOD PRESSURE: 120 MMHG | TEMPERATURE: 98.1 F | BODY MASS INDEX: 25.05 KG/M2

## 2024-08-22 DIAGNOSIS — G47.33 OSA ON CPAP: ICD-10-CM

## 2024-08-22 DIAGNOSIS — D68.69 SECONDARY HYPERCOAGULABLE STATE (HCC): ICD-10-CM

## 2024-08-22 DIAGNOSIS — Z95.1 S/P CABG (CORONARY ARTERY BYPASS GRAFT): ICD-10-CM

## 2024-08-22 DIAGNOSIS — F32.A DEPRESSION, UNSPECIFIED DEPRESSION TYPE: ICD-10-CM

## 2024-08-22 DIAGNOSIS — I48.19 PERSISTENT ATRIAL FIBRILLATION (HCC): ICD-10-CM

## 2024-08-22 DIAGNOSIS — I10 ESSENTIAL (PRIMARY) HYPERTENSION: Primary | ICD-10-CM

## 2024-08-22 DIAGNOSIS — E11.65 TYPE 2 DIABETES MELLITUS WITH HYPERGLYCEMIA, UNSPECIFIED WHETHER LONG TERM INSULIN USE (HCC): ICD-10-CM

## 2024-08-22 LAB — HBA1C MFR BLD: 8.7 %

## 2024-08-22 RX ORDER — PAROXETINE HYDROCHLORIDE HEMIHYDRATE 25 MG/1
25 TABLET, FILM COATED, EXTENDED RELEASE ORAL EVERY EVENING
Qty: 90 TABLET | Refills: 3 | Status: SHIPPED | OUTPATIENT
Start: 2024-08-22 | End: 2025-08-17

## 2024-08-22 RX ORDER — CITALOPRAM HYDROBROMIDE 40 MG/1
TABLET ORAL
Refills: 0 | OUTPATIENT
Start: 2024-08-22

## 2024-08-22 RX ORDER — LEVOTHYROXINE SODIUM 88 UG/1
88 TABLET ORAL DAILY
Qty: 90 TABLET | Refills: 1 | Status: SHIPPED | OUTPATIENT
Start: 2024-08-22

## 2024-08-22 RX ORDER — GLIMEPIRIDE 1 MG/1
1 TABLET ORAL
Qty: 90 TABLET | Refills: 3 | Status: SHIPPED | OUTPATIENT
Start: 2024-08-22

## 2024-09-08 PROBLEM — D68.69 SECONDARY HYPERCOAGULABLE STATE (HCC): Status: ACTIVE | Noted: 2024-09-08

## 2024-09-23 ENCOUNTER — OFFICE VISIT (OUTPATIENT)
Age: 74
End: 2024-09-23
Payer: MEDICARE

## 2024-09-23 ENCOUNTER — TELEPHONE (OUTPATIENT)
Age: 74
End: 2024-09-23

## 2024-09-23 VITALS
SYSTOLIC BLOOD PRESSURE: 136 MMHG | HEART RATE: 106 BPM | HEIGHT: 70 IN | BODY MASS INDEX: 25.05 KG/M2 | WEIGHT: 175 LBS | DIASTOLIC BLOOD PRESSURE: 68 MMHG

## 2024-09-23 DIAGNOSIS — I48.0 PAROXYSMAL ATRIAL FIBRILLATION (HCC): Primary | ICD-10-CM

## 2024-09-23 DIAGNOSIS — I48.0 PAROXYSMAL ATRIAL FIBRILLATION (HCC): ICD-10-CM

## 2024-09-23 DIAGNOSIS — I48.19 PERSISTENT ATRIAL FIBRILLATION (HCC): Primary | ICD-10-CM

## 2024-09-23 LAB
ANION GAP SERPL CALC-SCNC: 11 MMOL/L (ref 9–18)
BASOPHILS # BLD: 0 K/UL (ref 0–0.2)
BASOPHILS NFR BLD: 1 % (ref 0–2)
BUN SERPL-MCNC: 19 MG/DL (ref 8–23)
CALCIUM SERPL-MCNC: 9.7 MG/DL (ref 8.8–10.2)
CHLORIDE SERPL-SCNC: 106 MMOL/L (ref 98–107)
CO2 SERPL-SCNC: 30 MMOL/L (ref 20–28)
CREAT SERPL-MCNC: 1.33 MG/DL (ref 0.8–1.3)
DIFFERENTIAL METHOD BLD: ABNORMAL
EOSINOPHIL # BLD: 0.2 K/UL (ref 0–0.8)
EOSINOPHIL NFR BLD: 4 % (ref 0.5–7.8)
ERYTHROCYTE [DISTWIDTH] IN BLOOD BY AUTOMATED COUNT: 15.1 % (ref 11.9–14.6)
GLUCOSE SERPL-MCNC: 87 MG/DL (ref 70–99)
HCT VFR BLD AUTO: 44.9 % (ref 41.1–50.3)
HGB BLD-MCNC: 14 G/DL (ref 13.6–17.2)
IMM GRANULOCYTES # BLD AUTO: 0 K/UL (ref 0–0.5)
IMM GRANULOCYTES NFR BLD AUTO: 0 % (ref 0–5)
LYMPHOCYTES # BLD: 0.7 K/UL (ref 0.5–4.6)
LYMPHOCYTES NFR BLD: 13 % (ref 13–44)
MAGNESIUM SERPL-MCNC: 1.7 MG/DL (ref 1.8–2.4)
MCH RBC QN AUTO: 29 PG (ref 26.1–32.9)
MCHC RBC AUTO-ENTMCNC: 31.2 G/DL (ref 31.4–35)
MCV RBC AUTO: 93 FL (ref 82–102)
MONOCYTES # BLD: 0.6 K/UL (ref 0.1–1.3)
MONOCYTES NFR BLD: 11 % (ref 4–12)
NEUTS SEG # BLD: 4.1 K/UL (ref 1.7–8.2)
NEUTS SEG NFR BLD: 71 % (ref 43–78)
NRBC # BLD: 0 K/UL (ref 0–0.2)
PLATELET # BLD AUTO: 217 K/UL (ref 150–450)
PMV BLD AUTO: 12.8 FL (ref 9.4–12.3)
POTASSIUM SERPL-SCNC: 4.1 MMOL/L (ref 3.5–5.1)
RBC # BLD AUTO: 4.83 M/UL (ref 4.23–5.6)
SODIUM SERPL-SCNC: 146 MMOL/L (ref 136–145)
WBC # BLD AUTO: 5.7 K/UL (ref 4.3–11.1)

## 2024-09-23 PROCEDURE — 3017F COLORECTAL CA SCREEN DOC REV: CPT | Performed by: INTERNAL MEDICINE

## 2024-09-23 PROCEDURE — 3075F SYST BP GE 130 - 139MM HG: CPT | Performed by: INTERNAL MEDICINE

## 2024-09-23 PROCEDURE — 1036F TOBACCO NON-USER: CPT | Performed by: INTERNAL MEDICINE

## 2024-09-23 PROCEDURE — G8428 CUR MEDS NOT DOCUMENT: HCPCS | Performed by: INTERNAL MEDICINE

## 2024-09-23 PROCEDURE — 3078F DIAST BP <80 MM HG: CPT | Performed by: INTERNAL MEDICINE

## 2024-09-23 PROCEDURE — 93000 ELECTROCARDIOGRAM COMPLETE: CPT | Performed by: INTERNAL MEDICINE

## 2024-09-23 PROCEDURE — 99214 OFFICE O/P EST MOD 30 MIN: CPT | Performed by: INTERNAL MEDICINE

## 2024-09-23 PROCEDURE — 1123F ACP DISCUSS/DSCN MKR DOCD: CPT | Performed by: INTERNAL MEDICINE

## 2024-09-23 PROCEDURE — G8417 CALC BMI ABV UP PARAM F/U: HCPCS | Performed by: INTERNAL MEDICINE

## 2024-09-23 RX ORDER — DOFETILIDE 0.25 MG/1
500 CAPSULE ORAL 2 TIMES DAILY
Qty: 180 CAPSULE | Refills: 3 | Status: SHIPPED | OUTPATIENT
Start: 2024-09-23

## 2024-09-23 RX ORDER — DOFETILIDE 0.25 MG/1
500 CAPSULE ORAL 2 TIMES DAILY
COMMUNITY
End: 2024-09-23 | Stop reason: SDUPTHER

## 2024-09-23 ASSESSMENT — ENCOUNTER SYMPTOMS
GASTROINTESTINAL NEGATIVE: 1
EYES NEGATIVE: 1
RESPIRATORY NEGATIVE: 1
ALLERGIC/IMMUNOLOGIC NEGATIVE: 1

## 2024-09-25 ENCOUNTER — HOSPITAL ENCOUNTER (OUTPATIENT)
Dept: CARDIAC CATH/INVASIVE PROCEDURES | Age: 74
Discharge: HOME | End: 2024-09-25
Attending: INTERNAL MEDICINE | Admitting: INTERNAL MEDICINE
Payer: MEDICARE

## 2024-09-25 VITALS
WEIGHT: 175 LBS | OXYGEN SATURATION: 99 % | DIASTOLIC BLOOD PRESSURE: 115 MMHG | BODY MASS INDEX: 25.05 KG/M2 | SYSTOLIC BLOOD PRESSURE: 188 MMHG | TEMPERATURE: 98 F | HEIGHT: 70 IN | HEART RATE: 69 BPM

## 2024-09-25 DIAGNOSIS — I48.0 PAROXYSMAL ATRIAL FIBRILLATION (HCC): ICD-10-CM

## 2024-09-25 LAB
EKG ATRIAL RATE: 65 BPM
EKG DIAGNOSIS: NORMAL
EKG P AXIS: 70 DEGREES
EKG P-R INTERVAL: 208 MS
EKG Q-T INTERVAL: 458 MS
EKG QRS DURATION: 88 MS
EKG QTC CALCULATION (BAZETT): 476 MS
EKG R AXIS: -63 DEGREES
EKG T AXIS: 62 DEGREES
EKG VENTRICULAR RATE: 65 BPM
MAGNESIUM SERPL-MCNC: 1.7 MG/DL (ref 1.8–2.4)

## 2024-09-25 PROCEDURE — 93005 ELECTROCARDIOGRAM TRACING: CPT | Performed by: INTERNAL MEDICINE

## 2024-09-25 PROCEDURE — 93010 ELECTROCARDIOGRAM REPORT: CPT | Performed by: INTERNAL MEDICINE

## 2024-09-25 PROCEDURE — 83735 ASSAY OF MAGNESIUM: CPT

## 2024-10-03 ENCOUNTER — OFFICE VISIT (OUTPATIENT)
Dept: ENT CLINIC | Age: 74
End: 2024-10-03

## 2024-10-03 VITALS — HEIGHT: 70 IN | BODY MASS INDEX: 25.06 KG/M2 | RESPIRATION RATE: 14 BRPM | WEIGHT: 175.04 LBS

## 2024-10-03 DIAGNOSIS — J32.0 CHRONIC MAXILLARY SINUSITIS: Primary | ICD-10-CM

## 2024-10-03 DIAGNOSIS — E11.65 TYPE 2 DIABETES MELLITUS WITH HYPERGLYCEMIA, UNSPECIFIED WHETHER LONG TERM INSULIN USE (HCC): ICD-10-CM

## 2024-10-03 RX ORDER — BLOOD SUGAR DIAGNOSTIC
STRIP MISCELLANEOUS
Qty: 100 STRIP | Refills: 5 | Status: SHIPPED | OUTPATIENT
Start: 2024-10-03

## 2024-10-03 RX ORDER — BLOOD SUGAR DIAGNOSTIC
STRIP MISCELLANEOUS
Qty: 100 STRIP | Refills: 5 | OUTPATIENT
Start: 2024-10-03

## 2024-10-03 ASSESSMENT — ENCOUNTER SYMPTOMS
ALLERGIC/IMMUNOLOGIC NEGATIVE: 1
GASTROINTESTINAL NEGATIVE: 1
EYES NEGATIVE: 1

## 2024-10-04 ASSESSMENT — ENCOUNTER SYMPTOMS: COUGH: 1

## 2024-10-06 ENCOUNTER — PATIENT MESSAGE (OUTPATIENT)
Dept: FAMILY MEDICINE CLINIC | Facility: CLINIC | Age: 74
End: 2024-10-06

## 2024-10-06 DIAGNOSIS — E11.65 TYPE 2 DIABETES MELLITUS WITH HYPERGLYCEMIA, UNSPECIFIED WHETHER LONG TERM INSULIN USE (HCC): ICD-10-CM

## 2024-10-06 LAB
BACTERIA SPEC CULT: ABNORMAL
GRAM STN SPEC: ABNORMAL
GRAM STN SPEC: ABNORMAL
SERVICE CMNT-IMP: ABNORMAL

## 2024-10-09 RX ORDER — BLOOD SUGAR DIAGNOSTIC
STRIP MISCELLANEOUS
Qty: 100 STRIP | Refills: 5 | Status: SHIPPED | OUTPATIENT
Start: 2024-10-09

## 2024-11-11 RX ORDER — ATORVASTATIN CALCIUM 80 MG/1
80 TABLET, FILM COATED ORAL DAILY
Qty: 90 TABLET | Refills: 3 | Status: SHIPPED | OUTPATIENT
Start: 2024-11-11

## 2024-11-11 NOTE — TELEPHONE ENCOUNTER
Requested Prescriptions     Pending Prescriptions Disp Refills    atorvastatin (LIPITOR) 80 MG tablet [Pharmacy Med Name: Atorvastatin Calcium Oral Tablet 80 MG] 90 tablet 3     Sig: TAKE 1 TABLET EVERY DAY    Verified rx in last OV date 9/23/24. Pharmacy confirmed. Erx as requested

## 2024-11-21 ENCOUNTER — TELEPHONE (OUTPATIENT)
Age: 74
End: 2024-11-21

## 2024-11-21 ENCOUNTER — OFFICE VISIT (OUTPATIENT)
Age: 74
End: 2024-11-21

## 2024-11-21 ENCOUNTER — OFFICE VISIT (OUTPATIENT)
Dept: ENT CLINIC | Age: 74
End: 2024-11-21
Payer: MEDICARE

## 2024-11-21 VITALS
BODY MASS INDEX: 25.48 KG/M2 | SYSTOLIC BLOOD PRESSURE: 124 MMHG | DIASTOLIC BLOOD PRESSURE: 82 MMHG | WEIGHT: 178 LBS | HEIGHT: 70 IN | RESPIRATION RATE: 18 BRPM

## 2024-11-21 VITALS
SYSTOLIC BLOOD PRESSURE: 138 MMHG | HEART RATE: 67 BPM | HEIGHT: 70 IN | WEIGHT: 179 LBS | BODY MASS INDEX: 25.62 KG/M2 | DIASTOLIC BLOOD PRESSURE: 80 MMHG

## 2024-11-21 DIAGNOSIS — I48.19 PERSISTENT ATRIAL FIBRILLATION (HCC): Primary | ICD-10-CM

## 2024-11-21 DIAGNOSIS — Z98.890 S/P FESS (FUNCTIONAL ENDOSCOPIC SINUS SURGERY): ICD-10-CM

## 2024-11-21 DIAGNOSIS — J32.9 CHRONIC RHINOSINUSITIS: ICD-10-CM

## 2024-11-21 DIAGNOSIS — J32.9 CHRONIC RHINOSINUSITIS: Primary | ICD-10-CM

## 2024-11-21 PROCEDURE — 3074F SYST BP LT 130 MM HG: CPT | Performed by: STUDENT IN AN ORGANIZED HEALTH CARE EDUCATION/TRAINING PROGRAM

## 2024-11-21 PROCEDURE — 3079F DIAST BP 80-89 MM HG: CPT | Performed by: STUDENT IN AN ORGANIZED HEALTH CARE EDUCATION/TRAINING PROGRAM

## 2024-11-21 PROCEDURE — 1123F ACP DISCUSS/DSCN MKR DOCD: CPT | Performed by: STUDENT IN AN ORGANIZED HEALTH CARE EDUCATION/TRAINING PROGRAM

## 2024-11-21 PROCEDURE — 31237 NSL/SINS NDSC SURG BX POLYPC: CPT | Performed by: STUDENT IN AN ORGANIZED HEALTH CARE EDUCATION/TRAINING PROGRAM

## 2024-11-21 PROCEDURE — 1036F TOBACCO NON-USER: CPT | Performed by: STUDENT IN AN ORGANIZED HEALTH CARE EDUCATION/TRAINING PROGRAM

## 2024-11-21 PROCEDURE — 1159F MED LIST DOCD IN RCRD: CPT | Performed by: STUDENT IN AN ORGANIZED HEALTH CARE EDUCATION/TRAINING PROGRAM

## 2024-11-21 PROCEDURE — G8417 CALC BMI ABV UP PARAM F/U: HCPCS | Performed by: STUDENT IN AN ORGANIZED HEALTH CARE EDUCATION/TRAINING PROGRAM

## 2024-11-21 PROCEDURE — 1160F RVW MEDS BY RX/DR IN RCRD: CPT | Performed by: STUDENT IN AN ORGANIZED HEALTH CARE EDUCATION/TRAINING PROGRAM

## 2024-11-21 PROCEDURE — 99213 OFFICE O/P EST LOW 20 MIN: CPT | Performed by: STUDENT IN AN ORGANIZED HEALTH CARE EDUCATION/TRAINING PROGRAM

## 2024-11-21 PROCEDURE — G8427 DOCREV CUR MEDS BY ELIG CLIN: HCPCS | Performed by: STUDENT IN AN ORGANIZED HEALTH CARE EDUCATION/TRAINING PROGRAM

## 2024-11-21 PROCEDURE — 3017F COLORECTAL CA SCREEN DOC REV: CPT | Performed by: STUDENT IN AN ORGANIZED HEALTH CARE EDUCATION/TRAINING PROGRAM

## 2024-11-21 PROCEDURE — G8484 FLU IMMUNIZE NO ADMIN: HCPCS | Performed by: STUDENT IN AN ORGANIZED HEALTH CARE EDUCATION/TRAINING PROGRAM

## 2024-11-21 ASSESSMENT — ENCOUNTER SYMPTOMS
EYE ITCHING: 0
RESPIRATORY NEGATIVE: 1
CHOKING: 0
WHEEZING: 0
GASTROINTESTINAL NEGATIVE: 1
FACIAL SWELLING: 0
SHORTNESS OF BREATH: 0
SINUS PRESSURE: 0
STRIDOR: 0
DIARRHEA: 0
SINUS PAIN: 0
COUGH: 0
CONSTIPATION: 0
ALLERGIC/IMMUNOLOGIC NEGATIVE: 1
EYES NEGATIVE: 1
NAUSEA: 0
EYE PAIN: 0
APNEA: 0
EYE DISCHARGE: 0

## 2024-11-21 NOTE — PROGRESS NOTES
significant mucosal inflammation.  On the R side, the mucosa was healthy and the turbinates were intact. The middle meatus was clear w/ no edema, polyps or mucopurulence and the sphenoethmoid recess was was clear. On the L side, the mucosa was healthy and the turbinates were intact. The middle meatus was clear w/ no edema, polyps or mucopurulence and the sphenoethmoid recess was was clear. The scope was then removed and the patient tolerated the procedure well w/ no complications.       ASSESSMENT and PLAN:        ICD-10-CM    1. Chronic rhinosinusitis  J32.9 NASAL SCOPE,BX/RMV POLYP/DEBRID     Culture Sinus      2. S/P FESS (functional endoscopic sinus surgery)  Z98.890           Assessment & Plan  1. Chronic sinusitis.  A significant crust is present in the right maxillary sinus, which was partially cleaned out. Mucus was extracted from it, but its size may be hindering complete removal.  Thankfully his surgical sinus cavity remains widely patent.  A swab was taken for culture to confirm if the staph aureus is still present and to check for any resistance. A new prescription rinse will be provided from the compounding pharmacy including mupirocin/gentamicin/budesonide. If the culture results are positive, an oral antibiotic will be prescribed. If symptoms persist and the crust remains, a revision  FESS procedure to wash out the sinus and maxillary antrostomy to extend the sinus opening may be considered to prevent future complications.    Follow-up  Return in 1 month for follow up.        Sandra Rock,   11/21/2024      The patient (or guardian, if applicable) and other individuals in attendance with the patient were advised that Artificial Intelligence will be utilized during this visit to record, process the conversation to generate a clinical note, and support improvement of the AI technology. The patient (or guardian, if applicable) and other individuals in attendance at the appointment consented to

## 2024-11-21 NOTE — TELEPHONE ENCOUNTER
Patient seen today for follow up with Dr. Delacruz. Had PVI 6/12/24.     Registered 6 and 12 month heart monitors for home delivery to patient's verified address.    Understanding verbalized of heart monitor instructions.

## 2024-11-21 NOTE — PROGRESS NOTES
[facility]  [address]  [place] [address] [phone]    Patient Name:  Clifton Merchant  YOB: 1950    Clifton Merchant, was evaluated through a synchronous (real-time) audio-video encounter. The patient (or guardian if applicable) is aware that this is a billable service, which includes applicable co-pays. This Virtual Visit was conducted with patient's (and/or legal guardian's) consent. Patient identification was verified, and a caregiver was present when appropriate.   The patient was located at Home: [address] [address]  Provider was located at Home (Appt Dept State): [place]  Confirm you are appropriately licensed, registered, or certified to deliver care in the state where the patient is located as indicated above. If you are not or unsure, please re-schedule the visit: Yes, I confirm.          --DIANNE Ray - CNP on 11/25/2024 at 1:10 PM             Office Visit 11/25/2024    CHIEF COMPLAINT:    Chief Complaint   Patient presents with    CPAP/BiPAP    Sleep Apnea    Follow-up     F/u on pressure change       HISTORY OF PRESENT ILLNESS:  Patient is being seen today via virtual visit.     He has a history of severe sleep apnea from PSG in 2007 with RDI 69.8 events per hour with desaturations to 75%. He is prescribed auto CPAP 12 to 20 cm using a nasal mask. Download reveals 100% compliance over the past 175 days with average nightly use 7 hours 46 min. AHI is 2.8/hr with average pressure used 12.5-15 cm. He is using  nasal mask mask and is happy with it. He currently has a sinus infection and is a second round of antibiotics and now on doxycycline. He is managed by ENT> BEB[facility]O on cpap with excellent oxygenation.      Since last visit, he is having vivid dreams on occasion associated with his pillows kicked off into the floor. He wonders if he was allergic to his laundry detergent and has changed this. His sweating has improved as well

## 2024-11-21 NOTE — PROGRESS NOTES
[facility] CARDIOLOGY  [address] [place] [address], [address]  [place] [address]  PHONE: [phone]        24      NAME:  Clifton Merchant  : 1950  MRN: [id number]     Referring Cardiologist: Enio Weinstein MD     Reason for Consultation: Atrial fibrillation     ASSESSMENT and PLAN:  Diagnoses and all orders for this visit:      1. Persistent atrial fibrillation (HCC), [id number] = 5, on Pradaxa, Tikosyn. S/p AF/AFL ablation 2019 and repeat ablation 2024 with posterior wall isolation. He has severe remodeling.      2. S/P CABG/Aortocoronary bypass status, performed in .      3. Essential hypertension      4. Coronary artery disease without angina pectoris, unspecified vessel or lesion type, unspecified whether native or transplanted heart      5. Controlled type 2 diabetes mellitus with hyperglycemia (HCC)      6. Anxiety      7. Pure hypercholesterolemia      8. Neuropraxia of median nerve.       9. COVID infection 2022      10. Benign essential tremor     74-year-old male with a history of persistent atrial fibrillation which is now drug refractory. He has previously undergone an AF ablation x 2. He remains on Tikosyn and Eliquis.      -AF - Stable but remains OOR. S/p AF ablation. Back in atypical flutter/fine AT. He does have significant LA remodeling with a severely enlarged LA. Transitioned to Eliquis from Pradaxa (vivid dreams as SE?).   -Continue Tikosyn for now. If 100% burden of AF on monitor, will consider d/c for good at next followup.  -Exercise capacity remains excellent.      -Maxillary sinus issues - s/p sinus surgery, doing much better.      -Benign essential tremor - improved movement with Paxil. Sees Dr. Dani Weinstein as well.       -Achilles tendon injury - stable, improving.       -EP Follow up in 6 months or PRN.     Patient has been instructed and agrees to call our office with any issues or other concerns related to their cardiac condition(s) and/or complaint(s).

## 2024-11-24 RX ORDER — SULFAMETHOXAZOLE AND TRIMETHOPRIM 400; 80 MG/1; MG/1
1 TABLET ORAL 2 TIMES DAILY
Qty: 20 TABLET | Refills: 0 | Status: SHIPPED | OUTPATIENT
Start: 2024-11-24 | End: 2024-11-24

## 2024-11-24 RX ORDER — DOXYCYCLINE HYCLATE 100 MG
100 TABLET ORAL 2 TIMES DAILY
Qty: 28 TABLET | Refills: 0 | Status: SHIPPED | OUTPATIENT
Start: 2024-11-24 | End: 2024-12-08

## 2024-11-25 ENCOUNTER — TELEPHONE (OUTPATIENT)
Dept: ENT CLINIC | Age: 74
End: 2024-11-25

## 2024-11-25 ENCOUNTER — TELEMEDICINE (OUTPATIENT)
Dept: SLEEP MEDICINE | Age: 74
End: 2024-11-25
Payer: MEDICARE

## 2024-11-25 DIAGNOSIS — G47.33 OSA ON CPAP: Primary | ICD-10-CM

## 2024-11-25 DIAGNOSIS — R68.89 VIVID DREAM: ICD-10-CM

## 2024-11-25 PROCEDURE — 99213 OFFICE O/P EST LOW 20 MIN: CPT | Performed by: NURSE PRACTITIONER

## 2024-11-25 PROCEDURE — 1159F MED LIST DOCD IN RCRD: CPT | Performed by: NURSE PRACTITIONER

## 2024-11-25 PROCEDURE — 3017F COLORECTAL CA SCREEN DOC REV: CPT | Performed by: NURSE PRACTITIONER

## 2024-11-25 PROCEDURE — 1123F ACP DISCUSS/DSCN MKR DOCD: CPT | Performed by: NURSE PRACTITIONER

## 2024-11-25 PROCEDURE — 1160F RVW MEDS BY RX/DR IN RCRD: CPT | Performed by: NURSE PRACTITIONER

## 2024-11-25 PROCEDURE — G8427 DOCREV CUR MEDS BY ELIG CLIN: HCPCS | Performed by: NURSE PRACTITIONER

## 2024-11-25 NOTE — TELEPHONE ENCOUNTER
----- Message from Dr. Sandra Rock, DO sent at 11/24/2024  9:44 PM EST -----  Regarding: New antibiotic  I originally sent Bactrim to his pharmacy but unfortunately this is contraindicated with one of his cardiac medications.  I have now sent doxycycline to his pharmacy.  Please call to ensure that the doxycycline is started and that he does not take the Bactrim.  May also want to confirm with his pharmacy.  Thank you

## 2024-11-25 NOTE — RESULT ENCOUNTER NOTE
Patient' sinus culture again shows staph RES.  It appears to be sensitive to all the tested antibiotics.  He recently completed 10 days of Augmentin.  I will send him a new oral antibiotic doxycycline to his pharmacy for 14 days.  He should start his new medicated sinus rinse twice daily until his next office follow-up.  Please call to give results.

## 2024-11-25 NOTE — TELEPHONE ENCOUNTER
Patient's pharmacy confirmed cancellation with no dispense.   Called to ensure patient has picked up doxycycline . No answer , detailed message left asking patient to call our office to discuss.

## 2024-12-03 ENCOUNTER — OFFICE VISIT (OUTPATIENT)
Dept: FAMILY MEDICINE CLINIC | Facility: CLINIC | Age: 74
End: 2024-12-03
Payer: MEDICARE

## 2024-12-03 VITALS
HEIGHT: 70 IN | DIASTOLIC BLOOD PRESSURE: 70 MMHG | BODY MASS INDEX: 25.48 KG/M2 | HEART RATE: 60 BPM | TEMPERATURE: 97.8 F | SYSTOLIC BLOOD PRESSURE: 140 MMHG | WEIGHT: 178 LBS | OXYGEN SATURATION: 98 %

## 2024-12-03 DIAGNOSIS — E11.21 TYPE 2 DIABETES MELLITUS WITH NEPHROPATHY (HCC): Primary | ICD-10-CM

## 2024-12-03 DIAGNOSIS — I10 PRIMARY HYPERTENSION: ICD-10-CM

## 2024-12-03 LAB — HBA1C MFR BLD: 6.9 %

## 2024-12-03 PROCEDURE — 99213 OFFICE O/P EST LOW 20 MIN: CPT | Performed by: NURSE PRACTITIONER

## 2024-12-03 PROCEDURE — G8417 CALC BMI ABV UP PARAM F/U: HCPCS | Performed by: NURSE PRACTITIONER

## 2024-12-03 PROCEDURE — 3051F HG A1C>EQUAL 7.0%<8.0%: CPT | Performed by: NURSE PRACTITIONER

## 2024-12-03 PROCEDURE — 83036 HEMOGLOBIN GLYCOSYLATED A1C: CPT | Performed by: NURSE PRACTITIONER

## 2024-12-03 PROCEDURE — 3078F DIAST BP <80 MM HG: CPT | Performed by: NURSE PRACTITIONER

## 2024-12-03 PROCEDURE — 1036F TOBACCO NON-USER: CPT | Performed by: NURSE PRACTITIONER

## 2024-12-03 PROCEDURE — G8427 DOCREV CUR MEDS BY ELIG CLIN: HCPCS | Performed by: NURSE PRACTITIONER

## 2024-12-03 PROCEDURE — 3017F COLORECTAL CA SCREEN DOC REV: CPT | Performed by: NURSE PRACTITIONER

## 2024-12-03 PROCEDURE — 1159F MED LIST DOCD IN RCRD: CPT | Performed by: NURSE PRACTITIONER

## 2024-12-03 PROCEDURE — 2022F DILAT RTA XM EVC RTNOPTHY: CPT | Performed by: NURSE PRACTITIONER

## 2024-12-03 PROCEDURE — 1123F ACP DISCUSS/DSCN MKR DOCD: CPT | Performed by: NURSE PRACTITIONER

## 2024-12-03 PROCEDURE — G8484 FLU IMMUNIZE NO ADMIN: HCPCS | Performed by: NURSE PRACTITIONER

## 2024-12-03 PROCEDURE — 3077F SYST BP >= 140 MM HG: CPT | Performed by: NURSE PRACTITIONER

## 2024-12-03 PROCEDURE — 1160F RVW MEDS BY RX/DR IN RCRD: CPT | Performed by: NURSE PRACTITIONER

## 2024-12-03 ASSESSMENT — ENCOUNTER SYMPTOMS
COUGH: 0
EYE REDNESS: 0
ALLERGIC/IMMUNOLOGIC NEGATIVE: 1
COLOR CHANGE: 1
CHEST TIGHTNESS: 0
GASTROINTESTINAL NEGATIVE: 1
WHEEZING: 0
VOMITING: 0
NAUSEA: 0
SHORTNESS OF BREATH: 0
EYE DISCHARGE: 0

## 2024-12-03 NOTE — PROGRESS NOTES
Staphylococcus aureus (A)    Final       Asessment and Plan  1. Type 2 diabetes mellitus with nephropathy (HCC)  -     AMB POC HEMOGLOBIN A1C  2. Primary hypertension    A1c improved to 6.9% today.  Patient praised for great progress.  Remain on current dose 0.5 mg Amaryl once daily.  Continue regular exercise and healthy diet.  Recheck A1c in 3 months.    BP slightly above goal today.  Begin checking BP at home and follow-up with cardiology if persistently greater than 140/90.    Return if symptoms worsen or fail to improve.    DIANNE Bowens - NP  On this date I have spent 22 minutes reviewing previous notes, test results and face to face with the patient discussing the diagnosis and importance of compliance with the treatment plan as well as documenting on the day of the visit.

## 2024-12-19 ENCOUNTER — OFFICE VISIT (OUTPATIENT)
Dept: ENT CLINIC | Age: 74
End: 2024-12-19
Payer: MEDICARE

## 2024-12-19 VITALS
BODY MASS INDEX: 25.91 KG/M2 | WEIGHT: 181 LBS | DIASTOLIC BLOOD PRESSURE: 72 MMHG | RESPIRATION RATE: 19 BRPM | SYSTOLIC BLOOD PRESSURE: 126 MMHG | HEIGHT: 70 IN

## 2024-12-19 DIAGNOSIS — J32.9 CHRONIC RHINOSINUSITIS: Primary | ICD-10-CM

## 2024-12-19 DIAGNOSIS — Z98.890 S/P FESS (FUNCTIONAL ENDOSCOPIC SINUS SURGERY): ICD-10-CM

## 2024-12-19 DIAGNOSIS — J32.0 CHRONIC MAXILLARY SINUSITIS: ICD-10-CM

## 2024-12-19 PROCEDURE — 1123F ACP DISCUSS/DSCN MKR DOCD: CPT | Performed by: STUDENT IN AN ORGANIZED HEALTH CARE EDUCATION/TRAINING PROGRAM

## 2024-12-19 PROCEDURE — 1159F MED LIST DOCD IN RCRD: CPT | Performed by: STUDENT IN AN ORGANIZED HEALTH CARE EDUCATION/TRAINING PROGRAM

## 2024-12-19 PROCEDURE — G8482 FLU IMMUNIZE ORDER/ADMIN: HCPCS | Performed by: STUDENT IN AN ORGANIZED HEALTH CARE EDUCATION/TRAINING PROGRAM

## 2024-12-19 PROCEDURE — 3074F SYST BP LT 130 MM HG: CPT | Performed by: STUDENT IN AN ORGANIZED HEALTH CARE EDUCATION/TRAINING PROGRAM

## 2024-12-19 PROCEDURE — 1160F RVW MEDS BY RX/DR IN RCRD: CPT | Performed by: STUDENT IN AN ORGANIZED HEALTH CARE EDUCATION/TRAINING PROGRAM

## 2024-12-19 PROCEDURE — G8417 CALC BMI ABV UP PARAM F/U: HCPCS | Performed by: STUDENT IN AN ORGANIZED HEALTH CARE EDUCATION/TRAINING PROGRAM

## 2024-12-19 PROCEDURE — G8427 DOCREV CUR MEDS BY ELIG CLIN: HCPCS | Performed by: STUDENT IN AN ORGANIZED HEALTH CARE EDUCATION/TRAINING PROGRAM

## 2024-12-19 PROCEDURE — 3078F DIAST BP <80 MM HG: CPT | Performed by: STUDENT IN AN ORGANIZED HEALTH CARE EDUCATION/TRAINING PROGRAM

## 2024-12-19 PROCEDURE — 3017F COLORECTAL CA SCREEN DOC REV: CPT | Performed by: STUDENT IN AN ORGANIZED HEALTH CARE EDUCATION/TRAINING PROGRAM

## 2024-12-19 PROCEDURE — 99213 OFFICE O/P EST LOW 20 MIN: CPT | Performed by: STUDENT IN AN ORGANIZED HEALTH CARE EDUCATION/TRAINING PROGRAM

## 2024-12-19 PROCEDURE — 1036F TOBACCO NON-USER: CPT | Performed by: STUDENT IN AN ORGANIZED HEALTH CARE EDUCATION/TRAINING PROGRAM

## 2024-12-19 PROCEDURE — 31237 NSL/SINS NDSC SURG BX POLYPC: CPT | Performed by: STUDENT IN AN ORGANIZED HEALTH CARE EDUCATION/TRAINING PROGRAM

## 2024-12-19 RX ORDER — TERBINAFINE HYDROCHLORIDE 250 MG/1
250 TABLET ORAL
COMMUNITY
Start: 2024-12-05 | End: 2025-03-05

## 2024-12-19 ASSESSMENT — ENCOUNTER SYMPTOMS
COUGH: 0
SHORTNESS OF BREATH: 0
EYE ITCHING: 0
SINUS PAIN: 0
NAUSEA: 0
DIARRHEA: 0
WHEEZING: 0
EYE DISCHARGE: 0
APNEA: 0
EYE PAIN: 0
CONSTIPATION: 0
SINUS PRESSURE: 0
CHOKING: 0
STRIDOR: 0
FACIAL SWELLING: 0

## 2024-12-19 NOTE — PROGRESS NOTES
MAIN OR    SINUS SURGERY  1972    deviated septum surgery    TONSILLECTOMY  1957       Social History     Tobacco Use    Smoking status: Never     Passive exposure: Past    Smokeless tobacco: Never   Substance Use Topics    Alcohol use: Not Currently       Family History   Problem Relation Age of Onset    Heart Disease Mother     Heart Disease Father     Heart Disease Brother         ROS:    Review of Systems   Constitutional:  Negative for chills and fever.   HENT:  Negative for congestion, facial swelling, hearing loss, nosebleeds, sinus pressure, sinus pain and sneezing.    Eyes:  Negative for pain, discharge and itching.   Respiratory:  Negative for apnea, cough, choking, shortness of breath, wheezing and stridor.    Cardiovascular:  Negative for chest pain.   Gastrointestinal:  Negative for constipation, diarrhea and nausea.   Endocrine: Negative for polyuria.   Genitourinary:  Negative for difficulty urinating and flank pain.   Musculoskeletal:  Negative for arthralgias, myalgias and neck stiffness.   Skin:  Negative for rash and wound.   Allergic/Immunologic: Negative for environmental allergies.   Neurological:  Negative for dizziness, facial asymmetry and headaches.   Hematological:  Negative for adenopathy. Does not bruise/bleed easily.   Psychiatric/Behavioral:  Negative for agitation, behavioral problems and confusion.            PHYSICAL EXAM:    /72   Resp 19   Ht 1.788 m (5' 10.39\")   Wt 82.1 kg (181 lb)   BMI 25.68 kg/m²     Physical Exam  Vitals and nursing note reviewed.   Constitutional:       Appearance: Normal appearance.   HENT:      Head: Normocephalic and atraumatic.      Right Ear: Tympanic membrane, ear canal and external ear normal. There is no impacted cerumen.      Left Ear: Tympanic membrane, ear canal and external ear normal. There is no impacted cerumen.      Nose: Nose normal. No congestion or rhinorrhea.      Mouth/Throat:      Mouth: Mucous membranes are moist.

## 2025-01-01 ENCOUNTER — PATIENT MESSAGE (OUTPATIENT)
Dept: FAMILY MEDICINE CLINIC | Facility: CLINIC | Age: 75
End: 2025-01-01

## 2025-01-01 DIAGNOSIS — E11.21 TYPE 2 DIABETES MELLITUS WITH NEPHROPATHY (HCC): Primary | ICD-10-CM

## 2025-01-13 ENCOUNTER — TELEPHONE (OUTPATIENT)
Age: 75
End: 2025-01-13

## 2025-01-13 NOTE — TELEPHONE ENCOUNTER
CHRISTIANO STONE From [facility] at [facility] reported episode for pt he had a slow A.flutter hr 37 lasted  30 seconds no symptoms noted time of episode was 2:57am. Report shown to Dr Delacruz and he said ok no changes made.

## 2025-01-18 SDOH — HEALTH STABILITY: PHYSICAL HEALTH: ON AVERAGE, HOW MANY DAYS PER WEEK DO YOU ENGAGE IN MODERATE TO STRENUOUS EXERCISE (LIKE A BRISK WALK)?: 5 DAYS

## 2025-01-18 SDOH — HEALTH STABILITY: PHYSICAL HEALTH: ON AVERAGE, HOW MANY MINUTES DO YOU ENGAGE IN EXERCISE AT THIS LEVEL?: 80 MIN

## 2025-01-21 ENCOUNTER — LAB (OUTPATIENT)
Dept: FAMILY MEDICINE CLINIC | Facility: CLINIC | Age: 75
End: 2025-01-21

## 2025-01-21 ENCOUNTER — OFFICE VISIT (OUTPATIENT)
Dept: FAMILY MEDICINE CLINIC | Facility: CLINIC | Age: 75
End: 2025-01-21

## 2025-01-21 VITALS
SYSTOLIC BLOOD PRESSURE: 139 MMHG | HEIGHT: 70 IN | DIASTOLIC BLOOD PRESSURE: 83 MMHG | HEART RATE: 62 BPM | TEMPERATURE: 97.8 F | WEIGHT: 181.4 LBS | BODY MASS INDEX: 25.97 KG/M2

## 2025-01-21 DIAGNOSIS — D68.69 SECONDARY HYPERCOAGULABLE STATE (HCC): ICD-10-CM

## 2025-01-21 DIAGNOSIS — G47.33 OSA (OBSTRUCTIVE SLEEP APNEA): ICD-10-CM

## 2025-01-21 DIAGNOSIS — Z86.79 S/P ABLATION OF ATRIAL FIBRILLATION: ICD-10-CM

## 2025-01-21 DIAGNOSIS — Z98.890 S/P ACHILLES TENDON REPAIR: ICD-10-CM

## 2025-01-21 DIAGNOSIS — E78.5 HYPERLIPIDEMIA, UNSPECIFIED HYPERLIPIDEMIA TYPE: ICD-10-CM

## 2025-01-21 DIAGNOSIS — E11.69 TYPE 2 DIABETES MELLITUS WITH OTHER SPECIFIED COMPLICATION, WITHOUT LONG-TERM CURRENT USE OF INSULIN (HCC): Primary | ICD-10-CM

## 2025-01-21 DIAGNOSIS — E03.9 HYPOTHYROIDISM (ACQUIRED): ICD-10-CM

## 2025-01-21 DIAGNOSIS — I10 HYPERTENSION, UNSPECIFIED TYPE: ICD-10-CM

## 2025-01-21 DIAGNOSIS — M15.9 OSTEOARTHRITIS OF MULTIPLE JOINTS, UNSPECIFIED OSTEOARTHRITIS TYPE: ICD-10-CM

## 2025-01-21 DIAGNOSIS — G89.4 PAIN SYNDROME, CHRONIC: ICD-10-CM

## 2025-01-21 DIAGNOSIS — Z95.1 S/P CABG (CORONARY ARTERY BYPASS GRAFT): ICD-10-CM

## 2025-01-21 DIAGNOSIS — F33.9 RECURRENT MAJOR DEPRESSIVE DISORDER, REMISSION STATUS UNSPECIFIED (HCC): ICD-10-CM

## 2025-01-21 DIAGNOSIS — I48.91 ATRIAL FIBRILLATION, UNSPECIFIED TYPE (HCC): ICD-10-CM

## 2025-01-21 DIAGNOSIS — F41.1 GAD (GENERALIZED ANXIETY DISORDER): ICD-10-CM

## 2025-01-21 DIAGNOSIS — K21.9 GASTROESOPHAGEAL REFLUX DISEASE, UNSPECIFIED WHETHER ESOPHAGITIS PRESENT: ICD-10-CM

## 2025-01-21 DIAGNOSIS — R25.1 TREMOR OF LEFT HAND: ICD-10-CM

## 2025-01-21 DIAGNOSIS — J32.9 RECURRENT SINUS INFECTIONS: ICD-10-CM

## 2025-01-21 DIAGNOSIS — I25.10 ASCVD (ARTERIOSCLEROTIC CARDIOVASCULAR DISEASE): ICD-10-CM

## 2025-01-21 DIAGNOSIS — M51.372 DEGENERATION OF INTERVERTEBRAL DISC OF LUMBOSACRAL REGION WITH DISCOGENIC BACK PAIN AND LOWER EXTREMITY PAIN: ICD-10-CM

## 2025-01-21 DIAGNOSIS — R61 NIGHT SWEATS: ICD-10-CM

## 2025-01-21 DIAGNOSIS — Z98.890 S/P ABLATION OF ATRIAL FIBRILLATION: ICD-10-CM

## 2025-01-21 DIAGNOSIS — N18.30 STAGE 3 CHRONIC KIDNEY DISEASE, UNSPECIFIED WHETHER STAGE 3A OR 3B CKD (HCC): ICD-10-CM

## 2025-01-21 DIAGNOSIS — E11.69 TYPE 2 DIABETES MELLITUS WITH OTHER SPECIFIED COMPLICATION, WITHOUT LONG-TERM CURRENT USE OF INSULIN (HCC): ICD-10-CM

## 2025-01-21 DIAGNOSIS — N40.1 BENIGN PROSTATIC HYPERPLASIA WITH LOWER URINARY TRACT SYMPTOMS, SYMPTOM DETAILS UNSPECIFIED: ICD-10-CM

## 2025-01-21 DIAGNOSIS — F90.9 ATTENTION DEFICIT HYPERACTIVITY DISORDER (ADHD), UNSPECIFIED ADHD TYPE: ICD-10-CM

## 2025-01-21 PROBLEM — J34.2 DEVIATED NASAL SEPTUM: Status: RESOLVED | Noted: 2022-10-17 | Resolved: 2025-01-21

## 2025-01-21 PROBLEM — R13.19 OTHER DYSPHAGIA: Status: RESOLVED | Noted: 2021-07-01 | Resolved: 2025-01-21

## 2025-01-21 PROBLEM — M51.379 DDD (DEGENERATIVE DISC DISEASE), LUMBOSACRAL: Status: ACTIVE | Noted: 2025-01-21

## 2025-01-21 PROBLEM — J34.89 NASAL OBSTRUCTION: Status: RESOLVED | Noted: 2022-10-17 | Resolved: 2025-01-21

## 2025-01-21 PROBLEM — M19.90 OA (OSTEOARTHRITIS): Status: ACTIVE | Noted: 2025-01-21

## 2025-01-21 PROBLEM — N40.0 BPH (BENIGN PROSTATIC HYPERPLASIA): Status: ACTIVE | Noted: 2025-01-21

## 2025-01-21 PROBLEM — M76.62 ACHILLES TENDINITIS, LEFT LEG: Status: RESOLVED | Noted: 2023-10-17 | Resolved: 2025-01-21

## 2025-01-21 PROBLEM — R07.9 CHEST PAIN: Status: RESOLVED | Noted: 2021-08-30 | Resolved: 2025-01-21

## 2025-01-21 PROBLEM — E11.9 DM2 (DIABETES MELLITUS, TYPE 2) (HCC): Status: ACTIVE | Noted: 2018-01-19

## 2025-01-21 PROBLEM — F32.9 MDD (MAJOR DEPRESSIVE DISORDER): Status: ACTIVE | Noted: 2025-01-21

## 2025-01-21 PROBLEM — R22.1 NECK MASS: Status: RESOLVED | Noted: 2021-05-24 | Resolved: 2025-01-21

## 2025-01-21 PROBLEM — I20.0 UNSTABLE ANGINA PECTORIS (HCC): Status: RESOLVED | Noted: 2021-08-30 | Resolved: 2025-01-21

## 2025-01-21 PROBLEM — J34.3 NASAL TURBINATE HYPERTROPHY: Status: RESOLVED | Noted: 2022-10-17 | Resolved: 2025-01-21

## 2025-01-21 LAB
ALBUMIN SERPL-MCNC: 3.4 G/DL (ref 3.2–4.6)
ALBUMIN/GLOB SERPL: 1.3 (ref 1–1.9)
ALP SERPL-CCNC: 72 U/L (ref 40–129)
ALT SERPL-CCNC: 23 U/L (ref 8–55)
ANION GAP SERPL CALC-SCNC: 8 MMOL/L (ref 7–16)
AST SERPL-CCNC: 27 U/L (ref 15–37)
BILIRUB SERPL-MCNC: 0.4 MG/DL (ref 0–1.2)
BUN SERPL-MCNC: 21 MG/DL (ref 8–23)
CALCIUM SERPL-MCNC: 8.8 MG/DL (ref 8.8–10.2)
CHLORIDE SERPL-SCNC: 108 MMOL/L (ref 98–107)
CHOLEST SERPL-MCNC: 166 MG/DL (ref 0–200)
CO2 SERPL-SCNC: 28 MMOL/L (ref 20–29)
CREAT SERPL-MCNC: 1.38 MG/DL (ref 0.8–1.3)
CREAT UR-MCNC: 180 MG/DL (ref 39–259)
EST. AVERAGE GLUCOSE BLD GHB EST-MCNC: 172 MG/DL
GLOBULIN SER CALC-MCNC: 2.7 G/DL (ref 2.3–3.5)
GLUCOSE SERPL-MCNC: 208 MG/DL (ref 70–99)
HBA1C MFR BLD: 7.6 % (ref 0–5.6)
HDLC SERPL-MCNC: 68 MG/DL (ref 40–60)
HDLC SERPL: 2.4 (ref 0–5)
LDLC SERPL CALC-MCNC: 82 MG/DL (ref 0–100)
MICROALBUMIN UR-MCNC: 25 MG/DL (ref 0–20)
MICROALBUMIN/CREAT UR-RTO: 139 MG/G (ref 0–30)
POTASSIUM SERPL-SCNC: 4 MMOL/L (ref 3.5–5.1)
PROT SERPL-MCNC: 6 G/DL (ref 6.3–8.2)
SODIUM SERPL-SCNC: 144 MMOL/L (ref 136–145)
TRIGL SERPL-MCNC: 81 MG/DL (ref 0–150)
TSH W FREE THYROID IF ABNORMAL: 2.04 UIU/ML (ref 0.27–4.2)
VLDLC SERPL CALC-MCNC: 16 MG/DL (ref 6–23)

## 2025-01-21 RX ORDER — PAROXETINE HYDROCHLORIDE HEMIHYDRATE 25 MG/1
25 TABLET, FILM COATED, EXTENDED RELEASE ORAL EVERY EVENING
Qty: 100 TABLET | Refills: 0 | Status: SHIPPED | OUTPATIENT
Start: 2025-01-21

## 2025-01-21 RX ORDER — GABAPENTIN 300 MG/1
300 CAPSULE ORAL 3 TIMES DAILY
Qty: 300 CAPSULE | Refills: 0 | Status: SHIPPED | OUTPATIENT
Start: 2025-01-21 | End: 2025-08-09

## 2025-01-21 RX ORDER — GLIMEPIRIDE 1 MG/1
0.5 TABLET ORAL
Status: CANCELLED | OUTPATIENT
Start: 2025-01-21

## 2025-01-21 RX ORDER — GLUCOSAMINE HCL/CHONDROITIN SU 500-400 MG
CAPSULE ORAL
Qty: 100 STRIP | Refills: 0 | Status: SHIPPED | OUTPATIENT
Start: 2025-01-21

## 2025-01-21 RX ORDER — LEVOTHYROXINE SODIUM 88 UG/1
88 TABLET ORAL DAILY
Qty: 100 TABLET | Refills: 0 | Status: SHIPPED | OUTPATIENT
Start: 2025-01-21

## 2025-01-21 RX ORDER — METFORMIN HYDROCHLORIDE 500 MG/1
500 TABLET, EXTENDED RELEASE ORAL
Qty: 100 TABLET | Refills: 0 | Status: SHIPPED | OUTPATIENT
Start: 2025-01-21

## 2025-01-21 NOTE — ASSESSMENT & PLAN NOTE
Problem and/or Symptoms are new to provider. Will have patient follow up as directed and make the following plan for further evaluation and/or treatment:    Pertinent labs reviewed/pending; pertinent meds RF X 3 M

## 2025-01-21 NOTE — PROGRESS NOTES
[place] [address]  Phone: [phone]  Fax: [phone]  Email: [email or web address]      Encounter Info  Clifton Merchant; New patient 74 y.o.male; seen 1/21/2025 for: Gastroesophageal Reflux, test strips (Need to go to [facility]), and Diabetes (Discuss A1c, was last check in Dec and want better control of blood sugars. States they are all over the place)      Assessment & Plan    1. Type 2 diabetes mellitus with other specified complication, without long-term current use of insulin (HCC)  Assessment & Plan:  Problem and/or Symptoms are new to provider. Will have patient follow up as directed and make the following plan for further evaluation and/or treatment:    Not Stable: DC Amaryl & starting pt on a low dose of Metformin but XR (extended release) which should be better tolerated than the IR option he was on previously. Also checking pertinent labs today & will recheck A1C in 3 M  Orders:  -     metFORMIN (GLUCOPHAGE-XR) 500 MG extended release tablet; Take 1 tablet by mouth Daily with supper, Disp-100 tablet, R-0Normal  -     Albumin/Creatinine Ratio, Urine; Standing  -     Comprehensive Metabolic Panel; Standing  -     Hemoglobin A1C; Standing  -     blood glucose monitor strips; Use to check blood sugar daily as directed - May be used for ANY brand/type that is covered, in stock, or patient preference, Disp-100 strip, R-0, Normal  2. S/P CABG (coronary artery bypass graft)  3. S/P ablation of atrial fibrillation  4. Degeneration of intervertebral disc of lumbosacral region with discogenic back pain and lower extremity pain  Assessment & Plan:  Problem and/or Symptoms are new to provider. Will have patient follow up as directed and make the following plan for further evaluation and/or treatment:    Pertinent labs reviewed/pending; pertinent meds RF X 3 M  5. Osteoarthritis of multiple joints, unspecified osteoarthritis type  Assessment & Plan:  Problem and/or

## 2025-01-21 NOTE — ASSESSMENT & PLAN NOTE
Problem is managed by appropriate specialist and patient encouraged to attend all scheduled visits and take all medications as directed.

## 2025-01-21 NOTE — ASSESSMENT & PLAN NOTE
Problem and/or Symptoms are new to provider. Will have patient follow up as directed and make the following plan for further evaluation and/or treatment:    Same plan as for LAYLA

## 2025-01-21 NOTE — ASSESSMENT & PLAN NOTE
Problem and/or Symptoms are new to provider. Will have patient follow up as directed and make the following plan for further evaluation and/or treatment:    Pertinent labs reviewed/pending; pertinent meds RF X 3 M.     Pt has had some night sweats intermittently for the past year, which started < a year after starting SSRI Paxil. Will check pertinent labs today to r/o other causes of night sweats, but if all wnl & pt continues to have these issues by f/u in 3 M will consider switching from an SSRI to alt option, such as an SNRI.

## 2025-01-21 NOTE — PATIENT INSTRUCTIONS
I am checking one or more labs today related to your health concerns and if any results require a change in your treatment regimen you will be notified right away.    - Continue taking all the medications on this list as directed; this list is current and please discontinue any medications not on this list. Please call the office or use \"My Chart\" online to request medication refills prior to running out.    - PLEASE NOTE: when needing to schedule a visit you should send our office a [employer] message requesting a visit (or call the office if you can't use [employer]) and we will send you a scheduling ticket that will include open dates & times. You can then use this feature to select your preferred date & time for an office visit, virtual visit, and/or lab visit. Please make sure to respond to this scheduling ticket ASAP when received.     Please do NOT schedule any visit through the [employer] self-scheduling feature; any self-scheduling should only be done using the scheduling ticket provided to you directly from our office. Also, please do NOT schedule any visits through our out of state call center. If you ever need to speak to someone at our office directly please follow these instructions: when you call our office number, [phone], and get the phone tree options, press \"Option 2\" first & then \"Option 1\". This combination should take you directly to someone at our office and bypass the out of state call center.

## 2025-01-21 NOTE — ASSESSMENT & PLAN NOTE
Problem and/or Symptoms are new to provider. Will have patient follow up as directed and make the following plan for further evaluation and/or treatment:    Not Stable: DC Amaryl & starting pt on a low dose of Metformin but XR (extended release) which should be better tolerated than the IR option he was on previously. Also checking pertinent labs today & will recheck A1C in 3 M

## 2025-01-29 ENCOUNTER — TELEPHONE (OUTPATIENT)
Dept: FAMILY MEDICINE CLINIC | Facility: CLINIC | Age: 75
End: 2025-01-29

## 2025-01-29 NOTE — TELEPHONE ENCOUNTER
Ave from [facility] requesting the proscription order for test strips to be re-sent stating the exact brand (OneTouch Verio) test strips. On the directions stating \"use to test blood sugar daily\" nothing else.    The reason for the specifications is that they need to bill his part B medicare plan and they are very specific.    Any additional questions please call Ave at [phone].    Thank you!

## 2025-01-31 NOTE — TELEPHONE ENCOUNTER
No, I am not doing that...    This is included on the script itself:    \"May be used for ANY brand/type that is covered, in stock, or patient preference\"      If pharmacy says that they have to have a specific brand name then they can use that script for the specific brand required. I am NOT resending scripts for specific brand names just because the pharmacy asks for it; it is NOT necessary.

## 2025-01-31 NOTE — TELEPHONE ENCOUNTER
You addressed this on 1/23/25, looks like pharmacy will not fill without the name of test.*sigh* Medicare insurances will come by and audit the pharmacy's hard copy files so that be the only reason I can think of why they MUST have the name on the prescription.Please advise tks :)

## 2025-02-07 ENCOUNTER — TELEPHONE (OUTPATIENT)
Age: 75
End: 2025-02-07

## 2025-02-07 ENCOUNTER — OFFICE VISIT (OUTPATIENT)
Age: 75
End: 2025-02-07

## 2025-02-07 VITALS
HEART RATE: 69 BPM | HEIGHT: 70 IN | DIASTOLIC BLOOD PRESSURE: 66 MMHG | WEIGHT: 181 LBS | SYSTOLIC BLOOD PRESSURE: 138 MMHG | BODY MASS INDEX: 25.91 KG/M2

## 2025-02-07 DIAGNOSIS — I48.19 PERSISTENT ATRIAL FIBRILLATION (HCC): Primary | ICD-10-CM

## 2025-02-07 ASSESSMENT — ENCOUNTER SYMPTOMS
EYES NEGATIVE: 1
ALLERGIC/IMMUNOLOGIC NEGATIVE: 1
RESPIRATORY NEGATIVE: 1
GASTROINTESTINAL NEGATIVE: 1

## 2025-02-07 NOTE — PROGRESS NOTES
with lunch      cyanocobalamin 1000 MCG tablet Take 1 tablet by mouth       No current facility-administered medications for this visit.     No Known Allergies    Past Medical History:   Diagnosis Date    Anxiety     Arrhythmia     Atrial fib.    CAD (coronary artery disease) 1995    followed by Dr. Weinstein    Calculus of kidney     right renal calculi    Chronic pain     back     DDD (degenerative disc disease)     Diabetes (HCC)     type 2; glimeperide as needed per patient ; a1c = 8.5 as of 4/28/2023; check sugars daily avg is 80 per patient; hypoglycemic at 60    Diverticulitis 2013    Endocrine disease     thyroid    GERD (gastroesophageal reflux disease)     controlled with medication     History of EKG 02/15/2021    NSR with incomp RBBB    History of shingles 2013    HLD (hyperlipidemia) 7/5/2016    Hx of echocardiogram 07/01/2019    LVEF >55%    Hypercholesteremia     medication     Hypertension     controlled with medication  per patient     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     anxiety - controlled     S/P CABG (coronary artery bypass graft) 07/05/2016    4 vessel bypass    Sleep apnea     cpap    Sleep disorder 2007    apnea-uses CPAP    Thyroid disease     Hypothyroid     Past Surgical History:   Procedure Laterality Date    ACHILLES TENDON SURGERY Left 10/27/2023    Left Achilles secondary recostruction/Repair with Flexor hallucis longus and haglunds excision. performed by Jae Juárez III, MD at [facility]    ANGIOPLASTY  1995    CARDIAC CATHETERIZATION  06/25/2019    COLONOSCOPY N/A 7/14/2017    COLONOSCOPY performed by DELVIS Crowder MD at [facility]    COLONOSCOPY  2010    Dr. Kwok (5yrs)    COLONOSCOPY  07/14/2017    CORONARY ARTERY BYPASS GRAFT      quadruple bypass    EP DEVICE PROCEDURE N/A 6/12/2024    Ablation A-fib w complete ep study performed by Guy Delacruz MD at [facility] CARDI[facility] CATH [facility]    HEENT      nasal surgery 2ndary to obstruction

## 2025-02-27 ENCOUNTER — OFFICE VISIT (OUTPATIENT)
Dept: CARDIOTHORACIC SURGERY | Age: 75
End: 2025-02-27

## 2025-02-27 VITALS
WEIGHT: 179.8 LBS | HEART RATE: 66 BPM | DIASTOLIC BLOOD PRESSURE: 83 MMHG | HEIGHT: 70 IN | BODY MASS INDEX: 25.74 KG/M2 | SYSTOLIC BLOOD PRESSURE: 175 MMHG

## 2025-02-27 DIAGNOSIS — I48.91 ATRIAL FIBRILLATION, UNSPECIFIED TYPE (HCC): Primary | ICD-10-CM

## 2025-02-27 NOTE — PROGRESS NOTES
ISAIAS Jackson MD, MS        Clifton Merchatn         2/27/2025 1950       HISTORY OF PRESENT ILLNESS:  The patient is a 74 y.o. male who is seen for evaluation of minimally invasive convergent procedure for persistent atrial fibrillation refractory to transcatheter ablation. He does take eliquis. He denies any bleeding episodes or risk factors for bleeding. He does have a history of coronary artery bypass grafting in 1999.        Past Medical History:   Diagnosis Date    Anxiety     Arrhythmia     Atrial fib.    CAD (coronary artery disease) 1995    followed by Dr. Weinstein    Calculus of kidney     right renal calculi    Chronic pain     back     DDD (degenerative disc disease)     Diabetes (HCC)     type 2; glimeperide as needed per patient ; a1c = 8.5 as of 4/28/2023; check sugars daily avg is 80 per patient; hypoglycemic at 60    Diverticulitis 2013    Endocrine disease     thyroid    GERD (gastroesophageal reflux disease)     controlled with medication     History of EKG 02/15/2021    NSR with incomp RBBB    History of shingles 2013    HLD (hyperlipidemia) 7/5/2016    Hx of echocardiogram 07/01/2019    LVEF >55%    Hypercholesteremia     medication     Hypertension     controlled with medication  per patient     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 performed by Jae Juárez III, MD at [facility]    ANGIOPLASTY  1995    CARDIAC CATHETERIZATION  06/25/2019    COLONOSCOPY N/A 7/14/2017    COLONOSCOPY performed by DELVIS Crowder MD at [facility] END[facility]COPY    COLONOSCOPY  2010

## 2025-03-09 DIAGNOSIS — N40.1 BENIGN PROSTATIC HYPERPLASIA WITH URINARY OBSTRUCTION: ICD-10-CM

## 2025-03-09 DIAGNOSIS — N13.8 BENIGN PROSTATIC HYPERPLASIA WITH URINARY OBSTRUCTION: ICD-10-CM

## 2025-03-10 RX ORDER — TAMSULOSIN HYDROCHLORIDE 0.4 MG/1
0.4 CAPSULE ORAL EVERY EVENING
Qty: 90 CAPSULE | Refills: 3 | OUTPATIENT
Start: 2025-03-10

## 2025-03-10 RX ORDER — BENAZEPRIL HYDROCHLORIDE 20 MG/1
20 TABLET ORAL
Qty: 90 TABLET | Refills: 3 | Status: SHIPPED | OUTPATIENT
Start: 2025-03-10

## 2025-03-10 NOTE — TELEPHONE ENCOUNTER
Requested Prescriptions     Pending Prescriptions Disp Refills    benazepril (LOTENSIN) 20 MG tablet 90 tablet 3     Sig: Take 1 tablet by mouth Daily with lunch    Verified rx in last OV date 2/7/25. Pharmacy confirmed. Erx as requested

## 2025-04-07 SDOH — HEALTH STABILITY: PHYSICAL HEALTH: ON AVERAGE, HOW MANY MINUTES DO YOU ENGAGE IN EXERCISE AT THIS LEVEL?: 60 MIN

## 2025-04-07 SDOH — HEALTH STABILITY: PHYSICAL HEALTH: ON AVERAGE, HOW MANY DAYS PER WEEK DO YOU ENGAGE IN MODERATE TO STRENUOUS EXERCISE (LIKE A BRISK WALK)?: 4 DAYS

## 2025-04-08 ENCOUNTER — OFFICE VISIT (OUTPATIENT)
Dept: PRIMARY CARE CLINIC | Facility: CLINIC | Age: 75
End: 2025-04-08
Payer: MEDICARE

## 2025-04-08 VITALS
HEIGHT: 70 IN | BODY MASS INDEX: 25.25 KG/M2 | DIASTOLIC BLOOD PRESSURE: 78 MMHG | WEIGHT: 176.4 LBS | HEART RATE: 76 BPM | OXYGEN SATURATION: 98 % | SYSTOLIC BLOOD PRESSURE: 166 MMHG

## 2025-04-08 DIAGNOSIS — I48.91 ATRIAL FIBRILLATION, UNSPECIFIED TYPE (HCC): ICD-10-CM

## 2025-04-08 DIAGNOSIS — E11.69 TYPE 2 DIABETES MELLITUS WITH OTHER SPECIFIED COMPLICATION, WITHOUT LONG-TERM CURRENT USE OF INSULIN: ICD-10-CM

## 2025-04-08 DIAGNOSIS — S54.12XS: ICD-10-CM

## 2025-04-08 DIAGNOSIS — E03.9 HYPOTHYROIDISM (ACQUIRED): ICD-10-CM

## 2025-04-08 DIAGNOSIS — F33.9 RECURRENT MAJOR DEPRESSIVE DISORDER, REMISSION STATUS UNSPECIFIED: ICD-10-CM

## 2025-04-08 DIAGNOSIS — K21.9 GASTROESOPHAGEAL REFLUX DISEASE, UNSPECIFIED WHETHER ESOPHAGITIS PRESENT: ICD-10-CM

## 2025-04-08 DIAGNOSIS — E78.00 HIGH CHOLESTEROL: ICD-10-CM

## 2025-04-08 DIAGNOSIS — G89.4 PAIN SYNDROME, CHRONIC: ICD-10-CM

## 2025-04-08 DIAGNOSIS — Z95.1 S/P CABG (CORONARY ARTERY BYPASS GRAFT): ICD-10-CM

## 2025-04-08 DIAGNOSIS — F41.1 GAD (GENERALIZED ANXIETY DISORDER): ICD-10-CM

## 2025-04-08 DIAGNOSIS — Z00.00 ENCOUNTER FOR MEDICAL EXAMINATION TO ESTABLISH CARE: Primary | ICD-10-CM

## 2025-04-08 DIAGNOSIS — I25.10 ASCVD (ARTERIOSCLEROTIC CARDIOVASCULAR DISEASE): ICD-10-CM

## 2025-04-08 LAB
ALBUMIN SERPL-MCNC: 4 G/DL (ref 3.2–4.6)
ALBUMIN/GLOB SERPL: 1.4 (ref 1–1.9)
ALP SERPL-CCNC: 82 U/L (ref 40–129)
ALT SERPL-CCNC: 25 U/L (ref 8–55)
ANION GAP SERPL CALC-SCNC: 12 MMOL/L (ref 7–16)
AST SERPL-CCNC: 22 U/L (ref 15–37)
BASOPHILS # BLD: 0.05 K/UL (ref 0–0.2)
BASOPHILS NFR BLD: 1.2 % (ref 0–2)
BILIRUB SERPL-MCNC: 0.5 MG/DL (ref 0–1.2)
BUN SERPL-MCNC: 26 MG/DL (ref 8–23)
CALCIUM SERPL-MCNC: 10.1 MG/DL (ref 8.8–10.2)
CHLORIDE SERPL-SCNC: 104 MMOL/L (ref 98–107)
CHOLEST SERPL-MCNC: 191 MG/DL (ref 0–200)
CO2 SERPL-SCNC: 29 MMOL/L (ref 20–29)
CREAT SERPL-MCNC: 1.46 MG/DL (ref 0.8–1.3)
DIFFERENTIAL METHOD BLD: ABNORMAL
EOSINOPHIL # BLD: 0.16 K/UL (ref 0–0.8)
EOSINOPHIL NFR BLD: 3.7 % (ref 0.5–7.8)
ERYTHROCYTE [DISTWIDTH] IN BLOOD BY AUTOMATED COUNT: 14.7 % (ref 11.9–14.6)
EST. AVERAGE GLUCOSE BLD GHB EST-MCNC: 224 MG/DL
GLOBULIN SER CALC-MCNC: 2.8 G/DL (ref 2.3–3.5)
GLUCOSE SERPL-MCNC: 168 MG/DL (ref 70–99)
HBA1C MFR BLD: 9.4 % (ref 0–5.6)
HCT VFR BLD AUTO: 44.1 % (ref 41.1–50.3)
HDLC SERPL-MCNC: 75 MG/DL (ref 40–60)
HDLC SERPL: 2.6 (ref 0–5)
HGB BLD-MCNC: 13.9 G/DL (ref 13.6–17.2)
IMM GRANULOCYTES # BLD AUTO: 0.01 K/UL (ref 0–0.5)
IMM GRANULOCYTES NFR BLD AUTO: 0.2 % (ref 0–5)
LDLC SERPL CALC-MCNC: 101 MG/DL (ref 0–100)
LYMPHOCYTES # BLD: 0.92 K/UL (ref 0.5–4.6)
LYMPHOCYTES NFR BLD: 21.2 % (ref 13–44)
MCH RBC QN AUTO: 28.6 PG (ref 26.1–32.9)
MCHC RBC AUTO-ENTMCNC: 31.5 G/DL (ref 31.4–35)
MCV RBC AUTO: 90.7 FL (ref 82–102)
MONOCYTES # BLD: 0.56 K/UL (ref 0.1–1.3)
MONOCYTES NFR BLD: 12.9 % (ref 4–12)
NEUTS SEG # BLD: 2.63 K/UL (ref 1.7–8.2)
NEUTS SEG NFR BLD: 60.8 % (ref 43–78)
NRBC # BLD: 0 K/UL (ref 0–0.2)
PLATELET # BLD AUTO: 216 K/UL (ref 150–450)
PMV BLD AUTO: 12.5 FL (ref 9.4–12.3)
POTASSIUM SERPL-SCNC: 4.6 MMOL/L (ref 3.5–5.1)
PROT SERPL-MCNC: 6.8 G/DL (ref 6.3–8.2)
RBC # BLD AUTO: 4.86 M/UL (ref 4.23–5.6)
SODIUM SERPL-SCNC: 144 MMOL/L (ref 136–145)
TRIGL SERPL-MCNC: 77 MG/DL (ref 0–150)
TSH, 3RD GENERATION: 1.18 UIU/ML (ref 0.27–4.2)
VLDLC SERPL CALC-MCNC: 15 MG/DL (ref 6–23)
WBC # BLD AUTO: 4.3 K/UL (ref 4.3–11.1)

## 2025-04-08 PROCEDURE — 1160F RVW MEDS BY RX/DR IN RCRD: CPT | Performed by: INTERNAL MEDICINE

## 2025-04-08 PROCEDURE — 99205 OFFICE O/P NEW HI 60 MIN: CPT | Performed by: INTERNAL MEDICINE

## 2025-04-08 PROCEDURE — 1123F ACP DISCUSS/DSCN MKR DOCD: CPT | Performed by: INTERNAL MEDICINE

## 2025-04-08 PROCEDURE — G8427 DOCREV CUR MEDS BY ELIG CLIN: HCPCS | Performed by: INTERNAL MEDICINE

## 2025-04-08 PROCEDURE — 2022F DILAT RTA XM EVC RTNOPTHY: CPT | Performed by: INTERNAL MEDICINE

## 2025-04-08 PROCEDURE — 3078F DIAST BP <80 MM HG: CPT | Performed by: INTERNAL MEDICINE

## 2025-04-08 PROCEDURE — 1159F MED LIST DOCD IN RCRD: CPT | Performed by: INTERNAL MEDICINE

## 2025-04-08 PROCEDURE — 1036F TOBACCO NON-USER: CPT | Performed by: INTERNAL MEDICINE

## 2025-04-08 PROCEDURE — G8417 CALC BMI ABV UP PARAM F/U: HCPCS | Performed by: INTERNAL MEDICINE

## 2025-04-08 PROCEDURE — 3077F SYST BP >= 140 MM HG: CPT | Performed by: INTERNAL MEDICINE

## 2025-04-08 PROCEDURE — G2211 COMPLEX E/M VISIT ADD ON: HCPCS | Performed by: INTERNAL MEDICINE

## 2025-04-08 PROCEDURE — 3017F COLORECTAL CA SCREEN DOC REV: CPT | Performed by: INTERNAL MEDICINE

## 2025-04-08 PROCEDURE — 3051F HG A1C>EQUAL 7.0%<8.0%: CPT | Performed by: INTERNAL MEDICINE

## 2025-04-08 RX ORDER — OMEPRAZOLE 20 MG/1
20 CAPSULE, DELAYED RELEASE ORAL DAILY
Qty: 100 CAPSULE | Refills: 1 | Status: SHIPPED | OUTPATIENT
Start: 2025-04-08

## 2025-04-08 RX ORDER — GABAPENTIN 300 MG/1
300 CAPSULE ORAL 3 TIMES DAILY
Qty: 300 CAPSULE | Refills: 1 | Status: CANCELLED | OUTPATIENT
Start: 2025-04-08 | End: 2025-10-25

## 2025-04-08 RX ORDER — LEVOTHYROXINE SODIUM 88 UG/1
88 TABLET ORAL DAILY
Qty: 100 TABLET | Refills: 1 | Status: SHIPPED | OUTPATIENT
Start: 2025-04-08

## 2025-04-08 RX ORDER — PAROXETINE HYDROCHLORIDE HEMIHYDRATE 25 MG/1
25 TABLET, FILM COATED, EXTENDED RELEASE ORAL EVERY EVENING
Qty: 100 TABLET | Refills: 1 | Status: SHIPPED | OUTPATIENT
Start: 2025-04-08

## 2025-04-08 RX ORDER — METFORMIN HYDROCHLORIDE 500 MG/1
500 TABLET, EXTENDED RELEASE ORAL
Qty: 100 TABLET | Refills: 1 | Status: SHIPPED | OUTPATIENT
Start: 2025-04-08 | End: 2025-04-09 | Stop reason: ALTCHOICE

## 2025-04-08 RX ORDER — GABAPENTIN 300 MG/1
300 CAPSULE ORAL 2 TIMES DAILY
Qty: 180 CAPSULE | Refills: 0 | Status: SHIPPED | OUTPATIENT
Start: 2025-04-08 | End: 2025-07-07

## 2025-04-08 SDOH — ECONOMIC STABILITY: FOOD INSECURITY: WITHIN THE PAST 12 MONTHS, YOU WORRIED THAT YOUR FOOD WOULD RUN OUT BEFORE YOU GOT MONEY TO BUY MORE.: NEVER TRUE

## 2025-04-08 SDOH — ECONOMIC STABILITY: FOOD INSECURITY: WITHIN THE PAST 12 MONTHS, THE FOOD YOU BOUGHT JUST DIDN'T LAST AND YOU DIDN'T HAVE MONEY TO GET MORE.: NEVER TRUE

## 2025-04-08 ASSESSMENT — PATIENT HEALTH QUESTIONNAIRE - PHQ9
5. POOR APPETITE OR OVEREATING: MORE THAN HALF THE DAYS
SUM OF ALL RESPONSES TO PHQ QUESTIONS 1-9: 13
4. FEELING TIRED OR HAVING LITTLE ENERGY: MORE THAN HALF THE DAYS
7. TROUBLE CONCENTRATING ON THINGS, SUCH AS READING THE NEWSPAPER OR WATCHING TELEVISION: MORE THAN HALF THE DAYS
SUM OF ALL RESPONSES TO PHQ QUESTIONS 1-9: 13
2. FEELING DOWN, DEPRESSED OR HOPELESS: MORE THAN HALF THE DAYS
6. FEELING BAD ABOUT YOURSELF - OR THAT YOU ARE A FAILURE OR HAVE LET YOURSELF OR YOUR FAMILY DOWN: MORE THAN HALF THE DAYS
3. TROUBLE FALLING OR STAYING ASLEEP: NEARLY EVERY DAY
8. MOVING OR SPEAKING SO SLOWLY THAT OTHER PEOPLE COULD HAVE NOTICED. OR THE OPPOSITE, BEING SO FIGETY OR RESTLESS THAT YOU HAVE BEEN MOVING AROUND A LOT MORE THAN USUAL: NOT AT ALL
10. IF YOU CHECKED OFF ANY PROBLEMS, HOW DIFFICULT HAVE THESE PROBLEMS MADE IT FOR YOU TO DO YOUR WORK, TAKE CARE OF THINGS AT HOME, OR GET ALONG WITH OTHER PEOPLE: SOMEWHAT DIFFICULT
SUM OF ALL RESPONSES TO PHQ QUESTIONS 1-9: 13
9. THOUGHTS THAT YOU WOULD BE BETTER OFF DEAD, OR OF HURTING YOURSELF: NOT AT ALL
SUM OF ALL RESPONSES TO PHQ QUESTIONS 1-9: 13
1. LITTLE INTEREST OR PLEASURE IN DOING THINGS: NOT AT ALL

## 2025-04-08 ASSESSMENT — ENCOUNTER SYMPTOMS: RESPIRATORY NEGATIVE: 1

## 2025-04-08 NOTE — PROGRESS NOTES
follow-up recommended.    17. Big toe problem.  - Recently completed a course of Lamisil.  - Liver function test will be conducted today due to potential hepatotoxicity of Lamisil.  - Regular follow-up with podiatrist recommended.       The patient and/or patient representative voiced understanding and agreement with the current diagnoses, recommendations, and possible side effects.    No follow-up provider specified.      Maritza Herrera MD

## 2025-04-09 ENCOUNTER — RESULTS FOLLOW-UP (OUTPATIENT)
Dept: PRIMARY CARE CLINIC | Facility: CLINIC | Age: 75
End: 2025-04-09

## 2025-04-09 ENCOUNTER — TELEMEDICINE (OUTPATIENT)
Dept: PRIMARY CARE CLINIC | Facility: CLINIC | Age: 75
End: 2025-04-09
Payer: MEDICARE

## 2025-04-09 DIAGNOSIS — S54.12XS: ICD-10-CM

## 2025-04-09 DIAGNOSIS — F33.9 RECURRENT MAJOR DEPRESSIVE DISORDER, REMISSION STATUS UNSPECIFIED: ICD-10-CM

## 2025-04-09 DIAGNOSIS — G89.4 PAIN SYNDROME, CHRONIC: ICD-10-CM

## 2025-04-09 DIAGNOSIS — I48.91 ATRIAL FIBRILLATION, UNSPECIFIED TYPE (HCC): ICD-10-CM

## 2025-04-09 DIAGNOSIS — K21.9 GASTROESOPHAGEAL REFLUX DISEASE, UNSPECIFIED WHETHER ESOPHAGITIS PRESENT: ICD-10-CM

## 2025-04-09 DIAGNOSIS — Z95.1 S/P CABG (CORONARY ARTERY BYPASS GRAFT): ICD-10-CM

## 2025-04-09 DIAGNOSIS — E78.00 HIGH CHOLESTEROL: ICD-10-CM

## 2025-04-09 DIAGNOSIS — F41.1 GAD (GENERALIZED ANXIETY DISORDER): ICD-10-CM

## 2025-04-09 DIAGNOSIS — I25.10 ASCVD (ARTERIOSCLEROTIC CARDIOVASCULAR DISEASE): ICD-10-CM

## 2025-04-09 DIAGNOSIS — E11.69 TYPE 2 DIABETES MELLITUS WITH OTHER SPECIFIED COMPLICATION, WITHOUT LONG-TERM CURRENT USE OF INSULIN: Primary | ICD-10-CM

## 2025-04-09 DIAGNOSIS — E03.9 HYPOTHYROIDISM (ACQUIRED): ICD-10-CM

## 2025-04-09 PROCEDURE — G8417 CALC BMI ABV UP PARAM F/U: HCPCS | Performed by: INTERNAL MEDICINE

## 2025-04-09 PROCEDURE — G2211 COMPLEX E/M VISIT ADD ON: HCPCS | Performed by: INTERNAL MEDICINE

## 2025-04-09 PROCEDURE — 3017F COLORECTAL CA SCREEN DOC REV: CPT | Performed by: INTERNAL MEDICINE

## 2025-04-09 PROCEDURE — 2022F DILAT RTA XM EVC RTNOPTHY: CPT | Performed by: INTERNAL MEDICINE

## 2025-04-09 PROCEDURE — G8428 CUR MEDS NOT DOCUMENT: HCPCS | Performed by: INTERNAL MEDICINE

## 2025-04-09 PROCEDURE — 99214 OFFICE O/P EST MOD 30 MIN: CPT | Performed by: INTERNAL MEDICINE

## 2025-04-09 PROCEDURE — 1123F ACP DISCUSS/DSCN MKR DOCD: CPT | Performed by: INTERNAL MEDICINE

## 2025-04-09 PROCEDURE — 1036F TOBACCO NON-USER: CPT | Performed by: INTERNAL MEDICINE

## 2025-04-09 PROCEDURE — 3046F HEMOGLOBIN A1C LEVEL >9.0%: CPT | Performed by: INTERNAL MEDICINE

## 2025-04-09 NOTE — PROGRESS NOTES
FOLLOW UP VISIT    Subjective:    Clifton Merchant (: 1950) is a 74 y.o., male,   Chief Complaint   Patient presents with    Diabetes       HPI:  HPI    History of Present Illness  The patient presents via virtual visit for evaluation of diabetes.    Persistent thirst is reported, and daily blood glucose monitoring is performed, with a reading of 166 this morning. The condition was most severe in , after which various medications were prescribed. Since 2019, glimepiride has been taken, initially at a dose of 2 mg, which was found to be excessive. Halving the dose led to hypoglycemic episodes, and a further reduction to 0.5 mg resulted in morning blood glucose levels ranging between 70 and 120.    Eliquis is taken for atrial fibrillation. Atorvastatin and fenofibrate are taken for cholesterol management. Lotensin is taken for blood pressure management.    Supplemental medications include gabapentin, levothyroxine, omeprazole, Paxil, and Flomax.     The following portions of the patient's history were reviewed and updated as appropriate:      Past Medical History:   Diagnosis Date    Anxiety     Arrhythmia     Atrial fib.    CAD (coronary artery disease)     followed by Dr. Weinstein    Calculus of kidney     right renal calculi    Chronic pain     back     DDD (degenerative disc disease)     Diabetes (HCC)     type 2; glimeperide as needed per patient ; a1c = 8.5 as of 2023; check sugars daily avg is 80 per patient; hypoglycemic at 60    Diverticulitis     Endocrine disease     thyroid    GERD (gastroesophageal reflux disease)     controlled with medication     History of EKG 02/15/2021    NSR with incomp RBBB    History of shingles 2013    HLD (hyperlipidemia) 2016    Hx of echocardiogram 2019    LVEF >55%    Hypercholesteremia     medication     Hypertension     controlled with medication  per patient     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

## 2025-05-05 ENCOUNTER — LAB (OUTPATIENT)
Dept: UROLOGY | Age: 75
End: 2025-05-05

## 2025-05-05 DIAGNOSIS — N13.8 BENIGN PROSTATIC HYPERPLASIA WITH URINARY OBSTRUCTION: ICD-10-CM

## 2025-05-05 DIAGNOSIS — N40.1 BENIGN PROSTATIC HYPERPLASIA WITH URINARY OBSTRUCTION: ICD-10-CM

## 2025-05-05 LAB — PSA SERPL-MCNC: 2.7 NG/ML (ref 0–4)

## 2025-05-06 ENCOUNTER — OFFICE VISIT (OUTPATIENT)
Dept: PRIMARY CARE CLINIC | Facility: CLINIC | Age: 75
End: 2025-05-06
Payer: MEDICARE

## 2025-05-06 VITALS — DIASTOLIC BLOOD PRESSURE: 52 MMHG | SYSTOLIC BLOOD PRESSURE: 94 MMHG

## 2025-05-06 DIAGNOSIS — E03.9 HYPOTHYROIDISM (ACQUIRED): ICD-10-CM

## 2025-05-06 DIAGNOSIS — F41.1 GAD (GENERALIZED ANXIETY DISORDER): ICD-10-CM

## 2025-05-06 DIAGNOSIS — I25.10 ASCVD (ARTERIOSCLEROTIC CARDIOVASCULAR DISEASE): ICD-10-CM

## 2025-05-06 DIAGNOSIS — E78.00 HIGH CHOLESTEROL: ICD-10-CM

## 2025-05-06 DIAGNOSIS — I48.91 ATRIAL FIBRILLATION, UNSPECIFIED TYPE (HCC): ICD-10-CM

## 2025-05-06 DIAGNOSIS — E11.69 TYPE 2 DIABETES MELLITUS WITH OTHER SPECIFIED COMPLICATION, WITHOUT LONG-TERM CURRENT USE OF INSULIN (HCC): Primary | ICD-10-CM

## 2025-05-06 DIAGNOSIS — K21.9 GASTROESOPHAGEAL REFLUX DISEASE, UNSPECIFIED WHETHER ESOPHAGITIS PRESENT: ICD-10-CM

## 2025-05-06 PROCEDURE — 1159F MED LIST DOCD IN RCRD: CPT | Performed by: INTERNAL MEDICINE

## 2025-05-06 PROCEDURE — 3046F HEMOGLOBIN A1C LEVEL >9.0%: CPT | Performed by: INTERNAL MEDICINE

## 2025-05-06 PROCEDURE — 1036F TOBACCO NON-USER: CPT | Performed by: INTERNAL MEDICINE

## 2025-05-06 PROCEDURE — G8417 CALC BMI ABV UP PARAM F/U: HCPCS | Performed by: INTERNAL MEDICINE

## 2025-05-06 PROCEDURE — 2022F DILAT RTA XM EVC RTNOPTHY: CPT | Performed by: INTERNAL MEDICINE

## 2025-05-06 PROCEDURE — 1160F RVW MEDS BY RX/DR IN RCRD: CPT | Performed by: INTERNAL MEDICINE

## 2025-05-06 PROCEDURE — 1123F ACP DISCUSS/DSCN MKR DOCD: CPT | Performed by: INTERNAL MEDICINE

## 2025-05-06 PROCEDURE — 3017F COLORECTAL CA SCREEN DOC REV: CPT | Performed by: INTERNAL MEDICINE

## 2025-05-06 PROCEDURE — G8427 DOCREV CUR MEDS BY ELIG CLIN: HCPCS | Performed by: INTERNAL MEDICINE

## 2025-05-06 PROCEDURE — 3078F DIAST BP <80 MM HG: CPT | Performed by: INTERNAL MEDICINE

## 2025-05-06 PROCEDURE — 99214 OFFICE O/P EST MOD 30 MIN: CPT | Performed by: INTERNAL MEDICINE

## 2025-05-06 PROCEDURE — 3074F SYST BP LT 130 MM HG: CPT | Performed by: INTERNAL MEDICINE

## 2025-05-06 PROCEDURE — G2211 COMPLEX E/M VISIT ADD ON: HCPCS | Performed by: INTERNAL MEDICINE

## 2025-05-06 NOTE — PROGRESS NOTES
FOLLOW UP VISIT    Subjective:    Clifton Merchant (: 1950) is a 74 y.o., male,   Chief Complaint   Patient presents with    Blood Pressure Check     Brought home BP cuff:117/75  114/62  130/71  92/69  86/62  95/62  106/56         HPI:  HPI    History of Present Illness  The patient presents for evaluation of blood pressure management, diabetes, and thyroid management.    Blood Pressure Management  - Experiencing hypotensive episodes at home, with one instance of syncope  - Current medication regimen includes benazepril 20 mg daily  - Previously combined with amlodipine, which was discontinued due to persistent low blood pressure readings  - Scheduled for a follow-up appointment with Dr. Hardy on Thursday and a consultation with their cardiologist on Friday  - Reports difficulty in maintaining an exercise routine due to hypotensive episodes    Thyroid Management  - Requesting a refill of their levothyroxine prescription    Diabetes Management  - On metformin 1000 mg twice a day and Ozempic 0.25 every 7 days    Supplemental information: Additional medications include Eliquis 5 mg twice a day, aspirin 81 mg daily, atorvastatin 80 mg daily, vitamin D, vitamin B, Tikosyn 250 mg 2 tablets twice daily, Jardiance 10 mg, fenofibrate 160 mg, gabapentin 1 tablet in the morning and at night, nitroglycerin as needed, omeprazole 20 mg, Paxil 25 mg, and Flomax.     The following portions of the patient's history were reviewed and updated as appropriate:      Past Medical History:   Diagnosis Date    Anxiety     Arrhythmia     Atrial fib.    CAD (coronary artery disease)     followed by Dr. Weinstein    Calculus of kidney     right renal calculi    Chronic pain     back     DDD (degenerative disc disease)     Diabetes (HCC)     type 2; glimeperide as needed per patient ; a1c = 8.5 as of 2023; check sugars daily avg is 80 per patient; hypoglycemic at 60    Diverticulitis 2013    Endocrine disease     thyroid

## 2025-05-09 ENCOUNTER — OFFICE VISIT (OUTPATIENT)
Age: 75
End: 2025-05-09
Payer: MEDICARE

## 2025-05-09 VITALS
SYSTOLIC BLOOD PRESSURE: 112 MMHG | WEIGHT: 156.2 LBS | HEIGHT: 70 IN | DIASTOLIC BLOOD PRESSURE: 60 MMHG | BODY MASS INDEX: 22.36 KG/M2 | HEART RATE: 109 BPM

## 2025-05-09 DIAGNOSIS — I10 PRIMARY HYPERTENSION: ICD-10-CM

## 2025-05-09 DIAGNOSIS — I48.19 PERSISTENT ATRIAL FIBRILLATION (HCC): Primary | ICD-10-CM

## 2025-05-09 DIAGNOSIS — I25.10 ASCVD (ARTERIOSCLEROTIC CARDIOVASCULAR DISEASE): ICD-10-CM

## 2025-05-09 PROCEDURE — G8427 DOCREV CUR MEDS BY ELIG CLIN: HCPCS | Performed by: INTERNAL MEDICINE

## 2025-05-09 PROCEDURE — 1123F ACP DISCUSS/DSCN MKR DOCD: CPT | Performed by: INTERNAL MEDICINE

## 2025-05-09 PROCEDURE — 1159F MED LIST DOCD IN RCRD: CPT | Performed by: INTERNAL MEDICINE

## 2025-05-09 PROCEDURE — 1126F AMNT PAIN NOTED NONE PRSNT: CPT | Performed by: INTERNAL MEDICINE

## 2025-05-09 PROCEDURE — 99214 OFFICE O/P EST MOD 30 MIN: CPT | Performed by: INTERNAL MEDICINE

## 2025-05-09 PROCEDURE — 1036F TOBACCO NON-USER: CPT | Performed by: INTERNAL MEDICINE

## 2025-05-09 PROCEDURE — 3078F DIAST BP <80 MM HG: CPT | Performed by: INTERNAL MEDICINE

## 2025-05-09 PROCEDURE — 1160F RVW MEDS BY RX/DR IN RCRD: CPT | Performed by: INTERNAL MEDICINE

## 2025-05-09 PROCEDURE — 3074F SYST BP LT 130 MM HG: CPT | Performed by: INTERNAL MEDICINE

## 2025-05-09 PROCEDURE — G8420 CALC BMI NORM PARAMETERS: HCPCS | Performed by: INTERNAL MEDICINE

## 2025-05-09 PROCEDURE — 93000 ELECTROCARDIOGRAM COMPLETE: CPT | Performed by: INTERNAL MEDICINE

## 2025-05-09 PROCEDURE — 3017F COLORECTAL CA SCREEN DOC REV: CPT | Performed by: INTERNAL MEDICINE

## 2025-05-09 ASSESSMENT — ENCOUNTER SYMPTOMS
EYES NEGATIVE: 1
RESPIRATORY NEGATIVE: 1
GASTROINTESTINAL NEGATIVE: 1
ALLERGIC/IMMUNOLOGIC NEGATIVE: 1

## 2025-05-09 NOTE — PROGRESS NOTES
[facility] CARDIOLOGY  [address] [place] [address], [address]  [place] [address]  PHONE: [phone]        25      NAME:  Clifton Merchant  : 1950  MRN: [id number]     Referring Cardiologist: Enio Weinstein MD     Reason for Consultation: Atrial fibrillation     ASSESSMENT and PLAN:  Diagnoses and all orders for this visit:      1. Persistent atrial fibrillation (HCC), [id number] = 5, on Pradaxa, Tikosyn. S/p AF/AFL ablation 2019 and repeat ablation 2024 with posterior wall isolation. He has severe remodeling.      2. S/P CABG/Aortocoronary bypass status, performed in .      3. Essential hypertension      4. Coronary artery disease without angina pectoris, unspecified vessel or lesion type, unspecified whether native or transplanted heart      5. Controlled type 2 diabetes mellitus with hyperglycemia (HCC)      6. Anxiety      7. Pure hypercholesterolemia      8. Neuropraxia of median nerve.       9. COVID infection 2022      10. Benign essential tremor     74-year-old male with a history of persistent atrial fibrillation which is now drug refractory. He has previously undergone an AF ablation x 2. He remains on Tikosyn and Eliquis.      -AF - Stable but remains OOR. S/p AF ablation. Back in atypical flutter/fine AT. He does have significant LA remodeling with a severely enlarged LA. Transitioned to Eliquis from Pradaxa (vivid dreams as SE?).   -Stop Tikosyn for now.   -Considered convergent but unable to perform given previous CABG surgery and scarring.   -Ablation vs pace/ablate are options. I would avoid AAD.   -Can add BB if needed for rate control.     -Severe presyncope and weakness - suspect severe side effects/intolerances from GLP1a, will have him stop his medication and re-evaluate going forward. I will notify his PCP.       -Maxillary sinus issues - s/p sinus surgery, doing much better.      -Benign essential tremor - improved movement with Paxil. Sees Dr. Dani Weinstein as well.

## 2025-05-13 ENCOUNTER — OFFICE VISIT (OUTPATIENT)
Dept: UROLOGY | Age: 75
End: 2025-05-13
Payer: MEDICARE

## 2025-05-13 DIAGNOSIS — N40.1 BENIGN PROSTATIC HYPERPLASIA WITH URINARY OBSTRUCTION: Primary | ICD-10-CM

## 2025-05-13 DIAGNOSIS — N13.8 BENIGN PROSTATIC HYPERPLASIA WITH URINARY OBSTRUCTION: Primary | ICD-10-CM

## 2025-05-13 LAB
BILIRUBIN, URINE, POC: NEGATIVE
BLOOD URINE, POC: NEGATIVE
GLUCOSE URINE, POC: 500 MG/DL
KETONES, URINE, POC: NEGATIVE MG/DL
LEUKOCYTE ESTERASE, URINE, POC: NEGATIVE
NITRITE, URINE, POC: NEGATIVE
PH, URINE, POC: 5.5 (ref 4.6–8)
PROTEIN,URINE, POC: NEGATIVE MG/DL
SPECIFIC GRAVITY, URINE, POC: 1 (ref 1–1.03)
URINALYSIS CLARITY, POC: NORMAL
URINALYSIS COLOR, POC: NORMAL
UROBILINOGEN, POC: NORMAL MG/DL

## 2025-05-13 PROCEDURE — 3017F COLORECTAL CA SCREEN DOC REV: CPT | Performed by: UROLOGY

## 2025-05-13 PROCEDURE — G8427 DOCREV CUR MEDS BY ELIG CLIN: HCPCS | Performed by: UROLOGY

## 2025-05-13 PROCEDURE — 99213 OFFICE O/P EST LOW 20 MIN: CPT | Performed by: UROLOGY

## 2025-05-13 PROCEDURE — 1159F MED LIST DOCD IN RCRD: CPT | Performed by: UROLOGY

## 2025-05-13 PROCEDURE — 81003 URINALYSIS AUTO W/O SCOPE: CPT | Performed by: UROLOGY

## 2025-05-13 PROCEDURE — 1036F TOBACCO NON-USER: CPT | Performed by: UROLOGY

## 2025-05-13 PROCEDURE — G8420 CALC BMI NORM PARAMETERS: HCPCS | Performed by: UROLOGY

## 2025-05-13 PROCEDURE — 1160F RVW MEDS BY RX/DR IN RCRD: CPT | Performed by: UROLOGY

## 2025-05-13 PROCEDURE — 1123F ACP DISCUSS/DSCN MKR DOCD: CPT | Performed by: UROLOGY

## 2025-05-13 RX ORDER — TAMSULOSIN HYDROCHLORIDE 0.4 MG/1
0.4 CAPSULE ORAL EVERY EVENING
Qty: 90 CAPSULE | Refills: 3 | Status: SHIPPED | OUTPATIENT
Start: 2025-05-13

## 2025-05-13 ASSESSMENT — ENCOUNTER SYMPTOMS: BACK PAIN: 0

## 2025-05-13 NOTE — PROGRESS NOTES
[facility] UROLOGY  [address] [place] [address]  [phone]          Clifton Merchant  : 1950    Chief Complaint   Patient presents with    Follow-up     yearly          HPI     Clifton Merchant is a 74 y.o. male seen previously due to nephrolithiasis.  He returned in 3/21 due to intermittent BLQ pain since  and slow urinary stream and urinary frequency over the same period of time.  CT with contrast 21 revealed prostatomegaly and mild symmetric bladder wall thickening.  NO stones were seen.  Images and report were both personally reviewed.  He has a h/o afib and is on pradaxa.  He has undergone CABG.      He began tamsulosin in 3/21 and has really seen an improvement in symptoms.  Stream and frequency are greatly improved.      PSA:  2.5,  3.0,  3.4,  3.2,  2.7             Past Medical History:   Diagnosis Date    Anxiety     Arrhythmia     Atrial fib.    CAD (coronary artery disease)     followed by Dr. Weinstein    Calculus of kidney     right renal calculi    Chronic pain     back     DDD (degenerative disc disease)     Diabetes (HCC)     type 2; glimeperide as needed per patient ; a1c = 8.5 as of 2023; check sugars daily avg is 80 per patient; hypoglycemic at 60    Diverticulitis     Endocrine disease     thyroid    GERD (gastroesophageal reflux disease)     controlled with medication     History of EKG 02/15/2021    NSR with incomp RBBB    History of shingles 2013    HLD (hyperlipidemia) 2016    Hx of echocardiogram 2019    LVEF >55%    Hypercholesteremia     medication     Hypertension     controlled with medication  per patient     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

## 2025-05-23 NOTE — PROGRESS NOTES
[facility]  [address]  [place] [address] [phone]    Patient Name:  Clifton Merchant  YOB: 1950    Clifton Merchant, was evaluated through a synchronous (real-time) audio-video encounter. The patient (or guardian if applicable) is aware that this is a billable service, which includes applicable co-pays. This Virtual Visit was conducted with patient's (and/or legal guardian's) consent. Patient identification was verified, and a caregiver was present when appropriate.   The patient was located at Home: [address] [address]  Provider was located at Facility (Appt Dept): [address]  [place] [address]  Confirm you are appropriately licensed, registered, or certified to deliver care in the state where the patient is located as indicated above. If you are not or unsure, please re-schedule the visit: Yes, I confirm.          --DIANNE Ray - CNP on 5/27/2025 at 4:43 PM             Office Visit 5/27/2025    CHIEF COMPLAINT:    Chief Complaint   Patient presents with    CPAP/BiPAP    Sleep Apnea    1 Year Follow Up       HISTORY OF PRESENT ILLNESS:  Patient is being seen today via virtual visit.     He has a history of severe sleep apnea from PSG in 2007 with RDI 69.8 events per hour with desaturations to 75%. He is prescribed auto CPAP 12 to 20 cm using a nasal mask. Download reveals 100% compliance over the past 6 months with average nightly use 7 hours and 20 minutes.  AHI is 3.5 events per hour with average pressure use 12.4 to 14.8 cm.  He has had several medical changes since last visit.  He states a few months ago, his hemoglobin A1c was 9.2.  He was placed on metformin, Jardiance and Ozempic.  He lost 20 pounds in April and another 10 pounds with a current weight of 147 pounds.  He states he was in the 180s.  He had significant fatigue, balance issues and was recently taken off Ozempic.  He feels much

## 2025-05-25 ASSESSMENT — SLEEP AND FATIGUE QUESTIONNAIRES
HOW LIKELY ARE YOU TO NOD OFF OR FALL ASLEEP WHILE SITTING AND TALKING TO SOMEONE: WOULD NEVER DOZE
HOW LIKELY ARE YOU TO NOD OFF OR FALL ASLEEP WHILE SITTING QUIETLY AFTER LUNCH WITHOUT ALCOHOL: HIGH CHANCE OF DOZING
HOW LIKELY ARE YOU TO NOD OFF OR FALL ASLEEP WHILE SITTING INACTIVE IN A PUBLIC PLACE: MODERATE CHANCE OF DOZING
HOW LIKELY ARE YOU TO NOD OFF OR FALL ASLEEP IN A CAR, WHILE STOPPED FOR A FEW MINUTES IN TRAFFIC: WOULD NEVER DOZE
HOW LIKELY ARE YOU TO NOD OFF OR FALL ASLEEP WHILE LYING DOWN TO REST IN THE AFTERNOON WHEN CIRCUMSTANCES PERMIT: HIGH CHANCE OF DOZING
HOW LIKELY ARE YOU TO NOD OFF OR FALL ASLEEP WHEN YOU ARE A PASSENGER IN A CAR FOR AN HOUR WITHOUT A BREAK: HIGH CHANCE OF DOZING
HOW LIKELY ARE YOU TO NOD OFF OR FALL ASLEEP WHILE SITTING INACTIVE IN A PUBLIC PLACE: MODERATE CHANCE OF DOZING
HOW LIKELY ARE YOU TO NOD OFF OR FALL ASLEEP WHILE SITTING AND READING: SLIGHT CHANCE OF DOZING
HOW LIKELY ARE YOU TO NOD OFF OR FALL ASLEEP WHILE SITTING AND READING: SLIGHT CHANCE OF DOZING
ESS TOTAL SCORE: 15
HOW LIKELY ARE YOU TO NOD OFF OR FALL ASLEEP WHILE SITTING AND TALKING TO SOMEONE: WOULD NEVER DOZE
HOW LIKELY ARE YOU TO NOD OFF OR FALL ASLEEP WHILE WATCHING TV: HIGH CHANCE OF DOZING
HOW LIKELY ARE YOU TO NOD OFF OR FALL ASLEEP WHILE WATCHING TV: HIGH CHANCE OF DOZING
HOW LIKELY ARE YOU TO NOD OFF OR FALL ASLEEP WHILE SITTING QUIETLY AFTER LUNCH WITHOUT ALCOHOL: HIGH CHANCE OF DOZING
HOW LIKELY ARE YOU TO NOD OFF OR FALL ASLEEP WHILE LYING DOWN TO REST IN THE AFTERNOON WHEN CIRCUMSTANCES PERMIT: HIGH CHANCE OF DOZING
HOW LIKELY ARE YOU TO NOD OFF OR FALL ASLEEP IN A CAR, WHILE STOPPED FOR A FEW MINUTES IN TRAFFIC: WOULD NEVER DOZE
HOW LIKELY ARE YOU TO NOD OFF OR FALL ASLEEP WHEN YOU ARE A PASSENGER IN A CAR FOR AN HOUR WITHOUT A BREAK: HIGH CHANCE OF DOZING

## 2025-05-27 ENCOUNTER — TELEMEDICINE (OUTPATIENT)
Dept: SLEEP MEDICINE | Age: 75
End: 2025-05-27
Payer: MEDICARE

## 2025-05-27 DIAGNOSIS — G47.10 HYPERSOMNIA: ICD-10-CM

## 2025-05-27 DIAGNOSIS — G47.33 OSA ON CPAP: Primary | ICD-10-CM

## 2025-05-27 PROCEDURE — G8427 DOCREV CUR MEDS BY ELIG CLIN: HCPCS | Performed by: NURSE PRACTITIONER

## 2025-05-27 PROCEDURE — 1036F TOBACCO NON-USER: CPT | Performed by: NURSE PRACTITIONER

## 2025-05-27 PROCEDURE — 1159F MED LIST DOCD IN RCRD: CPT | Performed by: NURSE PRACTITIONER

## 2025-05-27 PROCEDURE — 99213 OFFICE O/P EST LOW 20 MIN: CPT | Performed by: NURSE PRACTITIONER

## 2025-05-27 PROCEDURE — G8420 CALC BMI NORM PARAMETERS: HCPCS | Performed by: NURSE PRACTITIONER

## 2025-05-27 PROCEDURE — 1123F ACP DISCUSS/DSCN MKR DOCD: CPT | Performed by: NURSE PRACTITIONER

## 2025-05-27 PROCEDURE — 3017F COLORECTAL CA SCREEN DOC REV: CPT | Performed by: NURSE PRACTITIONER

## 2025-06-02 ENCOUNTER — TELEPHONE (OUTPATIENT)
Age: 75
End: 2025-06-02

## 2025-06-17 ENCOUNTER — PATIENT MESSAGE (OUTPATIENT)
Dept: PRIMARY CARE CLINIC | Facility: CLINIC | Age: 75
End: 2025-06-17

## 2025-06-17 NOTE — TELEPHONE ENCOUNTER
Pt reports:  Feeling dizzy for last several weeks with last episode Sunday - Pt states, \"...it only affects me when I am walking and stops once I sit down...\"  Lost his balance last night and fell while in the garage, lost his balance   Has not been taking home BP in several weeks  Pt reports he hit his head and is currently on Eliquis (Dr. Delacruz) Pt instructed to notify Cariology r/t hitting head and on Eliquis.  Pt voiced understanding  ENT sched for 6/19/25  Cardiology sched for 6/20/25

## 2025-06-19 ENCOUNTER — OFFICE VISIT (OUTPATIENT)
Dept: ENT CLINIC | Age: 75
End: 2025-06-19
Payer: MEDICARE

## 2025-06-19 VITALS — BODY MASS INDEX: 21.47 KG/M2 | HEIGHT: 70 IN | WEIGHT: 150 LBS

## 2025-06-19 DIAGNOSIS — Z98.890 S/P FESS (FUNCTIONAL ENDOSCOPIC SINUS SURGERY): ICD-10-CM

## 2025-06-19 DIAGNOSIS — J32.9 CHRONIC RHINOSINUSITIS: Primary | ICD-10-CM

## 2025-06-19 DIAGNOSIS — H90.3 SENSORINEURAL HEARING LOSS (SNHL) OF BOTH EARS: ICD-10-CM

## 2025-06-19 PROCEDURE — G8420 CALC BMI NORM PARAMETERS: HCPCS | Performed by: STUDENT IN AN ORGANIZED HEALTH CARE EDUCATION/TRAINING PROGRAM

## 2025-06-19 PROCEDURE — 1159F MED LIST DOCD IN RCRD: CPT | Performed by: STUDENT IN AN ORGANIZED HEALTH CARE EDUCATION/TRAINING PROGRAM

## 2025-06-19 PROCEDURE — 1123F ACP DISCUSS/DSCN MKR DOCD: CPT | Performed by: STUDENT IN AN ORGANIZED HEALTH CARE EDUCATION/TRAINING PROGRAM

## 2025-06-19 PROCEDURE — 1160F RVW MEDS BY RX/DR IN RCRD: CPT | Performed by: STUDENT IN AN ORGANIZED HEALTH CARE EDUCATION/TRAINING PROGRAM

## 2025-06-19 PROCEDURE — 3017F COLORECTAL CA SCREEN DOC REV: CPT | Performed by: STUDENT IN AN ORGANIZED HEALTH CARE EDUCATION/TRAINING PROGRAM

## 2025-06-19 PROCEDURE — 1036F TOBACCO NON-USER: CPT | Performed by: STUDENT IN AN ORGANIZED HEALTH CARE EDUCATION/TRAINING PROGRAM

## 2025-06-19 PROCEDURE — G8427 DOCREV CUR MEDS BY ELIG CLIN: HCPCS | Performed by: STUDENT IN AN ORGANIZED HEALTH CARE EDUCATION/TRAINING PROGRAM

## 2025-06-19 PROCEDURE — 1126F AMNT PAIN NOTED NONE PRSNT: CPT | Performed by: STUDENT IN AN ORGANIZED HEALTH CARE EDUCATION/TRAINING PROGRAM

## 2025-06-19 PROCEDURE — 99213 OFFICE O/P EST LOW 20 MIN: CPT | Performed by: STUDENT IN AN ORGANIZED HEALTH CARE EDUCATION/TRAINING PROGRAM

## 2025-06-19 PROCEDURE — 31231 NASAL ENDOSCOPY DX: CPT | Performed by: STUDENT IN AN ORGANIZED HEALTH CARE EDUCATION/TRAINING PROGRAM

## 2025-06-19 RX ORDER — TERBINAFINE HYDROCHLORIDE 250 MG/1
250 TABLET ORAL DAILY
COMMUNITY
Start: 2025-06-09

## 2025-06-19 ASSESSMENT — ENCOUNTER SYMPTOMS
RESPIRATORY NEGATIVE: 1
EYES NEGATIVE: 1
ALLERGIC/IMMUNOLOGIC NEGATIVE: 1
SINUS PRESSURE: 0
GASTROINTESTINAL NEGATIVE: 1
SINUS PAIN: 0

## 2025-06-19 NOTE — PROGRESS NOTES
[address], [address]  [place] [address]  PHONE: [phone]    Clifton Merchant  1950    SUBJECTIVE:   Clifton Merchant is a 74 y.o. male seen for a follow up visit regarding the following:     Chief Complaint   Patient presents with    Atrial Fibrillation      HPI:    Clifton Merchant is a very pleasant 74 y.o. male with a past medical and cardiac history significant for CAD s/p CABG x 4 ((LIMA to LAD, SVG to RCA, SVG to OM1, SVG-D1), paroxysmal atrial fibrillation s/p DCCV and loaded on Tikoysn 11/3/2016, s/p AFL/AF ablation 6/25/19 with repeat ablation 6/12/24 with posterior wall isolation, HTN, HLD, DM2, BULMARO on CPAP who presents to discuss monitor results. He was recently seen in EP follow up by Dr Delacruz and noted to be in atypical atrial flutter/fine AT. He has significant LA remodeling with a severely enlarged LA. Tikosyn was discontinued and monitor was placed. Monitor shows 66% AF/AFL burden. RVR 20% of the time while in AF.     He complains of ongoing dizziness for over a month.states this is around the time was started on Jardiance. Last visit with Dr Delacruz on 5/9, benazepril was discontinued due low blood pressure at home. Patient blood pressure improved but dizziness did not change. He states he feels off balance/unsteady on his feet. While bending over drilling a hole in a bucket the other night he fell forward and hit head on concrete. Denies dizziness prior to this event. States he was more off balance. Admits to palpitation mostly when sitting still or laying in bed. Denies chest pain or shortness of breath.        Cardiac PMH: (Old records have been reviewed and summarized below)  Ablation (6/25/19):   1. Successful pulmonary vein isolation (PVAI) of the 4 pulmonary veins.  2. Successful ablation of CTI-dependent atrial flutter.  3. Comprehensive EP study with normal intra-atrial and infrahissian conduction times. Slightly diseased AV lenard timing.

## 2025-06-19 NOTE — PROGRESS NOTES
HPI:    Clifton Merchant is a 74 y.o. male seen Established   Chief Complaint   Patient presents with    Follow-up     6 month sinus f/u.  Denies pressure and pain and drainage.  Mentions his hearing and he feels like his hearing is fine.  He brought his hearing test with him and would like some recommendation.         History of Present Illness  The patient is a 74-year-old male presenting for follow-up of right-sided chronic sinusitis, status post right-sided FESS, septoplasty, and turbinate reduction on 12/30/2022. He also requests review of a recent audiogram and recommendations for hearing loss.    He had a lingering Staph aureus-positive sinusitis for a couple of months, resolved with medicated sinus irrigations. Sinuses have been stable for the past 6 months with satisfactory health and no significant drainage issues. He reports a slight wet sensation but no complications from the previous infection. Medicated rinses for 1 to 2 months effectively cleared his condition.    Despite advice to consider hearing aids, he perceives his hearing as adequate but acknowledges occasional difficulty with certain words, especially spoken by females. He recalls significant improvement with high-end hearing aids at [employer]. He does not typically increase TV volume excessively.    He fell in the garage 3 nights ago, hitting his knee, elbow, and hip. He did not go to the hospital but applied peroxide. It is improving.        Past Medical History, Past Surgical History, Family history, Social History, and Medications were all reviewed with the patient today and updated as necessary.     No Known Allergies    Patient Active Problem List   Diagnosis    DM2 (diabetes mellitus, type 2) ([facility])    LAYLA (generalized anxiety disorder)    Diverticulosis of colon    History of shingles    AF (atrial fibrillation) ([facility])    CPS (chronic pain syndrome)    BULMARO (obstructive sleep apnea)    S/P excision of lipoma    Neuropraxia of left

## 2025-06-20 ENCOUNTER — HOSPITAL ENCOUNTER (OUTPATIENT)
Dept: CT IMAGING | Age: 75
Discharge: HOME | End: 2025-06-23
Payer: MEDICARE

## 2025-06-20 ENCOUNTER — OFFICE VISIT (OUTPATIENT)
Age: 75
End: 2025-06-20
Payer: MEDICARE

## 2025-06-20 VITALS
HEIGHT: 70 IN | SYSTOLIC BLOOD PRESSURE: 114 MMHG | HEART RATE: 76 BPM | DIASTOLIC BLOOD PRESSURE: 80 MMHG | BODY MASS INDEX: 21.33 KG/M2 | WEIGHT: 149 LBS

## 2025-06-20 DIAGNOSIS — I48.19 PERSISTENT ATRIAL FIBRILLATION (HCC): Primary | ICD-10-CM

## 2025-06-20 DIAGNOSIS — S09.90XA HEAD TRAUMA, INITIAL ENCOUNTER: ICD-10-CM
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
## 2025-06-20 DIAGNOSIS — I25.10 ASCVD (ARTERIOSCLEROTIC CARDIOVASCULAR DISEASE): ICD-10-CM

## 2025-06-20 PROCEDURE — 1123F ACP DISCUSS/DSCN MKR DOCD: CPT | Performed by: PHYSICIAN ASSISTANT

## 2025-06-20 PROCEDURE — 70450 CT HEAD/BRAIN W/O DYE: CPT

## 2025-06-20 PROCEDURE — 1036F TOBACCO NON-USER: CPT | Performed by: PHYSICIAN ASSISTANT

## 2025-06-20 PROCEDURE — G8427 DOCREV CUR MEDS BY ELIG CLIN: HCPCS | Performed by: PHYSICIAN ASSISTANT

## 2025-06-20 PROCEDURE — G8420 CALC BMI NORM PARAMETERS: HCPCS | Performed by: PHYSICIAN ASSISTANT

## 2025-06-20 PROCEDURE — 3017F COLORECTAL CA SCREEN DOC REV: CPT | Performed by: PHYSICIAN ASSISTANT

## 2025-06-20 PROCEDURE — 1159F MED LIST DOCD IN RCRD: CPT | Performed by: PHYSICIAN ASSISTANT

## 2025-06-20 PROCEDURE — 3074F SYST BP LT 130 MM HG: CPT | Performed by: PHYSICIAN ASSISTANT

## 2025-06-20 PROCEDURE — 3079F DIAST BP 80-89 MM HG: CPT | Performed by: PHYSICIAN ASSISTANT

## 2025-06-20 PROCEDURE — 93000 ELECTROCARDIOGRAM COMPLETE: CPT | Performed by: PHYSICIAN ASSISTANT

## 2025-06-20 PROCEDURE — 99214 OFFICE O/P EST MOD 30 MIN: CPT | Performed by: PHYSICIAN ASSISTANT

## 2025-06-26 ENCOUNTER — TELEPHONE (OUTPATIENT)
Age: 75
End: 2025-06-26

## 2025-06-26 DIAGNOSIS — I48.19 PERSISTENT ATRIAL FIBRILLATION (HCC): Primary | ICD-10-CM

## 2025-06-26 NOTE — TELEPHONE ENCOUNTER
Orders placed per verbal from Dr. Delacruz.     Pace/ablate. LBBL. Staged AVN.     Reviewed pre-procedure instructions over the phone. Understanding verbalized.     No questions at this time.

## 2025-07-07 ENCOUNTER — RESULTS FOLLOW-UP (OUTPATIENT)
Dept: PRIMARY CARE CLINIC | Facility: CLINIC | Age: 75
End: 2025-07-07

## 2025-07-07 DIAGNOSIS — E03.9 HYPOTHYROIDISM (ACQUIRED): ICD-10-CM

## 2025-07-07 DIAGNOSIS — E78.00 HIGH CHOLESTEROL: ICD-10-CM

## 2025-07-07 DIAGNOSIS — E11.69 TYPE 2 DIABETES MELLITUS WITH OTHER SPECIFIED COMPLICATION, WITHOUT LONG-TERM CURRENT USE OF INSULIN (HCC): ICD-10-CM

## 2025-07-07 DIAGNOSIS — I48.91 ATRIAL FIBRILLATION, UNSPECIFIED TYPE (HCC): ICD-10-CM

## 2025-07-07 LAB
ALBUMIN SERPL-MCNC: 3.6 G/DL (ref 3.2–4.6)
ALBUMIN/GLOB SERPL: 1.6 (ref 1–1.9)
ALP SERPL-CCNC: 57 U/L (ref 40–129)
ALT SERPL-CCNC: 50 U/L (ref 8–55)
ANION GAP SERPL CALC-SCNC: 15 MMOL/L (ref 7–16)
AST SERPL-CCNC: 52 U/L (ref 15–37)
BASOPHILS # BLD: 0.06 K/UL (ref 0–0.2)
BASOPHILS NFR BLD: 1.8 % (ref 0–2)
BILIRUB SERPL-MCNC: 0.5 MG/DL (ref 0–1.2)
BUN SERPL-MCNC: 23 MG/DL (ref 8–23)
CALCIUM SERPL-MCNC: 9.5 MG/DL (ref 8.8–10.2)
CHLORIDE SERPL-SCNC: 103 MMOL/L (ref 98–107)
CHOLEST SERPL-MCNC: 165 MG/DL (ref 0–200)
CO2 SERPL-SCNC: 25 MMOL/L (ref 20–29)
CREAT SERPL-MCNC: 1.24 MG/DL (ref 0.8–1.3)
DIFFERENTIAL METHOD BLD: ABNORMAL
EOSINOPHIL # BLD: 0.29 K/UL (ref 0–0.8)
EOSINOPHIL NFR BLD: 8.9 % (ref 0.5–7.8)
ERYTHROCYTE [DISTWIDTH] IN BLOOD BY AUTOMATED COUNT: 16.8 % (ref 11.9–14.6)
EST. AVERAGE GLUCOSE BLD GHB EST-MCNC: 128 MG/DL
GLOBULIN SER CALC-MCNC: 2.3 G/DL (ref 2.3–3.5)
GLUCOSE SERPL-MCNC: 69 MG/DL (ref 70–99)
HBA1C MFR BLD: 6.1 % (ref 0–5.6)
HCT VFR BLD AUTO: 39.5 % (ref 41.1–50.3)
HDLC SERPL-MCNC: 71 MG/DL (ref 40–60)
HDLC SERPL: 2.3 (ref 0–5)
HGB BLD-MCNC: 12.6 G/DL (ref 13.6–17.2)
IMM GRANULOCYTES # BLD AUTO: 0 K/UL (ref 0–0.5)
IMM GRANULOCYTES NFR BLD AUTO: 0 % (ref 0–5)
LDLC SERPL CALC-MCNC: 80 MG/DL (ref 0–100)
LYMPHOCYTES # BLD: 0.57 K/UL (ref 0.5–4.6)
LYMPHOCYTES NFR BLD: 17.5 % (ref 13–44)
MCH RBC QN AUTO: 30.7 PG (ref 26.1–32.9)
MCHC RBC AUTO-ENTMCNC: 31.9 G/DL (ref 31.4–35)
MCV RBC AUTO: 96.3 FL (ref 82–102)
MONOCYTES # BLD: 0.38 K/UL (ref 0.1–1.3)
MONOCYTES NFR BLD: 11.7 % (ref 4–12)
NEUTS SEG # BLD: 1.95 K/UL (ref 1.7–8.2)
NEUTS SEG NFR BLD: 60.1 % (ref 43–78)
NRBC # BLD: 0 K/UL (ref 0–0.2)
PLATELET # BLD AUTO: 214 K/UL (ref 150–450)
PMV BLD AUTO: 12.1 FL (ref 9.4–12.3)
POTASSIUM SERPL-SCNC: 3.9 MMOL/L (ref 3.5–5.1)
PROT SERPL-MCNC: 5.9 G/DL (ref 6.3–8.2)
RBC # BLD AUTO: 4.1 M/UL (ref 4.23–5.6)
SODIUM SERPL-SCNC: 143 MMOL/L (ref 136–145)
TRIGL SERPL-MCNC: 74 MG/DL (ref 0–150)
TSH, 3RD GENERATION: 0.27 UIU/ML (ref 0.27–4.2)
VLDLC SERPL CALC-MCNC: 15 MG/DL (ref 6–23)
WBC # BLD AUTO: 3.3 K/UL (ref 4.3–11.1)

## 2025-07-08 ENCOUNTER — OFFICE VISIT (OUTPATIENT)
Dept: PRIMARY CARE CLINIC | Facility: CLINIC | Age: 75
End: 2025-07-08

## 2025-07-08 VITALS
WEIGHT: 144.6 LBS | SYSTOLIC BLOOD PRESSURE: 112 MMHG | DIASTOLIC BLOOD PRESSURE: 68 MMHG | HEART RATE: 78 BPM | OXYGEN SATURATION: 99 % | BODY MASS INDEX: 20.75 KG/M2

## 2025-07-08 DIAGNOSIS — E03.9 HYPOTHYROIDISM (ACQUIRED): ICD-10-CM

## 2025-07-08 DIAGNOSIS — E78.00 HIGH CHOLESTEROL: ICD-10-CM

## 2025-07-08 DIAGNOSIS — K21.9 GASTROESOPHAGEAL REFLUX DISEASE, UNSPECIFIED WHETHER ESOPHAGITIS PRESENT: ICD-10-CM

## 2025-07-08 DIAGNOSIS — Z00.00 MEDICARE ANNUAL WELLNESS VISIT, SUBSEQUENT: Primary | ICD-10-CM

## 2025-07-08 DIAGNOSIS — E11.69 TYPE 2 DIABETES MELLITUS WITH OTHER SPECIFIED COMPLICATION, WITHOUT LONG-TERM CURRENT USE OF INSULIN (HCC): ICD-10-CM

## 2025-07-08 DIAGNOSIS — I48.91 ATRIAL FIBRILLATION, UNSPECIFIED TYPE (HCC): ICD-10-CM

## 2025-07-08 DIAGNOSIS — F41.1 GAD (GENERALIZED ANXIETY DISORDER): ICD-10-CM

## 2025-07-08 RX ORDER — LEVOTHYROXINE SODIUM 75 UG/1
75 TABLET ORAL DAILY
Qty: 90 TABLET | Refills: 1 | Status: SHIPPED | OUTPATIENT
Start: 2025-07-08

## 2025-07-08 ASSESSMENT — PATIENT HEALTH QUESTIONNAIRE - PHQ9
7. TROUBLE CONCENTRATING ON THINGS, SUCH AS READING THE NEWSPAPER OR WATCHING TELEVISION: NOT AT ALL
3. TROUBLE FALLING OR STAYING ASLEEP: NOT AT ALL
10. IF YOU CHECKED OFF ANY PROBLEMS, HOW DIFFICULT HAVE THESE PROBLEMS MADE IT FOR YOU TO DO YOUR WORK, TAKE CARE OF THINGS AT HOME, OR GET ALONG WITH OTHER PEOPLE: SOMEWHAT DIFFICULT
5. POOR APPETITE OR OVEREATING: NOT AT ALL
4. FEELING TIRED OR HAVING LITTLE ENERGY: SEVERAL DAYS
9. THOUGHTS THAT YOU WOULD BE BETTER OFF DEAD, OR OF HURTING YOURSELF: NOT AT ALL
SUM OF ALL RESPONSES TO PHQ QUESTIONS 1-9: 4
8. MOVING OR SPEAKING SO SLOWLY THAT OTHER PEOPLE COULD HAVE NOTICED. OR THE OPPOSITE, BEING SO FIGETY OR RESTLESS THAT YOU HAVE BEEN MOVING AROUND A LOT MORE THAN USUAL: NOT AT ALL
SUM OF ALL RESPONSES TO PHQ QUESTIONS 1-9: 4
6. FEELING BAD ABOUT YOURSELF - OR THAT YOU ARE A FAILURE OR HAVE LET YOURSELF OR YOUR FAMILY DOWN: SEVERAL DAYS
SUM OF ALL RESPONSES TO PHQ QUESTIONS 1-9: 4
SUM OF ALL RESPONSES TO PHQ QUESTIONS 1-9: 4
1. LITTLE INTEREST OR PLEASURE IN DOING THINGS: SEVERAL DAYS
2. FEELING DOWN, DEPRESSED OR HOPELESS: SEVERAL DAYS

## 2025-07-08 ASSESSMENT — LIFESTYLE VARIABLES
HOW OFTEN DO YOU HAVE A DRINK CONTAINING ALCOHOL: NEVER
HOW MANY STANDARD DRINKS CONTAINING ALCOHOL DO YOU HAVE ON A TYPICAL DAY: PATIENT DOES NOT DRINK

## 2025-07-08 ASSESSMENT — ENCOUNTER SYMPTOMS
GASTROINTESTINAL NEGATIVE: 1
RESPIRATORY NEGATIVE: 1

## 2025-07-08 NOTE — PROGRESS NOTES
Self-assessment of health:  In general, how would you say your health is?: (!) Poor    Interventions:    Hearing Screen:  Do you or your family notice any trouble with your hearing that hasn't been managed with hearing aids?: (!) Yes    Interventions:    Vision Screen:  Do you have difficulty driving, watching TV, or doing any of your daily activities because of your eyesight?: (!) Yes  Have you had an eye exam within the past year?: Yes    Interventions:         Advanced Directives:  Do you have a Living Will?: (!) No    Intervention:                       Objective   Vitals:    07/08/25 1020   BP: 112/68   BP Site: Left Upper Arm   Patient Position: Sitting   Pulse: 78   SpO2: 99%   Weight: 65.6 kg (144 lb 9.6 oz)      Body mass index is 20.75 kg/m².                  No Known Allergies  Prior to Visit Medications    Medication Sig Taking? Authorizing Provider   terbinafine (LAMISIL) 250 MG tablet Take 1 tablet by mouth daily Yes ProviderSergio MD   tamsulosin (FLOMAX) 0.4 MG capsule Take 1 capsule by mouth every evening Yes Noah Queen MD   metFORMIN (GLUCOPHAGE) 1000 MG tablet Take 1 tablet by mouth 2 times daily (with meals) Yes Maritza Herrera MD   empagliflozin (JARDIANCE) 10 MG tablet Take 1 tablet by mouth daily Yes Maritza Herrera MD   blood glucose test strips (ASCENSIA AUTODISC VI;ONE TOUCH ULTRA TEST VI) strip 1 each by In Vitro route daily Provide what is covered by insurance E11.9 Yes Maritza Herrera MD   PARoxetine (PAXIL CR) 25 MG extended release tablet Take 1 tablet by mouth every evening Yes Maritza Herrera MD   levothyroxine (SYNTHROID) 88 MCG tablet Take 1 tablet by mouth Daily Yes Maritza Herrera MD   omeprazole (PRILOSEC) 20 MG delayed release capsule Take 1 capsule by mouth daily Yes Maritza Herrera MD   gabapentin (NEURONTIN) 300 MG capsule Take 1 capsule by mouth in the morning and at bedtime for 90 days. Yes Maritza Herrera MD   Misc. Devices (CPAP MACHINE) MISC by Does not apply route

## 2025-08-05 DIAGNOSIS — I48.19 PERSISTENT ATRIAL FIBRILLATION (HCC): ICD-10-CM

## 2025-08-05 LAB
ANION GAP SERPL CALC-SCNC: 11 MMOL/L (ref 7–16)
BASOPHILS # BLD: 0.05 K/UL (ref 0–0.2)
BASOPHILS NFR BLD: 1.3 % (ref 0–2)
BUN SERPL-MCNC: 25 MG/DL (ref 8–23)
CALCIUM SERPL-MCNC: 9.6 MG/DL (ref 8.8–10.2)
CHLORIDE SERPL-SCNC: 106 MMOL/L (ref 98–107)
CO2 SERPL-SCNC: 28 MMOL/L (ref 20–29)
CREAT SERPL-MCNC: 1.33 MG/DL (ref 0.8–1.3)
DIFFERENTIAL METHOD BLD: ABNORMAL
EOSINOPHIL # BLD: 0.35 K/UL (ref 0–0.8)
EOSINOPHIL NFR BLD: 9.2 % (ref 0.5–7.8)
ERYTHROCYTE [DISTWIDTH] IN BLOOD BY AUTOMATED COUNT: 15.4 % (ref 11.9–14.6)
GLUCOSE SERPL-MCNC: 185 MG/DL (ref 70–99)
HCT VFR BLD AUTO: 39.3 % (ref 41.1–50.3)
HGB BLD-MCNC: 12.5 G/DL (ref 13.6–17.2)
IMM GRANULOCYTES # BLD AUTO: 0.01 K/UL (ref 0–0.5)
IMM GRANULOCYTES NFR BLD AUTO: 0.3 % (ref 0–5)
LYMPHOCYTES # BLD: 0.44 K/UL (ref 0.5–4.6)
LYMPHOCYTES NFR BLD: 11.5 % (ref 13–44)
MAGNESIUM SERPL-MCNC: 1.8 MG/DL (ref 1.8–2.4)
MCH RBC QN AUTO: 31.3 PG (ref 26.1–32.9)
MCHC RBC AUTO-ENTMCNC: 31.8 G/DL (ref 31.4–35)
MCV RBC AUTO: 98.3 FL (ref 82–102)
MONOCYTES # BLD: 0.37 K/UL (ref 0.1–1.3)
MONOCYTES NFR BLD: 9.7 % (ref 4–12)
NEUTS SEG # BLD: 2.6 K/UL (ref 1.7–8.2)
NEUTS SEG NFR BLD: 68 % (ref 43–78)
NRBC # BLD: 0 K/UL (ref 0–0.2)
PLATELET # BLD AUTO: 230 K/UL (ref 150–450)
PMV BLD AUTO: 11.9 FL (ref 9.4–12.3)
POTASSIUM SERPL-SCNC: 4.2 MMOL/L (ref 3.5–5.1)
RBC # BLD AUTO: 4 M/UL (ref 4.23–5.6)
SODIUM SERPL-SCNC: 145 MMOL/L (ref 136–145)
WBC # BLD AUTO: 3.8 K/UL (ref 4.3–11.1)

## 2025-08-07 ENCOUNTER — ANESTHESIA EVENT (OUTPATIENT)
Dept: CARDIAC CATH/INVASIVE PROCEDURES | Age: 75
End: 2025-08-07
Payer: MEDICARE

## 2025-08-07 RX ORDER — HALOPERIDOL 5 MG/ML
1 INJECTION INTRAMUSCULAR
Status: CANCELLED | OUTPATIENT
Start: 2025-08-07

## 2025-08-07 RX ORDER — IPRATROPIUM BROMIDE AND ALBUTEROL SULFATE 2.5; .5 MG/3ML; MG/3ML
1 SOLUTION RESPIRATORY (INHALATION)
Status: CANCELLED | OUTPATIENT
Start: 2025-08-07

## 2025-08-07 RX ORDER — PROCHLORPERAZINE EDISYLATE 5 MG/ML
5 INJECTION INTRAMUSCULAR; INTRAVENOUS
Status: CANCELLED | OUTPATIENT
Start: 2025-08-07

## 2025-08-07 RX ORDER — SODIUM CHLORIDE 0.9 % (FLUSH) 0.9 %
5-40 SYRINGE (ML) INJECTION EVERY 12 HOURS SCHEDULED
Status: CANCELLED | OUTPATIENT
Start: 2025-08-07

## 2025-08-07 RX ORDER — LIDOCAINE HYDROCHLORIDE 10 MG/ML
1 INJECTION, SOLUTION INFILTRATION; PERINEURAL
Status: CANCELLED | OUTPATIENT
Start: 2025-08-07

## 2025-08-07 RX ORDER — OXYCODONE HYDROCHLORIDE 5 MG/1
5 TABLET ORAL PRN
Refills: 0 | Status: CANCELLED | OUTPATIENT
Start: 2025-08-07 | End: 2025-08-07

## 2025-08-07 RX ORDER — OXYCODONE HYDROCHLORIDE 5 MG/1
10 TABLET ORAL PRN
Refills: 0 | Status: CANCELLED | OUTPATIENT
Start: 2025-08-07 | End: 2025-08-07

## 2025-08-07 RX ORDER — SODIUM CHLORIDE 0.9 % (FLUSH) 0.9 %
5-40 SYRINGE (ML) INJECTION PRN
Status: CANCELLED | OUTPATIENT
Start: 2025-08-07

## 2025-08-07 RX ORDER — SODIUM CHLORIDE, SODIUM LACTATE, POTASSIUM CHLORIDE, CALCIUM CHLORIDE 600; 310; 30; 20 MG/100ML; MG/100ML; MG/100ML; MG/100ML
INJECTION, SOLUTION INTRAVENOUS CONTINUOUS
Status: CANCELLED | OUTPATIENT
Start: 2025-08-07

## 2025-08-07 RX ORDER — SODIUM CHLORIDE 9 MG/ML
INJECTION, SOLUTION INTRAVENOUS PRN
Status: CANCELLED | OUTPATIENT
Start: 2025-08-07

## 2025-08-07 RX ORDER — LABETALOL HYDROCHLORIDE 5 MG/ML
10 INJECTION, SOLUTION INTRAVENOUS
Status: CANCELLED | OUTPATIENT
Start: 2025-08-07

## 2025-08-08 ENCOUNTER — ANESTHESIA (OUTPATIENT)
Dept: CARDIAC CATH/INVASIVE PROCEDURES | Age: 75
End: 2025-08-08
Payer: MEDICARE

## 2025-08-08 ENCOUNTER — APPOINTMENT (OUTPATIENT)
Dept: GENERAL RADIOLOGY | Age: 75
End: 2025-08-08
Attending: INTERNAL MEDICINE
Payer: MEDICARE

## 2025-08-08 ENCOUNTER — HOSPITAL ENCOUNTER (OUTPATIENT)
Age: 75
Setting detail: OBSERVATION
Discharge: HOME | End: 2025-08-09
Attending: INTERNAL MEDICINE | Admitting: INTERNAL MEDICINE
Payer: MEDICARE

## 2025-08-08 DIAGNOSIS — I48.91 AF (ATRIAL FIBRILLATION) (HCC): ICD-10-CM

## 2025-08-08 DIAGNOSIS — Z95.0 S/P PLACEMENT OF CARDIAC PACEMAKER: Primary | ICD-10-CM

## 2025-08-08 LAB
ANION GAP SERPL CALC-SCNC: 10 MMOL/L (ref 7–16)
BUN SERPL-MCNC: 24 MG/DL (ref 8–23)
CALCIUM SERPL-MCNC: 9.6 MG/DL (ref 8.8–10.2)
CHLORIDE SERPL-SCNC: 106 MMOL/L (ref 98–107)
CO2 SERPL-SCNC: 28 MMOL/L (ref 20–29)
CREAT SERPL-MCNC: 1.23 MG/DL (ref 0.8–1.3)
ECHO BSA: 1.8 M2
EKG ATRIAL RATE: 340 BPM
EKG DIAGNOSIS: NORMAL
EKG Q-T INTERVAL: 392 MS
EKG QRS DURATION: 86 MS
EKG QTC CALCULATION (BAZETT): 463 MS
EKG R AXIS: 133 DEGREES
EKG T AXIS: 44 DEGREES
EKG VENTRICULAR RATE: 84 BPM
GLUCOSE BLD STRIP.AUTO-MCNC: 92 MG/DL (ref 65–100)
GLUCOSE SERPL-MCNC: 96 MG/DL (ref 70–99)
MAGNESIUM SERPL-MCNC: 1.7 MG/DL (ref 1.8–2.4)
POTASSIUM SERPL-SCNC: 4.1 MMOL/L (ref 3.5–5.1)
SERVICE CMNT-IMP: NORMAL
SODIUM SERPL-SCNC: 144 MMOL/L (ref 136–145)

## 2025-08-08 PROCEDURE — 33208 INSRT HEART PM ATRIAL & VENT: CPT | Performed by: INTERNAL MEDICINE

## 2025-08-08 PROCEDURE — G0378 HOSPITAL OBSERVATION PER HR: HCPCS

## 2025-08-08 PROCEDURE — C1898 LEAD, PMKR, OTHER THAN TRANS: HCPCS | Performed by: INTERNAL MEDICINE

## 2025-08-08 PROCEDURE — 80048 BASIC METABOLIC PNL TOTAL CA: CPT

## 2025-08-08 PROCEDURE — 6370000000 HC RX 637 (ALT 250 FOR IP): Performed by: INTERNAL MEDICINE

## 2025-08-08 PROCEDURE — C1725 CATH, TRANSLUMIN NON-LASER: HCPCS | Performed by: INTERNAL MEDICINE

## 2025-08-08 PROCEDURE — 3700000000 HC ANESTHESIA ATTENDED CARE: Performed by: INTERNAL MEDICINE

## 2025-08-08 PROCEDURE — 6370000000 HC RX 637 (ALT 250 FOR IP): Performed by: CASE MANAGER/CARE COORDINATOR

## 2025-08-08 PROCEDURE — 93010 ELECTROCARDIOGRAM REPORT: CPT | Performed by: INTERNAL MEDICINE

## 2025-08-08 PROCEDURE — 93005 ELECTROCARDIOGRAM TRACING: CPT | Performed by: INTERNAL MEDICINE

## 2025-08-08 PROCEDURE — 6360000002 HC RX W HCPCS: Performed by: INTERNAL MEDICINE

## 2025-08-08 PROCEDURE — 2500000003 HC RX 250 WO HCPCS: Performed by: INTERNAL MEDICINE

## 2025-08-08 PROCEDURE — 83735 ASSAY OF MAGNESIUM: CPT

## 2025-08-08 PROCEDURE — 33225 L VENTRIC PACING LEAD ADD-ON: CPT | Performed by: INTERNAL MEDICINE

## 2025-08-08 PROCEDURE — C1894 INTRO/SHEATH, NON-LASER: HCPCS | Performed by: INTERNAL MEDICINE

## 2025-08-08 PROCEDURE — 82962 GLUCOSE BLOOD TEST: CPT

## 2025-08-08 PROCEDURE — C1900 LEAD, CORONARY VENOUS: HCPCS | Performed by: INTERNAL MEDICINE

## 2025-08-08 PROCEDURE — C1760 CLOSURE DEV, VASC: HCPCS | Performed by: INTERNAL MEDICINE

## 2025-08-08 PROCEDURE — 3700000001 HC ADD 15 MINUTES (ANESTHESIA): Performed by: INTERNAL MEDICINE

## 2025-08-08 PROCEDURE — C1892 INTRO/SHEATH,FIXED,PEEL-AWAY: HCPCS | Performed by: INTERNAL MEDICINE

## 2025-08-08 PROCEDURE — 2709999900 HC NON-CHARGEABLE SUPPLY: Performed by: INTERNAL MEDICINE

## 2025-08-08 PROCEDURE — 6360000004 HC RX CONTRAST MEDICATION: Performed by: INTERNAL MEDICINE

## 2025-08-08 PROCEDURE — C2621 PMKR, OTHER THAN SING/DUAL: HCPCS | Performed by: INTERNAL MEDICINE

## 2025-08-08 PROCEDURE — 71045 X-RAY EXAM CHEST 1 VIEW: CPT

## 2025-08-08 PROCEDURE — 6360000002 HC RX W HCPCS: Performed by: NURSE ANESTHETIST, CERTIFIED REGISTERED

## 2025-08-08 PROCEDURE — 2720000010 HC SURG SUPPLY STERILE: Performed by: INTERNAL MEDICINE

## 2025-08-08 PROCEDURE — 2580000003 HC RX 258: Performed by: NURSE ANESTHETIST, CERTIFIED REGISTERED

## 2025-08-08 PROCEDURE — C1769 GUIDE WIRE: HCPCS | Performed by: INTERNAL MEDICINE

## 2025-08-08 DEVICE — PACEMAKER CRD 4.45X6.17X0.75 CM 16.6 CC VISIONIST X4: Type: IMPLANTABLE DEVICE | Status: FUNCTIONAL

## 2025-08-08 DEVICE — LEAD PACE BPLR 52 CM RT AV STR ACTIVE FIX IS-1 INGEVITY +: Type: IMPLANTABLE DEVICE | Status: FUNCTIONAL

## 2025-08-08 DEVICE — LEAD PACE STR 59 CM IS-1 BPLR CONN INGEVITY +: Type: IMPLANTABLE DEVICE | Status: FUNCTIONAL

## 2025-08-08 DEVICE — LEAD PACE QPLR 86 CM LT VENTRIC STR SPRL TINES FIX ACUITY X4: Type: IMPLANTABLE DEVICE | Status: FUNCTIONAL

## 2025-08-08 RX ORDER — SODIUM CHLORIDE 0.9 % (FLUSH) 0.9 %
5-40 SYRINGE (ML) INJECTION EVERY 12 HOURS SCHEDULED
Status: DISCONTINUED | OUTPATIENT
Start: 2025-08-08 | End: 2025-08-09 | Stop reason: HOSPADM

## 2025-08-08 RX ORDER — ATORVASTATIN CALCIUM 80 MG/1
80 TABLET, FILM COATED ORAL DAILY
Status: DISCONTINUED | OUTPATIENT
Start: 2025-08-08 | End: 2025-08-09

## 2025-08-08 RX ORDER — ACETAMINOPHEN 325 MG/1
650 TABLET ORAL EVERY 4 HOURS PRN
Status: DISCONTINUED | OUTPATIENT
Start: 2025-08-08 | End: 2025-08-09 | Stop reason: HOSPADM

## 2025-08-08 RX ORDER — TRAMADOL HYDROCHLORIDE 50 MG/1
100 TABLET ORAL EVERY 6 HOURS PRN
Status: DISCONTINUED | OUTPATIENT
Start: 2025-08-08 | End: 2025-08-09 | Stop reason: HOSPADM

## 2025-08-08 RX ORDER — ASPIRIN 81 MG/1
81 TABLET ORAL
Status: DISCONTINUED | OUTPATIENT
Start: 2025-08-08 | End: 2025-08-09 | Stop reason: HOSPADM

## 2025-08-08 RX ORDER — LIDOCAINE HYDROCHLORIDE AND EPINEPHRINE 10; 10 MG/ML; UG/ML
INJECTION, SOLUTION INFILTRATION; PERINEURAL PRN
Status: DISCONTINUED | OUTPATIENT
Start: 2025-08-08 | End: 2025-08-08 | Stop reason: HOSPADM

## 2025-08-08 RX ORDER — PANTOPRAZOLE SODIUM 40 MG/1
40 TABLET, DELAYED RELEASE ORAL
Status: DISCONTINUED | OUTPATIENT
Start: 2025-08-09 | End: 2025-08-08

## 2025-08-08 RX ORDER — INSULIN LISPRO 100 [IU]/ML
0-4 INJECTION, SOLUTION INTRAVENOUS; SUBCUTANEOUS
Status: DISCONTINUED | OUTPATIENT
Start: 2025-08-08 | End: 2025-08-09 | Stop reason: HOSPADM

## 2025-08-08 RX ORDER — PAROXETINE 20 MG/1
20 TABLET, FILM COATED ORAL DAILY
Status: DISCONTINUED | OUTPATIENT
Start: 2025-08-08 | End: 2025-08-09 | Stop reason: HOSPADM

## 2025-08-08 RX ORDER — TAMSULOSIN HYDROCHLORIDE 0.4 MG/1
0.4 CAPSULE ORAL NIGHTLY
Status: DISCONTINUED | OUTPATIENT
Start: 2025-08-08 | End: 2025-08-09 | Stop reason: HOSPADM

## 2025-08-08 RX ORDER — TRAMADOL HYDROCHLORIDE 50 MG/1
50 TABLET ORAL EVERY 6 HOURS PRN
Status: DISCONTINUED | OUTPATIENT
Start: 2025-08-08 | End: 2025-08-09 | Stop reason: HOSPADM

## 2025-08-08 RX ORDER — IOPAMIDOL 755 MG/ML
INJECTION, SOLUTION INTRAVASCULAR PRN
Status: DISCONTINUED | OUTPATIENT
Start: 2025-08-08 | End: 2025-08-08 | Stop reason: HOSPADM

## 2025-08-08 RX ORDER — FENOFIBRATE 160 MG/1
160 TABLET ORAL DAILY
Status: DISCONTINUED | OUTPATIENT
Start: 2025-08-09 | End: 2025-08-09 | Stop reason: HOSPADM

## 2025-08-08 RX ORDER — INSULIN LISPRO 100 [IU]/ML
0.08 INJECTION, SOLUTION INTRAVENOUS; SUBCUTANEOUS
Status: DISCONTINUED | OUTPATIENT
Start: 2025-08-08 | End: 2025-08-09 | Stop reason: HOSPADM

## 2025-08-08 RX ORDER — LEVOTHYROXINE SODIUM 75 UG/1
75 TABLET ORAL DAILY
Status: DISCONTINUED | OUTPATIENT
Start: 2025-08-09 | End: 2025-08-09 | Stop reason: HOSPADM

## 2025-08-08 RX ORDER — GABAPENTIN 300 MG/1
300 CAPSULE ORAL 2 TIMES DAILY
Status: DISCONTINUED | OUTPATIENT
Start: 2025-08-08 | End: 2025-08-09 | Stop reason: HOSPADM

## 2025-08-08 RX ORDER — SODIUM CHLORIDE, SODIUM LACTATE, POTASSIUM CHLORIDE, CALCIUM CHLORIDE 600; 310; 30; 20 MG/100ML; MG/100ML; MG/100ML; MG/100ML
INJECTION, SOLUTION INTRAVENOUS
Status: DISCONTINUED | OUTPATIENT
Start: 2025-08-08 | End: 2025-08-08 | Stop reason: SDUPTHER

## 2025-08-08 RX ORDER — SODIUM CHLORIDE 9 MG/ML
INJECTION, SOLUTION INTRAVENOUS PRN
Status: DISCONTINUED | OUTPATIENT
Start: 2025-08-08 | End: 2025-08-09 | Stop reason: HOSPADM

## 2025-08-08 RX ORDER — ONDANSETRON 2 MG/ML
4 INJECTION INTRAMUSCULAR; INTRAVENOUS EVERY 6 HOURS PRN
Status: DISCONTINUED | OUTPATIENT
Start: 2025-08-08 | End: 2025-08-09 | Stop reason: HOSPADM

## 2025-08-08 RX ORDER — PROPOFOL 10 MG/ML
INJECTION, EMULSION INTRAVENOUS
Status: DISCONTINUED | OUTPATIENT
Start: 2025-08-08 | End: 2025-08-08 | Stop reason: SDUPTHER

## 2025-08-08 RX ORDER — IBUPROFEN 600 MG/1
1 TABLET ORAL PRN
Status: DISCONTINUED | OUTPATIENT
Start: 2025-08-08 | End: 2025-08-09 | Stop reason: HOSPADM

## 2025-08-08 RX ORDER — SODIUM CHLORIDE 0.9 % (FLUSH) 0.9 %
5-40 SYRINGE (ML) INJECTION PRN
Status: DISCONTINUED | OUTPATIENT
Start: 2025-08-08 | End: 2025-08-09 | Stop reason: HOSPADM

## 2025-08-08 RX ORDER — DEXTROSE MONOHYDRATE 100 MG/ML
INJECTION, SOLUTION INTRAVENOUS CONTINUOUS PRN
Status: DISCONTINUED | OUTPATIENT
Start: 2025-08-08 | End: 2025-08-09 | Stop reason: HOSPADM

## 2025-08-08 RX ORDER — TAMSULOSIN HYDROCHLORIDE 0.4 MG/1
0.4 CAPSULE ORAL DAILY
Status: DISCONTINUED | OUTPATIENT
Start: 2025-08-09 | End: 2025-08-08

## 2025-08-08 RX ORDER — POLYETHYLENE GLYCOL 3350 17 G/17G
17 POWDER, FOR SOLUTION ORAL DAILY PRN
Status: DISCONTINUED | OUTPATIENT
Start: 2025-08-08 | End: 2025-08-09 | Stop reason: HOSPADM

## 2025-08-08 RX ADMIN — PAROXETINE HYDROCHLORIDE 20 MG: 20 TABLET, FILM COATED ORAL at 21:00

## 2025-08-08 RX ADMIN — SODIUM CHLORIDE, SODIUM LACTATE, POTASSIUM CHLORIDE, AND CALCIUM CHLORIDE: 600; 310; 30; 20 INJECTION, SOLUTION INTRAVENOUS at 16:07

## 2025-08-08 RX ADMIN — GABAPENTIN 300 MG: 300 CAPSULE ORAL at 20:15

## 2025-08-08 RX ADMIN — SODIUM CHLORIDE, PRESERVATIVE FREE 10 ML: 5 INJECTION INTRAVENOUS at 21:00

## 2025-08-08 RX ADMIN — ASPIRIN 81 MG: 81 TABLET ORAL at 20:15

## 2025-08-08 RX ADMIN — PROPOFOL 40 MG: 10 INJECTION, EMULSION INTRAVENOUS at 16:19

## 2025-08-08 RX ADMIN — Medication 2000 MG: at 16:32

## 2025-08-08 RX ADMIN — PROPOFOL 125 MCG/KG/MIN: 10 INJECTION, EMULSION INTRAVENOUS at 16:20

## 2025-08-08 RX ADMIN — ACETAMINOPHEN 650 MG: 325 TABLET ORAL at 20:15

## 2025-08-08 RX ADMIN — ATORVASTATIN CALCIUM 80 MG: 80 TABLET, FILM COATED ORAL at 20:15

## 2025-08-08 RX ADMIN — TAMSULOSIN HYDROCHLORIDE 0.4 MG: 0.4 CAPSULE ORAL at 21:48

## 2025-08-08 ASSESSMENT — PAIN DESCRIPTION - ORIENTATION: ORIENTATION: LEFT

## 2025-08-08 ASSESSMENT — PAIN SCALES - GENERAL
PAINLEVEL_OUTOF10: 0
PAINLEVEL_OUTOF10: 5
PAINLEVEL_OUTOF10: 4

## 2025-08-08 ASSESSMENT — PAIN DESCRIPTION - LOCATION: LOCATION: SHOULDER

## 2025-08-08 ASSESSMENT — PAIN DESCRIPTION - DESCRIPTORS: DESCRIPTORS: ACHING;DISCOMFORT

## 2025-08-09 VITALS
RESPIRATION RATE: 18 BRPM | TEMPERATURE: 98.1 F | HEART RATE: 71 BPM | DIASTOLIC BLOOD PRESSURE: 78 MMHG | WEIGHT: 144 LBS | HEIGHT: 70 IN | SYSTOLIC BLOOD PRESSURE: 143 MMHG | BODY MASS INDEX: 20.62 KG/M2 | OXYGEN SATURATION: 97 %

## 2025-08-09 PROBLEM — R00.1 SYMPTOMATIC BRADYCARDIA: Status: ACTIVE | Noted: 2025-08-09

## 2025-08-09 LAB
ANION GAP SERPL CALC-SCNC: 10 MMOL/L (ref 7–16)
BUN SERPL-MCNC: 22 MG/DL (ref 8–23)
CALCIUM SERPL-MCNC: 9.1 MG/DL (ref 8.8–10.2)
CHLORIDE SERPL-SCNC: 106 MMOL/L (ref 98–107)
CO2 SERPL-SCNC: 25 MMOL/L (ref 20–29)
CREAT SERPL-MCNC: 1.1 MG/DL (ref 0.8–1.3)
EKG ATRIAL RATE: 72 BPM
EKG DIAGNOSIS: NORMAL
EKG Q-T INTERVAL: 464 MS
EKG QRS DURATION: 144 MS
EKG QTC CALCULATION (BAZETT): 497 MS
EKG R AXIS: -70 DEGREES
EKG T AXIS: 56 DEGREES
EKG VENTRICULAR RATE: 69 BPM
EST. AVERAGE GLUCOSE BLD GHB EST-MCNC: 122 MG/DL
GLUCOSE BLD STRIP.AUTO-MCNC: 117 MG/DL (ref 65–100)
GLUCOSE BLD STRIP.AUTO-MCNC: 158 MG/DL (ref 65–100)
GLUCOSE SERPL-MCNC: 113 MG/DL (ref 70–99)
HBA1C MFR BLD: 5.9 % (ref 0–5.6)
MAGNESIUM SERPL-MCNC: 1.6 MG/DL (ref 1.8–2.4)
MAGNESIUM SERPL-MCNC: 2.2 MG/DL (ref 1.8–2.4)
POTASSIUM SERPL-SCNC: 4.1 MMOL/L (ref 3.5–5.1)
SERVICE CMNT-IMP: ABNORMAL
SERVICE CMNT-IMP: ABNORMAL
SODIUM SERPL-SCNC: 141 MMOL/L (ref 136–145)

## 2025-08-09 PROCEDURE — 93010 ELECTROCARDIOGRAM REPORT: CPT | Performed by: INTERNAL MEDICINE

## 2025-08-09 PROCEDURE — 82962 GLUCOSE BLOOD TEST: CPT

## 2025-08-09 PROCEDURE — 2500000003 HC RX 250 WO HCPCS: Performed by: INTERNAL MEDICINE

## 2025-08-09 PROCEDURE — 36415 COLL VENOUS BLD VENIPUNCTURE: CPT

## 2025-08-09 PROCEDURE — 83036 HEMOGLOBIN GLYCOSYLATED A1C: CPT

## 2025-08-09 PROCEDURE — G0378 HOSPITAL OBSERVATION PER HR: HCPCS

## 2025-08-09 PROCEDURE — 80048 BASIC METABOLIC PNL TOTAL CA: CPT

## 2025-08-09 PROCEDURE — 6370000000 HC RX 637 (ALT 250 FOR IP): Performed by: INTERNAL MEDICINE

## 2025-08-09 PROCEDURE — 6360000002 HC RX W HCPCS: Performed by: CASE MANAGER/CARE COORDINATOR

## 2025-08-09 PROCEDURE — 83735 ASSAY OF MAGNESIUM: CPT

## 2025-08-09 RX ORDER — HYDRALAZINE HYDROCHLORIDE 20 MG/ML
10 INJECTION INTRAMUSCULAR; INTRAVENOUS ONCE
Status: COMPLETED | OUTPATIENT
Start: 2025-08-09 | End: 2025-08-09

## 2025-08-09 RX ORDER — MAGNESIUM SULFATE IN WATER 40 MG/ML
2000 INJECTION, SOLUTION INTRAVENOUS
Status: COMPLETED | OUTPATIENT
Start: 2025-08-09 | End: 2025-08-09

## 2025-08-09 RX ORDER — ATORVASTATIN CALCIUM 80 MG/1
80 TABLET, FILM COATED ORAL DAILY
Status: DISCONTINUED | OUTPATIENT
Start: 2025-08-09 | End: 2025-08-09 | Stop reason: HOSPADM

## 2025-08-09 RX ORDER — TRAMADOL HYDROCHLORIDE 50 MG/1
50 TABLET ORAL EVERY 8 HOURS PRN
Qty: 10 TABLET | Refills: 0 | Status: SHIPPED | OUTPATIENT
Start: 2025-08-09 | End: 2025-08-12

## 2025-08-09 RX ORDER — MAGNESIUM SULFATE IN WATER 40 MG/ML
2000 INJECTION, SOLUTION INTRAVENOUS ONCE
Status: DISCONTINUED | OUTPATIENT
Start: 2025-08-09 | End: 2025-08-09

## 2025-08-09 RX ADMIN — LEVOTHYROXINE SODIUM 75 MCG: 0.07 TABLET ORAL at 08:19

## 2025-08-09 RX ADMIN — FENOFIBRATE 160 MG: 160 TABLET ORAL at 08:20

## 2025-08-09 RX ADMIN — MAGNESIUM SULFATE HEPTAHYDRATE 2000 MG: 40 INJECTION, SOLUTION INTRAVENOUS at 05:44

## 2025-08-09 RX ADMIN — PAROXETINE HYDROCHLORIDE 20 MG: 20 TABLET, FILM COATED ORAL at 08:20

## 2025-08-09 RX ADMIN — SODIUM CHLORIDE, PRESERVATIVE FREE 10 ML: 5 INJECTION INTRAVENOUS at 08:21

## 2025-08-09 RX ADMIN — MAGNESIUM SULFATE HEPTAHYDRATE 2000 MG: 40 INJECTION, SOLUTION INTRAVENOUS at 06:58

## 2025-08-09 RX ADMIN — ACETAMINOPHEN 650 MG: 325 TABLET ORAL at 01:20

## 2025-08-09 RX ADMIN — HYDRALAZINE HYDROCHLORIDE 10 MG: 20 INJECTION, SOLUTION INTRAMUSCULAR; INTRAVENOUS at 04:00

## 2025-08-09 RX ADMIN — INSULIN LISPRO 5 UNITS: 100 INJECTION, SOLUTION INTRAVENOUS; SUBCUTANEOUS at 08:20

## 2025-08-09 RX ADMIN — GABAPENTIN 300 MG: 300 CAPSULE ORAL at 08:19

## 2025-08-09 RX ADMIN — EMPAGLIFLOZIN 10 MG: 10 TABLET, FILM COATED ORAL at 08:20

## 2025-08-09 RX ADMIN — ASPIRIN 81 MG: 81 TABLET ORAL at 12:49

## 2025-08-09 RX ADMIN — ACETAMINOPHEN 650 MG: 325 TABLET ORAL at 10:12

## 2025-08-09 RX ADMIN — TRAMADOL HYDROCHLORIDE 50 MG: 50 TABLET, COATED ORAL at 12:49

## 2025-08-09 ASSESSMENT — PAIN DESCRIPTION - LOCATION
LOCATION: INCISION
LOCATION: SHOULDER
LOCATION: INCISION;OTHER (COMMENT)
LOCATION: SHOULDER
LOCATION: SHOULDER

## 2025-08-09 ASSESSMENT — PAIN SCALES - GENERAL
PAINLEVEL_OUTOF10: 4
PAINLEVEL_OUTOF10: 4
PAINLEVEL_OUTOF10: 0
PAINLEVEL_OUTOF10: 4
PAINLEVEL_OUTOF10: 0
PAINLEVEL_OUTOF10: 5
PAINLEVEL_OUTOF10: 6

## 2025-08-09 ASSESSMENT — PAIN DESCRIPTION - ORIENTATION
ORIENTATION: LEFT

## 2025-08-09 ASSESSMENT — PAIN - FUNCTIONAL ASSESSMENT
PAIN_FUNCTIONAL_ASSESSMENT: 0-10
PAIN_FUNCTIONAL_ASSESSMENT: ACTIVITIES ARE NOT PREVENTED
PAIN_FUNCTIONAL_ASSESSMENT: 0-10
PAIN_FUNCTIONAL_ASSESSMENT: ACTIVITIES ARE NOT PREVENTED

## 2025-08-09 ASSESSMENT — PAIN DESCRIPTION - DESCRIPTORS
DESCRIPTORS: ACHING;DISCOMFORT
DESCRIPTORS: ACHING;DISCOMFORT
DESCRIPTORS: ACHING
DESCRIPTORS: ACHING;DISCOMFORT
DESCRIPTORS: SORE

## 2025-08-11 ENCOUNTER — CARE COORDINATION (OUTPATIENT)
Dept: CARE COORDINATION | Facility: CLINIC | Age: 75
End: 2025-08-11

## 2025-08-20 ENCOUNTER — CARE COORDINATION (OUTPATIENT)
Dept: CARE COORDINATION | Facility: CLINIC | Age: 75
End: 2025-08-20

## 2025-08-20 ENCOUNTER — CLINICAL SUPPORT (OUTPATIENT)
Age: 75
End: 2025-08-20

## 2025-08-20 DIAGNOSIS — R00.1 SYMPTOMATIC BRADYCARDIA: Primary | ICD-10-CM

## 2025-08-20 RX ORDER — METOPROLOL SUCCINATE 50 MG/1
50 TABLET, EXTENDED RELEASE ORAL DAILY
Qty: 30 TABLET | Refills: 5 | Status: SHIPPED | OUTPATIENT
Start: 2025-08-20

## 2025-08-27 ENCOUNTER — CARE COORDINATION (OUTPATIENT)
Dept: CARE COORDINATION | Facility: CLINIC | Age: 75
End: 2025-08-27

## (undated) DEVICE — SOLUTION IRRIG 1000ML 0.9% SOD CHL USP POUR PLAS BTL

## (undated) DEVICE — FORCEPS BX L240CM JAW DIA2.8MM L CAP W/ NDL MIC MESH TOOTH

## (undated) DEVICE — GUIDEWIRE VASC L190CM DIA0.014IN HYDRPHLC ACUITY WHISPER VW

## (undated) DEVICE — SUTURE ABSORBABLE MONOFILAMENT 4-0 SC-1 18 IN PLN GUT 1824H

## (undated) DEVICE — TUBING, SUCTION, 1/4" X 10', STRAIGHT: Brand: MEDLINE

## (undated) DEVICE — GLOVE SURG SZ 65 L12IN FNGR THK79MIL GRN LTX FREE

## (undated) DEVICE — SYSTEM CLOSURE 6-12 FR VEN VASC VASCADE MVP

## (undated) DEVICE — GARMENT,MEDLINE,DVT,INT,CALF,MED, GEN2: Brand: MEDLINE

## (undated) DEVICE — DEVICE ELECSURG W/ DISECT PLASMABLADE X 3.0S

## (undated) DEVICE — KIT PROCEDURE SURG HEAD AND NECK TOTE

## (undated) DEVICE — SPLINT NSL SEPTAL SUPP REG PRE PUNCHED HOLE SIL STRL BRTH EZ

## (undated) DEVICE — SYR 3ML LL TIP 1/10ML GRAD --

## (undated) DEVICE — CATHETER DIAG AD 6FR L100CM 0.038IN STD COR POLYUR JUDKINS

## (undated) DEVICE — BUTTON SWITCH PENCIL BLADE ELECTRODE, HOLSTER: Brand: EDGE

## (undated) DEVICE — INTENDED FOR TISSUE SEPARATION, AND OTHER PROCEDURES THAT REQUIRE A SHARP SURGICAL BLADE TO PUNCTURE OR CUT.: Brand: BARD-PARKER ® STAINLESS STEEL BLADES

## (undated) DEVICE — CANNULA NSL ORAL AD FOR CAPNOFLEX CO2 O2 AIRLFE

## (undated) DEVICE — GLOVE ORANGE PI 7 1/2   MSG9075

## (undated) DEVICE — INTRODUCER LAT VEIN L62CM OD5.5FR ADV TELSCP SYS RENAL

## (undated) DEVICE — SUTURE ABSORBABLE MONOFILAMENT 4-0 CV15 6 IN PUR V-LOC 90 VLOCM1203

## (undated) DEVICE — 2000CC GUARDIAN II: Brand: GUARDIAN

## (undated) DEVICE — CANISTER, RIGID, 2000CC: Brand: MEDLINE INDUSTRIES, INC.

## (undated) DEVICE — CATHETER ABLAT 8FR L115CM 1-6-2MM SPC TIP 3.5MM DF CRV

## (undated) DEVICE — CODMAN® SURGICAL PATTIES 1" X 3" (2.54CM X 7.62CM): Brand: CODMAN®

## (undated) DEVICE — CONNECTOR TBNG OD5-7MM O2 END DISP

## (undated) DEVICE — DRAPE,TOP,102X53,STERILE: Brand: MEDLINE

## (undated) DEVICE — SYR 5ML 1/5 GRAD LL NSAF LF --

## (undated) DEVICE — PRECISION THIN (9.0 X 0.38 X 31.0MM)

## (undated) DEVICE — SUTURE ETHIBOND EXCEL SZ 0 L18IN NONABSORBABLE GRN L26MM MO-6 CX45D

## (undated) DEVICE — ADHESIVE SKIN CLOSURE WND 8.661X1.5 IN 22 CM LIQUIBAND SECUR

## (undated) DEVICE — PATCH REF EXT FOR CARTO 3 SYS (EA = 6 PACKS)

## (undated) DEVICE — DRILL 4.5MM FOR 5.5MM ANCHOR SL-PLUS

## (undated) DEVICE — SHEATH 1912000 5PK 4MM/0DEG STORZ XOMED: Brand: ENDO-SCRUB®

## (undated) DEVICE — SHEATH GUID 11.5X8.5FR L71MM M CRV L22MM BIDIR STEER CARTO

## (undated) DEVICE — DEVICE COMPR 17 CM 120 CC SAFEGUARD FOCUS LF

## (undated) DEVICE — INTRODUCER SHTH CARDIAGUIDE FCL20101

## (undated) DEVICE — INTRODUCER LD L13CM OD6FR SPLITTABLE DIL W/ J TIP GWIRE SYR

## (undated) DEVICE — BLADE 1884006EM RAD40 4MM M4 ROTATE ROHS: Brand: FUSION®

## (undated) DEVICE — PRESSURE MONITORING SET: Brand: TRUWAVE

## (undated) DEVICE — CATHETER MAP D CRV 2-2-2-2-2 MM SPC PERSEID OCTARAY

## (undated) DEVICE — DRAPE TWL SURG 16X26IN BLU ORB04] ALLCARE INC]

## (undated) DEVICE — SOLUTION IV 1000ML 0.9% SOD CHL

## (undated) DEVICE — INSTRUMENT TRACKER 9733533XOM ENT 1PK

## (undated) DEVICE — CATHETER REPROC EP F-J BIOBD710FJ282CTR

## (undated) DEVICE — STERILE (15.2 TAPERED TO 7.6 X 183CM) POLYETHYLENE ACCORDION-FOLDED COVER FOR USE WITH SIEMENS ACUNAV ULTRASOUND CATHETER FAMILY CONNECTOR: Brand: SWIFTLINK TRANSDUCER COVER

## (undated) DEVICE — TUBING PMP FOR CARTO SYS SMARTABLATE

## (undated) DEVICE — SYRINGE MED 50ML LUERLOCK TIP

## (undated) DEVICE — BLADE 1882040 5PK INFERIOR TURB 2MM

## (undated) DEVICE — NEEDLE SPNL 22GA L3.5IN BLK HUB S STL REG WALL FIT STYL W/

## (undated) DEVICE — FOAM BUMP ROUND LARGE: Brand: MEDLINE INDUSTRIES, INC.

## (undated) DEVICE — SHEATH INTRO L10CM DIA8FR TIF TIP PINN

## (undated) DEVICE — SUTURE VCRL SZ 2-0 L27IN ABSRB UD L26MM CT-2 1/2 CIR J269H

## (undated) DEVICE — BANDAGE COMPR W4INXL12FT E DISP ESMARCH EVEN

## (undated) DEVICE — GOWN,PREVENTION PLUS,XLN/XL,ST,24/CS: Brand: MEDLINE

## (undated) DEVICE — DRESSING POSTOP AG PRISMASEAL 3.5X6IN

## (undated) DEVICE — CATHETER ANGIO JL4 0.038 IN AD 6 FRX100 CM STD SUPER TORQUE

## (undated) DEVICE — SPLINT THMB W4XL30IN FBRGLS PD PRECUT LTWT DURABLE FAST SET

## (undated) DEVICE — SUTURE CHROMIC GUT SZ 4-0 L27IN ABSRB BRN L17MM RB-1 1/2 U203H

## (undated) DEVICE — PATIENT TRACKER 9734887XOM NON-INVASIVE

## (undated) DEVICE — CATHETER REPROC ELCTRPHSLGY 10FR DIA 90CML DGNSTC ULTRSND F

## (undated) DEVICE — GLOVE SURG SZ 65 CRM LTX FREE POLYISOPRENE POLYMER BEAD ANTI

## (undated) DEVICE — GUIDEWIRE VASC ANGLED 3 CM 0.035 INX180 CM STD NIT GLIDEWIRE

## (undated) DEVICE — PINNACLE INTRODUCER SHEATH: Brand: PINNACLE

## (undated) DEVICE — SUTURE NONABSORBABLE MONOFILAMENT 3-0 PS-1 18 IN BLK ETHILON 1663H

## (undated) DEVICE — GUIDE COR SNUS L40CM DIA9FR 0.035IN STD CRV ADV UNIQUE

## (undated) DEVICE — Device: Brand: NRG TRANSSEPTAL NEEDLE

## (undated) DEVICE — CATHETER PRESSURE WEDGE BLLN 6FRX110CM

## (undated) DEVICE — NDL PRT INJ NSAF BLNT 18GX1.5 --

## (undated) DEVICE — FOOT DR TOLLISON & WOMACK: Brand: MEDLINE INDUSTRIES, INC.

## (undated) DEVICE — CATHETER EP 7FR L115CM 2-8-2MM SPC TIP 2MM 10 ELECTRD F L

## (undated) DEVICE — KIT ANGIO CNTRST ADMIN W O BWL WORLEY

## (undated) DEVICE — KENDALL SCD EXPRESS SLEEVES, KNEE LENGTH, LARGE: Brand: KENDALL SCD

## (undated) DEVICE — PINNACLE TIF INTRODUCER SHEATH: Brand: PINNACLE

## (undated) DEVICE — SOLUTION IV 1000ML LAC RINGERS PH 6.5 INJ USP VIAFLX PLAS

## (undated) DEVICE — SHEATH 197230FLA 5PK ES2 STZ LF 4MM/45DG: Brand: ENDO-SCRUB®

## (undated) DEVICE — Device

## (undated) DEVICE — CONTAINER PREFIL FRMLN 40ML --

## (undated) DEVICE — KIT INSTR W/ 2.4MM GUIDEPIN SUT PASS WIRE NO2 FIBERWIRE

## (undated) DEVICE — SUT ETHLN 3-0 18IN PS1 BLK --

## (undated) DEVICE — LUKI TUBE SPECIMEN COLLECTION DEVICE,20 ML: Brand: ARGYLE

## (undated) DEVICE — REM POLYHESIVE ADULT PATIENT RETURN ELECTRODE: Brand: VALLEYLAB

## (undated) DEVICE — KIT,ANTI FOG,W/SPONGE & FLUID,SOFT PACK: Brand: MEDLINE

## (undated) DEVICE — HEAD REST BAGEL: Brand: DEVON

## (undated) DEVICE — SYSTEM US 18GA NDL GUID KT PRB CVR LEN 96IN SITE RITE

## (undated) DEVICE — ELECTROSURGICAL SUCTION COAGULATOR 10FR

## (undated) DEVICE — 18G NG KIT WITH 96IN PROBE COVER (10 PK): Brand: SITE-RITE

## (undated) DEVICE — TUBING 1895522 5PK STRAIGHTSHOT TO XPS: Brand: STRAIGHTSHOT®

## (undated) DEVICE — GLOVE SURG SZ 8 L12IN FNGR THK79MIL GRN LTX FREE

## (undated) DEVICE — SUTURE PROL SZ 2-0 L18IN NONABSORBABLE BLU FS L26MM 3/8 CIR 8685H

## (undated) DEVICE — AGENT HEMSTAT 3GM PURIFIED PLNT STARCH PWD ABSRB ARISTA AH

## (undated) DEVICE — KENDALL RADIOLUCENT FOAM MONITORING ELECTRODE RECTANGULAR SHAPE: Brand: KENDALL

## (undated) DEVICE — SET ACCS CATH 5FR L10CM NDL 21GA L7CM GWIRE L40CM

## (undated) DEVICE — TRAY PREP DRY W/ PREM GLV 2 APPL 6 SPNG 2 UNDPD 1 OVERWRAP

## (undated) DEVICE — HEAD AND NECK: Brand: MEDLINE INDUSTRIES, INC.

## (undated) DEVICE — ELECTRODE PT RET AD L9FT HI MOIST COND ADH HYDRGEL CORDED